# Patient Record
Sex: FEMALE | Race: BLACK OR AFRICAN AMERICAN | NOT HISPANIC OR LATINO | URBAN - METROPOLITAN AREA
[De-identification: names, ages, dates, MRNs, and addresses within clinical notes are randomized per-mention and may not be internally consistent; named-entity substitution may affect disease eponyms.]

---

## 2018-05-16 ENCOUNTER — INPATIENT (INPATIENT)
Facility: HOSPITAL | Age: 83
LOS: 19 days | Discharge: NURSING, REHAB OR LONG-TERM CARE | DRG: 253 | End: 2018-06-05
Attending: SURGERY | Admitting: SURGERY
Payer: MEDICARE

## 2018-05-16 VITALS
TEMPERATURE: 97 F | HEIGHT: 66 IN | SYSTOLIC BLOOD PRESSURE: 150 MMHG | RESPIRATION RATE: 20 BRPM | WEIGHT: 184.09 LBS | OXYGEN SATURATION: 100 % | DIASTOLIC BLOOD PRESSURE: 90 MMHG | HEART RATE: 99 BPM

## 2018-05-16 LAB
ALBUMIN SERPL ELPH-MCNC: 3.6 G/DL — SIGNIFICANT CHANGE UP (ref 3.3–5)
ALP SERPL-CCNC: 86 U/L — SIGNIFICANT CHANGE UP (ref 40–120)
ALT FLD-CCNC: 17 U/L — SIGNIFICANT CHANGE UP (ref 10–45)
ANION GAP SERPL CALC-SCNC: 15 MMOL/L — SIGNIFICANT CHANGE UP (ref 5–17)
APPEARANCE UR: CLEAR — SIGNIFICANT CHANGE UP
APTT BLD: 28.5 SEC — SIGNIFICANT CHANGE UP (ref 27.5–37.4)
AST SERPL-CCNC: 16 U/L — SIGNIFICANT CHANGE UP (ref 10–40)
BASE EXCESS BLDV CALC-SCNC: 0.7 MMOL/L — SIGNIFICANT CHANGE UP
BASOPHILS NFR BLD AUTO: 0.1 % — SIGNIFICANT CHANGE UP (ref 0–2)
BILIRUB SERPL-MCNC: 0.3 MG/DL — SIGNIFICANT CHANGE UP (ref 0.2–1.2)
BILIRUB UR-MCNC: NEGATIVE — SIGNIFICANT CHANGE UP
BLD GP AB SCN SERPL QL: NEGATIVE — SIGNIFICANT CHANGE UP
BLD GP AB SCN SERPL QL: NEGATIVE — SIGNIFICANT CHANGE UP
BUN SERPL-MCNC: 17 MG/DL — SIGNIFICANT CHANGE UP (ref 7–23)
CA-I SERPL-SCNC: 1.23 MMOL/L — SIGNIFICANT CHANGE UP (ref 1.12–1.3)
CALCIUM SERPL-MCNC: 9.6 MG/DL — SIGNIFICANT CHANGE UP (ref 8.4–10.5)
CHLORIDE SERPL-SCNC: 97 MMOL/L — SIGNIFICANT CHANGE UP (ref 96–108)
CK MB CFR SERPL CALC: 1.4 NG/ML — SIGNIFICANT CHANGE UP (ref 0–6.7)
CK SERPL-CCNC: 83 U/L — SIGNIFICANT CHANGE UP (ref 25–170)
CO2 SERPL-SCNC: 22 MMOL/L — SIGNIFICANT CHANGE UP (ref 22–31)
COLOR SPEC: YELLOW — SIGNIFICANT CHANGE UP
CREAT SERPL-MCNC: 0.76 MG/DL — SIGNIFICANT CHANGE UP (ref 0.5–1.3)
DIFF PNL FLD: NEGATIVE — SIGNIFICANT CHANGE UP
EOSINOPHIL NFR BLD AUTO: 2.5 % — SIGNIFICANT CHANGE UP (ref 0–6)
GAS PNL BLDV: 135 MMOL/L — LOW (ref 138–146)
GAS PNL BLDV: SIGNIFICANT CHANGE UP
GAS PNL BLDV: SIGNIFICANT CHANGE UP
GLUCOSE BLDC GLUCOMTR-MCNC: 134 MG/DL — HIGH (ref 70–99)
GLUCOSE BLDC GLUCOMTR-MCNC: 266 MG/DL — HIGH (ref 70–99)
GLUCOSE SERPL-MCNC: 186 MG/DL — HIGH (ref 70–99)
GLUCOSE UR QL: 250
HCO3 BLDV-SCNC: 26 MMOL/L — SIGNIFICANT CHANGE UP (ref 20–27)
HCT VFR BLD CALC: 35.3 % — SIGNIFICANT CHANGE UP (ref 34.5–45)
HGB BLD-MCNC: 11.8 G/DL — SIGNIFICANT CHANGE UP (ref 11.5–15.5)
INR BLD: 1.14 — SIGNIFICANT CHANGE UP (ref 0.88–1.16)
KETONES UR-MCNC: NEGATIVE — SIGNIFICANT CHANGE UP
LACTATE SERPL-SCNC: 1.4 MMOL/L — SIGNIFICANT CHANGE UP (ref 0.5–2)
LACTATE SERPL-SCNC: 2.2 MMOL/L — HIGH (ref 0.5–2)
LEUKOCYTE ESTERASE UR-ACNC: NEGATIVE — SIGNIFICANT CHANGE UP
LYMPHOCYTES # BLD AUTO: 18 % — SIGNIFICANT CHANGE UP (ref 13–44)
MCHC RBC-ENTMCNC: 29.4 PG — SIGNIFICANT CHANGE UP (ref 27–34)
MCHC RBC-ENTMCNC: 33.4 G/DL — SIGNIFICANT CHANGE UP (ref 32–36)
MCV RBC AUTO: 88 FL — SIGNIFICANT CHANGE UP (ref 80–100)
MONOCYTES NFR BLD AUTO: 13.4 % — SIGNIFICANT CHANGE UP (ref 2–14)
NEUTROPHILS NFR BLD AUTO: 66 % — SIGNIFICANT CHANGE UP (ref 43–77)
NITRITE UR-MCNC: NEGATIVE — SIGNIFICANT CHANGE UP
NT-PROBNP SERPL-SCNC: 100 PG/ML — SIGNIFICANT CHANGE UP (ref 0–300)
PCO2 BLDV: 45 MMHG — SIGNIFICANT CHANGE UP (ref 41–51)
PH BLDV: 7.38 — SIGNIFICANT CHANGE UP (ref 7.32–7.43)
PH UR: 6 — SIGNIFICANT CHANGE UP (ref 5–8)
PLATELET # BLD AUTO: 241 K/UL — SIGNIFICANT CHANGE UP (ref 150–400)
PO2 BLDV: 38 MMHG — SIGNIFICANT CHANGE UP
POTASSIUM BLDV-SCNC: 4.1 MMOL/L — SIGNIFICANT CHANGE UP (ref 3.5–4.9)
POTASSIUM SERPL-MCNC: 4.4 MMOL/L — SIGNIFICANT CHANGE UP (ref 3.5–5.3)
POTASSIUM SERPL-SCNC: 4.4 MMOL/L — SIGNIFICANT CHANGE UP (ref 3.5–5.3)
PROT SERPL-MCNC: 7.1 G/DL — SIGNIFICANT CHANGE UP (ref 6–8.3)
PROT UR-MCNC: NEGATIVE MG/DL — SIGNIFICANT CHANGE UP
PROTHROM AB SERPL-ACNC: 12.7 SEC — SIGNIFICANT CHANGE UP (ref 9.8–12.7)
RBC # BLD: 4.01 M/UL — SIGNIFICANT CHANGE UP (ref 3.8–5.2)
RBC # FLD: 13.5 % — SIGNIFICANT CHANGE UP (ref 10.3–16.9)
RH IG SCN BLD-IMP: POSITIVE — SIGNIFICANT CHANGE UP
RH IG SCN BLD-IMP: POSITIVE — SIGNIFICANT CHANGE UP
SAO2 % BLDV: 71 % — SIGNIFICANT CHANGE UP
SODIUM SERPL-SCNC: 134 MMOL/L — LOW (ref 135–145)
SP GR SPEC: 1.02 — SIGNIFICANT CHANGE UP (ref 1–1.03)
TROPONIN T SERPL-MCNC: <0.01 NG/ML — SIGNIFICANT CHANGE UP (ref 0–0.01)
UROBILINOGEN FLD QL: 0.2 E.U./DL — SIGNIFICANT CHANGE UP
WBC # BLD: 7.9 K/UL — SIGNIFICANT CHANGE UP (ref 3.8–10.5)
WBC # FLD AUTO: 7.9 K/UL — SIGNIFICANT CHANGE UP (ref 3.8–10.5)

## 2018-05-16 PROCEDURE — 99285 EMERGENCY DEPT VISIT HI MDM: CPT | Mod: 25

## 2018-05-16 PROCEDURE — 93010 ELECTROCARDIOGRAM REPORT: CPT

## 2018-05-16 PROCEDURE — 99223 1ST HOSP IP/OBS HIGH 75: CPT | Mod: 57,GC

## 2018-05-16 PROCEDURE — 93010 ELECTROCARDIOGRAM REPORT: CPT | Mod: 77

## 2018-05-16 PROCEDURE — 71045 X-RAY EXAM CHEST 1 VIEW: CPT | Mod: 26

## 2018-05-16 PROCEDURE — 73030 X-RAY EXAM OF SHOULDER: CPT | Mod: 26

## 2018-05-16 RX ORDER — SODIUM CHLORIDE 9 MG/ML
1000 INJECTION, SOLUTION INTRAVENOUS
Qty: 0 | Refills: 0 | Status: DISCONTINUED | OUTPATIENT
Start: 2018-05-16 | End: 2018-06-05

## 2018-05-16 RX ORDER — SERTRALINE 25 MG/1
50 TABLET, FILM COATED ORAL DAILY
Qty: 0 | Refills: 0 | Status: DISCONTINUED | OUTPATIENT
Start: 2018-05-16 | End: 2018-06-05

## 2018-05-16 RX ORDER — AMLODIPINE BESYLATE 2.5 MG/1
10 TABLET ORAL AT BEDTIME
Qty: 0 | Refills: 0 | Status: DISCONTINUED | OUTPATIENT
Start: 2018-05-16 | End: 2018-05-18

## 2018-05-16 RX ORDER — CIPROFLOXACIN LACTATE 400MG/40ML
500 VIAL (ML) INTRAVENOUS EVERY 12 HOURS
Qty: 0 | Refills: 0 | Status: DISCONTINUED | OUTPATIENT
Start: 2018-05-16 | End: 2018-05-18

## 2018-05-16 RX ORDER — INSULIN LISPRO 100/ML
VIAL (ML) SUBCUTANEOUS
Qty: 0 | Refills: 0 | Status: DISCONTINUED | OUTPATIENT
Start: 2018-05-16 | End: 2018-05-23

## 2018-05-16 RX ORDER — LOSARTAN POTASSIUM 100 MG/1
25 TABLET, FILM COATED ORAL
Qty: 0 | Refills: 0 | Status: DISCONTINUED | OUTPATIENT
Start: 2018-05-16 | End: 2018-05-16

## 2018-05-16 RX ORDER — DEXTROSE 50 % IN WATER 50 %
15 SYRINGE (ML) INTRAVENOUS ONCE
Qty: 0 | Refills: 0 | Status: DISCONTINUED | OUTPATIENT
Start: 2018-05-16 | End: 2018-06-05

## 2018-05-16 RX ORDER — HEPARIN SODIUM 5000 [USP'U]/ML
5000 INJECTION INTRAVENOUS; SUBCUTANEOUS EVERY 8 HOURS
Qty: 0 | Refills: 0 | Status: DISCONTINUED | OUTPATIENT
Start: 2018-05-16 | End: 2018-05-22

## 2018-05-16 RX ORDER — GABAPENTIN 400 MG/1
300 CAPSULE ORAL THREE TIMES A DAY
Qty: 0 | Refills: 0 | Status: DISCONTINUED | OUTPATIENT
Start: 2018-05-16 | End: 2018-06-05

## 2018-05-16 RX ORDER — DEXTROSE 50 % IN WATER 50 %
25 SYRINGE (ML) INTRAVENOUS ONCE
Qty: 0 | Refills: 0 | Status: DISCONTINUED | OUTPATIENT
Start: 2018-05-16 | End: 2018-06-05

## 2018-05-16 RX ORDER — GLUCAGON INJECTION, SOLUTION 0.5 MG/.1ML
1 INJECTION, SOLUTION SUBCUTANEOUS ONCE
Qty: 0 | Refills: 0 | Status: DISCONTINUED | OUTPATIENT
Start: 2018-05-16 | End: 2018-06-05

## 2018-05-16 RX ORDER — SODIUM CHLORIDE 9 MG/ML
3 INJECTION INTRAMUSCULAR; INTRAVENOUS; SUBCUTANEOUS ONCE
Qty: 0 | Refills: 0 | Status: COMPLETED | OUTPATIENT
Start: 2018-05-16 | End: 2018-05-16

## 2018-05-16 RX ORDER — DEXTROSE 50 % IN WATER 50 %
12.5 SYRINGE (ML) INTRAVENOUS ONCE
Qty: 0 | Refills: 0 | Status: DISCONTINUED | OUTPATIENT
Start: 2018-05-16 | End: 2018-06-05

## 2018-05-16 RX ORDER — ATORVASTATIN CALCIUM 80 MG/1
20 TABLET, FILM COATED ORAL AT BEDTIME
Qty: 0 | Refills: 0 | Status: DISCONTINUED | OUTPATIENT
Start: 2018-05-16 | End: 2018-05-19

## 2018-05-16 RX ADMIN — ATORVASTATIN CALCIUM 20 MILLIGRAM(S): 80 TABLET, FILM COATED ORAL at 22:27

## 2018-05-16 RX ADMIN — HEPARIN SODIUM 5000 UNIT(S): 5000 INJECTION INTRAVENOUS; SUBCUTANEOUS at 22:26

## 2018-05-16 RX ADMIN — SODIUM CHLORIDE 3 MILLILITER(S): 9 INJECTION INTRAMUSCULAR; INTRAVENOUS; SUBCUTANEOUS at 13:18

## 2018-05-16 RX ADMIN — GABAPENTIN 300 MILLIGRAM(S): 400 CAPSULE ORAL at 22:27

## 2018-05-16 RX ADMIN — AMLODIPINE BESYLATE 10 MILLIGRAM(S): 2.5 TABLET ORAL at 22:27

## 2018-05-16 RX ADMIN — Medication 100 MILLIGRAM(S): at 13:45

## 2018-05-16 RX ADMIN — Medication 300 MILLIGRAM(S): at 22:30

## 2018-05-16 RX ADMIN — Medication 500 MILLIGRAM(S): at 19:42

## 2018-05-16 NOTE — PRE-OP CHECKLIST - SELECT TESTS ORDERED
Type and Screen/POCT Blood Glucose/BMP/PT/PTT/CBC/INR/Urinalysis Type and Screen/POCT Blood Glucose/130/BMP/CBC/PT/PTT/INR/Urinalysis

## 2018-05-16 NOTE — ED PROVIDER NOTE - OBJECTIVE STATEMENT
82 yo F with hx of DVT? PE CAD cardiac stent PAD HTN DM CKD with bilateral lower extremity ulcers of great toes x 1 month-- no chest pain or sob  no N/V- recently traveled from [place] to vist her daughter who is an RN in the ED at [facility]  no pleuritic chest pain- no syncope

## 2018-05-16 NOTE — H&P ADULT - HISTORY OF PRESENT ILLNESS
83yoF with sig PMHx of CAD, lateral wall MI s/p 6 stents, dm, htn, ckd, RLE DVT, PE. early dementia presents with b/l great toe ulcers and pain.  Patient is from [place] and is noncompliant with medications and follow up care.  Patient has had ulcers for years.  Had a RLE angio done years ago and they could not intervene and was lost to follow up.  Was seeing Podiatry for ulcers but was told would not debride because she did not have pal pulses and the signals were not good.  Came to [place] to have better care and eval her vascular status.  Patient denies CP/N/V.

## 2018-05-16 NOTE — ED ADULT NURSE NOTE - OBJECTIVE STATEMENT
c/o intermittent dry non-productive cough and bilateral foot pain and BLE swelling x few weeks.  Hx DM, cardiac stents.  Also c/o left shoulder pain s/p trip and fall 2 weeks ago.  Denies head trauma, loc, chest pain, fever, chills.  Patient speaking clearly and coherently in full sentences.  MD at bedside.  EKG done.  Cardiac / vital signs monitoring in place.  DM ulcer to bilateral big toes.  Dressing changed by family this AM.  Labs sent.  Awaiting lab results and further studies.  Continue to monitor.

## 2018-05-16 NOTE — ED ADULT NURSE NOTE - NURSING SKIN WOUND TYPE #1
I have reviewed and confirmed nurses' notes for patient's medications, allergies, medical history, and surgical history. diabetic ulcer

## 2018-05-16 NOTE — H&P ADULT - ASSESSMENT
83yoF with b/l great toe nonhealing ulcer  -consistent carb diet  -ISS  -cont home meds  -f/u Dr. Sebastian  -ELSI cancino mdnt for OR tomorrow  -f/u am labs

## 2018-05-16 NOTE — ED ADULT NURSE NOTE - PMH
Arthropathy    Diabetes mellitus    Hyperlipidemia    Hypertension    Ischemic heart disease    Peripheral vascular disease    Ulcer of lower limb

## 2018-05-16 NOTE — ED PROVIDER NOTE - SKIN COLOR
normal for race/ischemic ulcers bilat great ts  no plap pulses DP or PI- uniphasic signal bilateral DP/PT

## 2018-05-16 NOTE — H&P ADULT - NSHPPHYSICALEXAM_GEN_ALL_CORE
Gen- NAD  Pulm- cta b/l  cardio- s1s2 rrr  abd- soft nt/nd  ext- RLE- distal tip of great toe with dry ulcer with ttp; no dc, odor, erythema  LLE- dry ulcer on nail bed of great toe with ttp; no dc, odor, erythema  Vascular- b/l LE +mono DP/PT triphasic pop 2+ pal fem

## 2018-05-16 NOTE — ED ADULT NURSE NOTE - CHIEF COMPLAINT QUOTE
Patient c/o sob when walking and bilateral foot pain with swelling for 2 weeks . Denies any chest pain  . History of cardiac stent .

## 2018-05-16 NOTE — PROGRESS NOTE ADULT - SUBJECTIVE AND OBJECTIVE BOX
Pre-op Diagnosis:  Procedure: Right Lower Extremity Angiogram  Surgeon: Emanuel    Consent: Obtained in chart                          11.8   7.9   )-----------( 241      ( 16 May 2018 13:25 )             35.3     05-16    134<L>  |  97  |  17  ----------------------------<  186<H>  4.4   |  22  |  0.76    Ca    9.6      16 May 2018 13:25    TPro  7.1  /  Alb  3.6  /  TBili  0.3  /  DBili  x   /  AST  16  /  ALT  17  /  AlkPhos  86  05-16    PT/INR - ( 16 May 2018 13:25 )   PT: 12.7 sec;   INR: 1.14          PTT - ( 16 May 2018 13:25 )  PTT:28.5 sec  Urinalysis Basic - ( 16 May 2018 14:58 )    Color: Yellow / Appearance: Clear / S.020 / pH: x  Gluc: x / Ketone: NEGATIVE  / Bili: Negative / Urobili: 0.2 E.U./dL   Blood: x / Protein: NEGATIVE mg/dL / Nitrite: NEGATIVE   Leuk Esterase: NEGATIVE / RBC: x / WBC x   Sq Epi: x / Non Sq Epi: x / Bacteria: x        Type & Screen: O positive, antibody Neg  CXR:  EKG:        A/P: 83yFemale pre-op for above procedure  1. NPO past midnight, except medications  2. IVFdextrose 5%. 1000 milliLiter(s) IV Continuous <Continuous>    3. [ ] Blood on hold, Units: Pre-op Diagnosis:  Procedure: Right Lower Extremity Angiogram  Surgeon: Emanuel    Consent: Obtained in chart                          11.8   7.9   )-----------( 241      ( 16 May 2018 13:25 )             35.3     05-16    134<L>  |  97  |  17  ----------------------------<  186<H>  4.4   |  22  |  0.76    Ca    9.6      16 May 2018 13:25    TPro  7.1  /  Alb  3.6  /  TBili  0.3  /  DBili  x   /  AST  16  /  ALT  17  /  AlkPhos  86  05-16    PT/INR - ( 16 May 2018 13:25 )   PT: 12.7 sec;   INR: 1.14          PTT - ( 16 May 2018 13:25 )  PTT:28.5 sec  Urinalysis Basic - ( 16 May 2018 14:58 )    Color: Yellow / Appearance: Clear / S.020 / pH: x  Gluc: x / Ketone: NEGATIVE  / Bili: Negative / Urobili: 0.2 E.U./dL   Blood: x / Protein: NEGATIVE mg/dL / Nitrite: NEGATIVE   Leuk Esterase: NEGATIVE / RBC: x / WBC x   Sq Epi: x / Non Sq Epi: x / Bacteria: x        Type & Screen: O positive, antibody Neg  CXR: WNL  EKG: NSR         A/P: 83yFemale pre-op for above procedure  1. NPO past midnight, except medications  2. IVFdextrose 5%. 1000 milliLiter(s) IV Continuous <Continuous>    3. [ ] Blood on hold, Units:

## 2018-05-17 DIAGNOSIS — I73.9 PERIPHERAL VASCULAR DISEASE, UNSPECIFIED: ICD-10-CM

## 2018-05-17 DIAGNOSIS — L98.499 NON-PRESSURE CHRONIC ULCER OF SKIN OF OTHER SITES WITH UNSPECIFIED SEVERITY: ICD-10-CM

## 2018-05-17 DIAGNOSIS — I10 ESSENTIAL (PRIMARY) HYPERTENSION: ICD-10-CM

## 2018-05-17 DIAGNOSIS — E78.5 HYPERLIPIDEMIA, UNSPECIFIED: ICD-10-CM

## 2018-05-17 DIAGNOSIS — Z01.818 ENCOUNTER FOR OTHER PREPROCEDURAL EXAMINATION: ICD-10-CM

## 2018-05-17 DIAGNOSIS — E11.9 TYPE 2 DIABETES MELLITUS WITHOUT COMPLICATIONS: ICD-10-CM

## 2018-05-17 LAB
ANION GAP SERPL CALC-SCNC: 10 MMOL/L — SIGNIFICANT CHANGE UP (ref 5–17)
BUN SERPL-MCNC: 13 MG/DL — SIGNIFICANT CHANGE UP (ref 7–23)
CALCIUM SERPL-MCNC: 9 MG/DL — SIGNIFICANT CHANGE UP (ref 8.4–10.5)
CHLORIDE SERPL-SCNC: 99 MMOL/L — SIGNIFICANT CHANGE UP (ref 96–108)
CO2 SERPL-SCNC: 28 MMOL/L — SIGNIFICANT CHANGE UP (ref 22–31)
CREAT SERPL-MCNC: 0.7 MG/DL — SIGNIFICANT CHANGE UP (ref 0.5–1.3)
CULTURE RESULTS: SIGNIFICANT CHANGE UP
GLUCOSE BLDC GLUCOMTR-MCNC: 123 MG/DL — HIGH (ref 70–99)
GLUCOSE BLDC GLUCOMTR-MCNC: 126 MG/DL — HIGH (ref 70–99)
GLUCOSE BLDC GLUCOMTR-MCNC: 130 MG/DL — HIGH (ref 70–99)
GLUCOSE BLDC GLUCOMTR-MCNC: 236 MG/DL — HIGH (ref 70–99)
GLUCOSE SERPL-MCNC: 140 MG/DL — HIGH (ref 70–99)
GRAM STN FLD: SIGNIFICANT CHANGE UP
HBA1C BLD-MCNC: 8.2 % — HIGH (ref 4–5.6)
HCT VFR BLD CALC: 34 % — LOW (ref 34.5–45)
HGB BLD-MCNC: 11.5 G/DL — SIGNIFICANT CHANGE UP (ref 11.5–15.5)
MAGNESIUM SERPL-MCNC: 2.1 MG/DL — SIGNIFICANT CHANGE UP (ref 1.6–2.6)
MCHC RBC-ENTMCNC: 29.3 PG — SIGNIFICANT CHANGE UP (ref 27–34)
MCHC RBC-ENTMCNC: 33.8 G/DL — SIGNIFICANT CHANGE UP (ref 32–36)
MCV RBC AUTO: 86.5 FL — SIGNIFICANT CHANGE UP (ref 80–100)
PHOSPHATE SERPL-MCNC: 3.3 MG/DL — SIGNIFICANT CHANGE UP (ref 2.5–4.5)
PLATELET # BLD AUTO: 249 K/UL — SIGNIFICANT CHANGE UP (ref 150–400)
POTASSIUM SERPL-MCNC: 3.9 MMOL/L — SIGNIFICANT CHANGE UP (ref 3.5–5.3)
POTASSIUM SERPL-SCNC: 3.9 MMOL/L — SIGNIFICANT CHANGE UP (ref 3.5–5.3)
RBC # BLD: 3.93 M/UL — SIGNIFICANT CHANGE UP (ref 3.8–5.2)
RBC # FLD: 13.5 % — SIGNIFICANT CHANGE UP (ref 10.3–16.9)
SODIUM SERPL-SCNC: 137 MMOL/L — SIGNIFICANT CHANGE UP (ref 135–145)
SPECIMEN SOURCE: SIGNIFICANT CHANGE UP
SPECIMEN SOURCE: SIGNIFICANT CHANGE UP
WBC # BLD: 5.8 K/UL — SIGNIFICANT CHANGE UP (ref 3.8–10.5)
WBC # FLD AUTO: 5.8 K/UL — SIGNIFICANT CHANGE UP (ref 3.8–10.5)

## 2018-05-17 PROCEDURE — 75710 ARTERY X-RAYS ARM/LEG: CPT | Mod: 26,GC

## 2018-05-17 PROCEDURE — 99223 1ST HOSP IP/OBS HIGH 75: CPT | Mod: GC

## 2018-05-17 PROCEDURE — 75625 CONTRAST EXAM ABDOMINL AORTA: CPT | Mod: 26,GC

## 2018-05-17 PROCEDURE — 36247 INS CATH ABD/L-EXT ART 3RD: CPT | Mod: GC

## 2018-05-17 PROCEDURE — 93306 TTE W/DOPPLER COMPLETE: CPT | Mod: 26

## 2018-05-17 PROCEDURE — 36140 INTRO NDL ICATH UPR/LXTR ART: CPT | Mod: 59,GC

## 2018-05-17 PROCEDURE — 36246 INS CATH ABD/L-EXT ART 2ND: CPT | Mod: 59,GC

## 2018-05-17 RX ORDER — SODIUM CHLORIDE 9 MG/ML
1000 INJECTION, SOLUTION INTRAVENOUS
Qty: 0 | Refills: 0 | Status: DISCONTINUED | OUTPATIENT
Start: 2018-05-17 | End: 2018-05-17

## 2018-05-17 RX ORDER — SODIUM CHLORIDE 9 MG/ML
1000 INJECTION INTRAMUSCULAR; INTRAVENOUS; SUBCUTANEOUS
Qty: 0 | Refills: 0 | Status: DISCONTINUED | OUTPATIENT
Start: 2018-05-17 | End: 2018-05-18

## 2018-05-17 RX ORDER — ASPIRIN/CALCIUM CARB/MAGNESIUM 324 MG
81 TABLET ORAL DAILY
Qty: 0 | Refills: 0 | Status: DISCONTINUED | OUTPATIENT
Start: 2018-05-17 | End: 2018-06-05

## 2018-05-17 RX ADMIN — Medication 81 MILLIGRAM(S): at 15:05

## 2018-05-17 RX ADMIN — Medication 300 MILLIGRAM(S): at 22:24

## 2018-05-17 RX ADMIN — ATORVASTATIN CALCIUM 20 MILLIGRAM(S): 80 TABLET, FILM COATED ORAL at 22:24

## 2018-05-17 RX ADMIN — Medication 500 MILLIGRAM(S): at 17:10

## 2018-05-17 RX ADMIN — GABAPENTIN 300 MILLIGRAM(S): 400 CAPSULE ORAL at 05:55

## 2018-05-17 RX ADMIN — Medication 500 MILLIGRAM(S): at 05:55

## 2018-05-17 RX ADMIN — GABAPENTIN 300 MILLIGRAM(S): 400 CAPSULE ORAL at 22:24

## 2018-05-17 RX ADMIN — Medication 300 MILLIGRAM(S): at 10:56

## 2018-05-17 RX ADMIN — Medication 300 MILLIGRAM(S): at 04:55

## 2018-05-17 RX ADMIN — AMLODIPINE BESYLATE 10 MILLIGRAM(S): 2.5 TABLET ORAL at 22:24

## 2018-05-17 RX ADMIN — SERTRALINE 50 MILLIGRAM(S): 25 TABLET, FILM COATED ORAL at 17:10

## 2018-05-17 RX ADMIN — Medication 300 MILLIGRAM(S): at 17:06

## 2018-05-17 RX ADMIN — SODIUM CHLORIDE 50 MILLILITER(S): 9 INJECTION INTRAMUSCULAR; INTRAVENOUS; SUBCUTANEOUS at 15:05

## 2018-05-17 RX ADMIN — HEPARIN SODIUM 5000 UNIT(S): 5000 INJECTION INTRAVENOUS; SUBCUTANEOUS at 22:23

## 2018-05-17 RX ADMIN — Medication 2: at 22:22

## 2018-05-17 NOTE — PROGRESS NOTE ADULT - SUBJECTIVE AND OBJECTIVE BOX
Vascular Post Op Check    Procedure: RLE angiogram  Surgeon: Emanuel    Pt comfortable, without complaints  Denies CP, SOB, N/V, numbness/tingling     Vital Signs Last 24 Hrs  T(C): 36.2 (17 May 2018 16:01), Max: 36.8 (17 May 2018 00:04)  T(F): 97.1 (17 May 2018 16:01), Max: 98.3 (17 May 2018 00:04)  HR: 61 (17 May 2018 16:01) (61 - 82)  BP: 139/73 (17 May 2018 16:01) (131/76 - 160/77)  BP(mean): --  RR: 17 (17 May 2018 16:01) (16 - 17)  SpO2: 98% (17 May 2018 16:01) (96% - 100%)  AVSS, NAD    Dressing C/D/I with no associated swelling or ecchymosis  General: Pt Alert and oriented                           11.5   5.8   )-----------( 249      ( 17 May 2018 11:31 )             34.0   05-17    137  |  99  |  13  ----------------------------<  140<H>  3.9   |  28  |  0.70    Ca    9.0      17 May 2018 11:31  Phos  3.3     05-17  Mg     2.1     05-17    TPro  7.1  /  Alb  3.6  /  TBili  0.3  /  DBili  x   /  AST  16  /  ALT  17  /  AlkPhos  86  05-16      A/P: 83yFemale POD#0 s/p above  - stable  - pain control  - NPO at MN for possible stress test tmrw

## 2018-05-17 NOTE — PROGRESS NOTE ADULT - SUBJECTIVE AND OBJECTIVE BOX
O/N: VALERIE, pre op'd consented, xray pend                        83yoF with b/l great toe nonhealing ulcer  -consistent carb diet  -ISS  -cont home meds  -f/u Dr. Romulo ORTEGA c mdnt for OR today  -f/u am labs O/N: VALERIE, pre op'd consented, xray pend    ciprofloxacin     Tablet 500  clindamycin   Capsule 300  amLODIPine   Tablet 10  ciprofloxacin     Tablet 500  clindamycin   Capsule 300  heparin  Injectable 5000        Vital Signs Last 24 Hrs  T(C): 36.5 (17 May 2018 05:58), Max: 36.9 (16 May 2018 16:22)  T(F): 97.7 (17 May 2018 05:58), Max: 98.5 (16 May 2018 16:22)  HR: 76 (17 May 2018 05:58) (70 - 99)  BP: 134/63 (17 May 2018 05:58) (130/57 - 160/77)  BP(mean): --  RR: 16 (17 May 2018 05:58) (16 - 20)  SpO2: 96% (17 May 2018 05:58) (96% - 100%)  I&O's Summary    16 May 2018 07:01  -  17 May 2018 07:00  --------------------------------------------------------  IN: 540 mL / OUT: 350 mL / NET: 190 mL      Gen- NAD  	Pulm- cta b/l  	cardio- s1s2 rrr  	abd- soft nt/nd  	ext- RLE- distal tip of great toe with dry ulcer with ttp; no dc, odor, erythema  	LLE- dry ulcer on nail bed of great toe with ttp; no dc, odor, erythema  Vascular- b/l LE +mono DP/PT triphasic pop 2+ pal fem      LABS:                        11.8   7.9   )-----------( 241      ( 16 May 2018 13:25 )             35.3     05-16    134<L>  |  97  |  17  ----------------------------<  186<H>  4.4   |  22  |  0.76    Ca    9.6      16 May 2018 13:25    TPro  7.1  /  Alb  3.6  /  TBili  0.3  /  DBili  x   /  AST  16  /  ALT  17  /  AlkPhos  86  05-16    PT/INR - ( 16 May 2018 13:25 )   PT: 12.7 sec;   INR: 1.14          PTT - ( 16 May 2018 13:25 )  PTT:28.5 sec        Assessment and Plan:     83yoF with b/l great toe nonhealing ulcer  -pre op for tmrw  -consistent carb diet  -ISS  -cont home meds  -f/u Dr. Romulo cancino mdnt for OR today  -f/u am labs

## 2018-05-17 NOTE — CONSULT NOTE ADULT - PROBLEM SELECTOR RECOMMENDATION 9
The culture is positive  follow on the identification of the organism. Patient is to continue on antibiotic.  The patient is scheduled for surgery after cardiac clearance and optimizing her medical condition

## 2018-05-17 NOTE — CONSULT NOTE ADULT - PROBLEM SELECTOR RECOMMENDATION 6
The patient's medical condition is optimized for surgery.  There is no contraindication for surgery.  There is no clinical evidence neither of angina, decompensated CHF, arrhthymias, nor valvular disease.   There is no limitation of exercise capacity.  MET is 4 .  ASA class is 3.  Weinberg cardiac risk factor is high .  DVT prophylaxis is indicated.  Pain control.  Early mobilization.  Avoid fluid overload.  I wait cardiac clearance

## 2018-05-17 NOTE — CONSULT NOTE ADULT - SUBJECTIVE AND OBJECTIVE BOX
Patient is a 83y old  Female who presents with a chief complaint of b/l great toe nonhealing ulcer with pain (16 May 2018 16:32)      HPI:  83yoF with sig PMHx of CAD, lateral wall MI s/p 6 stents, dm, htn, ckd, RLE DVT, PE. early dementia presents with b/l great toe ulcers and pain.  Patient is from [place] and is noncompliant with medications and follow up care.  Patient has had ulcers for years.  Had a RLE angio done years ago and they could not intervene and was lost to follow up.  Was seeing Podiatry for ulcers but was told would not debride because she did not have pal pulses and the signals were not good.  Came to [place] to have better care and eval her vascular status.  Patient denies CP/N/V. (16 May 2018 16:32)      PAST MEDICAL & SURGICAL HISTORY:  Peripheral vascular disease  Ulcer of lower limb  Arthropathy  Ischemic heart disease  Hypertension  Hyperlipidemia  Diabetes mellitus      FAMILY HISTORY:      SOCIAL HISTORY:  Smoking Status: [ ] Current, [ ] Former, [ ] Never  Pack Years:    MEDICATIONS:  Pulmonary:    Antimicrobials:  ciprofloxacin     Tablet 500 milliGRAM(s) Oral every 12 hours  clindamycin   Capsule 300 milliGRAM(s) Oral every 6 hours    Anticoagulants:  aspirin enteric coated 81 milliGRAM(s) Oral daily  heparin  Injectable 5000 Unit(s) SubCutaneous every 8 hours    Onc:    GI/:    Endocrine:  atorvastatin 20 milliGRAM(s) Oral at bedtime  dextrose 40% Gel 15 Gram(s) Oral once PRN  dextrose 50% Injectable 12.5 Gram(s) IV Push once  dextrose 50% Injectable 25 Gram(s) IV Push once  dextrose 50% Injectable 25 Gram(s) IV Push once  glucagon  Injectable 1 milliGRAM(s) IntraMuscular once PRN  insulin lispro (HumaLOG) corrective regimen sliding scale   SubCutaneous Before meals and at bedtime    Cardiac:  amLODIPine   Tablet 10 milliGRAM(s) Oral at bedtime    Other Medications:  dextrose 5%. 1000 milliLiter(s) IV Continuous <Continuous>  gabapentin 300 milliGRAM(s) Oral three times a day  sertraline 50 milliGRAM(s) Oral daily  sodium chloride 0.9%. 1000 milliLiter(s) IV Continuous <Continuous>      Allergies    penicillins (Anaphylaxis)    Intolerances        Vital Signs Last 24 Hrs  T(C): 36.2 (17 May 2018 16:01), Max: 36.8 (17 May 2018 00:04)  T(F): 97.1 (17 May 2018 16:01), Max: 98.3 (17 May 2018 00:04)  HR: 61 (17 May 2018 16:01) (61 - 76)  BP: 139/73 (17 May 2018 16:01) (131/76 - 139/73)  BP(mean): --  RR: 17 (17 May 2018 16:01) (16 - 17)  SpO2: 98% (17 May 2018 16:01) (96% - 100%)     @ 07:01  -   @ 07:00  --------------------------------------------------------  IN: 640 mL / OUT: 650 mL / NET: -10 mL     @ 07:01  -   @ 21:23  --------------------------------------------------------  IN: 0 mL / OUT: 350 mL / NET: -350 mL          LABS:      CBC Full  -  ( 17 May 2018 11:31 )  WBC Count : 5.8 K/uL  Hemoglobin : 11.5 g/dL  Hematocrit : 34.0 %  Platelet Count - Automated : 249 K/uL  Mean Cell Volume : 86.5 fL  Mean Cell Hemoglobin : 29.3 pg  Mean Cell Hemoglobin Concentration : 33.8 g/dL  Auto Neutrophil # : x  Auto Lymphocyte # : x  Auto Monocyte # : x  Auto Eosinophil # : x  Auto Basophil # : x  Auto Neutrophil % : x  Auto Lymphocyte % : x  Auto Monocyte % : x  Auto Eosinophil % : x  Auto Basophil % : x        137  |  99  |  13  ----------------------------<  140<H>  3.9   |  28  |  0.70    Ca    9.0      17 May 2018 11:31  Phos  3.3       Mg     2.1         TPro  7.1  /  Alb  3.6  /  TBili  0.3  /  DBili  x   /  AST  16  /  ALT  17  /  AlkPhos  86  05-    PT/INR - ( 16 May 2018 13:25 )   PT: 12.7 sec;   INR: 1.14          PTT - ( 16 May 2018 13:25 )  PTT:28.5 sec      Urinalysis Basic - ( 16 May 2018 14:58 )    Color: Yellow / Appearance: Clear / S.020 / pH: x  Gluc: x / Ketone: NEGATIVE  / Bili: Negative / Urobili: 0.2 E.U./dL   Blood: x / Protein: NEGATIVE mg/dL / Nitrite: NEGATIVE   Leuk Esterase: NEGATIVE / RBC: x / WBC x   Sq Epi: x / Non Sq Epi: x / Bacteria: x    < from: Echocardiogram (18 @ 08:13) >  NTERPRETATION:  Patient Height: 167.0 cm  Patient Weight: 83.0 kg  Systolic Pressure: 129 mmHg  Diastolic Pressure: 77 mmHg  BSA: 1.9 m^2  Interpretation Summary  Normal left ventricular size and wall thickness.Abnormal (paradoxical)   septal   motion consistent with abnormal conductionThere is basal inferior wall   hypokinesis.The left ventricular ejection fraction is 65%.The right   ventricle   is normal in size and function.The left atrium is mildly dilated.The   mitral   inflow pattern is consistent with impaired left ventricular relaxation   with   mildly elevated left atrial pressure (8-14mmHg).  Right atrial size is   normal.Calcified trileaflet aortic valve with prominent thickening on   ventricular side of aortic valve, likely represents a calcification;   howwever   connot rule out a fibroelastoma.  No aortic regurgitation noted.No   hemodynamically significant valvularaortic stenosis.Structurally normal   mitral valve.There is trace mitral regurgitation.Structurally normal   tricuspid   valve.There is trace to mild tricuspid regurgitation.There is mild   pulmonary   hypertension.The pulmonary artery systolic pressure is estimated to be 45   mmHg.Structurally normal pulmonic valve.There is trace pulmonic   regurgitation.There is no pericardial effusion.No prior echo is   available for   comparison.    < end of copied text >            < from: Xray Chest 1 View-PORTABLE IMMEDIATE (18 @ 13:57) >    EXAM:  XR CHEST PORTABLE IMMED 1V                          PROCEDURE DATE:  2018                     INTERPRETATION:  PORTABLE CHEST X-RAY     HISTORY: sob    PRIOR STUDIES: No prior studies are available for comparison.    FINDINGS: The lungs are clear.  There are no pleural effusions.  The   cardiomediastinal silhouette is unremarkable. No pneumothorax. Right   rotator cuff arthropathy.    IMPRESSION:  The lungs are clear.    < end of copied text >      RADIOLOGY & ADDITIONAL STUDIES (The following images were personally reviewed):

## 2018-05-17 NOTE — CONSULT NOTE ADULT - PROBLEM SELECTOR RECOMMENDATION 5
The blood sugar is control but the hemoglobin A1c is elevated. Continue insulin sliding scale to maintain the blood sugar in the range of 140- 180. Monitor for hypoglycemia

## 2018-05-17 NOTE — BRIEF OPERATIVE NOTE - PROCEDURE
<<-----Click on this checkbox to enter Procedure Angiogram  05/17/2018  RLE angiogram  Active  AMAYAER

## 2018-05-18 LAB
GLUCOSE BLDC GLUCOMTR-MCNC: 135 MG/DL — HIGH (ref 70–99)
GLUCOSE BLDC GLUCOMTR-MCNC: 144 MG/DL — HIGH (ref 70–99)
GLUCOSE BLDC GLUCOMTR-MCNC: 177 MG/DL — HIGH (ref 70–99)
GLUCOSE BLDC GLUCOMTR-MCNC: 246 MG/DL — HIGH (ref 70–99)
TROPONIN T SERPL-MCNC: <0.01 NG/ML — SIGNIFICANT CHANGE UP (ref 0–0.01)

## 2018-05-18 PROCEDURE — 93016 CV STRESS TEST SUPVJ ONLY: CPT

## 2018-05-18 PROCEDURE — 99233 SBSQ HOSP IP/OBS HIGH 50: CPT

## 2018-05-18 PROCEDURE — 93018 CV STRESS TEST I&R ONLY: CPT

## 2018-05-18 PROCEDURE — 99223 1ST HOSP IP/OBS HIGH 75: CPT

## 2018-05-18 PROCEDURE — 99233 SBSQ HOSP IP/OBS HIGH 50: CPT | Mod: GC

## 2018-05-18 PROCEDURE — 78452 HT MUSCLE IMAGE SPECT MULT: CPT | Mod: 26

## 2018-05-18 RX ORDER — INSULIN LISPRO 100/ML
4 VIAL (ML) SUBCUTANEOUS
Qty: 0 | Refills: 0 | Status: DISCONTINUED | OUTPATIENT
Start: 2018-05-18 | End: 2018-05-22

## 2018-05-18 RX ORDER — AMLODIPINE BESYLATE 2.5 MG/1
5 TABLET ORAL DAILY
Qty: 0 | Refills: 0 | Status: DISCONTINUED | OUTPATIENT
Start: 2018-05-18 | End: 2018-05-23

## 2018-05-18 RX ORDER — AMLODIPINE BESYLATE 2.5 MG/1
2.5 TABLET ORAL DAILY
Qty: 0 | Refills: 0 | Status: DISCONTINUED | OUTPATIENT
Start: 2018-05-18 | End: 2018-05-18

## 2018-05-18 RX ORDER — VANCOMYCIN HCL 1 G
1250 VIAL (EA) INTRAVENOUS EVERY 24 HOURS
Qty: 0 | Refills: 0 | Status: DISCONTINUED | OUTPATIENT
Start: 2018-05-18 | End: 2018-05-21

## 2018-05-18 RX ADMIN — Medication 166.67 MILLIGRAM(S): at 17:33

## 2018-05-18 RX ADMIN — Medication 2: at 17:41

## 2018-05-18 RX ADMIN — HEPARIN SODIUM 5000 UNIT(S): 5000 INJECTION INTRAVENOUS; SUBCUTANEOUS at 22:04

## 2018-05-18 RX ADMIN — SERTRALINE 50 MILLIGRAM(S): 25 TABLET, FILM COATED ORAL at 11:20

## 2018-05-18 RX ADMIN — Medication 500 MILLIGRAM(S): at 06:08

## 2018-05-18 RX ADMIN — Medication 1: at 22:04

## 2018-05-18 RX ADMIN — GABAPENTIN 300 MILLIGRAM(S): 400 CAPSULE ORAL at 22:04

## 2018-05-18 RX ADMIN — GABAPENTIN 300 MILLIGRAM(S): 400 CAPSULE ORAL at 06:08

## 2018-05-18 RX ADMIN — Medication 300 MILLIGRAM(S): at 03:30

## 2018-05-18 RX ADMIN — ATORVASTATIN CALCIUM 20 MILLIGRAM(S): 80 TABLET, FILM COATED ORAL at 22:03

## 2018-05-18 RX ADMIN — Medication 300 MILLIGRAM(S): at 10:13

## 2018-05-18 RX ADMIN — AMLODIPINE BESYLATE 5 MILLIGRAM(S): 2.5 TABLET ORAL at 11:21

## 2018-05-18 RX ADMIN — Medication 81 MILLIGRAM(S): at 11:21

## 2018-05-18 RX ADMIN — HEPARIN SODIUM 5000 UNIT(S): 5000 INJECTION INTRAVENOUS; SUBCUTANEOUS at 06:08

## 2018-05-18 NOTE — PROGRESS NOTE ADULT - SUBJECTIVE AND OBJECTIVE BOX
The nuclear stress test showed a  moderate area of ischemia. The SS score is five which indicates mild ischemia. The patient is clinically stable. Her cardiac surgical risk is increased by her age and coronary artery disease. Her RCRI score is one. Her cardiac risk is not high, and she is having a low-risk procedure. She is optimized for surgery.

## 2018-05-18 NOTE — PROGRESS NOTE ADULT - SUBJECTIVE AND OBJECTIVE BOX
Interval Events: Reviewed  Patient seen and examined at bedside.    Patient is a 83y old  Female who presents with a chief complaint of b/l great toe nonhealing ulcer with pain (16 May 2018 16:32)    She is doing okay. She had the stress test  PAST MEDICAL & SURGICAL HISTORY:  Peripheral vascular disease  Ulcer of lower limb  Arthropathy  Ischemic heart disease  Hypertension  Hyperlipidemia  Diabetes mellitus      MEDICATIONS:  Pulmonary:    Antimicrobials:  vancomycin  IVPB 1250 milliGRAM(s) IV Intermittent every 24 hours    Anticoagulants:  aspirin enteric coated 81 milliGRAM(s) Oral daily  heparin  Injectable 5000 Unit(s) SubCutaneous every 8 hours    Cardiac:  amLODIPine   Tablet 5 milliGRAM(s) Oral daily      Allergies    penicillins (Anaphylaxis)    Intolerances        Vital Signs Last 24 Hrs  T(C): 36.6 (18 May 2018 20:38), Max: 37.4 (17 May 2018 22:20)  T(F): 97.8 (18 May 2018 20:38), Max: 99.3 (17 May 2018 22:20)  HR: 85 (18 May 2018 20:38) (65 - 85)  BP: 168/65 (18 May 2018 20:38) (134/69 - 168/65)  BP(mean): --  RR: 17 (18 May 2018 20:38) (17 - 18)  SpO2: 98% (18 May 2018 20:38) (95% - 100%)    05-17 @ 07:01  -  05-18 @ 07:00  --------------------------------------------------------  IN: 800 mL / OUT: 650 mL / NET: 150 mL    05-18 @ 07:01  -  05-18 @ 21:28  --------------------------------------------------------  IN: 0 mL / OUT: 400 mL / NET: -400 mL          LABS:      CBC Full  -  ( 17 May 2018 11:31 )  WBC Count : 5.8 K/uL  Hemoglobin : 11.5 g/dL  Hematocrit : 34.0 %  Platelet Count - Automated : 249 K/uL  Mean Cell Volume : 86.5 fL  Mean Cell Hemoglobin : 29.3 pg  Mean Cell Hemoglobin Concentration : 33.8 g/dL  Auto Neutrophil # : x  Auto Lymphocyte # : x  Auto Monocyte # : x  Auto Eosinophil # : x  Auto Basophil # : x  Auto Neutrophil % : x  Auto Lymphocyte % : x  Auto Monocyte % : x  Auto Eosinophil % : x  Auto Basophil % : x    05-17    137  |  99  |  13  ----------------------------<  140<H>  < from: Pharm Thallium Stress (Lexiscan) -LEXMIB (05.18.18 @ 17:01) >  EXAM:  PHARM STRESS THALL RENETTA-MIBI+                          EXAM:  MYOCARD PERF SPECT MULTI                          EXAM:  EKG STRESS TEST (CARDIOL)                          PROCEDURE DATE:  05/18/2018                     INTERPRETATION:  Dear Dr. Castellanos:    Your patient, Radha Marrero, an 83 year-old-female, was evaluated in our   laboratory on May 18, 2018 by stress sestamibi/rest thallium separate   acquisition SPECT myocardial perfusion imaging.     Indication:  Diagnosis of coronary artery disease    Clinical History:  The patient is an 83-year-old woman with known coronary artery disease.   Her cardiac risk factors include DM, PVD and HLD. Her weight is 189 lbs   and her height is 64 in.    Procedure:    A resting injection of 1.4mCi of Tl-201 was made and semi-supine   tomographic images were obtained ten minutes later. Following the   acquisition of the resting images .4 mg of regadenoson was given by IV   push. Thirty seconds after the administration of the regadenoson, 10.0   mCi of Tc-99m sestamibi was injected. Semi-upright and semi-supine gated   tomographic images were obtained sixty minutes after the injection of   Tc-99m sestamibi.        Findings:  The resting heart rate was 61 bpm and the resting blood pressure was   124/70 mm Hg. A peak heart rate of 93 beats per minute and a blood   pressure of 130/66 mm Hg were recorded during stress. The patient did not   develop any symptoms other than fatigue during the procedure.    The resting EKG revealed normal sinus rhythm. Neither ST segment   depression nor cardiac arrhythmias were noted during the stress or   recovery periods.    The overall quality of the imaging is excellent. Visual analysis of the   stress tomographic images demonstrates a moderate size area of decreased   tracer uptake involving mid/distal inferior, inferolateral and   inferoseptal segments. Visual analysis of the rest tomographic images   demonstrates a physiologic distribution of tracer throughout the   myocardium. The SSS scoreis 5. There is no evidence of ischemic dilation   of the left ventricle. The gated images demonstrate normal left   ventricular chamber size, wall motion and myocardial thickening. The   ejection fraction is 62%. Additionally the right ventricle is normal.    EF (%)                      62                                    LLN = 50    EDV (ml)                  31                                    ULN =100    ESV (ml)                   12                                    ULN = 42         Event Rate (%)  SSS score      MI  Cardiac Death  <4    (Normal)      0.5    .3  4-8   (Mildly abnormal)  2.7    .8  9-13 (Mod. Abnormal)  2.9    2.3  >13 (Severely abnormal)  4.2    2.9    Impression:    Myocardial perfusion imaging is abnormal. There is a moderate size area   of reversible ischemia involving mid/distal inferior, inferolateral and   inferoseptal segments. Overall left ventricular systolic function is   normal without regional wall motion abnormalities. The EKG stress test is   normal.    < end of copied text >  3.9   |  28  |  0.70    Ca    9.0      17 May 2018 11:31  Phos  3.3     05-17  Mg     2.1     05-17                      Culture Results:   Numerous Staphylococcus aureus  presumptive Methicillin resistant  Confirmation to follow within 24 hours  Susceptibility to follow.  Result called to and read back byBakari Mccarthy RN  05/18/2018 13:34:03  Rare Gram Negative Rods  Identification and susceptibility to follow. (05-17 @ 08:34)      RADIOLOGY & ADDITIONAL STUDIES (The following images were personally reviewed):  Vivar:                                     No  Urine output:                       adequate  DVT prophylaxis:                 Yes  Flattus:                                  Yes  Bowel movement:              No

## 2018-05-18 NOTE — CONSULT NOTE ADULT - SUBJECTIVE AND OBJECTIVE BOX
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 early dementia presents with b/l great toe ulcers and pain.  Patient is from [place] and is noncompliant with medications and follow up care.  Patient has had ulcers for years.  Had a RLE angio done years ago and they could not intervene and was lost to follow up.  Was seeing Podiatry for ulcers but was told would not debride because she did not have pal pulses and the signals were not good.  Came to [place] to have better care and eval her vascular status.  Patient denies CP/N/V.    The patient walks two blocks with her walker and stop three times because of left leg discomfort. She has no chest pain or dyspnea. She has no dyspnea in bed. She is a vague sense of dizziness. The patient has not had a recent stress test.    Chart reviewed  PAST MEDICAL & SURGICAL HISTORY:  Peripheral vascular disease  Ulcer of lower limb  Arthropathy  Ischemic heart disease  Hypertension  Hyperlipidemia  Diabetes mellitus      [x  ] Diabetes   [ x ] Hypertension  [x  ] Hyperlipidemia  [ x ] CAD  [ x ] PCI  [  ] CABG    PREVIOUS DIAGNOSTIC TESTING:    [x  ] Echocardiogram:  [x  ]  Catheterization:  [ x ] Stress Test:  	    MEDICATIONS:  amLODIPine   Tablet 10 milliGRAM(s) Oral at bedtime    ciprofloxacin     Tablet 500 milliGRAM(s) Oral every 12 hours  clindamycin   Capsule 300 milliGRAM(s) Oral every 6 hours      gabapentin 300 milliGRAM(s) Oral three times a day  sertraline 50 milliGRAM(s) Oral daily      atorvastatin 20 milliGRAM(s) Oral at bedtime  dextrose 40% Gel 15 Gram(s) Oral once PRN  dextrose 50% Injectable 12.5 Gram(s) IV Push once  dextrose 50% Injectable 25 Gram(s) IV Push once  dextrose 50% Injectable 25 Gram(s) IV Push once  glucagon  Injectable 1 milliGRAM(s) IntraMuscular once PRN  insulin lispro (HumaLOG) corrective regimen sliding scale   SubCutaneous Before meals and at bedtime    aspirin enteric coated 81 milliGRAM(s) Oral daily  dextrose 5%. 1000 milliLiter(s) IV Continuous <Continuous>  heparin  Injectable 5000 Unit(s) SubCutaneous every 8 hours  sodium chloride 0.9%. 1000 milliLiter(s) IV Continuous <Continuous>      FAMILY HISTORY:      SOCIAL HISTORY:    [ x ] Never smoker  [  ] Non-smoker  [  ] Smoker  [  ] Social alcohol use  [  ] Former smoker    Allergies    penicillins (Anaphylaxis)    Intolerances    	    REVIEW OF SYSTEMS:  CONSTITUTIONAL: No fever, weight loss, or fatigue  EYES: No eye pain, visual disturbances, or discharge  ENT:  No difficulty hearing, tinnitus, vertigo; No sinus or throat pain  NECK: No pain or stiffness  PULMONARY: No cough, no wheezing, chills or hemoptysis; No Shortness of Breath  CARDIOVASCULAR: No chest pain, no  palpitations, no passing out, dizziness, or leg swelling+ claudication  GASTROINTESTINAL: No abdominal  pain. No nausea, vomiting, or hematemesis; No diarrhea or constipation. No melena or hematochezia.  GENITOURINARY: No dysuria, frequency, hematuria, or incontinence  NEUROLOGICAL: No headaches, memory loss, loss of strength, numbness, or tremors  SKIN: No itching, burning, rashes, or lesions   LYMPH Nodes: No enlarged glands  ENDOCRINE: No heat or cold intolerance; No hair loss  MUSCULOSKELETAL: No joint pain or swelling; No muscle, back, or extremity pain  PSYCHIATRIC: No depression, anxiety, mood swings, or difficulty sleeping  HEME/LYMPH: No easy bruising, or bleeding gums  ALLERGY AND IMMUNOLOGIC: No hives or eczema	    PHYSICAL EXAM:  T(C): 37.3 (05-18-18 @ 05:51), Max: 37.4 (05-17-18 @ 22:20)  HR: 73 (05-18-18 @ 05:51) (61 - 85)  BP: 166/72 (05-18-18 @ 05:51) (131/76 - 166/72)  RR: 17 (05-18-18 @ 05:51) (16 - 17)  SpO2: 100% (05-18-18 @ 05:51) (95% - 100%)  Wt(kg): --  I&O's Summary    17 May 2018 07:01  -  18 May 2018 07:00  --------------------------------------------------------  IN: 800 mL / OUT: 650 mL / NET: 150 mL        Appearance: Normal	  HEENT:   Normal oral mucosa, PERRL, EOMI	  Lymphatic: No lymphadenopathy  Cardiovascular: Normal S1 S2, No JVD, No murmur, No edema, DP/PT 0/0, gangrenous toes bilaterally  Pulmonary: Lungs clear to auscultation	  Psychiatry: A & O x 3, Mood & affect appropriate  Gastrointestinal:  Soft, Non-tender, + BS	  Skin: No rashes, No ecchymoses, No cyanosis	  Neurologic: Non-focal  Extremities: Normal range of motion, No clubbing or cyanosis  	 	  CARDIAC MARKERS:                                11.5   5.8   )-----------( 249      ( 17 May 2018 11:31 )             34.0     05-17    137  |  99  |  13  ----------------------------<  140<H>  3.9   |  28  |  0.70    Ca    9.0      17 May 2018 11:31  Phos  3.3     05-17  Mg     2.1     05-17    TPro  7.1  /  Alb  3.6  /  TBili  0.3  /  DBili  x   /  AST  16  /  ALT  17  /  AlkPhos  86  05-16    proBNP:  Lipid Profile:  HgA1c: Hemoglobin A1C, Whole Blood: 8.2 % (05-17 @ 11:31)   TSH:  PT/INR - ( 16 May 2018 13:25 )   PT: 12.7 sec;   INR: 1.14          PTT - ( 16 May 2018 13:25 )  PTT:28.5 sec  TELEMETRY: 	    ECG:  NSR T wave abn	  RADIOLOGY:	chest clear    ASSESSMENT/PLAN: 	    Coronary artery disease/ H/o MI-the patient's exercise is very limited although she has no chest discomfort. EKG shows T-wave abnormalities. The ejection fraction is normal. Recommend nuclear stress test. Continue aspirin and statin.     Hypertension-blood pressure has been elevated. Consider ACE inhibitor.    Gangrene-plan is surgery.     Diabetes-follow fingersticks.

## 2018-05-18 NOTE — PROGRESS NOTE ADULT - ATTENDING COMMENTS
Patient seen and examined with house-staff during bedside rounds.  Resident note read, including vitals, physical findings, laboratory data, and radiological reports.   Revisions included below.  Direct personal management at bed side and extensive interpretation of the data.  Plan was outlined and discussed in details with the housestaff.  Decision making of high complexity  Action taken for acute disease activity to reflect the level of care provided:  - medication reconciliation  - review laboratory data  -Preoperative evaluation and risk assessment  - This testis is positive and will follow with cardiology.  Management of comorbidities    Discussed with the family

## 2018-05-18 NOTE — PROGRESS NOTE ADULT - SUBJECTIVE AND OBJECTIVE BOX
pt has severe PVD with ulcer  angio shows occlusion of rt leg vessels and recon of peroneal artery    she will have full cardiac workup and we will bypass on tuesday coming

## 2018-05-18 NOTE — PROGRESS NOTE ADULT - SUBJECTIVE AND OBJECTIVE BOX
O/N: VALERIE, VSS                              A/P: 83yFemale s/p RLE Angio POD#1     - pain control  -F/u with Dr Junior  - NPO at [facility] for possible stress test   -Bypass LE next Tues O/N: VALERIE, VSS    ciprofloxacin     Tablet 500  clindamycin   Capsule 300  amLODIPine   Tablet 10  aspirin enteric coated 81  ciprofloxacin     Tablet 500  clindamycin   Capsule 300  heparin  Injectable 5000        Vital Signs Last 24 Hrs  T(C): 37.2 (18 May 2018 09:28), Max: 37.4 (17 May 2018 22:20)  T(F): 99 (18 May 2018 09:28), Max: 99.3 (17 May 2018 22:20)  HR: 82 (18 May 2018 09:28) (61 - 85)  BP: 137/74 (18 May 2018 09:28) (137/74 - 166/72)  BP(mean): --  RR: 17 (18 May 2018 09:28) (17 - 17)  SpO2: 98% (18 May 2018 09:28) (95% - 100%)  I&O's Summary    17 May 2018 07:01  -  18 May 2018 07:00  --------------------------------------------------------  IN: 800 mL / OUT: 650 mL / NET: 150 mL    Gen- NAD  	Pulm- cta b/l  	cardio- s1s2 rrr  	abd- soft nt/nd  	ext- RLE- distal tip of great toe with dry ulcer with ttp; no dc, odor, erythema  	LLE- dry ulcer on nail bed of great toe with ttp; no dc, odor, erythema  Vascular- b/l LE +mono DP/PT triphasic pop 2+ pal fem      LABS:                        11.5   5.8   )-----------( 249      ( 17 May 2018 11:31 )             34.0     05-17    137  |  99  |  13  ----------------------------<  140<H>  3.9   |  28  |  0.70    Ca    9.0      17 May 2018 11:31  Phos  3.3     05-17  Mg     2.1     05-17    TPro  7.1  /  Alb  3.6  /  TBili  0.3  /  DBili  x   /  AST  16  /  ALT  17  /  AlkPhos  86  05-16    PT/INR - ( 16 May 2018 13:25 )   PT: 12.7 sec;   INR: 1.14          PTT - ( 16 May 2018 13:25 )  PTT:28.5 sec    Radiology and Additional Studies:    Assessment and Plan:         A/P: 83yFemale s/p RLE Angio POD#1     - pain control  -F/u with Dr Junior  - nuc stress test today w Dr Junior  -Bypass LE next Tues

## 2018-05-19 LAB
-  AMPICILLIN/SULBACTAM: SIGNIFICANT CHANGE UP
-  AMPICILLIN: SIGNIFICANT CHANGE UP
-  CEFAZOLIN: SIGNIFICANT CHANGE UP
-  CEFAZOLIN: SIGNIFICANT CHANGE UP
-  CEFTRIAXONE: SIGNIFICANT CHANGE UP
-  CIPROFLOXACIN: SIGNIFICANT CHANGE UP
-  CLINDAMYCIN: SIGNIFICANT CHANGE UP
-  DAPTOMYCIN: SIGNIFICANT CHANGE UP
-  ERYTHROMYCIN: SIGNIFICANT CHANGE UP
-  GENTAMICIN: SIGNIFICANT CHANGE UP
-  LINEZOLID: SIGNIFICANT CHANGE UP
-  OXACILLIN: SIGNIFICANT CHANGE UP
-  PENICILLIN: SIGNIFICANT CHANGE UP
-  PIPERACILLIN/TAZOBACTAM: SIGNIFICANT CHANGE UP
-  RIFAMPIN: SIGNIFICANT CHANGE UP
-  TETRACYCLINE: SIGNIFICANT CHANGE UP
-  TOBRAMYCIN: SIGNIFICANT CHANGE UP
-  TRIMETHOPRIM/SULFAMETHOXAZOLE: SIGNIFICANT CHANGE UP
-  TRIMETHOPRIM/SULFAMETHOXAZOLE: SIGNIFICANT CHANGE UP
-  VANCOMYCIN: SIGNIFICANT CHANGE UP
GLUCOSE BLDC GLUCOMTR-MCNC: 119 MG/DL — HIGH (ref 70–99)
GLUCOSE BLDC GLUCOMTR-MCNC: 129 MG/DL — HIGH (ref 70–99)
GLUCOSE BLDC GLUCOMTR-MCNC: 134 MG/DL — HIGH (ref 70–99)
GLUCOSE BLDC GLUCOMTR-MCNC: 214 MG/DL — HIGH (ref 70–99)
GLUCOSE BLDC GLUCOMTR-MCNC: 254 MG/DL — HIGH (ref 70–99)
METHOD TYPE: SIGNIFICANT CHANGE UP

## 2018-05-19 PROCEDURE — 99233 SBSQ HOSP IP/OBS HIGH 50: CPT | Mod: GC

## 2018-05-19 PROCEDURE — 93970 EXTREMITY STUDY: CPT | Mod: 26

## 2018-05-19 RX ORDER — PIPERACILLIN AND TAZOBACTAM 4; .5 G/20ML; G/20ML
3.38 INJECTION, POWDER, LYOPHILIZED, FOR SOLUTION INTRAVENOUS EVERY 8 HOURS
Qty: 0 | Refills: 0 | Status: DISCONTINUED | OUTPATIENT
Start: 2018-05-19 | End: 2018-05-19

## 2018-05-19 RX ORDER — METOPROLOL TARTRATE 50 MG
25 TABLET ORAL DAILY
Qty: 0 | Refills: 0 | Status: DISCONTINUED | OUTPATIENT
Start: 2018-05-19 | End: 2018-05-23

## 2018-05-19 RX ORDER — ATORVASTATIN CALCIUM 80 MG/1
80 TABLET, FILM COATED ORAL AT BEDTIME
Qty: 0 | Refills: 0 | Status: DISCONTINUED | OUTPATIENT
Start: 2018-05-19 | End: 2018-06-05

## 2018-05-19 RX ORDER — CIPROFLOXACIN LACTATE 400MG/40ML
400 VIAL (ML) INTRAVENOUS ONCE
Qty: 0 | Refills: 0 | Status: COMPLETED | OUTPATIENT
Start: 2018-05-19 | End: 2018-05-20

## 2018-05-19 RX ORDER — CIPROFLOXACIN LACTATE 400MG/40ML
400 VIAL (ML) INTRAVENOUS EVERY 12 HOURS
Qty: 0 | Refills: 0 | Status: DISCONTINUED | OUTPATIENT
Start: 2018-05-20 | End: 2018-05-29

## 2018-05-19 RX ORDER — CIPROFLOXACIN LACTATE 400MG/40ML
VIAL (ML) INTRAVENOUS
Qty: 0 | Refills: 0 | Status: DISCONTINUED | OUTPATIENT
Start: 2018-05-20 | End: 2018-05-29

## 2018-05-19 RX ADMIN — Medication 4 UNIT(S): at 12:29

## 2018-05-19 RX ADMIN — Medication 81 MILLIGRAM(S): at 12:29

## 2018-05-19 RX ADMIN — SERTRALINE 50 MILLIGRAM(S): 25 TABLET, FILM COATED ORAL at 12:30

## 2018-05-19 RX ADMIN — Medication 3: at 12:29

## 2018-05-19 RX ADMIN — HEPARIN SODIUM 5000 UNIT(S): 5000 INJECTION INTRAVENOUS; SUBCUTANEOUS at 22:28

## 2018-05-19 RX ADMIN — GABAPENTIN 300 MILLIGRAM(S): 400 CAPSULE ORAL at 05:53

## 2018-05-19 RX ADMIN — Medication 2: at 22:27

## 2018-05-19 RX ADMIN — HEPARIN SODIUM 5000 UNIT(S): 5000 INJECTION INTRAVENOUS; SUBCUTANEOUS at 05:53

## 2018-05-19 RX ADMIN — GABAPENTIN 300 MILLIGRAM(S): 400 CAPSULE ORAL at 22:28

## 2018-05-19 RX ADMIN — HEPARIN SODIUM 5000 UNIT(S): 5000 INJECTION INTRAVENOUS; SUBCUTANEOUS at 13:28

## 2018-05-19 RX ADMIN — GABAPENTIN 300 MILLIGRAM(S): 400 CAPSULE ORAL at 13:28

## 2018-05-19 RX ADMIN — Medication 25 MILLIGRAM(S): at 17:36

## 2018-05-19 RX ADMIN — AMLODIPINE BESYLATE 5 MILLIGRAM(S): 2.5 TABLET ORAL at 05:53

## 2018-05-19 RX ADMIN — Medication 4 UNIT(S): at 17:36

## 2018-05-19 RX ADMIN — Medication 166.67 MILLIGRAM(S): at 17:36

## 2018-05-19 RX ADMIN — Medication 4 UNIT(S): at 07:38

## 2018-05-19 RX ADMIN — ATORVASTATIN CALCIUM 80 MILLIGRAM(S): 80 TABLET, FILM COATED ORAL at 22:27

## 2018-05-19 NOTE — PROGRESS NOTE ADULT - SUBJECTIVE AND OBJECTIVE BOX
O/N: VALERIE, VSS  5/18: presumed MRSA, switched from clinda to Vanco, off cipro        PMH:  Peripheral vascular disease  Ulcer of lower limb  Arthropathy  Ischemic heart disease  Hypertension  Hyperlipidemia  Diabetes mellitus      Medication:   ciprofloxacin   IVPB   vancomycin  IVPB 1250  amLODIPine   Tablet 5  aspirin enteric coated 81  ciprofloxacin   IVPB   heparin  Injectable 5000  metoprolol succinate ER 25  vancomycin  IVPB 1250        Vital Signs Last 24 Hrs  T(C): 36.5 (19 May 2018 17:35), Max: 37 (19 May 2018 05:18)  T(F): 97.7 (19 May 2018 17:35), Max: 98.6 (19 May 2018 05:18)  HR: 69 (19 May 2018 17:35) (69 - 81)  BP: 139/77 (19 May 2018 17:35) (129/77 - 156/74)  BP(mean): --  RR: 17 (19 May 2018 17:35) (16 - 17)  SpO2: 94% (19 May 2018 17:35) (94% - 99%)  I&O's Summary    18 May 2018 07:01  -  19 May 2018 07:00  --------------------------------------------------------  IN: 150 mL / OUT: 550 mL / NET: -400 mL    19 May 2018 07:01  -  19 May 2018 23:27  --------------------------------------------------------  IN: 490 mL / OUT: 300 mL / NET: 190 mL        Physical Exam:    Gen- NAD  	Pulm- cta b/l  	cardio- s1s2 rrr  	abd- soft nt/nd  	ext- RLE- distal tip of great toe with dry ulcer with ttp; no dc, odor, erythema  	LLE- dry ulcer on nail bed of great toe with ttp; no dc, odor, erythema  Vascular- b/l LE +mono DP/PT triphasic pop 2+ pal fem            Radiology and Additional Studies:

## 2018-05-19 NOTE — PROGRESS NOTE ADULT - ASSESSMENT
83 yoF with sig PMHx of CAD, lateral wall MI s/p 6 stents, dm, htn, ckd, RLE DVT, PE. early dementia s/p RLE angiogram (5/17) for b/l great toe ulcers and pain    - preop for RLE bypass next Tuesday    - pain control  - asa/toprol/amlodipine  - diabetic diet  - insulin sliding scale  - Vanco (5/18 - ) for MRSA / Cipro (5/19 - ) for E-coli  - SQH  - betadine for toes  - f/u AM labs

## 2018-05-20 DIAGNOSIS — E78.00 PURE HYPERCHOLESTEROLEMIA, UNSPECIFIED: ICD-10-CM

## 2018-05-20 DIAGNOSIS — I25.10 ATHEROSCLEROTIC HEART DISEASE OF NATIVE CORONARY ARTERY WITHOUT ANGINA PECTORIS: ICD-10-CM

## 2018-05-20 DIAGNOSIS — I10 ESSENTIAL (PRIMARY) HYPERTENSION: ICD-10-CM

## 2018-05-20 LAB
ANION GAP SERPL CALC-SCNC: 11 MMOL/L — SIGNIFICANT CHANGE UP (ref 5–17)
BUN SERPL-MCNC: 11 MG/DL — SIGNIFICANT CHANGE UP (ref 7–23)
CALCIUM SERPL-MCNC: 9.1 MG/DL — SIGNIFICANT CHANGE UP (ref 8.4–10.5)
CHLORIDE SERPL-SCNC: 99 MMOL/L — SIGNIFICANT CHANGE UP (ref 96–108)
CO2 SERPL-SCNC: 27 MMOL/L — SIGNIFICANT CHANGE UP (ref 22–31)
CREAT SERPL-MCNC: 0.66 MG/DL — SIGNIFICANT CHANGE UP (ref 0.5–1.3)
GLUCOSE BLDC GLUCOMTR-MCNC: 130 MG/DL — HIGH (ref 70–99)
GLUCOSE BLDC GLUCOMTR-MCNC: 147 MG/DL — HIGH (ref 70–99)
GLUCOSE BLDC GLUCOMTR-MCNC: 196 MG/DL — HIGH (ref 70–99)
GLUCOSE BLDC GLUCOMTR-MCNC: 246 MG/DL — HIGH (ref 70–99)
GLUCOSE SERPL-MCNC: 121 MG/DL — HIGH (ref 70–99)
HCT VFR BLD CALC: 34 % — LOW (ref 34.5–45)
HGB BLD-MCNC: 11 G/DL — LOW (ref 11.5–15.5)
MAGNESIUM SERPL-MCNC: 2.1 MG/DL — SIGNIFICANT CHANGE UP (ref 1.6–2.6)
MCHC RBC-ENTMCNC: 29 PG — SIGNIFICANT CHANGE UP (ref 27–34)
MCHC RBC-ENTMCNC: 32.4 G/DL — SIGNIFICANT CHANGE UP (ref 32–36)
MCV RBC AUTO: 89.7 FL — SIGNIFICANT CHANGE UP (ref 80–100)
PHOSPHATE SERPL-MCNC: 3.6 MG/DL — SIGNIFICANT CHANGE UP (ref 2.5–4.5)
PLATELET # BLD AUTO: 229 K/UL — SIGNIFICANT CHANGE UP (ref 150–400)
POTASSIUM SERPL-MCNC: 3.9 MMOL/L — SIGNIFICANT CHANGE UP (ref 3.5–5.3)
POTASSIUM SERPL-SCNC: 3.9 MMOL/L — SIGNIFICANT CHANGE UP (ref 3.5–5.3)
RBC # BLD: 3.79 M/UL — LOW (ref 3.8–5.2)
RBC # FLD: 13.8 % — SIGNIFICANT CHANGE UP (ref 10.3–16.9)
SODIUM SERPL-SCNC: 137 MMOL/L — SIGNIFICANT CHANGE UP (ref 135–145)
WBC # BLD: 7.2 K/UL — SIGNIFICANT CHANGE UP (ref 3.8–10.5)
WBC # FLD AUTO: 7.2 K/UL — SIGNIFICANT CHANGE UP (ref 3.8–10.5)

## 2018-05-20 PROCEDURE — 99233 SBSQ HOSP IP/OBS HIGH 50: CPT | Mod: GC

## 2018-05-20 PROCEDURE — 99233 SBSQ HOSP IP/OBS HIGH 50: CPT

## 2018-05-20 RX ORDER — ACETAMINOPHEN 500 MG
650 TABLET ORAL EVERY 6 HOURS
Qty: 0 | Refills: 0 | Status: DISCONTINUED | OUTPATIENT
Start: 2018-05-20 | End: 2018-06-05

## 2018-05-20 RX ORDER — OXYCODONE AND ACETAMINOPHEN 5; 325 MG/1; MG/1
2 TABLET ORAL EVERY 6 HOURS
Qty: 0 | Refills: 0 | Status: DISCONTINUED | OUTPATIENT
Start: 2018-05-20 | End: 2018-05-22

## 2018-05-20 RX ORDER — ISOSORBIDE MONONITRATE 60 MG/1
30 TABLET, EXTENDED RELEASE ORAL DAILY
Qty: 0 | Refills: 0 | Status: DISCONTINUED | OUTPATIENT
Start: 2018-05-20 | End: 2018-05-23

## 2018-05-20 RX ORDER — SODIUM CHLORIDE 9 MG/ML
1000 INJECTION INTRAMUSCULAR; INTRAVENOUS; SUBCUTANEOUS
Qty: 0 | Refills: 0 | Status: DISCONTINUED | OUTPATIENT
Start: 2018-05-20 | End: 2018-05-21

## 2018-05-20 RX ADMIN — GABAPENTIN 300 MILLIGRAM(S): 400 CAPSULE ORAL at 21:37

## 2018-05-20 RX ADMIN — ATORVASTATIN CALCIUM 80 MILLIGRAM(S): 80 TABLET, FILM COATED ORAL at 21:37

## 2018-05-20 RX ADMIN — ISOSORBIDE MONONITRATE 30 MILLIGRAM(S): 60 TABLET, EXTENDED RELEASE ORAL at 12:19

## 2018-05-20 RX ADMIN — AMLODIPINE BESYLATE 5 MILLIGRAM(S): 2.5 TABLET ORAL at 05:52

## 2018-05-20 RX ADMIN — HEPARIN SODIUM 5000 UNIT(S): 5000 INJECTION INTRAVENOUS; SUBCUTANEOUS at 22:00

## 2018-05-20 RX ADMIN — HEPARIN SODIUM 5000 UNIT(S): 5000 INJECTION INTRAVENOUS; SUBCUTANEOUS at 14:36

## 2018-05-20 RX ADMIN — Medication 2: at 12:19

## 2018-05-20 RX ADMIN — Medication 81 MILLIGRAM(S): at 12:19

## 2018-05-20 RX ADMIN — SERTRALINE 50 MILLIGRAM(S): 25 TABLET, FILM COATED ORAL at 12:21

## 2018-05-20 RX ADMIN — Medication 4 UNIT(S): at 12:19

## 2018-05-20 RX ADMIN — Medication 200 MILLIGRAM(S): at 23:54

## 2018-05-20 RX ADMIN — Medication 4 UNIT(S): at 08:29

## 2018-05-20 RX ADMIN — GABAPENTIN 300 MILLIGRAM(S): 400 CAPSULE ORAL at 14:37

## 2018-05-20 RX ADMIN — Medication 25 MILLIGRAM(S): at 05:52

## 2018-05-20 RX ADMIN — Medication 1: at 21:45

## 2018-05-20 RX ADMIN — OXYCODONE AND ACETAMINOPHEN 2 TABLET(S): 5; 325 TABLET ORAL at 00:38

## 2018-05-20 RX ADMIN — Medication 4 UNIT(S): at 17:15

## 2018-05-20 RX ADMIN — Medication 200 MILLIGRAM(S): at 12:22

## 2018-05-20 RX ADMIN — Medication 166.67 MILLIGRAM(S): at 17:15

## 2018-05-20 RX ADMIN — GABAPENTIN 300 MILLIGRAM(S): 400 CAPSULE ORAL at 05:52

## 2018-05-20 RX ADMIN — OXYCODONE AND ACETAMINOPHEN 1 TABLET(S): 5; 325 TABLET ORAL at 21:36

## 2018-05-20 RX ADMIN — Medication 200 MILLIGRAM(S): at 00:35

## 2018-05-20 RX ADMIN — HEPARIN SODIUM 5000 UNIT(S): 5000 INJECTION INTRAVENOUS; SUBCUTANEOUS at 05:52

## 2018-05-20 NOTE — PROGRESS NOTE ADULT - SUBJECTIVE AND OBJECTIVE BOX
INTERVAL HISTORY:  c/o pain in right great toe  	  MEDICATIONS:  amLODIPine   Tablet 5 milliGRAM(s) Oral daily  metoprolol succinate ER 25 milliGRAM(s) Oral daily    ciprofloxacin   IVPB      ciprofloxacin   IVPB 400 milliGRAM(s) IV Intermittent every 12 hours  vancomycin  IVPB 1250 milliGRAM(s) IV Intermittent every 24 hours      gabapentin 300 milliGRAM(s) Oral three times a day  sertraline 50 milliGRAM(s) Oral daily      atorvastatin 80 milliGRAM(s) Oral at bedtime  dextrose 40% Gel 15 Gram(s) Oral once PRN  dextrose 50% Injectable 12.5 Gram(s) IV Push once  dextrose 50% Injectable 25 Gram(s) IV Push once  dextrose 50% Injectable 25 Gram(s) IV Push once  glucagon  Injectable 1 milliGRAM(s) IntraMuscular once PRN  insulin lispro (HumaLOG) corrective regimen sliding scale   SubCutaneous Before meals and at bedtime  insulin lispro Injectable (HumaLOG) 4 Unit(s) SubCutaneous three times a day before meals    aspirin enteric coated 81 milliGRAM(s) Oral daily  dextrose 5%. 1000 milliLiter(s) IV Continuous <Continuous>  heparin  Injectable 5000 Unit(s) SubCutaneous every 8 hours        PHYSICAL EXAM:  T(C): 36.1 (05-20-18 @ 06:19), Max: 36.8 (05-19-18 @ 20:30)  HR: 62 (05-20-18 @ 06:19) (61 - 78)  BP: 159/76 (05-20-18 @ 06:19) (129/77 - 159/76)  RR: 17 (05-20-18 @ 06:19) (16 - 18)  SpO2: 99% (05-20-18 @ 06:19) (94% - 99%)  Wt(kg): --  I&O's Summary    19 May 2018 07:01  -  20 May 2018 07:00  --------------------------------------------------------  IN: 490 mL / OUT: 500 mL / NET: -10 mL          Appearance: Normal	  HEENT:   Normal oral mucosa, PERRL, EOMI	  Lymphatic: No lymphadenopathy  Cardiovascular: Normal S1 S2, No JVD, No murmurs, No edema  Respiratory: Lungs clear to auscultation	  Psychiatry: A & O x 3, Mood & affect appropriate  Gastrointestinal:  Soft, Non-tender, + BS	  Skin: No rashes, No ecchymoses, No cyanosis  Neurologic: Non-focal  Extremities: Normal range of motion, No clubbing, cyanosis or edema  Vascular: Peripheral pulses palpable absent    TELEMETRY: 	    ECG:  	  RADIOLOGY:   DIAGNOSTIC TESTING:  [ ] Echocardiogram:  [ ]  Catheterization:  [ ] Stress Test:    OTHER: 	    LABS:	 	    CARDIAC MARKERS:                    proBNP:   Lipid Profile:   HgA1c:   TSH:     ASSESSMENT/PLAN: INTERVAL HISTORY:  c/o pain in right great toe  + Nuc c/w a new dx of ischemic heart disease  	  MEDICATIONS:  amLODIPine   Tablet 5 milliGRAM(s) Oral daily  metoprolol succinate ER 25 milliGRAM(s) Oral daily    ciprofloxacin   IVPB      ciprofloxacin   IVPB 400 milliGRAM(s) IV Intermittent every 12 hours  vancomycin  IVPB 1250 milliGRAM(s) IV Intermittent every 24 hours      gabapentin 300 milliGRAM(s) Oral three times a day  sertraline 50 milliGRAM(s) Oral daily      atorvastatin 80 milliGRAM(s) Oral at bedtime  dextrose 40% Gel 15 Gram(s) Oral once PRN  dextrose 50% Injectable 12.5 Gram(s) IV Push once  dextrose 50% Injectable 25 Gram(s) IV Push once  dextrose 50% Injectable 25 Gram(s) IV Push once  glucagon  Injectable 1 milliGRAM(s) IntraMuscular once PRN  insulin lispro (HumaLOG) corrective regimen sliding scale   SubCutaneous Before meals and at bedtime  insulin lispro Injectable (HumaLOG) 4 Unit(s) SubCutaneous three times a day before meals    aspirin enteric coated 81 milliGRAM(s) Oral daily  dextrose 5%. 1000 milliLiter(s) IV Continuous <Continuous>  heparin  Injectable 5000 Unit(s) SubCutaneous every 8 hours        PHYSICAL EXAM:  T(C): 36.1 (05-20-18 @ 06:19), Max: 36.8 (05-19-18 @ 20:30)  HR: 62 (05-20-18 @ 06:19) (61 - 78)  BP: 159/76 (05-20-18 @ 06:19) (129/77 - 159/76)  RR: 17 (05-20-18 @ 06:19) (16 - 18)  SpO2: 99% (05-20-18 @ 06:19) (94% - 99%)  Wt(kg): --  I&O's Summary    19 May 2018 07:01  -  20 May 2018 07:00  --------------------------------------------------------  IN: 490 mL / OUT: 500 mL / NET: -10 mL          Appearance: Normal	  HEENT:   Normal oral mucosa, PERRL, EOMI	  Lymphatic: No lymphadenopathy  Cardiovascular: Normal S1 S2, No JVD, No murmurs, No edema  Respiratory: Lungs clear to auscultation	  Psychiatry: A & O x 3, Mood & affect appropriate  Gastrointestinal:  Soft, Non-tender, + BS	  Skin: No rashes, No ecchymoses, No cyanosis  Neurologic: Non-focal  Extremities: Normal range of motion, No clubbing, cyanosis or edema  Vascular: Peripheral pulses palpable absent    TELEMETRY: 	    ECG:  	  RADIOLOGY:   DIAGNOSTIC TESTING:  [ ] Echocardiogram:  [ ]  Catheterization:  [ ] Stress Test:    OTHER: 	    LABS:	 	    CARDIAC MARKERS:                    proBNP:   Lipid Profile:   HgA1c:   TSH:     ASSESSMENT/PLAN:

## 2018-05-20 NOTE — PROGRESS NOTE ADULT - SUBJECTIVE AND OBJECTIVE BOX
o/n: VALERIE, VSS  5/19: E.coli sensitive to ciprofloxacin (anaphylaxis w/ penicillin); vein mapping complete; increased statin to 80 and added metoprolol xL 25; no cath lab for Monday per cards (make NPO Sunday night just in case change of plan)         83 yoF with sig PMHx of CAD, lateral wall MI s/p 6 stents, dm, htn, ckd, RLE DVT, PE. early dementia s/p RLE angiogram (5/17) for b/l great toe ulcers and pain    - preop for RLE bypass next Tuesday    - pain control  - asa/toprol/amlodipine  - diabetic diet  - insulin sliding scale  - Vanco (5/18 - ) for MRSA / Cipro (5/19 - ) for E-coli  - SQH  - betadine for toes  - f/u AM labs o/n: VALERIE, VSS  5/19: E.coli sensitive to ciprofloxacin (anaphylaxis w/ penicillin); vein mapping complete; increased statin to 80 and added metoprolol xL 25; no cath lab for Monday per cards (make NPO Sunday night just in case change of plan)     ciprofloxacin   IVPB   ciprofloxacin   IVPB 400  vancomycin  IVPB 1250  amLODIPine   Tablet 5  aspirin enteric coated 81  ciprofloxacin   IVPB   ciprofloxacin   IVPB 400  heparin  Injectable 5000  isosorbide   mononitrate ER Tablet (IMDUR) 30  metoprolol succinate ER 25  vancomycin  IVPB 1250        Vital Signs Last 24 Hrs  T(C): 36.1 (20 May 2018 08:30), Max: 36.8 (19 May 2018 20:30)  T(F): 97 (20 May 2018 08:30), Max: 98.2 (19 May 2018 20:30)  HR: 56 (20 May 2018 08:30) (56 - 69)  BP: 130/78 (20 May 2018 08:30) (130/78 - 159/76)  BP(mean): --  RR: 16 (20 May 2018 08:30) (16 - 18)  SpO2: 100% (20 May 2018 08:30) (94% - 100%)  I&O's Summary    19 May 2018 07:01  -  20 May 2018 07:00  --------------------------------------------------------  IN: 490 mL / OUT: 500 mL / NET: -10 mL    Physical Exam:    Gen- NAD  	Pulm- cta b/l  	cardio- s1s2 rrr  	abd- soft nt/nd  	ext- RLE- distal tip of great toe with dry ulcer with ttp; no dc, odor, erythema  	LLE- dry ulcer on nail bed of great toe with ttp; no dc, odor, erythema  Vascular- b/l LE +mono DP/PT triphasic pop 2+ pal fem        LABS:        Radiology and Additional Studies:    Assessment and Plan:     83 yoF with sig PMHx of CAD, lateral wall MI s/p 6 stents, dm, htn, ckd, RLE DVT, PE. early dementia s/p RLE angiogram (5/17) for b/l great toe ulcers and pain    - preop for RLE bypass next Tuesday    - pain control  - asa/toprol/amlodipine  - diabetic diet  - insulin sliding scale  - Vanco (5/18 - ) for MRSA / Cipro (5/19 - ) for E-coli  - SQH  - betadine for toes  - f/u AM labs

## 2018-05-21 LAB
-  AZTREONAM: SIGNIFICANT CHANGE UP
-  CEFTAZIDIME: SIGNIFICANT CHANGE UP
-  MEROPENEM: SIGNIFICANT CHANGE UP
ANION GAP SERPL CALC-SCNC: 11 MMOL/L — SIGNIFICANT CHANGE UP (ref 5–17)
APTT BLD: 33.2 SEC — SIGNIFICANT CHANGE UP (ref 27.5–37.4)
BUN SERPL-MCNC: 20 MG/DL — SIGNIFICANT CHANGE UP (ref 7–23)
CALCIUM SERPL-MCNC: 8.9 MG/DL — SIGNIFICANT CHANGE UP (ref 8.4–10.5)
CHLORIDE SERPL-SCNC: 102 MMOL/L — SIGNIFICANT CHANGE UP (ref 96–108)
CO2 SERPL-SCNC: 26 MMOL/L — SIGNIFICANT CHANGE UP (ref 22–31)
CREAT SERPL-MCNC: 0.77 MG/DL — SIGNIFICANT CHANGE UP (ref 0.5–1.3)
CULTURE RESULTS: SIGNIFICANT CHANGE UP
CULTURE RESULTS: SIGNIFICANT CHANGE UP
GLUCOSE BLDC GLUCOMTR-MCNC: 122 MG/DL — HIGH (ref 70–99)
GLUCOSE BLDC GLUCOMTR-MCNC: 122 MG/DL — HIGH (ref 70–99)
GLUCOSE BLDC GLUCOMTR-MCNC: 168 MG/DL — HIGH (ref 70–99)
GLUCOSE BLDC GLUCOMTR-MCNC: 185 MG/DL — HIGH (ref 70–99)
GLUCOSE SERPL-MCNC: 131 MG/DL — HIGH (ref 70–99)
HCT VFR BLD CALC: 30.9 % — LOW (ref 34.5–45)
HGB BLD-MCNC: 10 G/DL — LOW (ref 11.5–15.5)
INR BLD: 1.09 — SIGNIFICANT CHANGE UP (ref 0.88–1.16)
MAGNESIUM SERPL-MCNC: 2.2 MG/DL — SIGNIFICANT CHANGE UP (ref 1.6–2.6)
MCHC RBC-ENTMCNC: 29.3 PG — SIGNIFICANT CHANGE UP (ref 27–34)
MCHC RBC-ENTMCNC: 32.4 G/DL — SIGNIFICANT CHANGE UP (ref 32–36)
MCV RBC AUTO: 90.6 FL — SIGNIFICANT CHANGE UP (ref 80–100)
PHOSPHATE SERPL-MCNC: 4.1 MG/DL — SIGNIFICANT CHANGE UP (ref 2.5–4.5)
PLATELET # BLD AUTO: 217 K/UL — SIGNIFICANT CHANGE UP (ref 150–400)
POTASSIUM SERPL-MCNC: 4.1 MMOL/L — SIGNIFICANT CHANGE UP (ref 3.5–5.3)
POTASSIUM SERPL-SCNC: 4.1 MMOL/L — SIGNIFICANT CHANGE UP (ref 3.5–5.3)
PROTHROM AB SERPL-ACNC: 12.1 SEC — SIGNIFICANT CHANGE UP (ref 9.8–12.7)
RBC # BLD: 3.41 M/UL — LOW (ref 3.8–5.2)
RBC # FLD: 14.1 % — SIGNIFICANT CHANGE UP (ref 10.3–16.9)
SODIUM SERPL-SCNC: 139 MMOL/L — SIGNIFICANT CHANGE UP (ref 135–145)
SPECIMEN SOURCE: SIGNIFICANT CHANGE UP
SPECIMEN SOURCE: SIGNIFICANT CHANGE UP
VANCOMYCIN TROUGH SERPL-MCNC: 7 UG/ML — LOW (ref 10–20)
WBC # BLD: 8.8 K/UL — SIGNIFICANT CHANGE UP (ref 3.8–10.5)
WBC # FLD AUTO: 8.8 K/UL — SIGNIFICANT CHANGE UP (ref 3.8–10.5)

## 2018-05-21 RX ORDER — SODIUM CHLORIDE 9 MG/ML
1000 INJECTION INTRAMUSCULAR; INTRAVENOUS; SUBCUTANEOUS
Qty: 0 | Refills: 0 | Status: DISCONTINUED | OUTPATIENT
Start: 2018-05-21 | End: 2018-05-21

## 2018-05-21 RX ORDER — VANCOMYCIN HCL 1 G
1500 VIAL (EA) INTRAVENOUS EVERY 24 HOURS
Qty: 0 | Refills: 0 | Status: DISCONTINUED | OUTPATIENT
Start: 2018-05-21 | End: 2018-05-23

## 2018-05-21 RX ORDER — SODIUM CHLORIDE 9 MG/ML
1000 INJECTION INTRAMUSCULAR; INTRAVENOUS; SUBCUTANEOUS
Qty: 0 | Refills: 0 | Status: DISCONTINUED | OUTPATIENT
Start: 2018-05-21 | End: 2018-05-24

## 2018-05-21 RX ADMIN — HEPARIN SODIUM 5000 UNIT(S): 5000 INJECTION INTRAVENOUS; SUBCUTANEOUS at 22:49

## 2018-05-21 RX ADMIN — Medication 200 MILLIGRAM(S): at 11:02

## 2018-05-21 RX ADMIN — Medication 81 MILLIGRAM(S): at 10:32

## 2018-05-21 RX ADMIN — Medication 300 MILLIGRAM(S): at 21:24

## 2018-05-21 RX ADMIN — Medication 4 UNIT(S): at 12:18

## 2018-05-21 RX ADMIN — AMLODIPINE BESYLATE 5 MILLIGRAM(S): 2.5 TABLET ORAL at 05:33

## 2018-05-21 RX ADMIN — OXYCODONE AND ACETAMINOPHEN 2 TABLET(S): 5; 325 TABLET ORAL at 22:50

## 2018-05-21 RX ADMIN — HEPARIN SODIUM 5000 UNIT(S): 5000 INJECTION INTRAVENOUS; SUBCUTANEOUS at 05:34

## 2018-05-21 RX ADMIN — Medication 1: at 22:49

## 2018-05-21 RX ADMIN — SODIUM CHLORIDE 100 MILLILITER(S): 9 INJECTION INTRAMUSCULAR; INTRAVENOUS; SUBCUTANEOUS at 00:00

## 2018-05-21 RX ADMIN — HEPARIN SODIUM 5000 UNIT(S): 5000 INJECTION INTRAVENOUS; SUBCUTANEOUS at 15:12

## 2018-05-21 RX ADMIN — GABAPENTIN 300 MILLIGRAM(S): 400 CAPSULE ORAL at 05:33

## 2018-05-21 RX ADMIN — GABAPENTIN 300 MILLIGRAM(S): 400 CAPSULE ORAL at 15:12

## 2018-05-21 RX ADMIN — Medication 25 MILLIGRAM(S): at 05:33

## 2018-05-21 RX ADMIN — ISOSORBIDE MONONITRATE 30 MILLIGRAM(S): 60 TABLET, EXTENDED RELEASE ORAL at 10:32

## 2018-05-21 RX ADMIN — SODIUM CHLORIDE 100 MILLILITER(S): 9 INJECTION INTRAMUSCULAR; INTRAVENOUS; SUBCUTANEOUS at 09:53

## 2018-05-21 RX ADMIN — Medication 4 UNIT(S): at 17:35

## 2018-05-21 RX ADMIN — SERTRALINE 50 MILLIGRAM(S): 25 TABLET, FILM COATED ORAL at 10:32

## 2018-05-21 RX ADMIN — GABAPENTIN 300 MILLIGRAM(S): 400 CAPSULE ORAL at 22:50

## 2018-05-21 RX ADMIN — Medication 1: at 17:35

## 2018-05-21 RX ADMIN — ATORVASTATIN CALCIUM 80 MILLIGRAM(S): 80 TABLET, FILM COATED ORAL at 22:58

## 2018-05-21 NOTE — PROGRESS NOTE ADULT - SUBJECTIVE AND OBJECTIVE BOX
Pre-op Diagnosis: PAD  Procedure: Right lower extremity bypass  Surgeon: Dr. Castellanos    Consent Pending                          10.0   8.8   )-----------( 217      ( 21 May 2018 06:17 )             30.9     05-21    139  |  102  |  20  ----------------------------<  131<H>  4.1   |  26  |  0.77    Ca    8.9      21 May 2018 06:17  Phos  4.1     05-21  Mg     2.2     05-21      PT/INR - ( 21 May 2018 06:17 )   PT: 12.1 sec;   INR: 1.09     PTT - ( 21 May 2018 06:17 )  PTT:33.2 sec      Type & Screen: O+ Ab neg  CXR: The lungs are clear.  EKG: Normal sinus rhythm; Voltage criteria for left ventricular hypertrophy; Nonspecific T wave abnormality      A/P: 83yFemale planned for above procedure  1. NPO past midnight, except medications  2. NS@65ml/hr  3. [x ] Blood on hold, Units: 2units  4. Home medication  5. Vancomycin/Ciprofloxacin  6. F/u AM labs

## 2018-05-21 NOTE — DIETITIAN INITIAL EVALUATION ADULT. - ENERGY NEEDS
Height: 5'6" Weight: 184lbs, IBW 130lbs+/-10%, %%, BMI 29.7  IBW used for calculations as pt >120% of IBW   Nutrient needs based on [facility] standards of care for maintenance in older adults.    Needs adjusted for age and wound healing.

## 2018-05-21 NOTE — DIETITIAN INITIAL EVALUATION ADULT. - OTHER INFO
84yo F w/ h/o CAD, lateral wall MI, DM, HTN, CKD, RLE DVT, PE, early dementia. S/p RLE angiogram (5/17) for b/l great toe ulcers and pain. pt w/ newly dx ischemic heart disease. Pt currently pre-op for RLE bypass tomorrow. NPO today for cardiac cath. Previously on a CSTCHO diet w/ good tolerance and adequate appetite. Pt unable to state purpose of CSTCHO diet. RD provided indepth edu on purpose of diet, CHO counting & meal planning. Reports enjoying ice cream and rice regularly - discussed portion sizes and prioritizing proteins & vegetables at meals. Denies GI distress, no N/V. Last BM yesterday 5/20. No known wt changes. Denies issues chewing or swallowing however, noted pt has missing tooth. Skin: big toe wound, R venous ulcer, josiah score 16; GI: distended per flowsheet.

## 2018-05-21 NOTE — DIETITIAN INITIAL EVALUATION ADULT. - ADHERENCE
per dietary recall pt does not adhere to CSTCHO diet. Unable to state protein foods, or high CHO/starch/sugar foods./poor

## 2018-05-21 NOTE — PROGRESS NOTE ADULT - SUBJECTIVE AND OBJECTIVE BOX
24hr Events:  O/N: VALERIE, VSS, NPO for possible cardiac cath  5/20: NPO for possible cardiac cath tmrw      Assessment/Plan;  83 yoF with sig PMHx of CAD, lateral wall MI s/p 6 stents, dm, htn, ckd, RLE DVT, PE. early dementia s/p RLE angiogram (5/17) for b/l great toe ulcers and pain    - preop for RLE bypass next Tuesday  - pain control  - asa/toprol/amlodipine  - NPO/IVF  - insulin sliding scale  - Vanco (5/18 - ) for MRSA / Cipro (5/19 - ) for E-coli  - SQH  - betadine for toes  - f/u AM labs 24hr Events:  O/N: VALERIE, VSS, NPO for possible cardiac cath  5/20: NPO for possible cardiac cath tmrw    ciprofloxacin   IVPB   ciprofloxacin   IVPB 400  vancomycin  IVPB 1250  amLODIPine   Tablet 5  aspirin enteric coated 81  ciprofloxacin   IVPB   ciprofloxacin   IVPB 400  heparin  Injectable 5000  isosorbide   mononitrate ER Tablet (IMDUR) 30  metoprolol succinate ER 25  vancomycin  IVPB 1250        Vital Signs Last 24 Hrs  T(C): 35.8 (21 May 2018 05:34), Max: 37 (20 May 2018 20:25)  T(F): 96.4 (21 May 2018 05:34), Max: 98.6 (20 May 2018 20:25)  HR: 55 (21 May 2018 05:34) (55 - 72)  BP: 135/71 (21 May 2018 05:34) (118/71 - 148/71)  BP(mean): --  RR: 16 (21 May 2018 05:34) (16 - 17)  SpO2: 97% (21 May 2018 05:34) (96% - 100%)  I&O's Summary    20 May 2018 07:01  -  21 May 2018 07:00  --------------------------------------------------------  IN: 1000 mL / OUT: 0 mL / NET: 1000 mL        Physical Exam:  General: NAD, resting comfortably in bed  Pulmonary: Nonlabored breathing, no respiratory distress  cardio- s1s2 rrr  	abd- soft nt/nd  	ext- RLE- distal tip of great toe with dry ulcer with ttp; no dc, odor, erythema  	LLE- dry ulcer on nail bed of great toe with ttp; no dc, odor, erythema  Vascular- b/l LE +mono DP/PT triphasic pop 2+ pal fem      LABS:                        10.0   8.8   )-----------( 217      ( 21 May 2018 06:17 )             30.9     05-21    139  |  102  |  20  ----------------------------<  131<H>  4.1   |  26  |  0.77    Ca    8.9      21 May 2018 06:17  Phos  4.1     05-21  Mg     2.2     05-21      PT/INR - ( 21 May 2018 06:17 )   PT: 12.1 sec;   INR: 1.09          PTT - ( 21 May 2018 06:17 )  PTT:33.2 sec    Radiology and Additional Studies:    Assessment and Plan:     Assessment/Plan;  83 yoF with sig PMHx of CAD, lateral wall MI s/p 6 stents, dm, htn, ckd, RLE DVT, PE. early dementia s/p RLE angiogram (5/17) for b/l great toe ulcers and pain    - preop for RLE bypass on Tuesday  - pain control  - asa/hsq  - cont home meds  - ISS  - NPO/IVF for poss cardiac cath today  - f/u cards recs  - insulin sliding scale  - Vanco (5/18 - ) for MRSA / Cipro (5/19 - ) for E-coli  - betadine for toes  - f/u AM labs

## 2018-05-22 LAB
-  AMPICILLIN/SULBACTAM: SIGNIFICANT CHANGE UP
-  AMPICILLIN: SIGNIFICANT CHANGE UP
-  CEFAZOLIN: SIGNIFICANT CHANGE UP
-  CEFTRIAXONE: SIGNIFICANT CHANGE UP
-  CIPROFLOXACIN: SIGNIFICANT CHANGE UP
-  GENTAMICIN: SIGNIFICANT CHANGE UP
-  PIPERACILLIN/TAZOBACTAM: SIGNIFICANT CHANGE UP
-  TOBRAMYCIN: SIGNIFICANT CHANGE UP
-  TRIMETHOPRIM/SULFAMETHOXAZOLE: SIGNIFICANT CHANGE UP
ANION GAP SERPL CALC-SCNC: 11 MMOL/L — SIGNIFICANT CHANGE UP (ref 5–17)
ANION GAP SERPL CALC-SCNC: 9 MMOL/L — SIGNIFICANT CHANGE UP (ref 5–17)
APTT BLD: 27.7 SEC — SIGNIFICANT CHANGE UP (ref 27.5–37.4)
APTT BLD: 51.8 SEC — HIGH (ref 27.5–37.4)
BLD GP AB SCN SERPL QL: NEGATIVE — SIGNIFICANT CHANGE UP
BUN SERPL-MCNC: 10 MG/DL — SIGNIFICANT CHANGE UP (ref 7–23)
BUN SERPL-MCNC: 17 MG/DL — SIGNIFICANT CHANGE UP (ref 7–23)
CALCIUM SERPL-MCNC: 8.7 MG/DL — SIGNIFICANT CHANGE UP (ref 8.4–10.5)
CALCIUM SERPL-MCNC: 8.9 MG/DL — SIGNIFICANT CHANGE UP (ref 8.4–10.5)
CHLORIDE SERPL-SCNC: 103 MMOL/L — SIGNIFICANT CHANGE UP (ref 96–108)
CHLORIDE SERPL-SCNC: 105 MMOL/L — SIGNIFICANT CHANGE UP (ref 96–108)
CO2 SERPL-SCNC: 25 MMOL/L — SIGNIFICANT CHANGE UP (ref 22–31)
CO2 SERPL-SCNC: 27 MMOL/L — SIGNIFICANT CHANGE UP (ref 22–31)
CREAT SERPL-MCNC: 0.7 MG/DL — SIGNIFICANT CHANGE UP (ref 0.5–1.3)
CREAT SERPL-MCNC: 0.8 MG/DL — SIGNIFICANT CHANGE UP (ref 0.5–1.3)
CULTURE RESULTS: SIGNIFICANT CHANGE UP
GLUCOSE BLDC GLUCOMTR-MCNC: 134 MG/DL — HIGH (ref 70–99)
GLUCOSE BLDC GLUCOMTR-MCNC: 136 MG/DL — HIGH (ref 70–99)
GLUCOSE BLDC GLUCOMTR-MCNC: 138 MG/DL — HIGH (ref 70–99)
GLUCOSE BLDC GLUCOMTR-MCNC: 138 MG/DL — HIGH (ref 70–99)
GLUCOSE SERPL-MCNC: 137 MG/DL — HIGH (ref 70–99)
GLUCOSE SERPL-MCNC: 144 MG/DL — HIGH (ref 70–99)
HCT VFR BLD CALC: 28.2 % — LOW (ref 34.5–45)
HCT VFR BLD CALC: 29.5 % — LOW (ref 34.5–45)
HGB BLD-MCNC: 8.9 G/DL — LOW (ref 11.5–15.5)
HGB BLD-MCNC: 9.5 G/DL — LOW (ref 11.5–15.5)
INR BLD: 1.07 — SIGNIFICANT CHANGE UP (ref 0.88–1.16)
INR BLD: 1.11 — SIGNIFICANT CHANGE UP (ref 0.88–1.16)
MAGNESIUM SERPL-MCNC: 1.8 MG/DL — SIGNIFICANT CHANGE UP (ref 1.6–2.6)
MAGNESIUM SERPL-MCNC: 2.1 MG/DL — SIGNIFICANT CHANGE UP (ref 1.6–2.6)
MCHC RBC-ENTMCNC: 28.4 PG — SIGNIFICANT CHANGE UP (ref 27–34)
MCHC RBC-ENTMCNC: 29.1 PG — SIGNIFICANT CHANGE UP (ref 27–34)
MCHC RBC-ENTMCNC: 31.6 G/DL — LOW (ref 32–36)
MCHC RBC-ENTMCNC: 32.2 G/DL — SIGNIFICANT CHANGE UP (ref 32–36)
MCV RBC AUTO: 90.1 FL — SIGNIFICANT CHANGE UP (ref 80–100)
MCV RBC AUTO: 90.2 FL — SIGNIFICANT CHANGE UP (ref 80–100)
METHOD TYPE: SIGNIFICANT CHANGE UP
ORGANISM # SPEC MICROSCOPIC CNT: SIGNIFICANT CHANGE UP
PHOSPHATE SERPL-MCNC: 4 MG/DL — SIGNIFICANT CHANGE UP (ref 2.5–4.5)
PHOSPHATE SERPL-MCNC: 4.2 MG/DL — SIGNIFICANT CHANGE UP (ref 2.5–4.5)
PLATELET # BLD AUTO: 208 K/UL — SIGNIFICANT CHANGE UP (ref 150–400)
PLATELET # BLD AUTO: 211 K/UL — SIGNIFICANT CHANGE UP (ref 150–400)
POTASSIUM SERPL-MCNC: 4 MMOL/L — SIGNIFICANT CHANGE UP (ref 3.5–5.3)
POTASSIUM SERPL-MCNC: 4.2 MMOL/L — SIGNIFICANT CHANGE UP (ref 3.5–5.3)
POTASSIUM SERPL-SCNC: 4 MMOL/L — SIGNIFICANT CHANGE UP (ref 3.5–5.3)
POTASSIUM SERPL-SCNC: 4.2 MMOL/L — SIGNIFICANT CHANGE UP (ref 3.5–5.3)
PROTHROM AB SERPL-ACNC: 11.9 SEC — SIGNIFICANT CHANGE UP (ref 9.8–12.7)
PROTHROM AB SERPL-ACNC: 12.4 SEC — SIGNIFICANT CHANGE UP (ref 9.8–12.7)
RBC # BLD: 3.13 M/UL — LOW (ref 3.8–5.2)
RBC # BLD: 3.27 M/UL — LOW (ref 3.8–5.2)
RBC # FLD: 14 % — SIGNIFICANT CHANGE UP (ref 10.3–16.9)
RBC # FLD: 14.2 % — SIGNIFICANT CHANGE UP (ref 10.3–16.9)
RH IG SCN BLD-IMP: POSITIVE — SIGNIFICANT CHANGE UP
SODIUM SERPL-SCNC: 139 MMOL/L — SIGNIFICANT CHANGE UP (ref 135–145)
SODIUM SERPL-SCNC: 141 MMOL/L — SIGNIFICANT CHANGE UP (ref 135–145)
SPECIMEN SOURCE: SIGNIFICANT CHANGE UP
WBC # BLD: 11.1 K/UL — HIGH (ref 3.8–10.5)
WBC # BLD: 7.8 K/UL — SIGNIFICANT CHANGE UP (ref 3.8–10.5)
WBC # FLD AUTO: 11.1 K/UL — HIGH (ref 3.8–10.5)
WBC # FLD AUTO: 7.8 K/UL — SIGNIFICANT CHANGE UP (ref 3.8–10.5)

## 2018-05-22 PROCEDURE — 75710 ARTERY X-RAYS ARM/LEG: CPT | Mod: 26,GC

## 2018-05-22 PROCEDURE — 93010 ELECTROCARDIOGRAM REPORT: CPT

## 2018-05-22 PROCEDURE — 99232 SBSQ HOSP IP/OBS MODERATE 35: CPT

## 2018-05-22 PROCEDURE — 99255 IP/OBS CONSLTJ NEW/EST HI 80: CPT | Mod: GC

## 2018-05-22 PROCEDURE — 35566 ART BYP FEM-ANT-POST TIB/PRL: CPT | Mod: GC

## 2018-05-22 PROCEDURE — 36140 INTRO NDL ICATH UPR/LXTR ART: CPT | Mod: 59,GC

## 2018-05-22 RX ORDER — OXYCODONE AND ACETAMINOPHEN 5; 325 MG/1; MG/1
1 TABLET ORAL EVERY 6 HOURS
Qty: 0 | Refills: 0 | Status: DISCONTINUED | OUTPATIENT
Start: 2018-05-22 | End: 2018-05-23

## 2018-05-22 RX ORDER — DOCUSATE SODIUM 100 MG
100 CAPSULE ORAL THREE TIMES A DAY
Qty: 0 | Refills: 0 | Status: DISCONTINUED | OUTPATIENT
Start: 2018-05-22 | End: 2018-06-05

## 2018-05-22 RX ORDER — MAGNESIUM SULFATE 500 MG/ML
1 VIAL (ML) INJECTION ONCE
Qty: 0 | Refills: 0 | Status: COMPLETED | OUTPATIENT
Start: 2018-05-22 | End: 2018-05-22

## 2018-05-22 RX ADMIN — GABAPENTIN 300 MILLIGRAM(S): 400 CAPSULE ORAL at 22:14

## 2018-05-22 RX ADMIN — Medication 200 MILLIGRAM(S): at 00:57

## 2018-05-22 RX ADMIN — OXYCODONE AND ACETAMINOPHEN 1 TABLET(S): 5; 325 TABLET ORAL at 20:58

## 2018-05-22 RX ADMIN — Medication 300 MILLIGRAM(S): at 21:37

## 2018-05-22 RX ADMIN — Medication 100 GRAM(S): at 21:10

## 2018-05-22 RX ADMIN — Medication 25 MILLIGRAM(S): at 06:36

## 2018-05-22 RX ADMIN — GABAPENTIN 300 MILLIGRAM(S): 400 CAPSULE ORAL at 06:36

## 2018-05-22 RX ADMIN — ATORVASTATIN CALCIUM 80 MILLIGRAM(S): 80 TABLET, FILM COATED ORAL at 22:14

## 2018-05-22 RX ADMIN — HEPARIN SODIUM 5000 UNIT(S): 5000 INJECTION INTRAVENOUS; SUBCUTANEOUS at 06:36

## 2018-05-22 RX ADMIN — AMLODIPINE BESYLATE 5 MILLIGRAM(S): 2.5 TABLET ORAL at 06:36

## 2018-05-22 RX ADMIN — SODIUM CHLORIDE 65 MILLILITER(S): 9 INJECTION INTRAMUSCULAR; INTRAVENOUS; SUBCUTANEOUS at 02:57

## 2018-05-22 RX ADMIN — Medication 200 MILLIGRAM(S): at 23:57

## 2018-05-22 RX ADMIN — OXYCODONE AND ACETAMINOPHEN 1 TABLET(S): 5; 325 TABLET ORAL at 21:20

## 2018-05-22 RX ADMIN — SODIUM CHLORIDE 60 MILLILITER(S): 9 INJECTION INTRAMUSCULAR; INTRAVENOUS; SUBCUTANEOUS at 19:35

## 2018-05-22 NOTE — CONSULT NOTE ADULT - SUBJECTIVE AND OBJECTIVE BOX
HPI:  83yoF with sig PMHx of CAD, lateral wall MI s/p 6 stents, dm, htn, ckd, RLE DVT, PE. early dementia presents with b/l great toe ulcers and pain.  Patient is from [place] and is noncompliant with medications and follow up care.  Patient has had ulcers for years.  Had a RLE angio done years ago and they could not intervene and was lost to follow up.  Was seeing Podiatry for ulcers but was told would not debride because she did not have pal pulses and the signals were not good.  Came to [place] to have better care and eval her vascular status.  Patient denies CP/N/V. (16 May 2018 16:32)      PMH:  PSH:  Meds:  Allerg:  SH:  FH:    PAST MEDICAL & SURGICAL HISTORY:  Peripheral vascular disease  Ulcer of lower limb  Arthropathy  Ischemic heart disease  Hypertension  Hyperlipidemia  Diabetes mellitus      MEDICATIONS  (STANDING):  amLODIPine   Tablet 5 milliGRAM(s) Oral daily  aspirin enteric coated 81 milliGRAM(s) Oral daily  atorvastatin 80 milliGRAM(s) Oral at bedtime  ciprofloxacin   IVPB      ciprofloxacin   IVPB 400 milliGRAM(s) IV Intermittent every 12 hours  dextrose 5%. 1000 milliLiter(s) (50 mL/Hr) IV Continuous <Continuous>  dextrose 50% Injectable 12.5 Gram(s) IV Push once  dextrose 50% Injectable 25 Gram(s) IV Push once  dextrose 50% Injectable 25 Gram(s) IV Push once  gabapentin 300 milliGRAM(s) Oral three times a day  insulin lispro (HumaLOG) corrective regimen sliding scale   SubCutaneous Before meals and at bedtime  insulin lispro Injectable (HumaLOG) 4 Unit(s) SubCutaneous three times a day before meals  isosorbide   mononitrate ER Tablet (IMDUR) 30 milliGRAM(s) Oral daily  metoprolol succinate ER 25 milliGRAM(s) Oral daily  sertraline 50 milliGRAM(s) Oral daily  sodium chloride 0.9%. 1000 milliLiter(s) (60 mL/Hr) IV Continuous <Continuous>  vancomycin  IVPB 1500 milliGRAM(s) IV Intermittent every 24 hours    MEDICATIONS  (PRN):  acetaminophen   Tablet. 650 milliGRAM(s) Oral every 6 hours PRN Moderate Pain (4 - 6)  dextrose 40% Gel 15 Gram(s) Oral once PRN Blood Glucose LESS THAN 70 milliGRAM(s)/deciliter  glucagon  Injectable 1 milliGRAM(s) IntraMuscular once PRN Glucose LESS THAN 70 milligrams/deciliter  oxyCODONE    5 mG/acetaminophen 325 mG 1 Tablet(s) Oral every 6 hours PRN Severe Pain (7 - 10)      Allergies    penicillins (Anaphylaxis)    Intolerances        SOCIAL HISTORY:    FAMILY HISTORY:      REVIEW OF SYSTEMS      General:	    Skin/Breast:  	  Ophthalmologic:  	  ENMT:	    Respiratory and Thorax:  	  Cardiovascular:	    Gastrointestinal:	    Genitourinary:	    Musculoskeletal:	    Neurological:	    Psychiatric:	    Hematology/Lymphatics:	    Endocrine:	    Allergic/Immunologic:	    ICU Vital Signs Last 24 Hrs  T(C): 35.9 (22 May 2018 18:55), Max: 36.7 (22 May 2018 00:00)  T(F): 96.7 (22 May 2018 18:55), Max: 98 (22 May 2018 00:00)  HR: 54 (22 May 2018 19:55) (52 - 64)  BP: 118/54 (22 May 2018 19:55) (114/73 - 169/80)  BP(mean): 82 (22 May 2018 19:55) (75 - 96)  ABP: 136/40 (22 May 2018 19:55) (126/38 - 140/42)  ABP(mean): 70 (22 May 2018 19:55) (64 - 74)  RR: 16 (22 May 2018 19:55) (8 - 17)  SpO2: 100% (22 May 2018 19:55) (98% - 100%)      I&O's Summary    21 May 2018 07:01  -  22 May 2018 07:00  --------------------------------------------------------  IN: 860 mL / OUT: 300 mL / NET: 560 mL    22 May 2018 07:01  -  22 May 2018 20:32  --------------------------------------------------------  IN: 420 mL / OUT: 500 mL / NET: -80 mL        Vivar:	  [ ] None	[ ] Daily Vivar Order Placed	   Indication:	  [ ] Strict I and O's    [ ] Obstruction     [ ] Incontinence + Stage 3 or 4 Decubitus  Central Line:  [ ] None	   [ ]  Medication / TPN Administration       Physical Exam:  General: NAD  HEENT: NC/AT, EOMI, PERRLA, normal hearing, no oral lesions, neck supple w/o LAD  Pulmonary: Nonlabored breathing, no respiratory distress, CTA-B  Cardiovascular: NSR, no murmurs  Abdominal: soft, NT/ND, +BS, no organomegaly  Extremities: WWP, 5/5 strength x 4, no clubbing/cyanosis/edema  Neuro: A/O x3, CNs II-XII grossly intact, normal motor/sensation, no focal deficits  Pulses:   Right:                                                                          Left:  FEM [ ]2+ [ ]1+ [ ]doppler                                             FEM [ ]2+ [ ]1+ [ ]doppler    POP [ ]2+ [ ]1+ [ ]doppler                                             POP [ ]2+ [ ]1+ [ ]doppler    DP [ ]2+ [ ]1+ [ ]doppler                                                DP [ ]2+ [ ]1+ [ ]doppler  PT[ ]2+ [ ]1+ [ ]doppler                                                  PT [ ]2+ [ ]1+ [ ]doppler    Lines/tubes/drains:    Vent settings:      LABS:                        8.9    11.1  )-----------( 208      ( 22 May 2018 19:49 )             28.2     05-22    141  |  105  |  10  ----------------------------<  144<H>  4.2   |  25  |  0.70    Ca    8.7      22 May 2018 19:49  Phos  4.2     05-22  Mg     1.8     05-22      PT/INR - ( 22 May 2018 19:49 )   PT: 12.4 sec;   INR: 1.11          PTT - ( 22 May 2018 19:49 )  PTT:51.8 sec    CAPILLARY BLOOD GLUCOSE  138 (22 May 2018 19:40)      POCT Blood Glucose.: 138 mg/dL (22 May 2018 19:38)  POCT Blood Glucose.: 134 mg/dL (22 May 2018 11:02)  POCT Blood Glucose.: 138 mg/dL (22 May 2018 08:03)  POCT Blood Glucose.: 185 mg/dL (21 May 2018 22:09)        Cultures:      RADIOLOGY & ADDITIONAL STUDIES: HPI:  83yoF with sig PMHx of CAD, lateral wall MI s/p 6 stents, dm, htn, ckd, RLE DVT, PE. early dementia presents with b/l great toe ulcers and pain.  Patient is from [place] and is noncompliant with medications and follow up care.  Patient has had ulcers for years.  Had a RLE angio done years ago and they could not intervene and was lost to follow up.  Was seeing Podiatry for ulcers but was told would not debride because she did not have pal pulses and the signals were not good.  Came to [place] to have better care and eval her vascular status.  Patient denies CP/N/V. (16 May 2018 16:32)    On 5/22, she went to OR for RLE CFA to peroneal bypass with rGSV and completion angio, extubated at end of case. Post-op, she is brought to SICU for close hemodynamic monitoring in light of coronary artery disease; post op EKG unchanged from pre op. She has minimal tenderness in right leg, and currently no pain in feet. No fevers, no CP, no SOA, no nausea, no leg coolness/numbness/weakness.     PAST MEDICAL & SURGICAL HISTORY:  Peripheral vascular disease  Ulcer of lower limb  Arthropathy  Ischemic heart disease  Hypertension  Hyperlipidemia  Diabetes mellitus    MEDICATIONS  (STANDING):  amLODIPine   Tablet 5 milliGRAM(s) Oral daily  aspirin enteric coated 81 milliGRAM(s) Oral daily  atorvastatin 80 milliGRAM(s) Oral at bedtime  ciprofloxacin   IVPB      ciprofloxacin   IVPB 400 milliGRAM(s) IV Intermittent every 12 hours  dextrose 5%. 1000 milliLiter(s) (50 mL/Hr) IV Continuous <Continuous>  dextrose 50% Injectable 12.5 Gram(s) IV Push once  dextrose 50% Injectable 25 Gram(s) IV Push once  dextrose 50% Injectable 25 Gram(s) IV Push once  gabapentin 300 milliGRAM(s) Oral three times a day  insulin lispro (HumaLOG) corrective regimen sliding scale   SubCutaneous Before meals and at bedtime  insulin lispro Injectable (HumaLOG) 4 Unit(s) SubCutaneous three times a day before meals  isosorbide   mononitrate ER Tablet (IMDUR) 30 milliGRAM(s) Oral daily  metoprolol succinate ER 25 milliGRAM(s) Oral daily  sertraline 50 milliGRAM(s) Oral daily  sodium chloride 0.9%. 1000 milliLiter(s) (60 mL/Hr) IV Continuous <Continuous>  vancomycin  IVPB 1500 milliGRAM(s) IV Intermittent every 24 hours    MEDICATIONS  (PRN):  acetaminophen   Tablet. 650 milliGRAM(s) Oral every 6 hours PRN Moderate Pain (4 - 6)  dextrose 40% Gel 15 Gram(s) Oral once PRN Blood Glucose LESS THAN 70 milliGRAM(s)/deciliter  glucagon  Injectable 1 milliGRAM(s) IntraMuscular once PRN Glucose LESS THAN 70 milligrams/deciliter  oxyCODONE    5 mG/acetaminophen 325 mG 1 Tablet(s) Oral every 6 hours PRN Severe Pain (7 - 10)      Allergies  penicillins (Anaphylaxis)      ICU Vital Signs Last 24 Hrs  T(C): 35.9 (22 May 2018 18:55), Max: 36.7 (22 May 2018 00:00)  T(F): 96.7 (22 May 2018 18:55), Max: 98 (22 May 2018 00:00)  HR: 54 (22 May 2018 19:55) (52 - 64)  BP: 118/54 (22 May 2018 19:55) (114/73 - 169/80)  BP(mean): 82 (22 May 2018 19:55) (75 - 96)  ABP: 136/40 (22 May 2018 19:55) (126/38 - 140/42)  ABP(mean): 70 (22 May 2018 19:55) (64 - 74)  RR: 16 (22 May 2018 19:55) (8 - 17)  SpO2: 100% (22 May 2018 19:55) (98% - 100%)      I&O's Summary    21 May 2018 07:01  -  22 May 2018 07:00  --------------------------------------------------------  IN: 860 mL / OUT: 300 mL / NET: 560 mL    22 May 2018 07:01  -  22 May 2018 20:32  --------------------------------------------------------  IN: 420 mL / OUT: 500 mL / NET: -80 mL        Vivar:	  [ ] None	[x ] Daily Vivar Order Placed	   Indication:	  [x ] Strict I and O's    [ ] Obstruction     [ ] Incontinence + Stage 3 or 4 Decubitus  Central Line:  [x ] None	   [ ]  Medication / TPN Administration       Physical Exam:  General: NAD  HEENT: NC/AT, EOMI, PERRLA, normal hearing, no oral lesions, neck supple w/o LAD  Pulmonary: Nonlabored breathing, no respiratory distress, CTA-B  Cardiovascular: NSR, no murmurs  Abdominal: soft, NT/ND, +BS, no organomegaly  Extremities: WWP, 5/5 strength x 4, no clubbing/cyanosis/edema  Neuro: A/O x3, CNs II-XII grossly intact, normal motor/sensation, no focal deficits  Pulses:   Right:                                                                          Left:  FEM [ ]2+ [ ]1+ [ ]doppler                                             FEM [ ]2+ [ ]1+ [ ]doppler    POP [ ]2+ [ ]1+ [ ]doppler                                             POP [ ]2+ [ ]1+ [ ]doppler    DP [ ]2+ [ ]1+ [ ]doppler                                                DP [ ]2+ [ ]1+ [ ]doppler  PT[ ]2+ [ ]1+ [ ]doppler                                                  PT [ ]2+ [ ]1+ [ ]doppler    Lines/tubes/drains:    Vent settings:      LABS:                        8.9    11.1  )-----------( 208      ( 22 May 2018 19:49 )             28.2     05-22    141  |  105  |  10  ----------------------------<  144<H>  4.2   |  25  |  0.70    Ca    8.7      22 May 2018 19:49  Phos  4.2     05-22  Mg     1.8     05-22      PT/INR - ( 22 May 2018 19:49 )   PT: 12.4 sec;   INR: 1.11          PTT - ( 22 May 2018 19:49 )  PTT:51.8 sec    CAPILLARY BLOOD GLUCOSE  138 (22 May 2018 19:40)      POCT Blood Glucose.: 138 mg/dL (22 May 2018 19:38)  POCT Blood Glucose.: 134 mg/dL (22 May 2018 11:02)  POCT Blood Glucose.: 138 mg/dL (22 May 2018 08:03)  POCT Blood Glucose.: 185 mg/dL (21 May 2018 22:09)        Cultures:      RADIOLOGY & ADDITIONAL STUDIES: HPI:  83yoF with sig PMHx of CAD, lateral wall MI s/p 6 stents, dm, htn, ckd, RLE DVT, PE. early dementia presents with b/l great toe ulcers and pain.  Patient is from [place] and is noncompliant with medications and follow up care.  Patient has had ulcers for years.  Had a RLE angio done years ago and they could not intervene and was lost to follow up.  Was seeing Podiatry for ulcers but was told would not debride because she did not have pal pulses and the signals were not good.  Came to [place] to have better care and eval her vascular status.  Patient denies CP/N/V. (16 May 2018 16:32)    On 5/22, she went to OR for RLE CFA to peroneal bypass with rGSV and completion angio, extubated at end of case. Post-op, she is brought to SICU for close hemodynamic monitoring in light of coronary artery disease; post op EKG unchanged from pre op. She has minimal tenderness in right leg, and currently no pain in feet. No fevers, no CP, no SOA, no nausea, no leg coolness/numbness/weakness.     PAST MEDICAL & SURGICAL HISTORY:  Peripheral vascular disease  Ulcer of lower limb  Arthropathy  Ischemic heart disease  Hypertension  Hyperlipidemia  Diabetes mellitus    MEDICATIONS  (STANDING):  amLODIPine   Tablet 5 milliGRAM(s) Oral daily  aspirin enteric coated 81 milliGRAM(s) Oral daily  atorvastatin 80 milliGRAM(s) Oral at bedtime  ciprofloxacin   IVPB      ciprofloxacin   IVPB 400 milliGRAM(s) IV Intermittent every 12 hours  dextrose 5%. 1000 milliLiter(s) (50 mL/Hr) IV Continuous <Continuous>  dextrose 50% Injectable 12.5 Gram(s) IV Push once  dextrose 50% Injectable 25 Gram(s) IV Push once  dextrose 50% Injectable 25 Gram(s) IV Push once  gabapentin 300 milliGRAM(s) Oral three times a day  insulin lispro (HumaLOG) corrective regimen sliding scale   SubCutaneous Before meals and at bedtime  insulin lispro Injectable (HumaLOG) 4 Unit(s) SubCutaneous three times a day before meals  isosorbide   mononitrate ER Tablet (IMDUR) 30 milliGRAM(s) Oral daily  metoprolol succinate ER 25 milliGRAM(s) Oral daily  sertraline 50 milliGRAM(s) Oral daily  sodium chloride 0.9%. 1000 milliLiter(s) (60 mL/Hr) IV Continuous <Continuous>  vancomycin  IVPB 1500 milliGRAM(s) IV Intermittent every 24 hours    MEDICATIONS  (PRN):  acetaminophen   Tablet. 650 milliGRAM(s) Oral every 6 hours PRN Moderate Pain (4 - 6)  dextrose 40% Gel 15 Gram(s) Oral once PRN Blood Glucose LESS THAN 70 milliGRAM(s)/deciliter  glucagon  Injectable 1 milliGRAM(s) IntraMuscular once PRN Glucose LESS THAN 70 milligrams/deciliter  oxyCODONE    5 mG/acetaminophen 325 mG 1 Tablet(s) Oral every 6 hours PRN Severe Pain (7 - 10)      Allergies  penicillins (Anaphylaxis)      ICU Vital Signs Last 24 Hrs  T(C): 35.9 (22 May 2018 18:55), Max: 36.7 (22 May 2018 00:00)  T(F): 96.7 (22 May 2018 18:55), Max: 98 (22 May 2018 00:00)  HR: 54 (22 May 2018 19:55) (52 - 64)  BP: 118/54 (22 May 2018 19:55) (114/73 - 169/80)  BP(mean): 82 (22 May 2018 19:55) (75 - 96)  ABP: 136/40 (22 May 2018 19:55) (126/38 - 140/42)  ABP(mean): 70 (22 May 2018 19:55) (64 - 74)  RR: 16 (22 May 2018 19:55) (8 - 17)  SpO2: 100% (22 May 2018 19:55) (98% - 100%)      I&O's Summary    21 May 2018 07:01  -  22 May 2018 07:00  --------------------------------------------------------  IN: 860 mL / OUT: 300 mL / NET: 560 mL    22 May 2018 07:01  -  22 May 2018 20:32  --------------------------------------------------------  IN: 420 mL / OUT: 500 mL / NET: -80 mL        Vivar:	  [ ] None	[x ] Daily Vivar Order Placed	   Indication:	  [x ] Strict I and O's    [ ] Obstruction     [ ] Incontinence + Stage 3 or 4 Decubitus  Central Line:  [x ] None	   [ ]  Medication / TPN Administration       Physical Exam:  General: NAD  HEENT: NC/AT, EOMI, PERRLA, normal hearing, no oral lesions, neck supple w/o LAD  Pulmonary: Nonlabored breathing, no respiratory distress, CTA-B  Cardiovascular: NSR, no murmurs  Abdominal: soft, NT/ND, +BS, no organomegaly  Extremities: RLE with prevena wound vac medially over incision, no drainage around tape, nontender. R 1st toe with dry gangrene at tip. Left great toe with dry gangrene at tip. Normal strength bilaterally, no edema. Feet warm.  Neuro: A/O x3, CNs II-XII grossly intact, normal motor/sensation, no focal deficits  Pulses: R DP/PT biphasic, L DP monophasic, L PT signal absent    LABS:                        8.9    11.1  )-----------( 208      ( 22 May 2018 19:49 )             28.2     05-22    141  |  105  |  10  ----------------------------<  144<H>  4.2   |  25  |  0.70    Ca    8.7      22 May 2018 19:49  Phos  4.2     05-22  Mg     1.8     05-22      PT/INR - ( 22 May 2018 19:49 )   PT: 12.4 sec;   INR: 1.11          PTT - ( 22 May 2018 19:49 )  PTT:51.8 sec    CAPILLARY BLOOD GLUCOSE  138 (22 May 2018 19:40)      POCT Blood Glucose.: 138 mg/dL (22 May 2018 19:38)  POCT Blood Glucose.: 134 mg/dL (22 May 2018 11:02)  POCT Blood Glucose.: 138 mg/dL (22 May 2018 08:03)  POCT Blood Glucose.: 185 mg/dL (21 May 2018 22:09)

## 2018-05-22 NOTE — BRIEF OPERATIVE NOTE - PROCEDURE
<<-----Click on this checkbox to enter Procedure Vascular surgery procedure  05/22/2018  RLE CFA to Peroneal bypass with rGSV with completion angio  Active  AMY

## 2018-05-22 NOTE — CONSULT NOTE ADULT - ATTENDING COMMENTS
Patient seen and examined with house-staff during bedside rounds.  Resident note read, including vitals, physical findings, laboratory data, and radiological reports.   Revisions included below.  Direct personal management at bed side and extensive interpretation of the data.  Plan was outlined and discussed in details with the housestaff.  Decision making of high complexity  Action taken for acute disease activity to reflect the level of care provided:  - medication reconciliation  - review laboratory data  -Preoperative evaluation and risk assessment    Management of comorbidities    Discussed with the family
CCM ATTENDING    Patient seen and discussed with House Officer/Resident  Chart and history reviewed  Exam, labs, and radiology as noted above   Assessment and Plans as outlined  Currently patient's mentation is appropriate   Protecting airway   Breathing is non-labored without increased work of breathing --- no intercostal accessory muscle use and no paradoxical abdominal motion evident   Ventilating and oxygenating adequately without increased work of breathing  Hemodynamically stable---clinically perfusing adequately  Aim to maintain MAP >= 65 mmHg, U/O >= 0.5 ml/kg/hr, pulse oximetry OxSat >= 92%   Metabolic demands along with any abnormal blood chemistries, and fluid requirements being addressed    Sedation and/or pain meds as needed to reduce metabolic demand and maintain comfort   GI/DVT prophylaxis

## 2018-05-22 NOTE — PROGRESS NOTE ADULT - SUBJECTIVE AND OBJECTIVE BOX
24hr Events:  O/N: NPO/IVF, pre-oped, Vanc trough 7, increased Vanc dose from 1250mg to 1500mg q24hr  5/21: ABIs, vein study done in [facility] office      Assessment/Plan:  83 yoF with sig PMHx of CAD, lateral wall MI s/p 6 stents, dm, htn, ckd, RLE DVT, PE. early dementia s/p RLE angiogram (5/17) for b/l great toe ulcers and pain    - pain control  - asa/hsq  - cont home meds  - ISS  - NPO/IVF for RLE bypass  - f/u cards recs  - insulin sliding scale  - Vanco (5/18 - ) for MRSA / Cipro (5/19 - ) for E-coli  - betadine for toes  - f/u AM labs 24hr Events:  O/N: NPO/IVF, pre-oped, Vanc trough 7, increased Vanc dose from 1250mg to 1500mg q24hr  5[facility]21: [facility]    ciprofloxacin   IVPB   ciprofloxacin   IVPB 400  vancomycin  IVPB 1500  amLODIPine   Tablet 5  aspirin enteric coated 81  ciprofloxacin   IVPB   ciprofloxacin   IVPB 400  heparin  Injectable 5000  isosorbide   mononitrate ER Tablet (IMDUR) 30  metoprolol succinate ER 25  vancomycin  IVPB 1500        Vital Signs Last 24 Hrs  T(C): 36.1 (22 May 2018 05:27), Max: 37.1 (21 May 2018 13:29)  T(F): 97 (22 May 2018 05:27), Max: 98.7 (21 May 2018 13:29)  HR: 64 (22 May 2018 05:27) (55 - 67)  BP: 150/75 (22 May 2018 05:27) (111/72 - 169/80)  BP(mean): --  RR: 17 (22 May 2018 05:27) (15 - 17)  SpO2: 98% (22 May 2018 05:27) (96% - 99%)  I&O's Summary    21 May 2018 07:01  -  22 May 2018 07:00  --------------------------------------------------------  IN: 860 mL / OUT: 300 mL / NET: 560 mL    22 May 2018 07:01  -  22 May 2018 07:54  --------------------------------------------------------  IN: 65 mL / OUT: 0 mL / NET: 65 mL    Physical Exam:  General: NAD, resting comfortably in bed  Pulmonary: Nonlabored breathing, no respiratory distress  cardio- s1s2 rrr  	abd- soft nt/nd  	ext- RLE- distal tip of great toe with dry ulcer with ttp; no dc, odor, erythema  	LLE- dry ulcer on nail bed of great toe with ttp; no dc, odor, erythema  Vascular- b/l LE +mono DP/PT triphasic pop 2+ pal fem          LABS:                        9.5    7.8   )-----------( 211      ( 22 May 2018 05:50 )             29.5     05-22    139  |  103  |  17  ----------------------------<  137<H>  4.0   |  27  |  0.80    Ca    8.9      22 May 2018 05:50  Phos  4.0     05-22  Mg     2.1     05-22      PT/INR - ( 22 May 2018 05:50 )   PT: 11.9 sec;   INR: 1.07          PTT - ( 22 May 2018 05:50 )  PTT:27.7 sec    Radiology and Additional Studies:    Assessment and Plan:     Assessment/Plan:  83 yoF with sig PMHx of CAD, lateral wall MI s/p 6 stents, dm, htn, ckd, RLE DVT, PE. early dementia s/p RLE angiogram (5/17) for b/l great toe ulcers and pain    - pain control  - asa/hsq  - cont home meds  - ISS  - NPO/IVF for RLE bypass  - f/u cards recs  - insulin sliding scale  - Vanco (5/18 - ) for MRSA / Cipro (5/19 - ) for E-coli  - betadine for toes  - f/u AM labs

## 2018-05-23 DIAGNOSIS — Z98.890 OTHER SPECIFIED POSTPROCEDURAL STATES: ICD-10-CM

## 2018-05-23 LAB
ALBUMIN SERPL ELPH-MCNC: 2.5 G/DL — LOW (ref 3.3–5)
ALP SERPL-CCNC: 51 U/L — SIGNIFICANT CHANGE UP (ref 40–120)
ALT FLD-CCNC: 27 U/L — SIGNIFICANT CHANGE UP (ref 10–45)
ANION GAP SERPL CALC-SCNC: 13 MMOL/L — SIGNIFICANT CHANGE UP (ref 5–17)
ANION GAP SERPL CALC-SCNC: 9 MMOL/L — SIGNIFICANT CHANGE UP (ref 5–17)
APPEARANCE UR: CLEAR — SIGNIFICANT CHANGE UP
APPEARANCE UR: CLEAR — SIGNIFICANT CHANGE UP
APTT BLD: 24.5 SEC — LOW (ref 27.5–37.4)
AST SERPL-CCNC: 24 U/L — SIGNIFICANT CHANGE UP (ref 10–40)
BILIRUB SERPL-MCNC: 0.4 MG/DL — SIGNIFICANT CHANGE UP (ref 0.2–1.2)
BILIRUB UR-MCNC: NEGATIVE — SIGNIFICANT CHANGE UP
BILIRUB UR-MCNC: NEGATIVE — SIGNIFICANT CHANGE UP
BUN SERPL-MCNC: 12 MG/DL — SIGNIFICANT CHANGE UP (ref 7–23)
BUN SERPL-MCNC: 14 MG/DL — SIGNIFICANT CHANGE UP (ref 7–23)
CALCIUM SERPL-MCNC: 7.8 MG/DL — LOW (ref 8.4–10.5)
CALCIUM SERPL-MCNC: 8.5 MG/DL — SIGNIFICANT CHANGE UP (ref 8.4–10.5)
CHLORIDE SERPL-SCNC: 100 MMOL/L — SIGNIFICANT CHANGE UP (ref 96–108)
CHLORIDE SERPL-SCNC: 101 MMOL/L — SIGNIFICANT CHANGE UP (ref 96–108)
CK MB CFR SERPL CALC: 4 NG/ML — SIGNIFICANT CHANGE UP (ref 0–6.7)
CK SERPL-CCNC: 345 U/L — HIGH (ref 25–170)
CO2 SERPL-SCNC: 24 MMOL/L — SIGNIFICANT CHANGE UP (ref 22–31)
CO2 SERPL-SCNC: 24 MMOL/L — SIGNIFICANT CHANGE UP (ref 22–31)
COLOR SPEC: YELLOW — SIGNIFICANT CHANGE UP
COLOR SPEC: YELLOW — SIGNIFICANT CHANGE UP
CREAT ?TM UR-MCNC: 203 MG/DL — SIGNIFICANT CHANGE UP
CREAT SERPL-MCNC: 0.72 MG/DL — SIGNIFICANT CHANGE UP (ref 0.5–1.3)
CREAT SERPL-MCNC: 0.99 MG/DL — SIGNIFICANT CHANGE UP (ref 0.5–1.3)
DIFF PNL FLD: (no result)
DIFF PNL FLD: (no result)
GLUCOSE BLDC GLUCOMTR-MCNC: 140 MG/DL — HIGH (ref 70–99)
GLUCOSE BLDC GLUCOMTR-MCNC: 144 MG/DL — HIGH (ref 70–99)
GLUCOSE BLDC GLUCOMTR-MCNC: 156 MG/DL — HIGH (ref 70–99)
GLUCOSE BLDC GLUCOMTR-MCNC: 169 MG/DL — HIGH (ref 70–99)
GLUCOSE BLDC GLUCOMTR-MCNC: 175 MG/DL — HIGH (ref 70–99)
GLUCOSE SERPL-MCNC: 173 MG/DL — HIGH (ref 70–99)
GLUCOSE SERPL-MCNC: 201 MG/DL — HIGH (ref 70–99)
GLUCOSE UR QL: NEGATIVE — SIGNIFICANT CHANGE UP
GLUCOSE UR QL: NEGATIVE — SIGNIFICANT CHANGE UP
HCT VFR BLD CALC: 26.2 % — LOW (ref 34.5–45)
HCT VFR BLD CALC: 29 % — LOW (ref 34.5–45)
HGB BLD-MCNC: 8.5 G/DL — LOW (ref 11.5–15.5)
HGB BLD-MCNC: 9.3 G/DL — LOW (ref 11.5–15.5)
INR BLD: 1.12 — SIGNIFICANT CHANGE UP (ref 0.88–1.16)
KETONES UR-MCNC: NEGATIVE — SIGNIFICANT CHANGE UP
KETONES UR-MCNC: NEGATIVE — SIGNIFICANT CHANGE UP
LACTATE SERPL-SCNC: 1.1 MMOL/L — SIGNIFICANT CHANGE UP (ref 0.5–2)
LEUKOCYTE ESTERASE UR-ACNC: NEGATIVE — SIGNIFICANT CHANGE UP
LEUKOCYTE ESTERASE UR-ACNC: NEGATIVE — SIGNIFICANT CHANGE UP
MAGNESIUM SERPL-MCNC: 2 MG/DL — SIGNIFICANT CHANGE UP (ref 1.6–2.6)
MAGNESIUM SERPL-MCNC: 2 MG/DL — SIGNIFICANT CHANGE UP (ref 1.6–2.6)
MCHC RBC-ENTMCNC: 29.2 PG — SIGNIFICANT CHANGE UP (ref 27–34)
MCHC RBC-ENTMCNC: 29.8 PG — SIGNIFICANT CHANGE UP (ref 27–34)
MCHC RBC-ENTMCNC: 32.1 G/DL — SIGNIFICANT CHANGE UP (ref 32–36)
MCHC RBC-ENTMCNC: 32.4 G/DL — SIGNIFICANT CHANGE UP (ref 32–36)
MCV RBC AUTO: 90.9 FL — SIGNIFICANT CHANGE UP (ref 80–100)
MCV RBC AUTO: 91.9 FL — SIGNIFICANT CHANGE UP (ref 80–100)
NITRITE UR-MCNC: NEGATIVE — SIGNIFICANT CHANGE UP
NITRITE UR-MCNC: NEGATIVE — SIGNIFICANT CHANGE UP
OSMOLALITY UR: 560 MOSMOL/KG — SIGNIFICANT CHANGE UP (ref 100–650)
PH UR: 5.5 — SIGNIFICANT CHANGE UP (ref 5–8)
PH UR: 5.5 — SIGNIFICANT CHANGE UP (ref 5–8)
PHOSPHATE SERPL-MCNC: 3.4 MG/DL — SIGNIFICANT CHANGE UP (ref 2.5–4.5)
PHOSPHATE SERPL-MCNC: 3.4 MG/DL — SIGNIFICANT CHANGE UP (ref 2.5–4.5)
PLATELET # BLD AUTO: 189 K/UL — SIGNIFICANT CHANGE UP (ref 150–400)
PLATELET # BLD AUTO: 212 K/UL — SIGNIFICANT CHANGE UP (ref 150–400)
POTASSIUM SERPL-MCNC: 4.2 MMOL/L — SIGNIFICANT CHANGE UP (ref 3.5–5.3)
POTASSIUM SERPL-MCNC: 4.2 MMOL/L — SIGNIFICANT CHANGE UP (ref 3.5–5.3)
POTASSIUM SERPL-SCNC: 4.2 MMOL/L — SIGNIFICANT CHANGE UP (ref 3.5–5.3)
POTASSIUM SERPL-SCNC: 4.2 MMOL/L — SIGNIFICANT CHANGE UP (ref 3.5–5.3)
PROT SERPL-MCNC: 5.6 G/DL — LOW (ref 6–8.3)
PROT UR-MCNC: (no result) MG/DL
PROT UR-MCNC: (no result) MG/DL
PROTHROM AB SERPL-ACNC: 12.5 SEC — SIGNIFICANT CHANGE UP (ref 9.8–12.7)
RBC # BLD: 2.85 M/UL — LOW (ref 3.8–5.2)
RBC # BLD: 3.19 M/UL — LOW (ref 3.8–5.2)
RBC # FLD: 14.4 % — SIGNIFICANT CHANGE UP (ref 10.3–16.9)
RBC # FLD: 14.5 % — SIGNIFICANT CHANGE UP (ref 10.3–16.9)
SODIUM SERPL-SCNC: 133 MMOL/L — LOW (ref 135–145)
SODIUM SERPL-SCNC: 138 MMOL/L — SIGNIFICANT CHANGE UP (ref 135–145)
SODIUM UR-SCNC: <20 MMOL/L — SIGNIFICANT CHANGE UP
SP GR SPEC: >=1.03 — SIGNIFICANT CHANGE UP (ref 1–1.03)
SP GR SPEC: >=1.03 — SIGNIFICANT CHANGE UP (ref 1–1.03)
TROPONIN T SERPL-MCNC: <0.01 NG/ML — SIGNIFICANT CHANGE UP (ref 0–0.01)
UROBILINOGEN FLD QL: 0.2 E.U./DL — SIGNIFICANT CHANGE UP
UROBILINOGEN FLD QL: 0.2 E.U./DL — SIGNIFICANT CHANGE UP
WBC # BLD: 11.3 K/UL — HIGH (ref 3.8–10.5)
WBC # BLD: 11.3 K/UL — HIGH (ref 3.8–10.5)
WBC # FLD AUTO: 11.3 K/UL — HIGH (ref 3.8–10.5)
WBC # FLD AUTO: 11.3 K/UL — HIGH (ref 3.8–10.5)

## 2018-05-23 PROCEDURE — 99232 SBSQ HOSP IP/OBS MODERATE 35: CPT | Mod: GC

## 2018-05-23 PROCEDURE — 71045 X-RAY EXAM CHEST 1 VIEW: CPT | Mod: 26

## 2018-05-23 PROCEDURE — 99233 SBSQ HOSP IP/OBS HIGH 50: CPT | Mod: GC

## 2018-05-23 PROCEDURE — 99233 SBSQ HOSP IP/OBS HIGH 50: CPT

## 2018-05-23 RX ORDER — OXYCODONE AND ACETAMINOPHEN 5; 325 MG/1; MG/1
2 TABLET ORAL EVERY 6 HOURS
Qty: 0 | Refills: 0 | Status: DISCONTINUED | OUTPATIENT
Start: 2018-05-23 | End: 2018-05-30

## 2018-05-23 RX ORDER — VANCOMYCIN HCL 1 G
1250 VIAL (EA) INTRAVENOUS EVERY 12 HOURS
Qty: 0 | Refills: 0 | Status: DISCONTINUED | OUTPATIENT
Start: 2018-05-23 | End: 2018-05-23

## 2018-05-23 RX ORDER — OXYCODONE AND ACETAMINOPHEN 5; 325 MG/1; MG/1
2 TABLET ORAL EVERY 4 HOURS
Qty: 0 | Refills: 0 | Status: DISCONTINUED | OUTPATIENT
Start: 2018-05-23 | End: 2018-05-23

## 2018-05-23 RX ORDER — INSULIN LISPRO 100/ML
VIAL (ML) SUBCUTANEOUS
Qty: 0 | Refills: 0 | Status: DISCONTINUED | OUTPATIENT
Start: 2018-05-23 | End: 2018-06-05

## 2018-05-23 RX ORDER — HYDROMORPHONE HYDROCHLORIDE 2 MG/ML
0.5 INJECTION INTRAMUSCULAR; INTRAVENOUS; SUBCUTANEOUS
Qty: 0 | Refills: 0 | Status: DISCONTINUED | OUTPATIENT
Start: 2018-05-23 | End: 2018-05-23

## 2018-05-23 RX ORDER — OXYCODONE AND ACETAMINOPHEN 5; 325 MG/1; MG/1
1 TABLET ORAL EVERY 4 HOURS
Qty: 0 | Refills: 0 | Status: DISCONTINUED | OUTPATIENT
Start: 2018-05-23 | End: 2018-05-23

## 2018-05-23 RX ORDER — SODIUM CHLORIDE 9 MG/ML
500 INJECTION INTRAMUSCULAR; INTRAVENOUS; SUBCUTANEOUS ONCE
Qty: 0 | Refills: 0 | Status: COMPLETED | OUTPATIENT
Start: 2018-05-23 | End: 2018-05-23

## 2018-05-23 RX ORDER — LOSARTAN POTASSIUM 100 MG/1
100 TABLET, FILM COATED ORAL DAILY
Qty: 0 | Refills: 0 | Status: DISCONTINUED | OUTPATIENT
Start: 2018-05-23 | End: 2018-05-23

## 2018-05-23 RX ORDER — SODIUM CHLORIDE 9 MG/ML
1000 INJECTION INTRAMUSCULAR; INTRAVENOUS; SUBCUTANEOUS
Qty: 0 | Refills: 0 | Status: DISCONTINUED | OUTPATIENT
Start: 2018-05-23 | End: 2018-05-23

## 2018-05-23 RX ORDER — HEPARIN SODIUM 5000 [USP'U]/ML
5000 INJECTION INTRAVENOUS; SUBCUTANEOUS EVERY 8 HOURS
Qty: 0 | Refills: 0 | Status: DISCONTINUED | OUTPATIENT
Start: 2018-05-23 | End: 2018-05-26

## 2018-05-23 RX ORDER — HEPARIN SODIUM 5000 [USP'U]/ML
5000 INJECTION INTRAVENOUS; SUBCUTANEOUS EVERY 12 HOURS
Qty: 0 | Refills: 0 | Status: DISCONTINUED | OUTPATIENT
Start: 2018-05-23 | End: 2018-05-23

## 2018-05-23 RX ORDER — VANCOMYCIN HCL 1 G
1250 VIAL (EA) INTRAVENOUS ONCE
Qty: 0 | Refills: 0 | Status: COMPLETED | OUTPATIENT
Start: 2018-05-23 | End: 2018-05-23

## 2018-05-23 RX ORDER — VANCOMYCIN HCL 1 G
VIAL (EA) INTRAVENOUS
Qty: 0 | Refills: 0 | Status: DISCONTINUED | OUTPATIENT
Start: 2018-05-23 | End: 2018-05-23

## 2018-05-23 RX ORDER — NOREPINEPHRINE BITARTRATE/D5W 8 MG/250ML
0.05 PLASTIC BAG, INJECTION (ML) INTRAVENOUS
Qty: 8 | Refills: 0 | Status: DISCONTINUED | OUTPATIENT
Start: 2018-05-23 | End: 2018-05-23

## 2018-05-23 RX ORDER — OXYCODONE AND ACETAMINOPHEN 5; 325 MG/1; MG/1
1 TABLET ORAL EVERY 6 HOURS
Qty: 0 | Refills: 0 | Status: DISCONTINUED | OUTPATIENT
Start: 2018-05-23 | End: 2018-05-30

## 2018-05-23 RX ORDER — ACETAMINOPHEN 500 MG
1000 TABLET ORAL ONCE
Qty: 0 | Refills: 0 | Status: COMPLETED | OUTPATIENT
Start: 2018-05-23 | End: 2018-05-23

## 2018-05-23 RX ORDER — HYDROMORPHONE HYDROCHLORIDE 2 MG/ML
0.5 INJECTION INTRAMUSCULAR; INTRAVENOUS; SUBCUTANEOUS ONCE
Qty: 0 | Refills: 0 | Status: DISCONTINUED | OUTPATIENT
Start: 2018-05-23 | End: 2018-05-23

## 2018-05-23 RX ADMIN — LOSARTAN POTASSIUM 100 MILLIGRAM(S): 100 TABLET, FILM COATED ORAL at 05:05

## 2018-05-23 RX ADMIN — AMLODIPINE BESYLATE 5 MILLIGRAM(S): 2.5 TABLET ORAL at 05:06

## 2018-05-23 RX ADMIN — Medication 2: at 06:06

## 2018-05-23 RX ADMIN — Medication 2: at 11:56

## 2018-05-23 RX ADMIN — HYDROMORPHONE HYDROCHLORIDE 0.5 MILLIGRAM(S): 2 INJECTION INTRAMUSCULAR; INTRAVENOUS; SUBCUTANEOUS at 09:15

## 2018-05-23 RX ADMIN — Medication 7.83 MICROGRAM(S)/KG/MIN: at 10:07

## 2018-05-23 RX ADMIN — SODIUM CHLORIDE 1000 MILLILITER(S): 9 INJECTION INTRAMUSCULAR; INTRAVENOUS; SUBCUTANEOUS at 15:51

## 2018-05-23 RX ADMIN — SODIUM CHLORIDE 60 MILLILITER(S): 9 INJECTION INTRAMUSCULAR; INTRAVENOUS; SUBCUTANEOUS at 12:27

## 2018-05-23 RX ADMIN — Medication 166.67 MILLIGRAM(S): at 11:46

## 2018-05-23 RX ADMIN — GABAPENTIN 300 MILLIGRAM(S): 400 CAPSULE ORAL at 13:53

## 2018-05-23 RX ADMIN — Medication 100 MILLIGRAM(S): at 22:21

## 2018-05-23 RX ADMIN — GABAPENTIN 300 MILLIGRAM(S): 400 CAPSULE ORAL at 22:20

## 2018-05-23 RX ADMIN — Medication 100 MILLIGRAM(S): at 05:06

## 2018-05-23 RX ADMIN — OXYCODONE AND ACETAMINOPHEN 1 TABLET(S): 5; 325 TABLET ORAL at 07:35

## 2018-05-23 RX ADMIN — HEPARIN SODIUM 5000 UNIT(S): 5000 INJECTION INTRAVENOUS; SUBCUTANEOUS at 13:52

## 2018-05-23 RX ADMIN — Medication 25 MILLIGRAM(S): at 05:05

## 2018-05-23 RX ADMIN — Medication 400 MILLIGRAM(S): at 21:01

## 2018-05-23 RX ADMIN — HYDROMORPHONE HYDROCHLORIDE 0.5 MILLIGRAM(S): 2 INJECTION INTRAMUSCULAR; INTRAVENOUS; SUBCUTANEOUS at 09:00

## 2018-05-23 RX ADMIN — SODIUM CHLORIDE 1000 MILLILITER(S): 9 INJECTION INTRAMUSCULAR; INTRAVENOUS; SUBCUTANEOUS at 09:29

## 2018-05-23 RX ADMIN — ATORVASTATIN CALCIUM 80 MILLIGRAM(S): 80 TABLET, FILM COATED ORAL at 22:21

## 2018-05-23 RX ADMIN — OXYCODONE AND ACETAMINOPHEN 1 TABLET(S): 5; 325 TABLET ORAL at 02:12

## 2018-05-23 RX ADMIN — Medication 200 MILLIGRAM(S): at 13:44

## 2018-05-23 RX ADMIN — Medication 100 MILLIGRAM(S): at 13:52

## 2018-05-23 RX ADMIN — SERTRALINE 50 MILLIGRAM(S): 25 TABLET, FILM COATED ORAL at 12:10

## 2018-05-23 RX ADMIN — OXYCODONE AND ACETAMINOPHEN 1 TABLET(S): 5; 325 TABLET ORAL at 08:05

## 2018-05-23 RX ADMIN — OXYCODONE AND ACETAMINOPHEN 1 TABLET(S): 5; 325 TABLET ORAL at 03:00

## 2018-05-23 RX ADMIN — GABAPENTIN 300 MILLIGRAM(S): 400 CAPSULE ORAL at 05:06

## 2018-05-23 RX ADMIN — HEPARIN SODIUM 5000 UNIT(S): 5000 INJECTION INTRAVENOUS; SUBCUTANEOUS at 22:21

## 2018-05-23 RX ADMIN — Medication 81 MILLIGRAM(S): at 12:05

## 2018-05-23 NOTE — PROGRESS NOTE ADULT - ASSESSMENT
83yoF with PMH CAD, lateral wall MI s/p 6 stents, DM, HTN, CKD, RLE DVT, PE, early dementia, PVD s/p RLE angiogram (5/17) for b/l great toe ulcers and rest pain now s/p  RLE CFA to Peroneal bypass with rGSV with completion angio on 5/22    Patient appears to be recovering well. Neurovascular exam intact.  Appreciate care per SICU. Agree with continuing to wean pressors as tolerated, pain control, continue abx, neurovascular monitoring

## 2018-05-23 NOTE — PROGRESS NOTE ADULT - SUBJECTIVE AND OBJECTIVE BOX
INTERVAL HISTORY:  The patient is having right leg discomfort. She denies chest pain or dyspnea.	  Chart, PMH and allergies reviewed  MEDICATIONS:  amLODIPine   Tablet 5 milliGRAM(s) Oral daily  isosorbide   mononitrate ER Tablet (IMDUR) 30 milliGRAM(s) Oral daily  losartan 100 milliGRAM(s) Oral daily  metoprolol succinate ER 25 milliGRAM(s) Oral daily    ciprofloxacin   IVPB      ciprofloxacin   IVPB 400 milliGRAM(s) IV Intermittent every 12 hours  vancomycin  IVPB 1500 milliGRAM(s) IV Intermittent every 24 hours      acetaminophen   Tablet. 650 milliGRAM(s) Oral every 6 hours PRN  gabapentin 300 milliGRAM(s) Oral three times a day  oxyCODONE    5 mG/acetaminophen 325 mG 1 Tablet(s) Oral every 6 hours PRN  sertraline 50 milliGRAM(s) Oral daily    docusate sodium 100 milliGRAM(s) Oral three times a day    atorvastatin 80 milliGRAM(s) Oral at bedtime  dextrose 40% Gel 15 Gram(s) Oral once PRN  dextrose 50% Injectable 12.5 Gram(s) IV Push once  dextrose 50% Injectable 25 Gram(s) IV Push once  dextrose 50% Injectable 25 Gram(s) IV Push once  glucagon  Injectable 1 milliGRAM(s) IntraMuscular once PRN  insulin lispro (HumaLOG) corrective regimen sliding scale   SubCutaneous Before meals and at bedtime    aspirin enteric coated 81 milliGRAM(s) Oral daily  dextrose 5%. 1000 milliLiter(s) IV Continuous <Continuous>  sodium chloride 0.9%. 1000 milliLiter(s) IV Continuous <Continuous>      REVIEW OF SYSTEMS:  CONSTITUTIONAL: No fever, no weight loss, or fatigue  PULMONARY: No cough, no wheezing, chills or hemoptysis; No Shortness of Breath  CARDIOVASCULAR: No chest pain, no palpitations, no syncope, dizziness, no leg swelling  GASTROINTESTINAL: No abdominal pain. No nausea, no  vomiting, or hematemesis; No diarrhea or constipation. No melena or hematochezia.  GENITOURINARY: No dysuria, frequency, hematuria, or incontinence  SKIN: No itching, burning, rashes, or lesions     PHYSICAL EXAM:  T(C): 37.7 (05-23-18 @ 06:38), Max: 37.7 (05-23-18 @ 06:38)  HR: 58 (05-23-18 @ 07:00) (50 - 62)  BP: 150/51 (05-23-18 @ 00:44) (114/73 - 150/51)  RR: 13 (05-23-18 @ 07:00) (8 - 21)  SpO2: 99% (05-23-18 @ 07:00) (95% - 100%)  Wt(kg): --  I&O's Summary    22 May 2018 07:01  -  23 May 2018 07:00  --------------------------------------------------------  IN: 1280 mL / OUT: 1365 mL / NET: -85 mL    23 May 2018 07:01  -  23 May 2018 07:52  --------------------------------------------------------  IN: 60 mL / OUT: 0 mL / NET: 60 mL        Appearance:  No deformities, normal appearance	  HEENT:   Normal oral mucosa, PERRL, EOMI	  Neck: No jvd , no masses  Lymphatic: No  lymphadenopathy  Pulmonary: Lungs clear to auscultation and percussion  Cardiovascular: Normal S1 S2, No JVD, No murmur, No edema, R foot warm, + doppler, R leg bandage, bilateral gangrenous toes	  Abdomen:  Soft, Non-tender, + BS  Skin: No rashes, No ecchymoses	  Extremities:  No clubbing or cyanosis  MSK: Normal range of motion, no joint swelling    LABS:		  CARDIAC MARKERS:                         9.3    11.3  )-----------( 212      ( 23 May 2018 04:31 )             29.0     05-23    138  |  101  |  12  ----------------------------<  201<H>  4.2   |  24  |  0.72    Ca    8.5      23 May 2018 04:31  Phos  3.4     05-23  Mg     2.0     05-23      proBNP:  Lipid Profile:  HgA1c:  TSH:  PT/INR - ( 22 May 2018 19:49 )   PT: 12.4 sec;   INR: 1.11          PTT - ( 22 May 2018 19:49 )  PTT:51.8 sec  TELEMETRY: NSR	           ECG:  	            RADIOLOGY:   DIAGNOSTIC TESTING: [ ] Echocardiogram: [ ]  Catheterization: [ ] Stress Test:      ASSESSMENT/PLAN: 	      Coronary artery disease/ H/o MI-No chest discomfort. EKG post op reported no change but not on chart. Please repeat.   Continue to monitor in ICU.    Hypertension-blood pressure is labile but acceptable.    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 R foot is warm with positive doppler flow.      Diabetes-follow fingersticks.    Anemia- check guaiac. Follow closely. Stable.

## 2018-05-23 NOTE — PROGRESS NOTE ADULT - ATTENDING COMMENTS
Patient seen and examined with house-staff during bedside rounds.  Resident note read, including vitals, physical findings, laboratory data, and radiological reports.   Revisions included below.  Direct personal management at bed side and extensive interpretation of the data.  Plan was outlined and discussed in details with the housestaff.  Decision making of high complexity  Action taken for acute disease activity to reflect the level of care provided:  - medication reconciliation  - review laboratory data  -Post operative evaluation and risk assessment  - Patient is followed by cardiology and there's no evidence of acute coronarysyndrome  Management of comorbidities    Discussed with the family

## 2018-05-23 NOTE — PROGRESS NOTE ADULT - SUBJECTIVE AND OBJECTIVE BOX
SUBJECTIVE: Patient seen and examined bedside. Underwent RLE CFA to peroneal rSVG bypass with completion angiogram showing adequate distal perfusion. End of case with PT/DP signals. Transferred to SICU care for post-operative hemodynamic monitoring.     This AM, no complaints. Would like something to help her sleep. Pain medication controlling level of pain for now. Is requiring minimal pressor support with MAP 60s.    Denies CP, SOB.    amLODIPine   Tablet 5 milliGRAM(s) Oral daily  aspirin enteric coated 81 milliGRAM(s) Oral daily  ciprofloxacin   IVPB      ciprofloxacin   IVPB 400 milliGRAM(s) IV Intermittent every 12 hours  heparin  Injectable 5000 Unit(s) SubCutaneous every 8 hours  norepinephrine Infusion 0.05 MICROgram(s)/kG/Min IV Continuous <Continuous>  vancomycin  IVPB 1250 milliGRAM(s) IV Intermittent every 12 hours  vancomycin  IVPB          Vital Signs Last 24 Hrs  T(C): 36.9 (23 May 2018 10:01), Max: 37.7 (23 May 2018 06:38)  T(F): 98.5 (23 May 2018 10:01), Max: 99.9 (23 May 2018 06:38)  HR: 52 (23 May 2018 11:15) (50 - 62)  BP: 105/50 (23 May 2018 11:00) (71/32 - 150/51)  BP(mean): 73 (23 May 2018 11:00) (39 - 982)  RR: 11 (23 May 2018 11:15) (8 - 21)  SpO2: 99% (23 May 2018 11:15) (95% - 100%)  I&O's Detail    22 May 2018 07:01  -  23 May 2018 07:00  --------------------------------------------------------  IN:    IV PiggyBack: 200 mL    sodium chloride 0.9%.: 1080 mL  Total IN: 1280 mL    OUT:    Indwelling Catheter - Urethral: 1065 mL    Voided: 300 mL  Total OUT: 1365 mL    Total NET: -85 mL      23 May 2018 07:01  -  23 May 2018 12:01  --------------------------------------------------------  IN:    norepinephrine Infusion: 17.2 mL    Sodium Chloride 0.9% IV Bolus: 500 mL    sodium chloride 0.9%.: 180 mL  Total IN: 697.2 mL    OUT:    Indwelling Catheter - Urethral: 95 mL  Total OUT: 95 mL    Total NET: 602.2 mL          General: NAD, resting comfortably in bed. Appears stated age. Overweight. Pleasant. AAOx3  C/V: RRR, no MRG  Pulm: Nonlabored breathing, no respiratory distress on RA  Extrem: WWP. RLE with mild pedal edema. DP/PT bi-triphasic signals. Prevena vac in place over length of medial incision, no drainage  Vivar in place draining clear yellow urine        LABS:                        9.3    11.3  )-----------( 212      ( 23 May 2018 04:31 )             29.0     05-23    138  |  101  |  12  ----------------------------<  201<H>  4.2   |  24  |  0.72    Ca    8.5      23 May 2018 04:31  Phos  3.4     05-23  Mg     2.0     05-23      PT/INR - ( 22 May 2018 19:49 )   PT: 12.4 sec;   INR: 1.11          PTT - ( 22 May 2018 19:49 )  PTT:51.8 sec      RADIOLOGY & ADDITIONAL STUDIES:

## 2018-05-23 NOTE — PROGRESS NOTE ADULT - ASSESSMENT
83 yoF with sig PMHx of CAD, lateral wall MI s/p 6 stents, dm, htn, ckd, RLE DVT, PE. early dementia p/w b/l great toe ulcers and rest pain now s/p  RLE CFA to Peroneal bypass with rGSV with completion angio on 5/22    Neuro: prn percocet/tylenol, gabapentin 300 tid, sertraline 50 qd  CV: HD stable with labile BP. Cards following.  Norvasc 5, imdur 30, toprol xl 25, losartan, ASA, lipitor   Pulm: NC, wean to room air  FENGI: ADAT, NS @ 60, colace  : Vivar  Endo: ISS, holding humalog 4 qac  ID: +MRSA and E coli in wound - vanc (5/21--), cipro (5/20--)  Ppx: SCDs, SQH  Lines: PIVs  Wounds: RLE incision with prevena vac, R and L 1st great toes with dry gangrene, daily betadine  PT: not ordered    Addendum: after completion of this note the team was notified by nursing staff of SBP 70s after receiving dilaudid 0.5mg as well as amlodipine, losartan, and metoprolol. Pt was given 500cc bolus without response so levophed was started to achieve MAP>65 for adequate perfusion through RLE bypass. Pt sleepy but easily arousable throughout hypotensive episode. She denies CP/SOB. RLE remains WWP with biphasic DP/PT signals. Will discontinue imdur, metoprolol, and losartan until BP allows. Vascular surgery aware.

## 2018-05-23 NOTE — PROGRESS NOTE ADULT - SUBJECTIVE AND OBJECTIVE BOX
INTERVAL HPI/OVERNIGHT EVENTS:    PRESSORS: [ ] YES [ ] NO      MEDICATIONS  (STANDING):  amLODIPine   Tablet 5 milliGRAM(s) Oral daily  aspirin enteric coated 81 milliGRAM(s) Oral daily  atorvastatin 80 milliGRAM(s) Oral at bedtime  ciprofloxacin   IVPB      ciprofloxacin   IVPB 400 milliGRAM(s) IV Intermittent every 12 hours  dextrose 5%. 1000 milliLiter(s) (50 mL/Hr) IV Continuous <Continuous>  dextrose 50% Injectable 12.5 Gram(s) IV Push once  dextrose 50% Injectable 25 Gram(s) IV Push once  dextrose 50% Injectable 25 Gram(s) IV Push once  docusate sodium 100 milliGRAM(s) Oral three times a day  gabapentin 300 milliGRAM(s) Oral three times a day  heparin  Injectable 5000 Unit(s) SubCutaneous every 8 hours  insulin lispro (HumaLOG) corrective regimen sliding scale   SubCutaneous Before meals and at bedtime  isosorbide   mononitrate ER Tablet (IMDUR) 30 milliGRAM(s) Oral daily  losartan 100 milliGRAM(s) Oral daily  metoprolol succinate ER 25 milliGRAM(s) Oral daily  sertraline 50 milliGRAM(s) Oral daily  sodium chloride 0.9%. 1000 milliLiter(s) (60 mL/Hr) IV Continuous <Continuous>  vancomycin  IVPB 1500 milliGRAM(s) IV Intermittent every 24 hours    MEDICATIONS  (PRN):  acetaminophen   Tablet. 650 milliGRAM(s) Oral every 6 hours PRN Mild Pain (1 - 3)  dextrose 40% Gel 15 Gram(s) Oral once PRN Blood Glucose LESS THAN 70 milliGRAM(s)/deciliter  glucagon  Injectable 1 milliGRAM(s) IntraMuscular once PRN Glucose LESS THAN 70 milligrams/deciliter  HYDROmorphone  Injectable 0.5 milliGRAM(s) IV Push every 3 hours PRN breakthrough pain  oxyCODONE    5 mG/acetaminophen 325 mG 1 Tablet(s) Oral every 4 hours PRN Moderate Pain (4 - 6)  oxyCODONE    5 mG/acetaminophen 325 mG 2 Tablet(s) Oral every 4 hours PRN Severe Pain (7 - 10)      Drug Dosing Weight  Height (cm): 167.64 (21 May 2018 02:20)  Weight (kg): 83.5 (21 May 2018 02:20)  BMI (kg/m2): 29.7 (21 May 2018 02:20)  BSA (m2): 1.93 (21 May 2018 02:20)      PAST MEDICAL & SURGICAL HISTORY:  Peripheral vascular disease  Ulcer of lower limb  Arthropathy  Ischemic heart disease  Hypertension  Hyperlipidemia  Diabetes mellitus      ICU Vital Signs Last 24 Hrs  T(C): 37.7 (23 May 2018 06:38), Max: 37.7 (23 May 2018 06:38)  T(F): 99.9 (23 May 2018 06:38), Max: 99.9 (23 May 2018 06:38)  HR: 56 (23 May 2018 09:00) (50 - 62)  BP: 150/51 (23 May 2018 00:44) (114/73 - 150/51)  BP(mean): 982 (23 May 2018 00:44) (75 - 982)  ABP: 98/40 (23 May 2018 09:00) (98/40 - 182/52)  ABP(mean): 58 (23 May 2018 09:00) (58 - 88)  RR: 19 (23 May 2018 09:00) (8 - 21)  SpO2: 97% (23 May 2018 09:00) (95% - 100%)            22 May 2018 07:01  -  23 May 2018 07:00  --------------------------------------------------------  IN:    IV PiggyBack: 200 mL    sodium chloride 0.9%.: 1080 mL  Total IN: 1280 mL    OUT:    Indwelling Catheter - Urethral: 1065 mL    Voided: 300 mL  Total OUT: 1365 mL    Total NET: -85 mL      23 May 2018 07:01  -  23 May 2018 09:18  --------------------------------------------------------  IN:    sodium chloride 0.9%.: 60 mL  Total IN: 60 mL    OUT:    Indwelling Catheter - Urethral: 50 mL  Total OUT: 50 mL    Total NET: 10 mL              PHYSICAL EXAM:  General: appears uncomfortable  Neuro: A&Ox3, CNs II-XII grossly intact  HEENT: NC/AT, MMM,   Pulmonary: Nonlabored breathing, no respiratory distress, CTA-B  Cardiovascular: NSR, no murmurs  Abdominal: soft, NT/ND, +BS, no organomegaly  Extremities: RLE with prevena wound vac medially over incision, no drainage around tape, nontender. R 1st toe with dry gangrene at tip. Left great toe with dry gangrene at tip. Normal strength bilaterally, no edema. Feet warm.  Pulses: R DP/PT biphasic, L DP monophasic, L PT signal absent      LABS:  CBC Full  -  (23 May 2018 04:31)  WBC Count : 11.3 K/uL  Hemoglobin : 9.3 g/dL  Hematocrit : 29.0 %  Platelet Count - Automated : 212 K/uL  Mean Cell Volume : 90.9 fL  Mean Cell Hemoglobin : 29.2 pg  Mean Cell Hemoglobin Concentration : 32.1 g/dL      05-23    138  |  101  |  12  ----------------------------<  201<H>  4.2   |  24  |  0.72    Ca    8.5      23 May 2018 04:31  Phos  3.4     05-23  Mg     2.0     05-23      PT/INR - ( 22 May 2018 19:49 )   PT: 12.4 sec;   INR: 1.11          PTT - ( 22 May 2018 19:49 )  PTT:51.8 sec INTERVAL HPI/OVERNIGHT EVENTS: o/n: Post op EKG unchanged from pre op. AM Hb 9.5, post op 8.9, HD stable. Mag repleted. Finger sticks WNL. Transferred to [facility]. Restarted losartan.  5/22: transferred to SICU s/p RLE CFA to Peroneal bypass with rGSV with completion angio. R DP/PT biphasic signals.       Pt seen and examined at bedside. She c/o right groin pain that has not improved after taking percocet. She denies CP/SOB/N/V/HA      MEDICATIONS  (STANDING):  amLODIPine   Tablet 5 milliGRAM(s) Oral daily  aspirin enteric coated 81 milliGRAM(s) Oral daily  atorvastatin 80 milliGRAM(s) Oral at bedtime  ciprofloxacin   IVPB      ciprofloxacin   IVPB 400 milliGRAM(s) IV Intermittent every 12 hours  dextrose 5%. 1000 milliLiter(s) (50 mL/Hr) IV Continuous <Continuous>  dextrose 50% Injectable 12.5 Gram(s) IV Push once  dextrose 50% Injectable 25 Gram(s) IV Push once  dextrose 50% Injectable 25 Gram(s) IV Push once  docusate sodium 100 milliGRAM(s) Oral three times a day  gabapentin 300 milliGRAM(s) Oral three times a day  heparin  Injectable 5000 Unit(s) SubCutaneous every 8 hours  insulin lispro (HumaLOG) corrective regimen sliding scale   SubCutaneous Before meals and at bedtime  isosorbide   mononitrate ER Tablet (IMDUR) 30 milliGRAM(s) Oral daily  losartan 100 milliGRAM(s) Oral daily  metoprolol succinate ER 25 milliGRAM(s) Oral daily  sertraline 50 milliGRAM(s) Oral daily  sodium chloride 0.9%. 1000 milliLiter(s) (60 mL/Hr) IV Continuous <Continuous>  vancomycin  IVPB 1500 milliGRAM(s) IV Intermittent every 24 hours    MEDICATIONS  (PRN):  acetaminophen   Tablet. 650 milliGRAM(s) Oral every 6 hours PRN Mild Pain (1 - 3)  dextrose 40% Gel 15 Gram(s) Oral once PRN Blood Glucose LESS THAN 70 milliGRAM(s)/deciliter  glucagon  Injectable 1 milliGRAM(s) IntraMuscular once PRN Glucose LESS THAN 70 milligrams/deciliter  HYDROmorphone  Injectable 0.5 milliGRAM(s) IV Push every 3 hours PRN breakthrough pain  oxyCODONE    5 mG/acetaminophen 325 mG 1 Tablet(s) Oral every 4 hours PRN Moderate Pain (4 - 6)  oxyCODONE    5 mG/acetaminophen 325 mG 2 Tablet(s) Oral every 4 hours PRN Severe Pain (7 - 10)      Drug Dosing Weight  Height (cm): 167.64 (21 May 2018 02:20)  Weight (kg): 83.5 (21 May 2018 02:20)  BMI (kg/m2): 29.7 (21 May 2018 02:20)  BSA (m2): 1.93 (21 May 2018 02:20)      PAST MEDICAL & SURGICAL HISTORY:  Peripheral vascular disease  Ulcer of lower limb  Arthropathy  Ischemic heart disease  Hypertension  Hyperlipidemia  Diabetes mellitus      ICU Vital Signs Last 24 Hrs  T(C): 37.7 (23 May 2018 06:38), Max: 37.7 (23 May 2018 06:38)  T(F): 99.9 (23 May 2018 06:38), Max: 99.9 (23 May 2018 06:38)  HR: 56 (23 May 2018 09:00) (50 - 62)  BP: 150/51 (23 May 2018 00:44) (114/73 - 150/51)  BP(mean): 982 (23 May 2018 00:44) (75 - 982)  ABP: 98/40 (23 May 2018 09:00) (98/40 - 182/52)  ABP(mean): 58 (23 May 2018 09:00) (58 - 88)  RR: 19 (23 May 2018 09:00) (8 - 21)  SpO2: 97% (23 May 2018 09:00) (95% - 100%)            22 May 2018 07:01  -  23 May 2018 07:00  --------------------------------------------------------  IN:    IV PiggyBack: 200 mL    sodium chloride 0.9%.: 1080 mL  Total IN: 1280 mL    OUT:    Indwelling Catheter - Urethral: 1065 mL    Voided: 300 mL  Total OUT: 1365 mL    Total NET: -85 mL      23 May 2018 07:01  -  23 May 2018 09:18  --------------------------------------------------------  IN:    sodium chloride 0.9%.: 60 mL  Total IN: 60 mL    OUT:    Indwelling Catheter - Urethral: 50 mL  Total OUT: 50 mL    Total NET: 10 mL              PHYSICAL EXAM:  General: appears uncomfortable  Neuro: A&Ox3, no focal deficits  HEENT: NC/AT, MMM, left pupil with cataract  Pulmonary: CTA x 2, no respiratory distress  CV: RRR, no murmur  Abdominal: soft, NT/ND, +BS, no organomegaly  : serrato draining clear urine  Extremities: WWP. RLE with prevena wound vac over incision, functioning properly. R 1st toe with dry gangrene at tip. Left great toe with dry gangrene at tip. no edema. Feet warm.  Pulses: R DP/PT biphasic, L DP monophasic, L PT signal absent      LABS:  CBC Full  -  (23 May 2018 04:31)  WBC Count : 11.3 K/uL  Hemoglobin : 9.3 g/dL  Hematocrit : 29.0 %  Platelet Count - Automated : 212 K/uL  Mean Cell Volume : 90.9 fL  Mean Cell Hemoglobin : 29.2 pg  Mean Cell Hemoglobin Concentration : 32.1 g/dL      05-23    138  |  101  |  12  ----------------------------<  201<H>  4.2   |  24  |  0.72    Ca    8.5      23 May 2018 04:31  Phos  3.4     05-23  Mg     2.0     05-23      PT/INR - ( 22 May 2018 19:49 )   PT: 12.4 sec;   INR: 1.11          PTT - ( 22 May 2018 19:49 )  PTT:51.8 sec

## 2018-05-23 NOTE — PROGRESS NOTE ADULT - SUBJECTIVE AND OBJECTIVE BOX
Interval Events: Reviewed  Patient seen and examined at bedside.    Patient is a 84y old  Female who presents with a chief complaint of b/l great toe nonhealing ulcer with pain (16 May 2018 16:32)  She's doing well the paint is only at the incision site    PAST MEDICAL & SURGICAL HISTORY:  Peripheral vascular disease  Ulcer of lower limb  Arthropathy  Ischemic heart disease  Hypertension  Hyperlipidemia  Diabetes mellitus      MEDICATIONS:  Pulmonary:    Antimicrobials:  ciprofloxacin   IVPB      ciprofloxacin   IVPB 400 milliGRAM(s) IV Intermittent every 12 hours    Anticoagulants:  aspirin enteric coated 81 milliGRAM(s) Oral daily  heparin  Injectable 5000 Unit(s) SubCutaneous every 8 hours    Cardiac:      Allergies    penicillins (Anaphylaxis)    Intolerances        Vital Signs Last 24 Hrs  T(C): 38.4 (23 May 2018 19:35), Max: 38.4 (23 May 2018 19:35)  T(F): 101.2 (23 May 2018 19:35), Max: 101.2 (23 May 2018 19:35)  HR: 72 (23 May 2018 21:00) (50 - 72)  BP: 150/60 (23 May 2018 21:00) (71/32 - 155/72)  BP(mean): 87 (23 May 2018 21:00) (39 - 982)  RR: 20 (23 May 2018 21:00) (11 - 21)  SpO2: 98% (23 May 2018 21:00) (95% - 100%)    05-22 @ 07:01  -  05-23 @ 07:00  --------------------------------------------------------  IN: 1280 mL / OUT: 1365 mL / NET: -85 mL    05-23 @ 07:01  -  05-23 @ 21:23  --------------------------------------------------------  IN: 2599.9 mL / OUT: 310 mL / NET: 2289.9 mL          LABS:      CBC Full  -  ( 23 May 2018 16:45 )  WBC Count : 11.3 K/uL  Hemoglobin : 8.5 g/dL  Hematocrit : 26.2 %  Platelet Count - Automated : 189 K/uL  Mean Cell Volume : 91.9 fL  Mean Cell Hemoglobin : 29.8 pg  Mean Cell Hemoglobin Concentration : 32.4 g/dL  Auto Neutrophil # : x  Auto Lymphocyte # : x  Auto Monocyte # : x  Auto Eosinophil # : x  Auto Basophil # : x  Auto Neutrophil % : x  Auto Lymphocyte % : x  Auto Monocyte % : x  Auto Eosinophil % : x  Auto Basophil % : x    05-23    133<L>  |  100  |  14  ----------------------------<  173<H>  4.2   |  24  |  0.99    Ca    7.8<L>      23 May 2018 16:45  Phos  3.4     05-23  Mg     2.0     05-23    TPro  5.6<L>  /  Alb  2.5<L>  /  TBili  0.4  /  DBili  x   /  AST  24  /  ALT  27  /  AlkPhos  51  05-23    PT/INR - ( 23 May 2018 11:34 )   PT: 12.5 sec;   INR: 1.12          PTT - ( 23 May 2018 11:34 )  PTT:24.5 sec      Urinalysis Basic - ( 23 May 2018 17:41 )    Color: Yellow / Appearance: Clear / SG: >=1.030 / pH: x  Gluc: x / Ketone: NEGATIVE  / Bili: Negative / Urobili: 0.2 E.U./dL   Blood: x / Protein: Trace mg/dL / Nitrite: NEGATIVE   Leuk Esterase: NEGATIVE / RBC: < 5 /HPF / WBC < 5 /HPF   Sq Epi: x / Non Sq Epi: 0-5 /HPF / Bacteria: Present /HPF                  RADIOLOGY & ADDITIONAL STUDIES (The following images were personally reviewed):  Vivar:                                     No  Urine output:                       adequate  DVT prophylaxis:                 Yes  Flattus:                                  Yes  Bowel movement:              No

## 2018-05-24 LAB
ANION GAP SERPL CALC-SCNC: 9 MMOL/L — SIGNIFICANT CHANGE UP (ref 5–17)
BUN SERPL-MCNC: 13 MG/DL — SIGNIFICANT CHANGE UP (ref 7–23)
CALCIUM SERPL-MCNC: 8.3 MG/DL — LOW (ref 8.4–10.5)
CHLORIDE SERPL-SCNC: 100 MMOL/L — SIGNIFICANT CHANGE UP (ref 96–108)
CO2 SERPL-SCNC: 24 MMOL/L — SIGNIFICANT CHANGE UP (ref 22–31)
CREAT SERPL-MCNC: 0.88 MG/DL — SIGNIFICANT CHANGE UP (ref 0.5–1.3)
GLUCOSE BLDC GLUCOMTR-MCNC: 105 MG/DL — HIGH (ref 70–99)
GLUCOSE BLDC GLUCOMTR-MCNC: 116 MG/DL — HIGH (ref 70–99)
GLUCOSE BLDC GLUCOMTR-MCNC: 155 MG/DL — HIGH (ref 70–99)
GLUCOSE BLDC GLUCOMTR-MCNC: 172 MG/DL — HIGH (ref 70–99)
GLUCOSE SERPL-MCNC: 140 MG/DL — HIGH (ref 70–99)
HCT VFR BLD CALC: 29.5 % — LOW (ref 34.5–45)
HGB BLD-MCNC: 9.7 G/DL — LOW (ref 11.5–15.5)
MAGNESIUM SERPL-MCNC: 2.1 MG/DL — SIGNIFICANT CHANGE UP (ref 1.6–2.6)
MCHC RBC-ENTMCNC: 29.6 PG — SIGNIFICANT CHANGE UP (ref 27–34)
MCHC RBC-ENTMCNC: 32.9 G/DL — SIGNIFICANT CHANGE UP (ref 32–36)
MCV RBC AUTO: 89.9 FL — SIGNIFICANT CHANGE UP (ref 80–100)
PHOSPHATE SERPL-MCNC: 3.4 MG/DL — SIGNIFICANT CHANGE UP (ref 2.5–4.5)
PLATELET # BLD AUTO: 170 K/UL — SIGNIFICANT CHANGE UP (ref 150–400)
POTASSIUM SERPL-MCNC: 4 MMOL/L — SIGNIFICANT CHANGE UP (ref 3.5–5.3)
POTASSIUM SERPL-SCNC: 4 MMOL/L — SIGNIFICANT CHANGE UP (ref 3.5–5.3)
RBC # BLD: 3.28 M/UL — LOW (ref 3.8–5.2)
RBC # FLD: 14.7 % — SIGNIFICANT CHANGE UP (ref 10.3–16.9)
SODIUM SERPL-SCNC: 133 MMOL/L — LOW (ref 135–145)
VANCOMYCIN FLD-MCNC: 14.6 UG/ML — SIGNIFICANT CHANGE UP
VANCOMYCIN TROUGH SERPL-MCNC: 12 UG/ML — SIGNIFICANT CHANGE UP (ref 10–20)
WBC # BLD: 12 K/UL — HIGH (ref 3.8–10.5)
WBC # FLD AUTO: 12 K/UL — HIGH (ref 3.8–10.5)

## 2018-05-24 PROCEDURE — 99233 SBSQ HOSP IP/OBS HIGH 50: CPT

## 2018-05-24 PROCEDURE — 99232 SBSQ HOSP IP/OBS MODERATE 35: CPT | Mod: GC

## 2018-05-24 PROCEDURE — 99233 SBSQ HOSP IP/OBS HIGH 50: CPT | Mod: GC

## 2018-05-24 RX ORDER — METOPROLOL TARTRATE 50 MG
25 TABLET ORAL DAILY
Qty: 0 | Refills: 0 | Status: DISCONTINUED | OUTPATIENT
Start: 2018-05-24 | End: 2018-06-05

## 2018-05-24 RX ORDER — VANCOMYCIN HCL 1 G
1250 VIAL (EA) INTRAVENOUS ONCE
Qty: 0 | Refills: 0 | Status: COMPLETED | OUTPATIENT
Start: 2018-05-24 | End: 2018-05-24

## 2018-05-24 RX ORDER — VANCOMYCIN HCL 1 G
VIAL (EA) INTRAVENOUS
Qty: 0 | Refills: 0 | Status: DISCONTINUED | OUTPATIENT
Start: 2018-05-24 | End: 2018-05-30

## 2018-05-24 RX ORDER — VANCOMYCIN HCL 1 G
1250 VIAL (EA) INTRAVENOUS EVERY 24 HOURS
Qty: 0 | Refills: 0 | Status: DISCONTINUED | OUTPATIENT
Start: 2018-05-25 | End: 2018-05-30

## 2018-05-24 RX ORDER — LOSARTAN POTASSIUM 100 MG/1
50 TABLET, FILM COATED ORAL DAILY
Qty: 0 | Refills: 0 | Status: DISCONTINUED | OUTPATIENT
Start: 2018-05-24 | End: 2018-05-25

## 2018-05-24 RX ADMIN — Medication 100 MILLIGRAM(S): at 06:19

## 2018-05-24 RX ADMIN — SERTRALINE 50 MILLIGRAM(S): 25 TABLET, FILM COATED ORAL at 11:23

## 2018-05-24 RX ADMIN — Medication 100 MILLIGRAM(S): at 22:33

## 2018-05-24 RX ADMIN — Medication 166.67 MILLIGRAM(S): at 10:58

## 2018-05-24 RX ADMIN — Medication 100 MILLIGRAM(S): at 13:50

## 2018-05-24 RX ADMIN — GABAPENTIN 300 MILLIGRAM(S): 400 CAPSULE ORAL at 06:19

## 2018-05-24 RX ADMIN — Medication 650 MILLIGRAM(S): at 08:39

## 2018-05-24 RX ADMIN — OXYCODONE AND ACETAMINOPHEN 1 TABLET(S): 5; 325 TABLET ORAL at 16:45

## 2018-05-24 RX ADMIN — HEPARIN SODIUM 5000 UNIT(S): 5000 INJECTION INTRAVENOUS; SUBCUTANEOUS at 06:19

## 2018-05-24 RX ADMIN — OXYCODONE AND ACETAMINOPHEN 1 TABLET(S): 5; 325 TABLET ORAL at 17:30

## 2018-05-24 RX ADMIN — HEPARIN SODIUM 5000 UNIT(S): 5000 INJECTION INTRAVENOUS; SUBCUTANEOUS at 13:50

## 2018-05-24 RX ADMIN — Medication 200 MILLIGRAM(S): at 00:07

## 2018-05-24 RX ADMIN — ATORVASTATIN CALCIUM 80 MILLIGRAM(S): 80 TABLET, FILM COATED ORAL at 22:33

## 2018-05-24 RX ADMIN — Medication 81 MILLIGRAM(S): at 11:23

## 2018-05-24 RX ADMIN — HEPARIN SODIUM 5000 UNIT(S): 5000 INJECTION INTRAVENOUS; SUBCUTANEOUS at 22:33

## 2018-05-24 RX ADMIN — GABAPENTIN 300 MILLIGRAM(S): 400 CAPSULE ORAL at 22:33

## 2018-05-24 RX ADMIN — Medication 200 MILLIGRAM(S): at 12:49

## 2018-05-24 RX ADMIN — Medication 2: at 22:33

## 2018-05-24 RX ADMIN — GABAPENTIN 300 MILLIGRAM(S): 400 CAPSULE ORAL at 13:50

## 2018-05-24 RX ADMIN — Medication 2: at 11:23

## 2018-05-24 RX ADMIN — Medication 650 MILLIGRAM(S): at 09:00

## 2018-05-24 RX ADMIN — Medication 25 MILLIGRAM(S): at 10:50

## 2018-05-24 NOTE — PROGRESS NOTE ADULT - SUBJECTIVE AND OBJECTIVE BOX
INTERVAL HISTORY:  The patient has had right leg discomfort and a cough. She denies chest pain or dyspnea.	  Chart, PMH and allergies reviewed  MEDICATIONS:    ciprofloxacin   IVPB      ciprofloxacin   IVPB 400 milliGRAM(s) IV Intermittent every 12 hours      acetaminophen   Tablet. 650 milliGRAM(s) Oral every 6 hours PRN  gabapentin 300 milliGRAM(s) Oral three times a day  oxyCODONE    5 mG/acetaminophen 325 mG 1 Tablet(s) Oral every 6 hours PRN  oxyCODONE    5 mG/acetaminophen 325 mG 2 Tablet(s) Oral every 6 hours PRN  sertraline 50 milliGRAM(s) Oral daily    docusate sodium 100 milliGRAM(s) Oral three times a day    atorvastatin 80 milliGRAM(s) Oral at bedtime  dextrose 40% Gel 15 Gram(s) Oral once PRN  dextrose 50% Injectable 12.5 Gram(s) IV Push once  dextrose 50% Injectable 25 Gram(s) IV Push once  dextrose 50% Injectable 25 Gram(s) IV Push once  glucagon  Injectable 1 milliGRAM(s) IntraMuscular once PRN  insulin lispro (HumaLOG) corrective regimen sliding scale   SubCutaneous Before meals and at bedtime    aspirin enteric coated 81 milliGRAM(s) Oral daily  dextrose 5%. 1000 milliLiter(s) IV Continuous <Continuous>  heparin  Injectable 5000 Unit(s) SubCutaneous every 8 hours  sodium chloride 0.9%. 1000 milliLiter(s) IV Continuous <Continuous>      REVIEW OF SYSTEMS:  CONSTITUTIONAL: +fever, no weight loss, or fatigue  PULMONARY: +cough, no wheezing, chills or hemoptysis; No Shortness of Breath  CARDIOVASCULAR: No chest pain, no palpitations, no syncope, dizziness, no leg swelling  GASTROINTESTINAL: No abdominal pain. No nausea, no  vomiting, or hematemesis; No diarrhea or constipation. No melena or hematochezia.  GENITOURINARY: No dysuria, frequency, hematuria, or incontinence  SKIN: No itching, burning, rashes, or lesions     PHYSICAL EXAM:  T(C): 36.6 (05-24-18 @ 06:15), Max: 38.7 (05-23-18 @ 22:00)  HR: 62 (05-24-18 @ 07:00) (50 - 74)  BP: 106/52 (05-24-18 @ 01:00) (71/32 - 155/72)  RR: 17 (05-24-18 @ 07:00) (11 - 37)  SpO2: 99% (05-24-18 @ 07:00) (93% - 100%)  Wt(kg): --  I&O's Summary    23 May 2018 07:01  -  24 May 2018 07:00  --------------------------------------------------------  IN: 3749.9 mL / OUT: 985 mL / NET: 2764.9 mL        Appearance:  No deformities, normal appearance	  HEENT:   Normal oral mucosa, PERRL, EOMI	  Neck: No jvd , no masses  Lymphatic: No  lymphadenopathy  Pulmonary: Lungs clear to auscultation and percussion  Cardiovascular: Normal S1 S2, No JVD, No murmur, No edema, dressing R leg, gangrenous toes bilaterally, R foot warm.	  Abdomen:  Soft, Non-tender, + BS  Skin: No rashes, No ecchymoses	  Extremities:  No clubbing or cyanosis  MSK: Normal range of motion, no joint swelling    LABS:		  CARDIAC MARKERS:                         9.7    12.0  )-----------( 170      ( 24 May 2018 04:26 )             29.5     05-24    133<L>  |  100  |  13  ----------------------------<  140<H>  4.0   |  24  |  0.88    Ca    8.3<L>      24 May 2018 04:26  Phos  3.4     05-24  Mg     2.1     05-24    TPro  5.6<L>  /  Alb  2.5<L>  /  TBili  0.4  /  DBili  x   /  AST  24  /  ALT  27  /  AlkPhos  51  05-23    proBNP:  Lipid Profile:  HgA1c:  TSH:  PT/INR - ( 23 May 2018 11:34 )   PT: 12.5 sec;   INR: 1.12          PTT - ( 23 May 2018 11:34 )  PTT:24.5 sec  TELEMETRY: NSR	           ECG:  	Sinus hilda, minimal ST abn            RADIOLOGY:   DIAGNOSTIC TESTING: [ ] Echocardiogram: [ ]  Catheterization: [ ] Stress Test:      ASSESSMENT/PLAN: 	    Hypotension-blood pressure was in the 70s by cuff. Related to medication  and  postoperative state. The patient was later febrile. She responded to saline boluses. The patient had no chest pain indicative of ischemia. The plan is to discontinue amlodipine and isosorbide. The losartan will be reduced to 50 mg daily. Metoprolol will be continued to prevent ischemia. The patient is getting IV fluid. The patient was transfused and hemoglobin increased appropriately.    Coronary artery disease/ H/o MI-No chest discomfort. EKG post op no change.  Continue to monitor in ICU.    Hypertension- see above.    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 R foot is warm with positive doppler flow.  No drainage.     Diabetes-follow fingersticks.    Anemia-  Follow closely. Transfused.

## 2018-05-24 NOTE — PROGRESS NOTE ADULT - SUBJECTIVE AND OBJECTIVE BOX
SUBJECTIVE: Patient seen and examined bedside. Pt hypotensive yesterday, likely due to receiving multiple anti-HTNsives at once. Also with oliguria for which she received light IVF boluses. Overnight, febrile to 101.2F. CXR significant for atelectasis. UA negative. BCX drawn. Received 1U PRBC for Hb 8.5 with appropriate response. Vanc dose held as trough 14.    This AM, reports continued pain in the RLE.     aspirin enteric coated 81 milliGRAM(s) Oral daily  ciprofloxacin   IVPB      ciprofloxacin   IVPB 400 milliGRAM(s) IV Intermittent every 12 hours  heparin  Injectable 5000 Unit(s) SubCutaneous every 8 hours      Vital Signs Last 24 Hrs  T(C): 36.6 (24 May 2018 06:15), Max: 38.7 (23 May 2018 22:00)  T(F): 97.8 (24 May 2018 06:15), Max: 101.6 (23 May 2018 22:00)  HR: 74 (24 May 2018 06:00) (50 - 74)  BP: 106/52 (24 May 2018 01:00) (71/32 - 155/72)  BP(mean): 68 (24 May 2018 01:00) (39 - 117)  RR: 20 (24 May 2018 06:00) (11 - 37)  SpO2: 100% (24 May 2018 06:00) (93% - 100%)  I&O's Detail    22 May 2018 07:01  -  23 May 2018 07:00  --------------------------------------------------------  IN:    IV PiggyBack: 200 mL    sodium chloride 0.9%.: 1080 mL  Total IN: 1280 mL    OUT:    Indwelling Catheter - Urethral: 1065 mL    Voided: 300 mL  Total OUT: 1365 mL    Total NET: -85 mL      23 May 2018 07:01  -  24 May 2018 06:58  --------------------------------------------------------  IN:    IV PiggyBack: 100 mL    norepinephrine Infusion: 32.9 mL    Oral Fluid: 297 mL    Packed Red Blood Cells: 350 mL    Sodium Chloride 0.9% IV Bolus: 1000 mL    sodium chloride 0.9%.: 1320 mL    Solution: 250 mL    Solution: 400 mL  Total IN: 3749.9 mL    OUT:    Indwelling Catheter - Urethral: 985 mL  Total OUT: 985 mL    Total NET: 2764.9 mL    General: NAD, resting comfortably in bed. Appears stated age. Overweight. Pleasant. AAOx3  C/V: RRR, no MRG  Pulm: Nonlabored breathing, no respiratory distress on RA  Extrem: WWP. RLE with mild pedal edema. DP/PT mono-biphasic signals. Prevena vac in place over length of medial incision, no drainage. Bilateral first toe ulcers with betadine.  Vivar in place draining clear yellow urine    LABS:                        9.7    12.0  )-----------( 170      ( 24 May 2018 04:26 )             29.5     05-24    133<L>  |  100  |  13  ----------------------------<  140<H>  4.0   |  24  |  0.88    Ca    8.3<L>      24 May 2018 04:26  Phos  3.4     05-24  Mg     2.1     05-24    TPro  5.6<L>  /  Alb  2.5<L>  /  TBili  0.4  /  DBili  x   /  AST  24  /  ALT  27  /  AlkPhos  51  05-23    PT/INR - ( 23 May 2018 11:34 )   PT: 12.5 sec;   INR: 1.12          PTT - ( 23 May 2018 11:34 )  PTT:24.5 sec  Urinalysis Basic - ( 23 May 2018 22:24 )    Color: Yellow / Appearance: Clear / SG: >=1.030 / pH: x  Gluc: x / Ketone: NEGATIVE  / Bili: Negative / Urobili: 0.2 E.U./dL   Blood: x / Protein: Trace mg/dL / Nitrite: NEGATIVE   Leuk Esterase: NEGATIVE / RBC: 5-10 /HPF / WBC 5-10 /HPF   Sq Epi: x / Non Sq Epi: 5-10 /HPF / Bacteria: Present /HPF        RADIOLOGY & ADDITIONAL STUDIES:

## 2018-05-24 NOTE — PHYSICAL THERAPY INITIAL EVALUATION ADULT - ADDITIONAL COMMENTS
Patient reports that prior to admission, she was living in [place] independent. Reports that she ambulated with a rolling walker, lived alone in a 1 level house, and was driving. Reports that just prior to admission, her daughter travelled to [place] and brought her directly here to the hospital. Patient states that her daughter's house has 1 flight of stairs to enter and her daughter/daughter's family are home during the day to assist as needed.

## 2018-05-24 NOTE — PROGRESS NOTE ADULT - ATTENDING COMMENTS
SHAJI Wei has acted as my scribe. Pt with temp this AM and denies SOB, abd pain, dysuria, or chest pain. VSS and CXR without obvious infiltrate. OOB with PT and Vivar d/regian. continue pain control. Anti Htn resumed except Norvasc d/regina. continue wound care per Vascular and pulse monitoring. LE warm with dopplerable pulses bilat.  No cyanosis. wound without erythema around dressing or drainage. F/U cultures and continue Vanco and Cipro. Dose Vanco based on trough.

## 2018-05-24 NOTE — PHYSICAL THERAPY INITIAL EVALUATION ADULT - GENERAL OBSERVATIONS, REHAB EVAL
Patient received seated in bedside chair with + heplock IV, wound vac to RLE, tele. Patient in no apparent distress.

## 2018-05-24 NOTE — PROGRESS NOTE ADULT - ATTENDING COMMENTS
Patient seen and examined with house-staff during bedside rounds.  Resident note read, including vitals, physical findings, laboratory data, and radiological reports.   Revisions included below.  Direct personal management at bed side and extensive interpretation of the data.  Plan was outlined and discussed in details with the housestaff.  Decision making of high complexity  Action taken for acute disease activity to reflect the level of care provided:  - medication reconciliation  - review laboratory data  -Post operative evaluation and risk assessment  - Patient is followed by cardiology and there's no evidence of acute coronary syndrome  Management of comorbidities    Discussed with the family

## 2018-05-24 NOTE — PHYSICAL THERAPY INITIAL EVALUATION ADULT - PERTINENT HX OF CURRENT PROBLEM, REHAB EVAL
83 yoF with sig PMHx of CAD, lateral wall MI s/p 6 stents, dm, htn, ckd, RLE DVT, PE. early dementia s/p RLE angiogram (5/17) for b/l great toe ulcers and rest pain now s/p  RLE CFA to Peroneal bypass with rGSV with completion angio on 5/22

## 2018-05-24 NOTE — PHYSICAL THERAPY INITIAL EVALUATION ADULT - ORIENTATION, REHAB EVAL
+Person, +location (+hospital, able to state name of hospital with increased time), +year, +situation.

## 2018-05-24 NOTE — PROGRESS NOTE ADULT - ASSESSMENT
83 yoF with sig PMHx of CAD, lateral wall MI s/p 6 stents, dm, htn, ckd, RLE DVT, PE. early dementia s/p RLE angiogram (5/17) for b/l great toe ulcers and rest pain now s/p  RLE CFA to Peroneal bypass with rGSV with completion angio on 5/22    Neuro: prn percocet/tylenol, gabapentin 300 tid, sertraline 50 qd  CV: HD stable. metoprolol, losartan 50 QD, ASA, lipitor. cards following.   Pulm: NC, wean to room air  FENGI: DM diet, colace  : TOV@4pm  Endo: ISS  ID: +MRSA and carbapenem resistant E coli in wound - vanc (5/21--), cipro (5/20--)  Ppx: SQH  Lines: PIVs  Wounds: RLE incision with prevena vac, R and L 1st great toes with dry gangrene, daily betadine  PT: PT consult placed 5/24

## 2018-05-24 NOTE — PROGRESS NOTE ADULT - SUBJECTIVE AND OBJECTIVE BOX
INTERVAL HPI/OVERNIGHT EVENTS: o/n: Febrile to 101.2 rectally at 1900. Coughing, CXR showed atelectasis, UA neg, new bctx sent, given Ofirmev 1 g. 1 U PRBC at 2200. Post txn: 9.7. Vanc trough: 14, vanc not given.  5/23: SQH started, become hypotensive to SBP 70s after receiving all antihypertensives at once and dilaudid 0.5. 500cc bolus given without response so levo started. dc-ed isosorbite, losartan and metoprolol. off levo at 3pm. given 500cc bolus for low UO      Pt seen and examined at bedside. She offers no complaints. Denies CP/SOB/N/V    MEDICATIONS  (STANDING):  aspirin enteric coated 81 milliGRAM(s) Oral daily  atorvastatin 80 milliGRAM(s) Oral at bedtime  ciprofloxacin   IVPB      ciprofloxacin   IVPB 400 milliGRAM(s) IV Intermittent every 12 hours  dextrose 5%. 1000 milliLiter(s) (50 mL/Hr) IV Continuous <Continuous>  dextrose 50% Injectable 12.5 Gram(s) IV Push once  dextrose 50% Injectable 25 Gram(s) IV Push once  dextrose 50% Injectable 25 Gram(s) IV Push once  docusate sodium 100 milliGRAM(s) Oral three times a day  gabapentin 300 milliGRAM(s) Oral three times a day  heparin  Injectable 5000 Unit(s) SubCutaneous every 8 hours  insulin lispro (HumaLOG) corrective regimen sliding scale   SubCutaneous Before meals and at bedtime  losartan 50 milliGRAM(s) Oral daily  metoprolol succinate ER 25 milliGRAM(s) Oral daily  sertraline 50 milliGRAM(s) Oral daily  vancomycin  IVPB        MEDICATIONS  (PRN):  acetaminophen   Tablet. 650 milliGRAM(s) Oral every 6 hours PRN Mild Pain (1 - 3)  dextrose 40% Gel 15 Gram(s) Oral once PRN Blood Glucose LESS THAN 70 milliGRAM(s)/deciliter  glucagon  Injectable 1 milliGRAM(s) IntraMuscular once PRN Glucose LESS THAN 70 milligrams/deciliter  oxyCODONE    5 mG/acetaminophen 325 mG 1 Tablet(s) Oral every 6 hours PRN Moderate Pain (4 - 6)  oxyCODONE    5 mG/acetaminophen 325 mG 2 Tablet(s) Oral every 6 hours PRN Severe Pain (7 - 10)      Drug Dosing Weight  Height (cm): 167.64 (21 May 2018 02:20)  Weight (kg): 83.5 (21 May 2018 02:20)  BMI (kg/m2): 29.7 (21 May 2018 02:20)  BSA (m2): 1.93 (21 May 2018 02:20)      PAST MEDICAL & SURGICAL HISTORY:  Peripheral vascular disease  Ulcer of lower limb  Arthropathy  Ischemic heart disease  Hypertension  Hyperlipidemia  Diabetes mellitus      ICU Vital Signs Last 24 Hrs  T(C): 36.6 (24 May 2018 06:15), Max: 38.7 (23 May 2018 22:00)  T(F): 97.8 (24 May 2018 06:15), Max: 101.6 (23 May 2018 22:00)  HR: 80 (24 May 2018 11:32) (50 - 80)  BP: 126/78 (24 May 2018 11:32) (106/52 - 155/72)  BP(mean): 106 (24 May 2018 11:32) (66 - 117)  ABP: 112/82 (24 May 2018 11:32) (96/52 - 166/52)  ABP(mean): 96 (24 May 2018 11:32) (62 - 104)  RR: 21 (24 May 2018 11:32) (11 - 37)  SpO2: 100% (24 May 2018 11:32) (93% - 100%)            23 May 2018 07:01  -  24 May 2018 07:00  --------------------------------------------------------  IN:    IV PiggyBack: 100 mL    norepinephrine Infusion: 32.9 mL    Oral Fluid: 297 mL    Packed Red Blood Cells: 350 mL    sodium chloride 0.9%: 1380 mL    Sodium Chloride 0.9% IV Bolus: 1000 mL    Solution: 400 mL    Solution: 250 mL  Total IN: 3809.9 mL    OUT:    Indwelling Catheter - Urethral: 1055 mL  Total OUT: 1055 mL    Total NET: 2754.9 mL      24 May 2018 07:01  -  24 May 2018 12:27  --------------------------------------------------------  IN:    Oral Fluid: 180 mL    sodium chloride 0.9%: 180 mL    Solution: 250 mL  Total IN: 610 mL    OUT:    Indwelling Catheter - Urethral: 75 mL  Total OUT: 75 mL    Total NET: 535 mL              PHYSICAL EXAM:      LABS:  CBC Full  -  ( 24 May 2018 04:26 )  WBC Count : 12.0 K/uL  Hemoglobin : 9.7 g/dL  Hematocrit : 29.5 %  Platelet Count - Automated : 170 K/uL  Mean Cell Volume : 89.9 fL  Mean Cell Hemoglobin : 29.6 pg  Mean Cell Hemoglobin Concentration : 32.9 g/dL  Auto Neutrophil # : x  Auto Lymphocyte # : x  Auto Monocyte # : x  Auto Eosinophil # : x  Auto Basophil # : x  Auto Neutrophil % : x  Auto Lymphocyte % : x  Auto Monocyte % : x  Auto Eosinophil % : x  Auto Basophil % : x    05-24    133<L>  |  100  |  13  ----------------------------<  140<H>  4.0   |  24  |  0.88    Ca    8.3<L>      24 May 2018 04:26  Phos  3.4     05-24  Mg     2.1     05-24    TPro  5.6<L>  /  Alb  2.5<L>  /  TBili  0.4  /  DBili  x   /  AST  24  /  ALT  27  /  AlkPhos  51  05-23    PT/INR - ( 23 May 2018 11:34 )   PT: 12.5 sec;   INR: 1.12          PTT - ( 23 May 2018 11:34 )  PTT:24.5 sec  Urinalysis Basic - ( 23 May 2018 22:24 )    Color: Yellow / Appearance: Clear / SG: >=1.030 / pH: x  Gluc: x / Ketone: NEGATIVE  / Bili: Negative / Urobili: 0.2 E.U./dL   Blood: x / Protein: Trace mg/dL / Nitrite: NEGATIVE   Leuk Esterase: NEGATIVE / RBC: 5-10 /HPF / WBC 5-10 /HPF   Sq Epi: x / Non Sq Epi: 5-10 /HPF / Bacteria: Present /HPF INTERVAL HPI/OVERNIGHT EVENTS: o/n: Febrile to 101.2 rectally at 1900. Coughing, CXR showed atelectasis, UA neg, new bctx sent, given Ofirmev 1 g. 1 U PRBC at 2200. Post txn: 9.7. Vanc trough: 14, vanc not given.  5/23: SQH started, become hypotensive to SBP 70s after receiving all antihypertensives at once and dilaudid 0.5. 500cc bolus given without response so levo started. dc-ed isosorbite, losartan and metoprolol. off levo at 3pm. given 500cc bolus for low UO      Pt seen and examined at bedside. She offers no complaints. Denies CP/SOB/N/V    MEDICATIONS  (STANDING):  aspirin enteric coated 81 milliGRAM(s) Oral daily  atorvastatin 80 milliGRAM(s) Oral at bedtime  ciprofloxacin   IVPB      ciprofloxacin   IVPB 400 milliGRAM(s) IV Intermittent every 12 hours  dextrose 5%. 1000 milliLiter(s) (50 mL/Hr) IV Continuous <Continuous>  dextrose 50% Injectable 12.5 Gram(s) IV Push once  dextrose 50% Injectable 25 Gram(s) IV Push once  dextrose 50% Injectable 25 Gram(s) IV Push once  docusate sodium 100 milliGRAM(s) Oral three times a day  gabapentin 300 milliGRAM(s) Oral three times a day  heparin  Injectable 5000 Unit(s) SubCutaneous every 8 hours  insulin lispro (HumaLOG) corrective regimen sliding scale   SubCutaneous Before meals and at bedtime  losartan 50 milliGRAM(s) Oral daily  metoprolol succinate ER 25 milliGRAM(s) Oral daily  sertraline 50 milliGRAM(s) Oral daily  vancomycin  IVPB        MEDICATIONS  (PRN):  acetaminophen   Tablet. 650 milliGRAM(s) Oral every 6 hours PRN Mild Pain (1 - 3)  dextrose 40% Gel 15 Gram(s) Oral once PRN Blood Glucose LESS THAN 70 milliGRAM(s)/deciliter  glucagon  Injectable 1 milliGRAM(s) IntraMuscular once PRN Glucose LESS THAN 70 milligrams/deciliter  oxyCODONE    5 mG/acetaminophen 325 mG 1 Tablet(s) Oral every 6 hours PRN Moderate Pain (4 - 6)  oxyCODONE    5 mG/acetaminophen 325 mG 2 Tablet(s) Oral every 6 hours PRN Severe Pain (7 - 10)      Drug Dosing Weight  Height (cm): 167.64 (21 May 2018 02:20)  Weight (kg): 83.5 (21 May 2018 02:20)  BMI (kg/m2): 29.7 (21 May 2018 02:20)  BSA (m2): 1.93 (21 May 2018 02:20)      PAST MEDICAL & SURGICAL HISTORY:  Peripheral vascular disease  Ulcer of lower limb  Arthropathy  Ischemic heart disease  Hypertension  Hyperlipidemia  Diabetes mellitus      ICU Vital Signs Last 24 Hrs  T(C): 36.6 (24 May 2018 06:15), Max: 38.7 (23 May 2018 22:00)  T(F): 97.8 (24 May 2018 06:15), Max: 101.6 (23 May 2018 22:00)  HR: 80 (24 May 2018 11:32) (50 - 80)  BP: 126/78 (24 May 2018 11:32) (106/52 - 155/72)  BP(mean): 106 (24 May 2018 11:32) (66 - 117)  ABP: 112/82 (24 May 2018 11:32) (96/52 - 166/52)  ABP(mean): 96 (24 May 2018 11:32) (62 - 104)  RR: 21 (24 May 2018 11:32) (11 - 37)  SpO2: 100% (24 May 2018 11:32) (93% - 100%)            23 May 2018 07:01  -  24 May 2018 07:00  --------------------------------------------------------  IN:    IV PiggyBack: 100 mL    norepinephrine Infusion: 32.9 mL    Oral Fluid: 297 mL    Packed Red Blood Cells: 350 mL    sodium chloride 0.9%: 1380 mL    Sodium Chloride 0.9% IV Bolus: 1000 mL    Solution: 400 mL    Solution: 250 mL  Total IN: 3809.9 mL    OUT:    Indwelling Catheter - Urethral: 1055 mL  Total OUT: 1055 mL    Total NET: 2754.9 mL      24 May 2018 07:01  -  24 May 2018 12:27  --------------------------------------------------------  IN:    Oral Fluid: 180 mL    sodium chloride 0.9%: 180 mL    Solution: 250 mL  Total IN: 610 mL    OUT:    Indwelling Catheter - Urethral: 75 mL  Total OUT: 75 mL    Total NET: 535 mL      PHYSICAL EXAM:  General: NAD  Neuro: A&Ox3, no focal deficits  HEENT: NC/AT, MMM, left pupil with cataract  Pulmonary: CTA x 2, no respiratory distress  CV: RRR, no murmur  Abdominal: soft, NT/ND, +BS, no organomegaly  : serrato draining clear urine  Extremities:  RLE with prevena wound vac over incision, functioning properly. R 1st toe with dry gangrene at tip. Left great toe with dry gangrene at tip. Right foot WWP, Left foot cool. no edema.  Pulses: R DP/PT biphasic, L DP monophasic, L PT signal absent        LABS:  CBC Full  -  ( 24 May 2018 04:26 )  WBC Count : 12.0 K/uL  Hemoglobin : 9.7 g/dL  Hematocrit : 29.5 %  Platelet Count - Automated : 170 K/uL  Mean Cell Volume : 89.9 fL  Mean Cell Hemoglobin : 29.6 pg  Mean Cell Hemoglobin Concentration : 32.9 g/dL  Auto Neutrophil # : x  Auto Lymphocyte # : x  Auto Monocyte # : x  Auto Eosinophil # : x  Auto Basophil # : x  Auto Neutrophil % : x  Auto Lymphocyte % : x  Auto Monocyte % : x  Auto Eosinophil % : x  Auto Basophil % : x    05-24    133<L>  |  100  |  13  ----------------------------<  140<H>  4.0   |  24  |  0.88    Ca    8.3<L>      24 May 2018 04:26  Phos  3.4     05-24  Mg     2.1     05-24    TPro  5.6<L>  /  Alb  2.5<L>  /  TBili  0.4  /  DBili  x   /  AST  24  /  ALT  27  /  AlkPhos  51  05-23    PT/INR - ( 23 May 2018 11:34 )   PT: 12.5 sec;   INR: 1.12          PTT - ( 23 May 2018 11:34 )  PTT:24.5 sec  Urinalysis Basic - ( 23 May 2018 22:24 )    Color: Yellow / Appearance: Clear / SG: >=1.030 / pH: x  Gluc: x / Ketone: NEGATIVE  / Bili: Negative / Urobili: 0.2 E.U./dL   Blood: x / Protein: Trace mg/dL / Nitrite: NEGATIVE   Leuk Esterase: NEGATIVE / RBC: 5-10 /HPF / WBC 5-10 /HPF   Sq Epi: x / Non Sq Epi: 5-10 /HPF / Bacteria: Present /HPF

## 2018-05-24 NOTE — PHYSICAL THERAPY INITIAL EVALUATION ADULT - CRITERIA FOR SKILLED THERAPEUTIC INTERVENTIONS
impairments found/therapy frequency/anticipated equipment needs at discharge/rehab potential/anticipated discharge recommendation

## 2018-05-25 ENCOUNTER — RESULT REVIEW (OUTPATIENT)
Age: 83
End: 2018-05-25

## 2018-05-25 LAB
ANION GAP SERPL CALC-SCNC: 12 MMOL/L — SIGNIFICANT CHANGE UP (ref 5–17)
BUN SERPL-MCNC: 14 MG/DL — SIGNIFICANT CHANGE UP (ref 7–23)
CALCIUM SERPL-MCNC: 8.5 MG/DL — SIGNIFICANT CHANGE UP (ref 8.4–10.5)
CHLORIDE SERPL-SCNC: 101 MMOL/L — SIGNIFICANT CHANGE UP (ref 96–108)
CO2 SERPL-SCNC: 23 MMOL/L — SIGNIFICANT CHANGE UP (ref 22–31)
CREAT SERPL-MCNC: 0.84 MG/DL — SIGNIFICANT CHANGE UP (ref 0.5–1.3)
GLUCOSE BLDC GLUCOMTR-MCNC: 112 MG/DL — HIGH (ref 70–99)
GLUCOSE BLDC GLUCOMTR-MCNC: 132 MG/DL — HIGH (ref 70–99)
GLUCOSE BLDC GLUCOMTR-MCNC: 137 MG/DL — HIGH (ref 70–99)
GLUCOSE BLDC GLUCOMTR-MCNC: 159 MG/DL — HIGH (ref 70–99)
GLUCOSE SERPL-MCNC: 123 MG/DL — HIGH (ref 70–99)
HCT VFR BLD CALC: 28.2 % — LOW (ref 34.5–45)
HGB BLD-MCNC: 9.3 G/DL — LOW (ref 11.5–15.5)
MAGNESIUM SERPL-MCNC: 2 MG/DL — SIGNIFICANT CHANGE UP (ref 1.6–2.6)
MCHC RBC-ENTMCNC: 28.6 PG — SIGNIFICANT CHANGE UP (ref 27–34)
MCHC RBC-ENTMCNC: 33 G/DL — SIGNIFICANT CHANGE UP (ref 32–36)
MCV RBC AUTO: 86.8 FL — SIGNIFICANT CHANGE UP (ref 80–100)
PHOSPHATE SERPL-MCNC: 2.7 MG/DL — SIGNIFICANT CHANGE UP (ref 2.5–4.5)
PLATELET # BLD AUTO: 191 K/UL — SIGNIFICANT CHANGE UP (ref 150–400)
POTASSIUM SERPL-MCNC: 3.8 MMOL/L — SIGNIFICANT CHANGE UP (ref 3.5–5.3)
POTASSIUM SERPL-SCNC: 3.8 MMOL/L — SIGNIFICANT CHANGE UP (ref 3.5–5.3)
RBC # BLD: 3.25 M/UL — LOW (ref 3.8–5.2)
RBC # FLD: 14.9 % — SIGNIFICANT CHANGE UP (ref 10.3–16.9)
SODIUM SERPL-SCNC: 136 MMOL/L — SIGNIFICANT CHANGE UP (ref 135–145)
WBC # BLD: 11.4 K/UL — HIGH (ref 3.8–10.5)
WBC # FLD AUTO: 11.4 K/UL — HIGH (ref 3.8–10.5)

## 2018-05-25 PROCEDURE — 99232 SBSQ HOSP IP/OBS MODERATE 35: CPT

## 2018-05-25 PROCEDURE — 28820 AMPUTATION OF TOE: CPT | Mod: 78,GC

## 2018-05-25 RX ORDER — MORPHINE SULFATE 50 MG/1
2 CAPSULE, EXTENDED RELEASE ORAL
Qty: 0 | Refills: 0 | Status: DISCONTINUED | OUTPATIENT
Start: 2018-05-25 | End: 2018-05-25

## 2018-05-25 RX ORDER — SODIUM CHLORIDE 9 MG/ML
1000 INJECTION INTRAMUSCULAR; INTRAVENOUS; SUBCUTANEOUS
Qty: 0 | Refills: 0 | Status: DISCONTINUED | OUTPATIENT
Start: 2018-05-25 | End: 2018-05-25

## 2018-05-25 RX ADMIN — SERTRALINE 50 MILLIGRAM(S): 25 TABLET, FILM COATED ORAL at 13:32

## 2018-05-25 RX ADMIN — Medication 166.67 MILLIGRAM(S): at 11:59

## 2018-05-25 RX ADMIN — GABAPENTIN 300 MILLIGRAM(S): 400 CAPSULE ORAL at 06:08

## 2018-05-25 RX ADMIN — HEPARIN SODIUM 5000 UNIT(S): 5000 INJECTION INTRAVENOUS; SUBCUTANEOUS at 21:05

## 2018-05-25 RX ADMIN — GABAPENTIN 300 MILLIGRAM(S): 400 CAPSULE ORAL at 21:05

## 2018-05-25 RX ADMIN — HEPARIN SODIUM 5000 UNIT(S): 5000 INJECTION INTRAVENOUS; SUBCUTANEOUS at 06:08

## 2018-05-25 RX ADMIN — OXYCODONE AND ACETAMINOPHEN 1 TABLET(S): 5; 325 TABLET ORAL at 20:41

## 2018-05-25 RX ADMIN — Medication 650 MILLIGRAM(S): at 00:08

## 2018-05-25 RX ADMIN — SODIUM CHLORIDE 50 MILLILITER(S): 9 INJECTION INTRAMUSCULAR; INTRAVENOUS; SUBCUTANEOUS at 06:25

## 2018-05-25 RX ADMIN — ATORVASTATIN CALCIUM 80 MILLIGRAM(S): 80 TABLET, FILM COATED ORAL at 21:05

## 2018-05-25 RX ADMIN — Medication 650 MILLIGRAM(S): at 01:08

## 2018-05-25 RX ADMIN — Medication 2: at 22:01

## 2018-05-25 RX ADMIN — Medication 100 MILLIGRAM(S): at 06:08

## 2018-05-25 RX ADMIN — Medication 100 MILLIGRAM(S): at 13:35

## 2018-05-25 RX ADMIN — Medication 200 MILLIGRAM(S): at 13:35

## 2018-05-25 RX ADMIN — Medication 25 MILLIGRAM(S): at 06:08

## 2018-05-25 RX ADMIN — Medication 200 MILLIGRAM(S): at 00:08

## 2018-05-25 RX ADMIN — HEPARIN SODIUM 5000 UNIT(S): 5000 INJECTION INTRAVENOUS; SUBCUTANEOUS at 13:32

## 2018-05-25 RX ADMIN — GABAPENTIN 300 MILLIGRAM(S): 400 CAPSULE ORAL at 13:32

## 2018-05-25 RX ADMIN — Medication 650 MILLIGRAM(S): at 06:08

## 2018-05-25 RX ADMIN — Medication 81 MILLIGRAM(S): at 13:32

## 2018-05-25 RX ADMIN — Medication 650 MILLIGRAM(S): at 07:08

## 2018-05-25 RX ADMIN — OXYCODONE AND ACETAMINOPHEN 1 TABLET(S): 5; 325 TABLET ORAL at 21:41

## 2018-05-25 NOTE — PROGRESS NOTE ADULT - SUBJECTIVE AND OBJECTIVE BOX
O/N: VALERIE. VSS                    83yoF with PMH CAD, lateral wall MI s/p 6 stents, DM, HTN, CKD, RLE DVT, PE, early dementia, PVD s/p RLE angiogram (5/17) for b/l great toe ulcers and rest pain now s/p  RLE CFA to Peroneal bypass with rGSV with completion angio on 5/22, stepped down 5/24 for SICU      PT  Vanc trough 5/26 9am  awaiting [facility] placement O/N: VALERIE. VSS    ciprofloxacin   IVPB   ciprofloxacin   IVPB 400  vancomycin  IVPB 1250  vancomycin  IVPB   aspirin enteric coated 81  ciprofloxacin   IVPB   ciprofloxacin   IVPB 400  heparin  Injectable 5000  metoprolol succinate ER 25  vancomycin  IVPB 1250  vancomycin  IVPB         Vital Signs Last 24 Hrs  T(C): 36.3 (25 May 2018 10:29), Max: 37.3 (25 May 2018 01:00)  T(F): 97.4 (25 May 2018 10:29), Max: 99.1 (25 May 2018 01:00)  HR: 74 (25 May 2018 10:45) (64 - 80)  BP: 134/65 (25 May 2018 10:45) (107/51 - 167/74)  BP(mean): 95 (25 May 2018 10:15) (61 - 106)  RR: 15 (25 May 2018 10:45) (12 - 28)  SpO2: 100% (25 May 2018 10:45) (86% - 100%)  I&O's Detail    24 May 2018 07:01  -  25 May 2018 07:00  --------------------------------------------------------  IN:    Oral Fluid: 680 mL    sodium chloride 0.9%: 180 mL    Solution: 250 mL    Solution: 400 mL  Total IN: 1510 mL    OUT:    Indwelling Catheter - Urethral: 75 mL    Voided: 430 mL  Total OUT: 505 mL    Total NET: 1005 mL      25 May 2018 07:01  -  25 May 2018 11:23  --------------------------------------------------------  IN:    sodium chloride 0.9%.: 50 mL  Total IN: 50 mL    OUT:  Total OUT: 0 mL    Total NET: 50 mL          Physical Exam:  General: NAD, resting comfortably in bed  Pulmonary: Nonlabored breathing, no respiratory distress  Cardiovascular: NSR  Abdominal: soft, NT/ND  Extremities: L toe dry gangrene. R toe gangrene, now s/p amputation  Pulses: R biphasic DP/ Triphasic PT. L monophasic DP/PT.       LABS:                        9.3    11.4  )-----------( 191      ( 25 May 2018 07:56 )             28.2     05-25    136  |  101  |  14  ----------------------------<  123<H>  3.8   |  23  |  0.84    Ca    8.5      25 May 2018 07:56  Phos  2.7     05-25  Mg     2.0     05-25    TPro  5.6<L>  /  Alb  2.5<L>  /  TBili  0.4  /  DBili  x   /  AST  24  /  ALT  27  /  AlkPhos  51  05-23    PT/INR - ( 23 May 2018 11:34 )   PT: 12.5 sec;   INR: 1.12          PTT - ( 23 May 2018 11:34 )  PTT:24.5 sec    Radiology and Additional Studies:

## 2018-05-25 NOTE — PROGRESS NOTE ADULT - SUBJECTIVE AND OBJECTIVE BOX
Vascular Surgery Post-Op Note    Procedure: right 1st toe amputation    Diagnosis/Indication: right 1st toe infection     Surgeon: Dr. Castellanos    S: Pt has no complaints. Denies CP, SOB, foot pain, coolness or numbness    O:  T(C): 36.3 (05-25-18 @ 10:29), Max: 36.6 (05-25-18 @ 09:29)  T(F): 97.4 (05-25-18 @ 10:29), Max: 97.8 (05-25-18 @ 09:29)  HR: 66 (05-25-18 @ 12:04) (64 - 74)  BP: 158/69 (05-25-18 @ 12:04) (111/55 - 158/69)  RR: 18 (05-25-18 @ 12:04) (12 - 19)  SpO2: 100% (05-25-18 @ 12:04) (100% - 100%)  Wt(kg): --                        9.3    11.4  )-----------( 191      ( 25 May 2018 07:56 )             28.2     05-25    136  |  101  |  14  ----------------------------<  123<H>  3.8   |  23  |  0.84    Ca    8.5      25 May 2018 07:56  Phos  2.7     05-25  Mg     2.0     05-25    TPro  5.6<L>  /  Alb  2.5<L>  /  TBili  0.4  /  DBili  x   /  AST  24  /  ALT  27  /  AlkPhos  51  05-23      Gen: NAD, resting comfortably in bed  C/V: NSR  Pulm: Nonlabored breathing, no respiratory distress  Abd: soft, NT/ND  Extrem: right foot dressing with some serosanguinous exudate, no jordan bleeding      A/P: 84yFemale s/p above procedure  Diet: advance as tolerated  IVF: HL  Pain/nausea control  DVT ppx  PT eval in AM

## 2018-05-25 NOTE — PROGRESS NOTE ADULT - ASSESSMENT
83yoF with PMH CAD, lateral wall MI s/p 6 stents, DM, HTN, CKD, RLE DVT, PE, early dementia, PVD s/p RLE angiogram (5/17) for b/l great toe ulcers and rest pain now s/p  RLE CFA to Peroneal bypass with rGSV with completion angio on 5/22, stepped down 5/24 for SICU      PT  Vanc trough 5/26 9am  awaiting JAMI placement

## 2018-05-25 NOTE — BRIEF OPERATIVE NOTE - PROCEDURE
<<-----Click on this checkbox to enter Procedure Toe amputation, right, single toe  05/25/2018  great toe  Active  TAD

## 2018-05-25 NOTE — BRIEF OPERATIVE NOTE - OPERATION/FINDINGS
Non healing ulcer of R great toe, partially amputated. Minimal bleeding at wound edge. No jordan pus.
Patent aorto-iliac segment. Patent CFA and PFA. Occluded SFA and pop. REconstutution of the perneal in mid-calf. AT and PT occluded. Peroneal supplying the foot.
Patent bypass. No anastomotic defects

## 2018-05-25 NOTE — PROGRESS NOTE ADULT - SUBJECTIVE AND OBJECTIVE BOX
INTERVAL HISTORY:  The patient is anxious about today's surgery. She is having right leg discomfort. She denies chest pain or dyspnea.	  Chart, PMH and allergies reviewed  MEDICATIONS:  metoprolol succinate ER 25 milliGRAM(s) Oral daily    ciprofloxacin   IVPB      ciprofloxacin   IVPB 400 milliGRAM(s) IV Intermittent every 12 hours  vancomycin  IVPB 1250 milliGRAM(s) IV Intermittent every 24 hours  vancomycin  IVPB          acetaminophen   Tablet. 650 milliGRAM(s) Oral every 6 hours PRN  gabapentin 300 milliGRAM(s) Oral three times a day  oxyCODONE    5 mG/acetaminophen 325 mG 1 Tablet(s) Oral every 6 hours PRN  oxyCODONE    5 mG/acetaminophen 325 mG 2 Tablet(s) Oral every 6 hours PRN  sertraline 50 milliGRAM(s) Oral daily    docusate sodium 100 milliGRAM(s) Oral three times a day    atorvastatin 80 milliGRAM(s) Oral at bedtime  dextrose 40% Gel 15 Gram(s) Oral once PRN  dextrose 50% Injectable 12.5 Gram(s) IV Push once  dextrose 50% Injectable 25 Gram(s) IV Push once  dextrose 50% Injectable 25 Gram(s) IV Push once  glucagon  Injectable 1 milliGRAM(s) IntraMuscular once PRN  insulin lispro (HumaLOG) corrective regimen sliding scale   SubCutaneous Before meals and at bedtime    aspirin enteric coated 81 milliGRAM(s) Oral daily  dextrose 5%. 1000 milliLiter(s) IV Continuous <Continuous>  heparin  Injectable 5000 Unit(s) SubCutaneous every 8 hours  sodium chloride 0.9%. 1000 milliLiter(s) IV Continuous <Continuous>      REVIEW OF SYSTEMS:  CONSTITUTIONAL: No fever, no weight loss, or fatigue  PULMONARY: No cough, no wheezing, chills or hemoptysis; No Shortness of Breath  CARDIOVASCULAR: No chest pain, no palpitations, no syncope, dizziness, no leg swelling  GASTROINTESTINAL: No abdominal pain. No nausea, no  vomiting, or hematemesis; No diarrhea or constipation. No melena or hematochezia.  GENITOURINARY: No dysuria, frequency, hematuria, or incontinence  SKIN: No itching, burning, rashes, or lesions     PHYSICAL EXAM:  T(C): 37.1 (05-25-18 @ 06:08), Max: 37.3 (05-25-18 @ 01:00)  HR: 80 (05-25-18 @ 06:15) (60 - 80)  BP: 167/74 (05-25-18 @ 06:15) (107/51 - 167/74)  RR: 18 (05-25-18 @ 06:15) (15 - 28)  SpO2: 100% (05-25-18 @ 06:15) (86% - 100%)  Wt(kg): --  I&O's Summary    24 May 2018 07:01  -  25 May 2018 07:00  --------------------------------------------------------  IN: 1510 mL / OUT: 505 mL / NET: 1005 mL      Height (cm): 167.64 (05-25 @ 06:15)  Weight (kg): 83.5 (05-25 @ 06:15)  BMI (kg/m2): 29.7 (05-25 @ 06:15)  BSA (m2): 1.93 (05-25 @ 06:15)  Appearance:  No deformities, normal appearance	  HEENT:   Normal oral mucosa, PERRL, EOMI	  Neck: No jvd , no masses  Lymphatic: No  lymphadenopathy  Pulmonary: Lungs clear to auscultation and percussion  Cardiovascular: Normal S1 S2, No JVD, No murmur, No edema	  Abdomen:  Soft, Non-tender, + BS  Skin: No rashes, No ecchymoses	  Extremities:  No clubbing or cyanosis, R foot is warm, gangrenous toes bilaterally  MSK: Normal range of motion, no joint swelling    LABS:		  CARDIAC MARKERS:                         9.7    12.0  )-----------( 170      ( 24 May 2018 04:26 )             29.5     05-24    133<L>  |  100  |  13  ----------------------------<  140<H>  4.0   |  24  |  0.88    Ca    8.3<L>      24 May 2018 04:26  Phos  3.4     05-24  Mg     2.1     05-24    TPro  5.6<L>  /  Alb  2.5<L>  /  TBili  0.4  /  DBili  x   /  AST  24  /  ALT  27  /  AlkPhos  51  05-23    proBNP:  Lipid Profile:  HgA1c:  TSH:  PT/INR - ( 23 May 2018 11:34 )   PT: 12.5 sec;   INR: 1.12          PTT - ( 23 May 2018 11:34 )  PTT:24.5 sec  TELEMETRY: 	           ECG:  	            RADIOLOGY:   DIAGNOSTIC TESTING: [ ] Echocardiogram: [ ]  Catheterization: [ ] Stress Test:      ASSESSMENT/PLAN: 	    Hypotension-blood pressure is lablile but acceptable.  The losartan is 50 mg daily. Metoprolol is well tolerated. The patient is getting IV fluid.     Coronary artery disease/ H/o MI-No chest discomfort. EKG post op no change.  Continue to monitor in ICU.    Hypertension- see above.    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 R foot is warm with positive doppler flow.  No drainage.     Diabetes-follow fingersticks.    Anemia-  Follow closely.

## 2018-05-26 LAB
ANION GAP SERPL CALC-SCNC: 9 MMOL/L — SIGNIFICANT CHANGE UP (ref 5–17)
APTT BLD: 28.6 SEC — SIGNIFICANT CHANGE UP (ref 27.5–37.4)
BUN SERPL-MCNC: 16 MG/DL — SIGNIFICANT CHANGE UP (ref 7–23)
CALCIUM SERPL-MCNC: 8.7 MG/DL — SIGNIFICANT CHANGE UP (ref 8.4–10.5)
CHLORIDE SERPL-SCNC: 100 MMOL/L — SIGNIFICANT CHANGE UP (ref 96–108)
CO2 SERPL-SCNC: 26 MMOL/L — SIGNIFICANT CHANGE UP (ref 22–31)
CREAT SERPL-MCNC: 0.81 MG/DL — SIGNIFICANT CHANGE UP (ref 0.5–1.3)
GLUCOSE BLDC GLUCOMTR-MCNC: 114 MG/DL — HIGH (ref 70–99)
GLUCOSE BLDC GLUCOMTR-MCNC: 139 MG/DL — HIGH (ref 70–99)
GLUCOSE BLDC GLUCOMTR-MCNC: 177 MG/DL — HIGH (ref 70–99)
GLUCOSE BLDC GLUCOMTR-MCNC: 206 MG/DL — HIGH (ref 70–99)
GLUCOSE SERPL-MCNC: 168 MG/DL — HIGH (ref 70–99)
HCT VFR BLD CALC: 29 % — LOW (ref 34.5–45)
HGB BLD-MCNC: 9.3 G/DL — LOW (ref 11.5–15.5)
MAGNESIUM SERPL-MCNC: 2 MG/DL — SIGNIFICANT CHANGE UP (ref 1.6–2.6)
MCHC RBC-ENTMCNC: 28.9 PG — SIGNIFICANT CHANGE UP (ref 27–34)
MCHC RBC-ENTMCNC: 32.1 G/DL — SIGNIFICANT CHANGE UP (ref 32–36)
MCV RBC AUTO: 90.1 FL — SIGNIFICANT CHANGE UP (ref 80–100)
PHOSPHATE SERPL-MCNC: 3.2 MG/DL — SIGNIFICANT CHANGE UP (ref 2.5–4.5)
PLATELET # BLD AUTO: 203 K/UL — SIGNIFICANT CHANGE UP (ref 150–400)
POTASSIUM SERPL-MCNC: 3.9 MMOL/L — SIGNIFICANT CHANGE UP (ref 3.5–5.3)
POTASSIUM SERPL-SCNC: 3.9 MMOL/L — SIGNIFICANT CHANGE UP (ref 3.5–5.3)
RBC # BLD: 3.22 M/UL — LOW (ref 3.8–5.2)
RBC # FLD: 14.6 % — SIGNIFICANT CHANGE UP (ref 10.3–16.9)
SODIUM SERPL-SCNC: 135 MMOL/L — SIGNIFICANT CHANGE UP (ref 135–145)
VANCOMYCIN TROUGH SERPL-MCNC: 10 UG/ML — SIGNIFICANT CHANGE UP (ref 10–20)
WBC # BLD: 8.8 K/UL — SIGNIFICANT CHANGE UP (ref 3.8–10.5)
WBC # FLD AUTO: 8.8 K/UL — SIGNIFICANT CHANGE UP (ref 3.8–10.5)

## 2018-05-26 PROCEDURE — 99232 SBSQ HOSP IP/OBS MODERATE 35: CPT

## 2018-05-26 PROCEDURE — 99232 SBSQ HOSP IP/OBS MODERATE 35: CPT | Mod: GC

## 2018-05-26 RX ORDER — HEPARIN SODIUM 5000 [USP'U]/ML
500 INJECTION INTRAVENOUS; SUBCUTANEOUS
Qty: 25000 | Refills: 0 | Status: DISCONTINUED | OUTPATIENT
Start: 2018-05-26 | End: 2018-05-27

## 2018-05-26 RX ADMIN — Medication 4: at 22:04

## 2018-05-26 RX ADMIN — Medication 81 MILLIGRAM(S): at 12:03

## 2018-05-26 RX ADMIN — OXYCODONE AND ACETAMINOPHEN 1 TABLET(S): 5; 325 TABLET ORAL at 18:00

## 2018-05-26 RX ADMIN — OXYCODONE AND ACETAMINOPHEN 1 TABLET(S): 5; 325 TABLET ORAL at 14:54

## 2018-05-26 RX ADMIN — OXYCODONE AND ACETAMINOPHEN 1 TABLET(S): 5; 325 TABLET ORAL at 06:05

## 2018-05-26 RX ADMIN — HEPARIN SODIUM 5000 UNIT(S): 5000 INJECTION INTRAVENOUS; SUBCUTANEOUS at 14:00

## 2018-05-26 RX ADMIN — Medication 166.67 MILLIGRAM(S): at 13:18

## 2018-05-26 RX ADMIN — SERTRALINE 50 MILLIGRAM(S): 25 TABLET, FILM COATED ORAL at 12:03

## 2018-05-26 RX ADMIN — HEPARIN SODIUM 5 UNIT(S)/HR: 5000 INJECTION INTRAVENOUS; SUBCUTANEOUS at 19:54

## 2018-05-26 RX ADMIN — Medication 25 MILLIGRAM(S): at 06:05

## 2018-05-26 RX ADMIN — GABAPENTIN 300 MILLIGRAM(S): 400 CAPSULE ORAL at 22:04

## 2018-05-26 RX ADMIN — GABAPENTIN 300 MILLIGRAM(S): 400 CAPSULE ORAL at 06:05

## 2018-05-26 RX ADMIN — OXYCODONE AND ACETAMINOPHEN 1 TABLET(S): 5; 325 TABLET ORAL at 13:18

## 2018-05-26 RX ADMIN — ATORVASTATIN CALCIUM 80 MILLIGRAM(S): 80 TABLET, FILM COATED ORAL at 22:04

## 2018-05-26 RX ADMIN — OXYCODONE AND ACETAMINOPHEN 1 TABLET(S): 5; 325 TABLET ORAL at 18:38

## 2018-05-26 RX ADMIN — Medication 200 MILLIGRAM(S): at 12:04

## 2018-05-26 RX ADMIN — HEPARIN SODIUM 5000 UNIT(S): 5000 INJECTION INTRAVENOUS; SUBCUTANEOUS at 06:04

## 2018-05-26 RX ADMIN — OXYCODONE AND ACETAMINOPHEN 1 TABLET(S): 5; 325 TABLET ORAL at 07:05

## 2018-05-26 RX ADMIN — GABAPENTIN 300 MILLIGRAM(S): 400 CAPSULE ORAL at 13:17

## 2018-05-26 RX ADMIN — Medication 2: at 12:03

## 2018-05-26 RX ADMIN — Medication 200 MILLIGRAM(S): at 00:30

## 2018-05-26 NOTE — PROGRESS NOTE ADULT - SUBJECTIVE AND OBJECTIVE BOX
INTERVAL HISTORY:  s/p peripheral bypass and toe amputation on right  	  MEDICATIONS:  metoprolol succinate ER 25 milliGRAM(s) Oral daily    ciprofloxacin   IVPB      ciprofloxacin   IVPB 400 milliGRAM(s) IV Intermittent every 12 hours  vancomycin  IVPB 1250 milliGRAM(s) IV Intermittent every 24 hours  vancomycin  IVPB          acetaminophen   Tablet. 650 milliGRAM(s) Oral every 6 hours PRN  gabapentin 300 milliGRAM(s) Oral three times a day  oxyCODONE    5 mG/acetaminophen 325 mG 1 Tablet(s) Oral every 6 hours PRN  oxyCODONE    5 mG/acetaminophen 325 mG 2 Tablet(s) Oral every 6 hours PRN  sertraline 50 milliGRAM(s) Oral daily    docusate sodium 100 milliGRAM(s) Oral three times a day    atorvastatin 80 milliGRAM(s) Oral at bedtime  dextrose 40% Gel 15 Gram(s) Oral once PRN  dextrose 50% Injectable 12.5 Gram(s) IV Push once  dextrose 50% Injectable 25 Gram(s) IV Push once  dextrose 50% Injectable 25 Gram(s) IV Push once  glucagon  Injectable 1 milliGRAM(s) IntraMuscular once PRN  insulin lispro (HumaLOG) corrective regimen sliding scale   SubCutaneous Before meals and at bedtime    aspirin enteric coated 81 milliGRAM(s) Oral daily  dextrose 5%. 1000 milliLiter(s) IV Continuous <Continuous>  heparin  Injectable 5000 Unit(s) SubCutaneous every 8 hours        PHYSICAL EXAM:  T(C): 36.1 (05-26-18 @ 05:06), Max: 37.5 (05-25-18 @ 22:08)  HR: 73 (05-26-18 @ 09:34) (62 - 82)  BP: 134/63 (05-26-18 @ 09:34) (113/51 - 179/74)  RR: 18 (05-26-18 @ 09:34) (12 - 19)  SpO2: 100% (05-26-18 @ 09:34) (100% - 100%)  Wt(kg): --  I&O's Summary    25 May 2018 07:01  -  26 May 2018 07:00  --------------------------------------------------------  IN: 1270 mL / OUT: 850 mL / NET: 420 mL          Appearance: Normal	  HEENT:   Normal oral mucosa, PERRL, EOMI	  Lymphatic: No lymphadenopathy  Cardiovascular: Normal S1 S2, No JVD, No murmurs, No edema  Respiratory: Lungs clear to auscultation	  Psychiatry: A & O x 3, Mood & affect appropriate  Gastrointestinal:  Soft, Non-tender, + BS	  Skin: No rashes, No ecchymoses, No cyanosis  Neurologic: Non-focal  Extremities: Normal range of motion, No clubbing, cyanosis or edema  Vascular: Peripheral pulses reduced    TELEMETRY: 	    ECG:  	  RADIOLOGY:   DIAGNOSTIC TESTING:  [ ] Echocardiogram:  [ ]  Catheterization:  [ ] Stress Test:    OTHER: 	    LABS:	 	    CARDIAC MARKERS:                                  9.3    11.4  )-----------( 191      ( 25 May 2018 07:56 )             28.2     05-25    136  |  101  |  14  ----------------------------<  123<H>  3.8   |  23  |  0.84    Ca    8.5      25 May 2018 07:56  Phos  2.7     05-25  Mg     2.0     05-25      proBNP:   Lipid Profile:   HgA1c:   TSH:     ASSESSMENT/PLAN:

## 2018-05-26 NOTE — PROGRESS NOTE ADULT - SUBJECTIVE AND OBJECTIVE BOX
O/N: VALERIE, VSS, minimal bld tinged soilage on dressing at surgical site                          A/P: 84yFemale s/p R first Toe Amp pod#1    Diet: advance as tolerated  Pain/nausea control  DVT ppx  PT eval

## 2018-05-26 NOTE — PROGRESS NOTE ADULT - SUBJECTIVE AND OBJECTIVE BOX
Interval Events: Reviewed  Patient seen and examined at bedside.    Patient is a 84y old  Female who presents with a chief complaint of b/l great toe nonhealing ulcer with pain (16 May 2018 16:32)    she is doing well but complainin gof pain at the surgery site  PAST MEDICAL & SURGICAL HISTORY:  Peripheral vascular disease  Ulcer of lower limb  Arthropathy  Ischemic heart disease  Hypertension  Hyperlipidemia  Diabetes mellitus      MEDICATIONS:  Pulmonary:    Antimicrobials:  ciprofloxacin   IVPB      ciprofloxacin   IVPB 400 milliGRAM(s) IV Intermittent every 12 hours  vancomycin  IVPB 1250 milliGRAM(s) IV Intermittent every 24 hours  vancomycin  IVPB        Anticoagulants:  aspirin enteric coated 81 milliGRAM(s) Oral daily  heparin  Injectable 5000 Unit(s) SubCutaneous every 8 hours    Cardiac:  metoprolol succinate ER 25 milliGRAM(s) Oral daily      Allergies    penicillins (Anaphylaxis)    Intolerances        Vital Signs Last 24 Hrs  T(C): 36.4 (26 May 2018 17:51), Max: 37.5 (25 May 2018 22:08)  T(F): 97.5 (26 May 2018 17:51), Max: 99.5 (25 May 2018 22:08)  HR: 70 (26 May 2018 17:46) (62 - 82)  BP: 125/60 (26 May 2018 17:46) (73/146 - 179/74)  BP(mean): 60 (26 May 2018 13:24) (60 - 60)  RR: 18 (26 May 2018 17:46) (16 - 18)  SpO2: 100% (26 May 2018 17:46) (100% - 100%)    05-25 @ 07:01 - 05-26 @ 07:00  --------------------------------------------------------  IN: 1270 mL / OUT: 850 mL / NET: 420 mL    05-26 @ 07:01 - 05-26 @ 18:34  --------------------------------------------------------  IN: 720 mL / OUT: 0 mL / NET: 720 mL          LABS:      CBC Full  -  ( 26 May 2018 12:00 )  WBC Count : 8.8 K/uL  Hemoglobin : 9.3 g/dL  Hematocrit : 29.0 %  Platelet Count - Automated : 203 K/uL  Mean Cell Volume : 90.1 fL  Mean Cell Hemoglobin : 28.9 pg  Mean Cell Hemoglobin Concentration : 32.1 g/dL  Auto Neutrophil # : x  Auto Lymphocyte # : x  Auto Monocyte # : x  Auto Eosinophil # : x  Auto Basophil # : x  Auto Neutrophil % : x  Auto Lymphocyte % : x  Auto Monocyte % : x  Auto Eosinophil % : x  Auto Basophil % : x    05-26    135  |  100  |  16  ----------------------------<  168<H>  3.9   |  26  |  0.81    Ca    8.7      26 May 2018 12:00  Phos  3.2     05-26  Mg     2.0     05-26      PTT - ( 26 May 2018 17:43 )  PTT:28.6 sec                    RADIOLOGY & ADDITIONAL STUDIES (The following images were personally reviewed):  Vivar:                                     No  Urine output:                       adequate  DVT prophylaxis:                 Yes  Flattus:                                  Yes  Bowel movement:              No

## 2018-05-27 LAB
APTT BLD: 33.5 SEC — SIGNIFICANT CHANGE UP (ref 27.5–37.4)
APTT BLD: 39.6 SEC — HIGH (ref 27.5–37.4)
APTT BLD: 47.8 SEC — HIGH (ref 27.5–37.4)
GLUCOSE BLDC GLUCOMTR-MCNC: 111 MG/DL — HIGH (ref 70–99)
GLUCOSE BLDC GLUCOMTR-MCNC: 142 MG/DL — HIGH (ref 70–99)
GLUCOSE BLDC GLUCOMTR-MCNC: 155 MG/DL — HIGH (ref 70–99)
GLUCOSE BLDC GLUCOMTR-MCNC: 161 MG/DL — HIGH (ref 70–99)

## 2018-05-27 RX ORDER — HEPARIN SODIUM 5000 [USP'U]/ML
800 INJECTION INTRAVENOUS; SUBCUTANEOUS
Qty: 25000 | Refills: 0 | Status: DISCONTINUED | OUTPATIENT
Start: 2018-05-27 | End: 2018-05-28

## 2018-05-27 RX ORDER — HEPARIN SODIUM 5000 [USP'U]/ML
700 INJECTION INTRAVENOUS; SUBCUTANEOUS
Qty: 25000 | Refills: 0 | Status: DISCONTINUED | OUTPATIENT
Start: 2018-05-27 | End: 2018-05-27

## 2018-05-27 RX ADMIN — Medication 200 MILLIGRAM(S): at 11:52

## 2018-05-27 RX ADMIN — Medication 100 MILLIGRAM(S): at 13:42

## 2018-05-27 RX ADMIN — HEPARIN SODIUM 7 UNIT(S)/HR: 5000 INJECTION INTRAVENOUS; SUBCUTANEOUS at 13:43

## 2018-05-27 RX ADMIN — HEPARIN SODIUM 8 UNIT(S)/HR: 5000 INJECTION INTRAVENOUS; SUBCUTANEOUS at 22:18

## 2018-05-27 RX ADMIN — Medication 81 MILLIGRAM(S): at 11:51

## 2018-05-27 RX ADMIN — Medication 100 MILLIGRAM(S): at 22:19

## 2018-05-27 RX ADMIN — Medication 200 MILLIGRAM(S): at 00:53

## 2018-05-27 RX ADMIN — OXYCODONE AND ACETAMINOPHEN 2 TABLET(S): 5; 325 TABLET ORAL at 02:26

## 2018-05-27 RX ADMIN — Medication 2: at 22:19

## 2018-05-27 RX ADMIN — GABAPENTIN 300 MILLIGRAM(S): 400 CAPSULE ORAL at 22:18

## 2018-05-27 RX ADMIN — HEPARIN SODIUM 6 UNIT(S)/HR: 5000 INJECTION INTRAVENOUS; SUBCUTANEOUS at 07:23

## 2018-05-27 RX ADMIN — OXYCODONE AND ACETAMINOPHEN 1 TABLET(S): 5; 325 TABLET ORAL at 14:40

## 2018-05-27 RX ADMIN — ATORVASTATIN CALCIUM 80 MILLIGRAM(S): 80 TABLET, FILM COATED ORAL at 22:18

## 2018-05-27 RX ADMIN — OXYCODONE AND ACETAMINOPHEN 2 TABLET(S): 5; 325 TABLET ORAL at 07:25

## 2018-05-27 RX ADMIN — Medication 200 MILLIGRAM(S): at 23:39

## 2018-05-27 RX ADMIN — GABAPENTIN 300 MILLIGRAM(S): 400 CAPSULE ORAL at 07:26

## 2018-05-27 RX ADMIN — Medication 166.67 MILLIGRAM(S): at 11:47

## 2018-05-27 RX ADMIN — OXYCODONE AND ACETAMINOPHEN 2 TABLET(S): 5; 325 TABLET ORAL at 01:01

## 2018-05-27 RX ADMIN — OXYCODONE AND ACETAMINOPHEN 2 TABLET(S): 5; 325 TABLET ORAL at 18:15

## 2018-05-27 RX ADMIN — Medication 25 MILLIGRAM(S): at 07:26

## 2018-05-27 RX ADMIN — OXYCODONE AND ACETAMINOPHEN 2 TABLET(S): 5; 325 TABLET ORAL at 17:14

## 2018-05-27 RX ADMIN — Medication 2: at 11:50

## 2018-05-27 RX ADMIN — GABAPENTIN 300 MILLIGRAM(S): 400 CAPSULE ORAL at 13:42

## 2018-05-27 RX ADMIN — OXYCODONE AND ACETAMINOPHEN 2 TABLET(S): 5; 325 TABLET ORAL at 23:39

## 2018-05-27 RX ADMIN — OXYCODONE AND ACETAMINOPHEN 2 TABLET(S): 5; 325 TABLET ORAL at 08:25

## 2018-05-27 RX ADMIN — SERTRALINE 50 MILLIGRAM(S): 25 TABLET, FILM COATED ORAL at 11:51

## 2018-05-27 RX ADMIN — Medication 100 MILLIGRAM(S): at 07:25

## 2018-05-27 RX ADMIN — OXYCODONE AND ACETAMINOPHEN 1 TABLET(S): 5; 325 TABLET ORAL at 13:42

## 2018-05-27 NOTE — PROVIDER CONTACT NOTE (OTHER) - RECOMMENDATIONS
Call and discuss with HCP/daughter.
return packed red blood cells till further notice Md to order tylenol for fever
Continue with blood administration & continue to monitor temp.
as per MD Albert maintain same rate
continue to monitor
xander salcedo

## 2018-05-27 NOTE — PROGRESS NOTE ADULT - ASSESSMENT
A/P: 84yFemale s/p R first Toe Amp pod#1    Diet: advance as tolerated  Pain/nausea control  DVT ppx  PT eval A/P: 84yFemale s/p R first Toe Amp pod#2    Diet: advance as tolerated  Pain/nausea control  DVT ppx  PT eval   Hep Gtt f/u ptt 6p

## 2018-05-27 NOTE — PROVIDER CONTACT NOTE (OTHER) - ACTION/TREATMENT ORDERED:
As per SHAJI Barrera, states she will call daughter and discuss need for OR today for amputation of 1st digit on R foot.
packed red blood cells returned
Administer blood per order, cooling blanket post transfusion until fever resolves, continue to monitor temp.
losarten held
no change in therapy, continue heparin at 5cc/hr
patient continued to be monitored

## 2018-05-27 NOTE — PROVIDER CONTACT NOTE (OTHER) - REASON
Temp 101.6
bp 107 /51 map 61
heparin specific protocol
short burst of tachycardia
Consent for OR for amputation of first digit R foot
patients temperature 101.2rectally

## 2018-05-27 NOTE — PROVIDER CONTACT NOTE (OTHER) - DATE AND TIME:
23-May-2018 22:10
24-May-2018 12:55
24-May-2018 13:50
27-May-2018 04:05
23-May-2018 19:35
25-May-2018 06:15

## 2018-05-27 NOTE — PROGRESS NOTE ADULT - SUBJECTIVE AND OBJECTIVE BOX
5/26 & ON: Vanc trough in AM was 10, gave same dose. started heparin gtt. Pt offers no complaints at this time,    O/N: Vanc trough in AM 10, dose unchanged. started heparin gtt.     ciprofloxacin   IVPB   ciprofloxacin   IVPB 400  vancomycin  IVPB 1250  vancomycin  IVPB   aspirin enteric coated 81  ciprofloxacin   IVPB   ciprofloxacin   IVPB 400  heparin  Infusion 700  metoprolol succinate ER 25  vancomycin  IVPB 1250  vancomycin  IVPB       Allergies    penicillins (Anaphylaxis)    Intolerances        Vital Signs Last 24 Hrs  T(C): 35.8 (27 May 2018 13:56), Max: 37 (26 May 2018 21:45)  T(F): 96.5 (27 May 2018 13:56), Max: 98.6 (26 May 2018 21:45)  HR: 56 (27 May 2018 09:35) (56 - 70)  BP: 105/52 (27 May 2018 09:35) (105/52 - 185/74)  BP(mean): --  RR: 16 (27 May 2018 09:35) (16 - 18)  SpO2: 99% (27 May 2018 09:35) (99% - 100%)  I&O's Summary    26 May 2018 07:01  -  27 May 2018 07:00  --------------------------------------------------------  IN: 720 mL / OUT: 0 mL / NET: 720 mL    27 May 2018 07:01  -  27 May 2018 16:25  --------------------------------------------------------  IN: 144 mL / OUT: 0 mL / NET: 144 mL        Physical Exam:  General: Pt AXOX2	  Pulmonary: unlabored breathing  Cardiovascular: S1S2  Extremities: Great toe stump beef red tissue, wet/dry dressing placed    LABS:                        9.3    8.8   )-----------( 203      ( 26 May 2018 12:00 )             29.0     05-26    135  |  100  |  16  ----------------------------<  168<H>  3.9   |  26  |  0.81    Ca    8.7      26 May 2018 12:00  Phos  3.2     05-26  Mg     2.0     05-26      PTT - ( 27 May 2018 10:54 )  PTT:47.8 sec    Radiology and Additional Studies:

## 2018-05-27 NOTE — PROVIDER CONTACT NOTE (OTHER) - ASSESSMENT
Pt c/o pain in R foot. VSS. /74 HR 80 RR 18 O2 100% room air in NSR.
patient denies chills
no bleeding
patient asymptomatic
patient asymptomatic

## 2018-05-27 NOTE — PROVIDER CONTACT NOTE (OTHER) - SITUATION
PTT 33.5
Pt w/ temp of 101.6 rectally 30 min post admin of 1g IV tylenol.
patient had episode of tacycardia
patient oob to chair bp 107/51 map 61 asymptomatic
Pt is first case for OR today for amputation of 1st digit on R foot. Pt was unaware and needs consent. Pt would like vascular team to discuss with daughter/HCP Anne Guzman. HCP form in chart.
patient to get one unit of packed red blood cells but temperature is 101.2 rectally

## 2018-05-27 NOTE — PROVIDER CONTACT NOTE (OTHER) - BACKGROUND
Pt had 1 unit of blood ordered being held d/t fever this evening.
heparin specific protocol
s/p fem fem bypass
s/p fem fem bypass 5/22
VEnous ulcer b/l of both feet on 1st digits. Pt had RLE angiogram and bypass of RLE on 5/22.
post op right femeral ,femeral bypass on 5/22/18  hb 8.5

## 2018-05-28 LAB
ALBUMIN SERPL ELPH-MCNC: 2.6 G/DL — LOW (ref 3.3–5)
ALP SERPL-CCNC: 68 U/L — SIGNIFICANT CHANGE UP (ref 40–120)
ALT FLD-CCNC: 79 U/L — HIGH (ref 10–45)
ANION GAP SERPL CALC-SCNC: 11 MMOL/L — SIGNIFICANT CHANGE UP (ref 5–17)
APTT BLD: 41 SEC — HIGH (ref 27.5–37.4)
APTT BLD: 79.1 SEC — HIGH (ref 27.5–37.4)
AST SERPL-CCNC: 52 U/L — HIGH (ref 10–40)
BILIRUB SERPL-MCNC: 0.4 MG/DL — SIGNIFICANT CHANGE UP (ref 0.2–1.2)
BUN SERPL-MCNC: 20 MG/DL — SIGNIFICANT CHANGE UP (ref 7–23)
CALCIUM SERPL-MCNC: 8.9 MG/DL — SIGNIFICANT CHANGE UP (ref 8.4–10.5)
CHLORIDE SERPL-SCNC: 98 MMOL/L — SIGNIFICANT CHANGE UP (ref 96–108)
CO2 SERPL-SCNC: 27 MMOL/L — SIGNIFICANT CHANGE UP (ref 22–31)
CREAT SERPL-MCNC: 0.84 MG/DL — SIGNIFICANT CHANGE UP (ref 0.5–1.3)
GLUCOSE BLDC GLUCOMTR-MCNC: 113 MG/DL — HIGH (ref 70–99)
GLUCOSE BLDC GLUCOMTR-MCNC: 120 MG/DL — HIGH (ref 70–99)
GLUCOSE BLDC GLUCOMTR-MCNC: 208 MG/DL — HIGH (ref 70–99)
GLUCOSE BLDC GLUCOMTR-MCNC: 95 MG/DL — SIGNIFICANT CHANGE UP (ref 70–99)
GLUCOSE SERPL-MCNC: 118 MG/DL — HIGH (ref 70–99)
HCT VFR BLD CALC: 31.6 % — LOW (ref 34.5–45)
HGB BLD-MCNC: 10.2 G/DL — LOW (ref 11.5–15.5)
MAGNESIUM SERPL-MCNC: 2 MG/DL — SIGNIFICANT CHANGE UP (ref 1.6–2.6)
MCHC RBC-ENTMCNC: 28.7 PG — SIGNIFICANT CHANGE UP (ref 27–34)
MCHC RBC-ENTMCNC: 32.3 G/DL — SIGNIFICANT CHANGE UP (ref 32–36)
MCV RBC AUTO: 89 FL — SIGNIFICANT CHANGE UP (ref 80–100)
PHOSPHATE SERPL-MCNC: 4.3 MG/DL — SIGNIFICANT CHANGE UP (ref 2.5–4.5)
PLATELET # BLD AUTO: 180 K/UL — SIGNIFICANT CHANGE UP (ref 150–400)
POTASSIUM SERPL-MCNC: 4.4 MMOL/L — SIGNIFICANT CHANGE UP (ref 3.5–5.3)
POTASSIUM SERPL-SCNC: 4.4 MMOL/L — SIGNIFICANT CHANGE UP (ref 3.5–5.3)
PROT SERPL-MCNC: 5.9 G/DL — LOW (ref 6–8.3)
RBC # BLD: 3.55 M/UL — LOW (ref 3.8–5.2)
RBC # FLD: 14.5 % — SIGNIFICANT CHANGE UP (ref 10.3–16.9)
SODIUM SERPL-SCNC: 136 MMOL/L — SIGNIFICANT CHANGE UP (ref 135–145)
WBC # BLD: 7.5 K/UL — SIGNIFICANT CHANGE UP (ref 3.8–10.5)
WBC # FLD AUTO: 7.5 K/UL — SIGNIFICANT CHANGE UP (ref 3.8–10.5)

## 2018-05-28 PROCEDURE — 99232 SBSQ HOSP IP/OBS MODERATE 35: CPT

## 2018-05-28 RX ORDER — HYDRALAZINE HCL 50 MG
5 TABLET ORAL ONCE
Qty: 0 | Refills: 0 | Status: COMPLETED | OUTPATIENT
Start: 2018-05-28 | End: 2018-05-28

## 2018-05-28 RX ORDER — HEPARIN SODIUM 5000 [USP'U]/ML
900 INJECTION INTRAVENOUS; SUBCUTANEOUS
Qty: 25000 | Refills: 0 | Status: DISCONTINUED | OUTPATIENT
Start: 2018-05-28 | End: 2018-05-28

## 2018-05-28 RX ORDER — APIXABAN 2.5 MG/1
5 TABLET, FILM COATED ORAL EVERY 12 HOURS
Qty: 0 | Refills: 0 | Status: DISCONTINUED | OUTPATIENT
Start: 2018-05-28 | End: 2018-06-05

## 2018-05-28 RX ADMIN — SERTRALINE 50 MILLIGRAM(S): 25 TABLET, FILM COATED ORAL at 11:01

## 2018-05-28 RX ADMIN — Medication 81 MILLIGRAM(S): at 11:02

## 2018-05-28 RX ADMIN — OXYCODONE AND ACETAMINOPHEN 2 TABLET(S): 5; 325 TABLET ORAL at 09:06

## 2018-05-28 RX ADMIN — OXYCODONE AND ACETAMINOPHEN 2 TABLET(S): 5; 325 TABLET ORAL at 00:50

## 2018-05-28 RX ADMIN — Medication 25 MILLIGRAM(S): at 05:49

## 2018-05-28 RX ADMIN — GABAPENTIN 300 MILLIGRAM(S): 400 CAPSULE ORAL at 15:21

## 2018-05-28 RX ADMIN — Medication 4: at 12:05

## 2018-05-28 RX ADMIN — OXYCODONE AND ACETAMINOPHEN 1 TABLET(S): 5; 325 TABLET ORAL at 07:02

## 2018-05-28 RX ADMIN — OXYCODONE AND ACETAMINOPHEN 2 TABLET(S): 5; 325 TABLET ORAL at 23:00

## 2018-05-28 RX ADMIN — OXYCODONE AND ACETAMINOPHEN 2 TABLET(S): 5; 325 TABLET ORAL at 10:56

## 2018-05-28 RX ADMIN — Medication 100 MILLIGRAM(S): at 21:39

## 2018-05-28 RX ADMIN — HEPARIN SODIUM 9 UNIT(S)/HR: 5000 INJECTION INTRAVENOUS; SUBCUTANEOUS at 05:47

## 2018-05-28 RX ADMIN — Medication 5 MILLIGRAM(S): at 22:02

## 2018-05-28 RX ADMIN — Medication 200 MILLIGRAM(S): at 23:34

## 2018-05-28 RX ADMIN — GABAPENTIN 300 MILLIGRAM(S): 400 CAPSULE ORAL at 21:39

## 2018-05-28 RX ADMIN — Medication 100 MILLIGRAM(S): at 15:21

## 2018-05-28 RX ADMIN — GABAPENTIN 300 MILLIGRAM(S): 400 CAPSULE ORAL at 05:49

## 2018-05-28 RX ADMIN — APIXABAN 5 MILLIGRAM(S): 2.5 TABLET, FILM COATED ORAL at 23:34

## 2018-05-28 RX ADMIN — OXYCODONE AND ACETAMINOPHEN 2 TABLET(S): 5; 325 TABLET ORAL at 22:03

## 2018-05-28 RX ADMIN — Medication 100 MILLIGRAM(S): at 05:48

## 2018-05-28 RX ADMIN — OXYCODONE AND ACETAMINOPHEN 1 TABLET(S): 5; 325 TABLET ORAL at 05:48

## 2018-05-28 RX ADMIN — ATORVASTATIN CALCIUM 80 MILLIGRAM(S): 80 TABLET, FILM COATED ORAL at 21:39

## 2018-05-28 RX ADMIN — APIXABAN 5 MILLIGRAM(S): 2.5 TABLET, FILM COATED ORAL at 11:08

## 2018-05-28 RX ADMIN — Medication 200 MILLIGRAM(S): at 11:00

## 2018-05-28 RX ADMIN — Medication 166.67 MILLIGRAM(S): at 11:00

## 2018-05-28 NOTE — PROGRESS NOTE ADULT - ASSESSMENT
Assessment/Plan:  A/P: 84yFemale s/p R first Toe Amp pod#3    Diet: advance as tolerated  Pain/nausea control  Provena off, Xeroform to incision.  DVT ppx  PT eval   Eliquis  Home medication

## 2018-05-28 NOTE — PROGRESS NOTE ADULT - SUBJECTIVE AND OBJECTIVE BOX
INTERVAL HISTORY:  c/o post op pain  	  MEDICATIONS:  metoprolol succinate ER 25 milliGRAM(s) Oral daily    ciprofloxacin   IVPB      ciprofloxacin   IVPB 400 milliGRAM(s) IV Intermittent every 12 hours  vancomycin  IVPB 1250 milliGRAM(s) IV Intermittent every 24 hours  vancomycin  IVPB          acetaminophen   Tablet. 650 milliGRAM(s) Oral every 6 hours PRN  gabapentin 300 milliGRAM(s) Oral three times a day  oxyCODONE    5 mG/acetaminophen 325 mG 1 Tablet(s) Oral every 6 hours PRN  oxyCODONE    5 mG/acetaminophen 325 mG 2 Tablet(s) Oral every 6 hours PRN  sertraline 50 milliGRAM(s) Oral daily    docusate sodium 100 milliGRAM(s) Oral three times a day    atorvastatin 80 milliGRAM(s) Oral at bedtime  dextrose 40% Gel 15 Gram(s) Oral once PRN  dextrose 50% Injectable 12.5 Gram(s) IV Push once  dextrose 50% Injectable 25 Gram(s) IV Push once  dextrose 50% Injectable 25 Gram(s) IV Push once  glucagon  Injectable 1 milliGRAM(s) IntraMuscular once PRN  insulin lispro (HumaLOG) corrective regimen sliding scale   SubCutaneous Before meals and at bedtime    apixaban 5 milliGRAM(s) Oral every 12 hours  aspirin enteric coated 81 milliGRAM(s) Oral daily  dextrose 5%. 1000 milliLiter(s) IV Continuous <Continuous>        PHYSICAL EXAM:  T(C): 36.1 (05-28-18 @ 09:09), Max: 37 (05-27-18 @ 21:56)  HR: 64 (05-28-18 @ 05:30) (58 - 64)  BP: 158/59 (05-28-18 @ 05:30) (109/55 - 158/59)  RR: 16 (05-28-18 @ 05:30) (16 - 16)  SpO2: 98% (05-28-18 @ 05:30) (96% - 98%)  Wt(kg): --  I&O's Summary    27 May 2018 07:01  -  28 May 2018 07:00  --------------------------------------------------------  IN: 845 mL / OUT: 0 mL / NET: 845 mL    28 May 2018 07:01  -  28 May 2018 09:44  --------------------------------------------------------  IN: 180 mL / OUT: 0 mL / NET: 180 mL          Appearance: Normal	  HEENT:   Normal oral mucosa, PERRL, EOMI	  Lymphatic: No lymphadenopathy  Cardiovascular: Normal S1 S2, No JVD, No murmurs, No edema  Respiratory: Lungs clear to auscultation	  Psychiatry: A & O x 3, Mood & affect appropriate  Gastrointestinal:  Soft, Non-tender, + BS	  Skin: No rashes, No ecchymoses, No cyanosis  Neurologic: Non-focal  Extremities: Normal range of motion, No clubbing, cyanosis or edema  Vascular: Peripheral pulses reduced, right foot bandaged  TELEMETRY: 	    ECG:  	  RADIOLOGY:   DIAGNOSTIC TESTING:  [ ] Echocardiogram:  [ ]  Catheterization:  [ ] Stress Test:    OTHER: 	    LABS:	 	    CARDIAC MARKERS:                                  10.2   7.5   )-----------( 180      ( 28 May 2018 03:17 )             31.6     05-28    136  |  98  |  20  ----------------------------<  118<H>  4.4   |  27  |  0.84    Ca    8.9      28 May 2018 03:17  Phos  4.3     05-28  Mg     2.0     05-28    TPro  5.9<L>  /  Alb  2.6<L>  /  TBili  0.4  /  DBili  x   /  AST  52<H>  /  ALT  79<H>  /  AlkPhos  68  05-28    proBNP:   Lipid Profile:   HgA1c:   TSH:     ASSESSMENT/PLAN:

## 2018-05-28 NOTE — PROGRESS NOTE ADULT - SUBJECTIVE AND OBJECTIVE BOX
24hr Events:  O/N: 8pm PTT 39.6, heparin rate incrased from 7ml/hr to 8ml/hr, 2am PTT 41.0,heparin rate increased from 8ml/hr to 9ml/hr  5/27: VALERIE, ptt 47, incr to 7 from 6      Assessment/Plan:  A/P: 84yFemale s/p R first Toe Amp pod#2    Diet: advance as tolerated  Pain/nausea control  DVT ppx  PT eval   Hep Gtt f/u ptt 10AM  Home medication 24hr Events:  O/N: 8pm PTT 39.6, heparin rate incrased from 7ml/hr to 8ml/hr, 2am PTT 41.0,heparin rate increased from 8ml/hr to 9ml/hr  5/27: VALERIE, ptt 47, incr to 7 from 6      ciprofloxacin   IVPB   ciprofloxacin   IVPB 400  vancomycin  IVPB 1250  vancomycin  IVPB   apixaban 5  aspirin enteric coated 81  ciprofloxacin   IVPB   ciprofloxacin   IVPB 400  metoprolol succinate ER 25  vancomycin  IVPB 1250  vancomycin  IVPB         Vital Signs Last 24 Hrs  T(C): 36.1 (28 May 2018 09:09), Max: 37 (27 May 2018 21:56)  T(F): 96.9 (28 May 2018 09:09), Max: 98.6 (27 May 2018 21:56)  HR: 64 (28 May 2018 05:30) (58 - 64)  BP: 158/59 (28 May 2018 05:30) (109/55 - 158/59)  BP(mean): --  RR: 16 (28 May 2018 05:30) (16 - 16)  SpO2: 98% (28 May 2018 05:30) (96% - 98%)  I&O's Detail    27 May 2018 07:01  -  28 May 2018 07:00  --------------------------------------------------------  IN:    heparin Infusion: 24 mL    heparin Infusion: 21 mL    Oral Fluid: 350 mL    Solution: 200 mL    Solution: 250 mL  Total IN: 845 mL    OUT:  Total OUT: 0 mL    Total NET: 845 mL      28 May 2018 07:01  -  28 May 2018 10:27  --------------------------------------------------------  IN:    Oral Fluid: 180 mL  Total IN: 180 mL    OUT:  Total OUT: 0 mL    Total NET: 180 mL          Physical Exam:  General: NAD, resting comfortably in bed  Pulmonary: Nonlabored breathing, no respiratory distress  Cardiovascular: NSR  Abdominal: soft, NT/ND  Extremities: RLE w/ great toe amp.   Pulses: Biphasic DP/ Triphasic PT on Right. monophasic DP/PT on left.   Provena vac removed today.       LABS:                        10.2   7.5   )-----------( 180      ( 28 May 2018 03:17 )             31.6     05-28    136  |  98  |  20  ----------------------------<  118<H>  4.4   |  27  |  0.84    Ca    8.9      28 May 2018 03:17  Phos  4.3     05-28  Mg     2.0     05-28    TPro  5.9<L>  /  Alb  2.6<L>  /  TBili  0.4  /  DBili  x   /  AST  52<H>  /  ALT  79<H>  /  AlkPhos  68  05-28    PTT - ( 28 May 2018 03:17 )  PTT:41.0 sec    Radiology and Additional Studies:

## 2018-05-29 ENCOUNTER — TRANSCRIPTION ENCOUNTER (OUTPATIENT)
Age: 83
End: 2018-05-29

## 2018-05-29 LAB
ANION GAP SERPL CALC-SCNC: 11 MMOL/L — SIGNIFICANT CHANGE UP (ref 5–17)
BUN SERPL-MCNC: 16 MG/DL — SIGNIFICANT CHANGE UP (ref 7–23)
CALCIUM SERPL-MCNC: 9.4 MG/DL — SIGNIFICANT CHANGE UP (ref 8.4–10.5)
CHLORIDE SERPL-SCNC: 100 MMOL/L — SIGNIFICANT CHANGE UP (ref 96–108)
CO2 SERPL-SCNC: 28 MMOL/L — SIGNIFICANT CHANGE UP (ref 22–31)
CREAT SERPL-MCNC: 0.78 MG/DL — SIGNIFICANT CHANGE UP (ref 0.5–1.3)
CULTURE RESULTS: SIGNIFICANT CHANGE UP
GLUCOSE BLDC GLUCOMTR-MCNC: 124 MG/DL — HIGH (ref 70–99)
GLUCOSE BLDC GLUCOMTR-MCNC: 148 MG/DL — HIGH (ref 70–99)
GLUCOSE BLDC GLUCOMTR-MCNC: 177 MG/DL — HIGH (ref 70–99)
GLUCOSE BLDC GLUCOMTR-MCNC: 99 MG/DL — SIGNIFICANT CHANGE UP (ref 70–99)
GLUCOSE SERPL-MCNC: 108 MG/DL — HIGH (ref 70–99)
HCT VFR BLD CALC: 32.8 % — LOW (ref 34.5–45)
HGB BLD-MCNC: 10.5 G/DL — LOW (ref 11.5–15.5)
MAGNESIUM SERPL-MCNC: 2 MG/DL — SIGNIFICANT CHANGE UP (ref 1.6–2.6)
MCHC RBC-ENTMCNC: 28.5 PG — SIGNIFICANT CHANGE UP (ref 27–34)
MCHC RBC-ENTMCNC: 32 G/DL — SIGNIFICANT CHANGE UP (ref 32–36)
MCV RBC AUTO: 88.9 FL — SIGNIFICANT CHANGE UP (ref 80–100)
PHOSPHATE SERPL-MCNC: 3.7 MG/DL — SIGNIFICANT CHANGE UP (ref 2.5–4.5)
PLATELET # BLD AUTO: 250 K/UL — SIGNIFICANT CHANGE UP (ref 150–400)
POTASSIUM SERPL-MCNC: 4.5 MMOL/L — SIGNIFICANT CHANGE UP (ref 3.5–5.3)
POTASSIUM SERPL-SCNC: 4.5 MMOL/L — SIGNIFICANT CHANGE UP (ref 3.5–5.3)
RBC # BLD: 3.69 M/UL — LOW (ref 3.8–5.2)
RBC # FLD: 14.8 % — SIGNIFICANT CHANGE UP (ref 10.3–16.9)
SODIUM SERPL-SCNC: 139 MMOL/L — SIGNIFICANT CHANGE UP (ref 135–145)
SPECIMEN SOURCE: SIGNIFICANT CHANGE UP
WBC # BLD: 8 K/UL — SIGNIFICANT CHANGE UP (ref 3.8–10.5)
WBC # FLD AUTO: 8 K/UL — SIGNIFICANT CHANGE UP (ref 3.8–10.5)

## 2018-05-29 PROCEDURE — 99232 SBSQ HOSP IP/OBS MODERATE 35: CPT

## 2018-05-29 RX ORDER — AMLODIPINE BESYLATE 2.5 MG/1
10 TABLET ORAL DAILY
Qty: 0 | Refills: 0 | Status: DISCONTINUED | OUTPATIENT
Start: 2018-05-29 | End: 2018-06-05

## 2018-05-29 RX ORDER — AMLODIPINE BESYLATE 2.5 MG/1
10 TABLET ORAL AT BEDTIME
Qty: 0 | Refills: 0 | Status: DISCONTINUED | OUTPATIENT
Start: 2018-05-29 | End: 2018-05-29

## 2018-05-29 RX ORDER — CIPROFLOXACIN LACTATE 400MG/40ML
500 VIAL (ML) INTRAVENOUS EVERY 12 HOURS
Qty: 0 | Refills: 0 | Status: DISCONTINUED | OUTPATIENT
Start: 2018-05-29 | End: 2018-05-30

## 2018-05-29 RX ADMIN — Medication 81 MILLIGRAM(S): at 11:25

## 2018-05-29 RX ADMIN — Medication 100 MILLIGRAM(S): at 06:35

## 2018-05-29 RX ADMIN — Medication 100 MILLIGRAM(S): at 14:16

## 2018-05-29 RX ADMIN — Medication 25 MILLIGRAM(S): at 06:36

## 2018-05-29 RX ADMIN — SERTRALINE 50 MILLIGRAM(S): 25 TABLET, FILM COATED ORAL at 11:25

## 2018-05-29 RX ADMIN — AMLODIPINE BESYLATE 10 MILLIGRAM(S): 2.5 TABLET ORAL at 22:22

## 2018-05-29 RX ADMIN — Medication 166.67 MILLIGRAM(S): at 11:25

## 2018-05-29 RX ADMIN — OXYCODONE AND ACETAMINOPHEN 2 TABLET(S): 5; 325 TABLET ORAL at 06:36

## 2018-05-29 RX ADMIN — ATORVASTATIN CALCIUM 80 MILLIGRAM(S): 80 TABLET, FILM COATED ORAL at 22:22

## 2018-05-29 RX ADMIN — OXYCODONE AND ACETAMINOPHEN 2 TABLET(S): 5; 325 TABLET ORAL at 07:30

## 2018-05-29 RX ADMIN — GABAPENTIN 300 MILLIGRAM(S): 400 CAPSULE ORAL at 14:16

## 2018-05-29 RX ADMIN — GABAPENTIN 300 MILLIGRAM(S): 400 CAPSULE ORAL at 06:36

## 2018-05-29 RX ADMIN — APIXABAN 5 MILLIGRAM(S): 2.5 TABLET, FILM COATED ORAL at 18:27

## 2018-05-29 RX ADMIN — Medication 100 MILLIGRAM(S): at 22:22

## 2018-05-29 RX ADMIN — Medication 500 MILLIGRAM(S): at 18:28

## 2018-05-29 RX ADMIN — APIXABAN 5 MILLIGRAM(S): 2.5 TABLET, FILM COATED ORAL at 06:36

## 2018-05-29 RX ADMIN — GABAPENTIN 300 MILLIGRAM(S): 400 CAPSULE ORAL at 22:22

## 2018-05-29 RX ADMIN — Medication 2: at 11:25

## 2018-05-29 NOTE — PROGRESS NOTE ADULT - SUBJECTIVE AND OBJECTIVE BOX
INTERVAL HISTORY:  The patient is having right first toe pain. She has not had chest pain or dyspnea.	  Chart, PMH and allergies reviewed  MEDICATIONS:  metoprolol succinate ER 25 milliGRAM(s) Oral daily    ciprofloxacin   IVPB      ciprofloxacin   IVPB 400 milliGRAM(s) IV Intermittent every 12 hours  vancomycin  IVPB 1250 milliGRAM(s) IV Intermittent every 24 hours  vancomycin  IVPB          acetaminophen   Tablet. 650 milliGRAM(s) Oral every 6 hours PRN  gabapentin 300 milliGRAM(s) Oral three times a day  oxyCODONE    5 mG/acetaminophen 325 mG 1 Tablet(s) Oral every 6 hours PRN  oxyCODONE    5 mG/acetaminophen 325 mG 2 Tablet(s) Oral every 6 hours PRN  sertraline 50 milliGRAM(s) Oral daily    docusate sodium 100 milliGRAM(s) Oral three times a day    atorvastatin 80 milliGRAM(s) Oral at bedtime  dextrose 40% Gel 15 Gram(s) Oral once PRN  dextrose 50% Injectable 12.5 Gram(s) IV Push once  dextrose 50% Injectable 25 Gram(s) IV Push once  dextrose 50% Injectable 25 Gram(s) IV Push once  glucagon  Injectable 1 milliGRAM(s) IntraMuscular once PRN  insulin lispro (HumaLOG) corrective regimen sliding scale   SubCutaneous Before meals and at bedtime    apixaban 5 milliGRAM(s) Oral every 12 hours  aspirin enteric coated 81 milliGRAM(s) Oral daily  dextrose 5%. 1000 milliLiter(s) IV Continuous <Continuous>      REVIEW OF SYSTEMS:  CONSTITUTIONAL: No fever, no weight loss, or fatigue  PULMONARY: No cough, no wheezing, chills or hemoptysis; No Shortness of Breath  CARDIOVASCULAR: No chest pain, no palpitations, no syncope, dizziness, no leg swelling  GASTROINTESTINAL: No abdominal pain. No nausea, no  vomiting, or hematemesis; No diarrhea or constipation. No melena or hematochezia.  GENITOURINARY: No dysuria, frequency, hematuria, or incontinence  SKIN: No itching, burning, rashes, or lesions     PHYSICAL EXAM:  T(C): 36 (05-29-18 @ 06:22), Max: 37.3 (05-28-18 @ 21:07)  HR: 68 (05-29-18 @ 05:30) (66 - 78)  BP: 140/65 (05-29-18 @ 05:30) (124/54 - 180/75)  RR: 20 (05-29-18 @ 05:30) (16 - 20)  SpO2: 96% (05-29-18 @ 05:30) (96% - 100%)  Wt(kg): --  I&O's Summary    28 May 2018 07:01  -  29 May 2018 07:00  --------------------------------------------------------  IN: 300 mL / OUT: 0 mL / NET: 300 mL        Appearance:  No deformities, normal appearance	  HEENT:   Normal oral mucosa, PERRL, EOMI	  Neck: No jvd , no masses  Lymphatic: No  lymphadenopathy  Pulmonary: Lungs clear to auscultation and percussion  Cardiovascular: Normal S1 S2, No JVD, No murmur, No edema	  Abdomen:  Soft, Non-tender, + BS  Skin: No rashes, No ecchymoses	  Extremities:  No clubbing or cyanosis, R foot bandaged  MSK: Normal range of motion, no joint swelling    LABS:		  CARDIAC MARKERS:                         10.5   8.0   )-----------( 250      ( 29 May 2018 06:28 )             32.8     05-29    139  |  100  |  16  ----------------------------<  108<H>  4.5   |  28  |  0.78    Ca    9.4      29 May 2018 06:28  Phos  3.7     05-29  Mg     2.0     05-29    TPro  5.9<L>  /  Alb  2.6<L>  /  TBili  0.4  /  DBili  x   /  AST  52<H>  /  ALT  79<H>  /  AlkPhos  68  05-28    proBNP:  Lipid Profile:  HgA1c:  TSH:  PTT - ( 28 May 2018 10:44 )  PTT:79.1 sec  TELEMETRY: 	           ECG:  	            RADIOLOGY:   DIAGNOSTIC TESTING: [ ] Echocardiogram: [ ]  Catheterization: [ ] Stress Test:      ASSESSMENT/PLAN: 	    Hypotension-blood pressure is lablile but generally acceptable. Received IV hydralazine with good response. Metoprolol is well tolerated. The patient is getting IV fluid.     Coronary artery disease/ H/o MI-No chest discomfort. EKG post op no change.  Continue to monitor in ICU.    Hypertension- see above.    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 R foot is bandaged.      Diabetes-follow fingersticks.

## 2018-05-29 NOTE — DISCHARGE NOTE ADULT - CARE PROVIDER_API CALL
Vicky Castellanos), Surgery; Vascular Surgery  [address]  [address]  [place], [place] [address]  Phone: [phone]  Fax: [phone]

## 2018-05-29 NOTE — DISCHARGE NOTE ADULT - MEDICATION SUMMARY - MEDICATIONS TO TAKE
I will START or STAY ON the medications listed below when I get home from the hospital:    oxyCODONE-acetaminophen 5 mg-325 mg oral tablet  -- 1 tab(s) by mouth every 6 hours, As Needed - 10)  -- Indication: For Pain medication    aspirin 81 mg oral delayed release tablet  -- 1 tab(s) by mouth once a day  -- Indication: For ASHD (arteriosclerotic heart disease)    acetaminophen 325 mg oral tablet  -- 2 tab(s) by mouth every 6 hours, As needed, Mild Pain (1 - 3)  -- Indication: For Pain medication    losartan 100 mg oral tablet  -- 1 tab(s) by mouth once a day  -- Indication: For Essential hypertension    Eliquis 5 mg oral tablet  -- 1 tab(s) by mouth 2 times a day  -- Indication: For DVT    gabapentin 300 mg oral capsule  -- 1 cap(s) by mouth 3 times a day  -- Indication: For Diabetes mellitus    sertraline 50 mg oral tablet  -- 1 tab(s) by mouth once a day  -- Indication: For Home medication    metFORMIN 750 mg oral tablet, extended release  -- 1 tab(s) by mouth once a day  -- Indication: For Diabetes mellitus    promethazine 12.5 mg oral tablet  -- 1 tab(s) by mouth every 6 hours  -- Indication: For Home medication    meclizine 12.5 mg oral tablet  -- 1 tab(s) by mouth 3 times a day  -- Indication: For Home medication    Crestor 20 mg oral tablet  -- 1 tab(s) by mouth once a day (at bedtime)  -- Indication: For Pure hypercholesterolemia    Xanax 0.25 mg oral tablet  -- 1 tab(s) by mouth 3 times a day  -- Indication: For Home medication    carvedilol 6.25 mg oral tablet  -- 1 tab(s) by mouth 2 times a day  -- Indication: For Essential hypertension    metoprolol succinate 25 mg oral tablet, extended release  -- 1 tab(s) by mouth once a day  -- Indication: For Essential hypertension    amLODIPine 10 mg oral tablet  -- 1 tab(s) by mouth once a day  -- Indication: For Essential hypertension    Colace 10 mg/mL oral liquid  -- 10 milliliter(s) by mouth 2 times a day  -- Indication: For Constipation    hydrALAZINE 25 mg oral tablet  -- 1 tab(s) by mouth 4 times a day  -- Indication: For Essential hypertension I will START or STAY ON the medications listed below when I get home from the hospital:    oxyCODONE-acetaminophen 5 mg-325 mg oral tablet  -- 1 tab(s) by mouth every 6 hours, As Needed - 10)  -- Indication: For Pain medication    aspirin 81 mg oral delayed release tablet  -- 1 tab(s) by mouth once a day  -- Indication: For ASHD (arteriosclerotic heart disease)    acetaminophen 325 mg oral tablet  -- 2 tab(s) by mouth every 6 hours, As needed, Mild Pain (1 - 3)  -- Indication: For Pain medication    losartan 100 mg oral tablet  -- 1 tab(s) by mouth once a day  -- Indication: For Essential hypertension    Eliquis 5 mg oral tablet  -- 1 tab(s) by mouth 2 times a day  -- Indication: For DVT    gabapentin 300 mg oral capsule  -- 1 cap(s) by mouth 3 times a day  -- Indication: For Diabetes mellitus    sertraline 50 mg oral tablet  -- 1 tab(s) by mouth once a day  -- Indication: For Home medication    metFORMIN 750 mg oral tablet, extended release  -- 1 tab(s) by mouth once a day  -- Indication: For Diabetes mellitus    meclizine 12.5 mg oral tablet  -- 1 tab(s) by mouth 3 times a day, As Needed  -- Indication: For Home medication    promethazine 12.5 mg oral tablet  -- 1 tab(s) by mouth every 6 hours, As Needed  -- Indication: For Home medication    Crestor 20 mg oral tablet  -- 1 tab(s) by mouth once a day (at bedtime)  -- Indication: For Pure hypercholesterolemia    Xanax 0.25 mg oral tablet  -- 1 tab(s) by mouth 3 times a day  -- Indication: For Home medication    metoprolol succinate 25 mg oral tablet, extended release  -- 1 tab(s) by mouth once a day  -- Indication: For Essential hypertension    amLODIPine 10 mg oral tablet  -- 1 tab(s) by mouth once a day  -- Indication: For Essential hypertension    Colace 10 mg/mL oral liquid  -- 10 milliliter(s) by mouth 2 times a day  -- Indication: For Constipation    hydrALAZINE 25 mg oral tablet  -- 1 tab(s) by mouth 4 times a day  -- Indication: For Essential hypertension

## 2018-05-29 NOTE — DISCHARGE NOTE ADULT - CARE PLAN
Principal Discharge DX:	Ulcer of lower limb  Goal:	wound healing  Assessment and plan of treatment:	ambulation as tolerated with physical therapy at [facility]  Follow-up with Dr. Castellanos in 1-2 weeks in office at [phone]. Remove dressings to shower with soap and water. Dry well. Dampen gauze with saline solution. Place on or into wound. Cover with dry gauze and Kerlix wrap daily. Please call your doctor if you have a fever of over 101.9 or swelling/bleeding at wound. Principal Discharge DX:	Ulcer of lower limb  Goal:	wound healing  Assessment and plan of treatment:	Activity: ambulation as tolerated using walker with physical therapy per routine at [facility]. Wound Care; Wash Right leg wounds with soap and water daily. Pat dry. Place ABD pad in right groin for moisture absorption. Change throughout the day to keep groin dry. Right 1st toe amputation site; saline wet to dry dressing changes and wrap with kerlix daily. Pt may shower. Remove all dressings before shower.  Follow-up with Dr. Castellanos in 1-2 weeks in office at [phone].    Please call your doctor if you have a fever of over 101.9 or swelling/bleeding at wound.

## 2018-05-29 NOTE — DISCHARGE NOTE ADULT - MEDICATION SUMMARY - MEDICATIONS TO STOP TAKING
I will STOP taking the medications listed below when I get home from the hospital:  None I will STOP taking the medications listed below when I get home from the hospital:    carvedilol 6.25 mg oral tablet  -- 1 tab(s) by mouth 2 times a day

## 2018-05-29 NOTE — PROGRESS NOTE ADULT - ATTENDING COMMENTS
Patient seen and examined with house-staff during bedside rounds.  Resident note read, including vitals, physical findings, laboratory data, and radiological reports.   Revisions included below.  Direct personal management at bed side and extensive interpretation of the data.  Plan was outlined and discussed in details with the housestaff.  Decision making of high complexity  Action taken for acute disease activity to reflect the level of care provided:  - medication reconciliation  - review laboratory data  -Post operative evaluation and risk assessment  - Patient is followed by cardiology and there's no evidence of acute coronary syndrome  - Management of comorbidities    Discussed with the family

## 2018-05-29 NOTE — DISCHARGE NOTE ADULT - HOSPITAL COURSE
83yoF with sig PMHx of CAD, lateral wall MI s/p 6 stents, dm, htn, ckd, RLE DVT, PE. early dementia presents with b/l great toe ulcers and pain.  Patient is from [place] and is noncompliant with medications and follow up care.  Patient has had ulcers for years.  Had a RLE angio done years ago and they could not intervene and was lost to follow up.  Was seeing Podiatry for ulcers but was told would not debride because she did not have pal pulses and the signals were not good.  Came to [place] to have better care and eval her vascular status.  Patient denies CP/N/V. s/p RLE angiogram 5/17 s/p RLE CFA to Peroneal bypass with rGSV with completion angio 5/22 s/p Non healing ulcer of R great toe, partially amputated 5/25 Her postoperative course was unremarkable with advancement of diet, passing trial of void, and pain control. On day of discharge patient was stable to be d/c'd to [facility]. 83yoF with sig PMHx of CAD, lateral wall MI s/p 6 stents, dm, htn, ckd, RLE DVT, PE. early dementia presents with b/l great toe ulcers and pain.  Patient is from [place] and is noncompliant with medications and follow up care.  Patient has had ulcers for years.  Had a RLE angio done years ago and they could not intervene and was lost to follow up.  Was seeing Podiatry for ulcers but was told would not debride because she did not have pal pulses and the signals were not good.  Came to [place] to have better care and eval her vascular status.  Patient denies CP/N/V. s/p RLE angiogram 5/17 s/p RLE CFA to Peroneal bypass with rGSV with completion angio 5/22 s/p Non healing ulcer of R great toe, partially amputated 5/25 Her postoperative course was unremarkable with advancement of diet, passing trial of void, and pain control. Pt completed full course of antibiotics. On day of discharge patient was stable to be d/c'd to [facility].

## 2018-05-29 NOTE — PROGRESS NOTE ADULT - SUBJECTIVE AND OBJECTIVE BOX
24hr Events:  O/N: Given hydralazine 5mg IV for SBP of 180 with improvement  5/28: Heparin gtt -> Eliquis. Prevena off today.       Assessment/Plan:  A/P: 84yFemale s/p R first Toe Amp and RLE CFA to Peroneal bypass    Diet: Consistent Carb diet  Pain/nausea control  Provena off, Xeroform to incision.  DVT ppx  PT eval   Eliquis  Home medication 24hr Events:  O/N: Given hydralazine 5mg IV for SBP of 180 with improvement  5/28: Heparin gtt -> Eliquis. Prevena off today.       ciprofloxacin   IVPB   ciprofloxacin   IVPB 400  vancomycin  IVPB 1250  vancomycin  IVPB   apixaban 5  aspirin enteric coated 81  ciprofloxacin   IVPB   ciprofloxacin   IVPB 400  metoprolol succinate ER 25  vancomycin  IVPB 1250  vancomycin  IVPB         Vital Signs Last 24 Hrs  T(C): 36 (29 May 2018 06:22), Max: 37.3 (28 May 2018 21:07)  T(F): 96.8 (29 May 2018 06:22), Max: 99.1 (28 May 2018 21:07)  HR: 68 (29 May 2018 05:30) (66 - 78)  BP: 140/65 (29 May 2018 05:30) (124/54 - 180/75)  BP(mean): --  RR: 20 (29 May 2018 05:30) (16 - 20)  SpO2: 96% (29 May 2018 05:30) (96% - 100%)  I&O's Detail    28 May 2018 07:01  -  29 May 2018 07:00  --------------------------------------------------------  IN:    Oral Fluid: 300 mL  Total IN: 300 mL    OUT:  Total OUT: 0 mL    Total NET: 300 mL          Physical Exam:  General: NAD, resting comfortably in bed  Pulmonary: Nonlabored breathing, no respiratory distress  Cardiovascular: NSR  Abdominal: soft, NT/ND  Extremities: RLE with incision intact, great toe amputation.   Pulses: R biphasic DP/ Triphasic PT. L monophasic DP/PT      LABS:                        10.5   8.0   )-----------( 250      ( 29 May 2018 06:28 )             32.8     05-29    139  |  100  |  16  ----------------------------<  108<H>  4.5   |  28  |  0.78    Ca    9.4      29 May 2018 06:28  Phos  3.7     05-29  Mg     2.0     05-29    TPro  5.9<L>  /  Alb  2.6<L>  /  TBili  0.4  /  DBili  x   /  AST  52<H>  /  ALT  79<H>  /  AlkPhos  68  05-28    PTT - ( 28 May 2018 10:44 )  PTT:79.1 sec    Radiology and Additional Studies:

## 2018-05-29 NOTE — PROGRESS NOTE ADULT - SUBJECTIVE AND OBJECTIVE BOX
Interval Events: Reviewed  Patient seen and examined at bedside.    Patient is a 84y old  Female who presents with a chief complaint of b/l great toe ulcers and pain (29 May 2018 08:06)  she is still complaining of pain at the right foot     PAST MEDICAL & SURGICAL HISTORY:  Peripheral vascular disease  Ulcer of lower limb  Arthropathy  Ischemic heart disease  Hypertension  Hyperlipidemia  Diabetes mellitus      MEDICATIONS:  Pulmonary:    Antimicrobials:  ciprofloxacin     Tablet 500 milliGRAM(s) Oral every 12 hours  vancomycin  IVPB 1250 milliGRAM(s) IV Intermittent every 24 hours  vancomycin  IVPB        Anticoagulants:  apixaban 5 milliGRAM(s) Oral every 12 hours  aspirin enteric coated 81 milliGRAM(s) Oral daily    Cardiac:  amLODIPine   Tablet 10 milliGRAM(s) Oral daily  metoprolol succinate ER 25 milliGRAM(s) Oral daily      Allergies    penicillins (Anaphylaxis)    Intolerances        Vital Signs Last 24 Hrs  T(C): 36.6 (29 May 2018 17:42), Max: 37.3 (29 May 2018 01:00)  T(F): 97.9 (29 May 2018 17:42), Max: 99.1 (29 May 2018 01:00)  HR: 66 (29 May 2018 22:07) (58 - 78)  BP: 195/84 (29 May 2018 22:07) (124/54 - 195/84)  BP(mean): 89 (29 May 2018 18:06) (89 - 89)  RR: 18 (29 May 2018 20:23) (18 - 20)  SpO2: 99% (29 May 2018 20:23) (96% - 99%)    05-28 @ 07:01 - 05-29 @ 07:00  --------------------------------------------------------  IN: 300 mL / OUT: 0 mL / NET: 300 mL    05-29 @ 07:01 - 05-29 @ 22:29  --------------------------------------------------------  IN: 730 mL / OUT: 0 mL / NET: 730 mL          LABS:      CBC Full  -  ( 29 May 2018 06:28 )  WBC Count : 8.0 K/uL  Hemoglobin : 10.5 g/dL  Hematocrit : 32.8 %  Platelet Count - Automated : 250 K/uL  Mean Cell Volume : 88.9 fL  Mean Cell Hemoglobin : 28.5 pg  Mean Cell Hemoglobin Concentration : 32.0 g/dL  Auto Neutrophil # : x  Auto Lymphocyte # : x  Auto Monocyte # : x  Auto Eosinophil # : x  Auto Basophil # : x  Auto Neutrophil % : x  Auto Lymphocyte % : x  Auto Monocyte % : x  Auto Eosinophil % : x  Auto Basophil % : x    05-29    139  |  100  |  16  ----------------------------<  108<H>  4.5   |  28  |  0.78    Ca    9.4      29 May 2018 06:28  Phos  3.7     05-29  Mg     2.0     05-29    TPro  5.9<L>  /  Alb  2.6<L>  /  TBili  0.4  /  DBili  x   /  AST  52<H>  /  ALT  79<H>  /  AlkPhos  68  05-28    PTT - ( 28 May 2018 10:44 )  PTT:79.1 sec                    RADIOLOGY & ADDITIONAL STUDIES (The following images were personally reviewed):  Vivar:                                     No  Urine output:                       adequate  DVT prophylaxis:                 Yes  Flattus:                                  Yes  Bowel movement:              No

## 2018-05-29 NOTE — DISCHARGE NOTE ADULT - PLAN OF CARE
wound healing ambulation as tolerated with physical therapy at [facility]  Follow-up with Dr. Castellanos in 1-2 weeks in office at [phone]. Remove dressings to shower with soap and water. Dry well. Dampen gauze with saline solution. Place on or into wound. Cover with dry gauze and Kerlix wrap daily. Please call your doctor if you have a fever of over 101.9 or swelling/bleeding at wound. Activity: ambulation as tolerated using walker with physical therapy per routine at [facility]. Wound Care; Wash Right leg wounds with soap and water daily. Pat dry. Place ABD pad in right groin for moisture absorption. Change throughout the day to keep groin dry. Right 1st toe amputation site; saline wet to dry dressing changes and wrap with kerlix daily. Pt may shower. Remove all dressings before shower.  Follow-up with Dr. Castellanos in 1-2 weeks in office at [phone].    Please call your doctor if you have a fever of over 101.9 or swelling/bleeding at wound.

## 2018-05-29 NOTE — PROGRESS NOTE ADULT - ASSESSMENT
Assessment/Plan:  A/P: 84yFemale s/p R first Toe Amp and RLE CFA to Peroneal bypass    Diet: Consistent Carb diet  Pain/nausea control  Provena off, Xeroform to incision.  DVT ppx  PT eval   Eliquis  Home medication

## 2018-05-29 NOTE — DISCHARGE NOTE ADULT - NS AS ACTIVITY OBS
Showering allowed/Do not drive or operate machinery/No Heavy lifting/straining Showering allowed/Walking-Indoors allowed/Walking-Outdoors allowed/Stairs allowed/No Heavy lifting/straining/Do not drive or operate machinery

## 2018-05-29 NOTE — DISCHARGE NOTE ADULT - PATIENT PORTAL LINK FT
You can access the [employer] [facility] Patient Portal, offered by [facility], by registering with the following website: [email or web address]

## 2018-05-30 LAB
GLUCOSE BLDC GLUCOMTR-MCNC: 106 MG/DL — HIGH (ref 70–99)
GLUCOSE BLDC GLUCOMTR-MCNC: 109 MG/DL — HIGH (ref 70–99)
GLUCOSE BLDC GLUCOMTR-MCNC: 153 MG/DL — HIGH (ref 70–99)
GLUCOSE BLDC GLUCOMTR-MCNC: 159 MG/DL — HIGH (ref 70–99)
VANCOMYCIN TROUGH SERPL-MCNC: 13 UG/ML — SIGNIFICANT CHANGE UP (ref 10–20)

## 2018-05-30 PROCEDURE — 99232 SBSQ HOSP IP/OBS MODERATE 35: CPT | Mod: GC

## 2018-05-30 RX ORDER — OXYCODONE AND ACETAMINOPHEN 5; 325 MG/1; MG/1
2 TABLET ORAL EVERY 6 HOURS
Qty: 0 | Refills: 0 | Status: DISCONTINUED | OUTPATIENT
Start: 2018-05-30 | End: 2018-06-05

## 2018-05-30 RX ORDER — OXYCODONE AND ACETAMINOPHEN 5; 325 MG/1; MG/1
1 TABLET ORAL EVERY 6 HOURS
Qty: 0 | Refills: 0 | Status: DISCONTINUED | OUTPATIENT
Start: 2018-05-30 | End: 2018-06-05

## 2018-05-30 RX ADMIN — AMLODIPINE BESYLATE 10 MILLIGRAM(S): 2.5 TABLET ORAL at 07:40

## 2018-05-30 RX ADMIN — OXYCODONE AND ACETAMINOPHEN 1 TABLET(S): 5; 325 TABLET ORAL at 11:20

## 2018-05-30 RX ADMIN — GABAPENTIN 300 MILLIGRAM(S): 400 CAPSULE ORAL at 07:39

## 2018-05-30 RX ADMIN — Medication 2: at 11:36

## 2018-05-30 RX ADMIN — Medication 500 MILLIGRAM(S): at 17:04

## 2018-05-30 RX ADMIN — Medication 166.67 MILLIGRAM(S): at 13:31

## 2018-05-30 RX ADMIN — Medication 500 MILLIGRAM(S): at 07:39

## 2018-05-30 RX ADMIN — APIXABAN 5 MILLIGRAM(S): 2.5 TABLET, FILM COATED ORAL at 17:04

## 2018-05-30 RX ADMIN — GABAPENTIN 300 MILLIGRAM(S): 400 CAPSULE ORAL at 13:06

## 2018-05-30 RX ADMIN — Medication 100 MILLIGRAM(S): at 13:06

## 2018-05-30 RX ADMIN — Medication 25 MILLIGRAM(S): at 07:40

## 2018-05-30 RX ADMIN — ATORVASTATIN CALCIUM 80 MILLIGRAM(S): 80 TABLET, FILM COATED ORAL at 21:50

## 2018-05-30 RX ADMIN — Medication 100 MILLIGRAM(S): at 21:50

## 2018-05-30 RX ADMIN — OXYCODONE AND ACETAMINOPHEN 1 TABLET(S): 5; 325 TABLET ORAL at 10:16

## 2018-05-30 RX ADMIN — Medication 2: at 17:04

## 2018-05-30 RX ADMIN — SERTRALINE 50 MILLIGRAM(S): 25 TABLET, FILM COATED ORAL at 10:15

## 2018-05-30 RX ADMIN — GABAPENTIN 300 MILLIGRAM(S): 400 CAPSULE ORAL at 21:50

## 2018-05-30 RX ADMIN — Medication 81 MILLIGRAM(S): at 10:15

## 2018-05-30 RX ADMIN — APIXABAN 5 MILLIGRAM(S): 2.5 TABLET, FILM COATED ORAL at 07:40

## 2018-05-30 RX ADMIN — Medication 100 MILLIGRAM(S): at 07:40

## 2018-05-30 NOTE — PROGRESS NOTE ADULT - SUBJECTIVE AND OBJECTIVE BOX
24hr Events:  O/N: Ordered home dose amlodipine, VSS  5/29: awaiting JAMI      Assessment/Plan;  A/P: 84yFemale s/p R first Toe Amp and RLE CFA to Peroneal bypass    Diet: Consistent Carb diet  Pain/nausea control  Provena off, Xeroform to incision.  DVT ppx  PT eval   Eliquis  Home medication 24hr Events:  O/N: Ordered home dose amlodipine, VSS  5/29: awaiting JAMI    ciprofloxacin     Tablet 500  vancomycin  IVPB 1250  vancomycin  IVPB   amLODIPine   Tablet 10  apixaban 5  aspirin enteric coated 81  ciprofloxacin     Tablet 500  metoprolol succinate ER 25  vancomycin  IVPB 1250  vancomycin  IVPB         Vital Signs Last 24 Hrs  T(C): 36.8 (30 May 2018 05:18), Max: 37.2 (29 May 2018 22:09)  T(F): 98.3 (30 May 2018 05:18), Max: 98.9 (29 May 2018 22:09)  HR: 78 (30 May 2018 00:59) (58 - 78)  BP: 142/85 (30 May 2018 00:59) (129/75 - 195/84)  BP(mean): 89 (29 May 2018 18:06) (89 - 89)  RR: 18 (30 May 2018 00:59) (18 - 20)  SpO2: 98% (30 May 2018 00:59) (96% - 99%)  I&O's Summary    29 May 2018 07:01  -  30 May 2018 07:00  --------------------------------------------------------  IN: 850 mL / OUT: 0 mL / NET: 850 mL        Physical Exam:  General: NAD, resting comfortably in bed  Pulmonary: Nonlabored breathing, no respiratory distress  Cardiovascular: NSR  Abdominal: soft, NT/ND  Extremities: RLE with incision intact, great toe amputation c/d/i  Pulses: R biphasic DP/ Triphasic PT. L monophasic DP/PT      LABS:                        10.5   8.0   )-----------( 250      ( 29 May 2018 06:28 )             32.8     05-29    139  |  100  |  16  ----------------------------<  108<H>  4.5   |  28  |  0.78    Ca    9.4      29 May 2018 06:28  Phos  3.7     05-29  Mg     2.0     05-29      PTT - ( 28 May 2018 10:44 )  PTT:79.1 sec    Radiology and Additional Studies:    Assessment and Plan:     Assessment/Plan;  A/P: 84yFemale s/p R first Toe Amp and RLE CFA to Peroneal bypass    Diet: Consistent Carb diet  Pain/nausea control  Provena off, Xeroform to incision.  DVT ppx  PT eval   Eliquis  Home medication  dispo: JAMI

## 2018-05-31 LAB
GLUCOSE BLDC GLUCOMTR-MCNC: 118 MG/DL — HIGH (ref 70–99)
GLUCOSE BLDC GLUCOMTR-MCNC: 136 MG/DL — HIGH (ref 70–99)
GLUCOSE BLDC GLUCOMTR-MCNC: 152 MG/DL — HIGH (ref 70–99)
GLUCOSE BLDC GLUCOMTR-MCNC: 192 MG/DL — HIGH (ref 70–99)
SURGICAL PATHOLOGY STUDY: SIGNIFICANT CHANGE UP

## 2018-05-31 PROCEDURE — 99232 SBSQ HOSP IP/OBS MODERATE 35: CPT | Mod: GC

## 2018-05-31 RX ADMIN — GABAPENTIN 300 MILLIGRAM(S): 400 CAPSULE ORAL at 06:04

## 2018-05-31 RX ADMIN — OXYCODONE AND ACETAMINOPHEN 2 TABLET(S): 5; 325 TABLET ORAL at 15:15

## 2018-05-31 RX ADMIN — OXYCODONE AND ACETAMINOPHEN 2 TABLET(S): 5; 325 TABLET ORAL at 14:21

## 2018-05-31 RX ADMIN — GABAPENTIN 300 MILLIGRAM(S): 400 CAPSULE ORAL at 21:19

## 2018-05-31 RX ADMIN — Medication 2: at 11:53

## 2018-05-31 RX ADMIN — Medication 650 MILLIGRAM(S): at 01:45

## 2018-05-31 RX ADMIN — ATORVASTATIN CALCIUM 80 MILLIGRAM(S): 80 TABLET, FILM COATED ORAL at 21:19

## 2018-05-31 RX ADMIN — SERTRALINE 50 MILLIGRAM(S): 25 TABLET, FILM COATED ORAL at 11:53

## 2018-05-31 RX ADMIN — GABAPENTIN 300 MILLIGRAM(S): 400 CAPSULE ORAL at 16:45

## 2018-05-31 RX ADMIN — Medication 100 MILLIGRAM(S): at 06:04

## 2018-05-31 RX ADMIN — APIXABAN 5 MILLIGRAM(S): 2.5 TABLET, FILM COATED ORAL at 06:04

## 2018-05-31 RX ADMIN — APIXABAN 5 MILLIGRAM(S): 2.5 TABLET, FILM COATED ORAL at 17:00

## 2018-05-31 RX ADMIN — Medication 100 MILLIGRAM(S): at 16:44

## 2018-05-31 RX ADMIN — Medication 100 MILLIGRAM(S): at 21:19

## 2018-05-31 RX ADMIN — OXYCODONE AND ACETAMINOPHEN 1 TABLET(S): 5; 325 TABLET ORAL at 07:09

## 2018-05-31 RX ADMIN — AMLODIPINE BESYLATE 10 MILLIGRAM(S): 2.5 TABLET ORAL at 06:04

## 2018-05-31 RX ADMIN — Medication 650 MILLIGRAM(S): at 00:47

## 2018-05-31 RX ADMIN — Medication 81 MILLIGRAM(S): at 11:53

## 2018-05-31 RX ADMIN — Medication 2: at 22:05

## 2018-05-31 RX ADMIN — OXYCODONE AND ACETAMINOPHEN 1 TABLET(S): 5; 325 TABLET ORAL at 06:04

## 2018-05-31 RX ADMIN — Medication 650 MILLIGRAM(S): at 17:45

## 2018-05-31 RX ADMIN — Medication 650 MILLIGRAM(S): at 16:53

## 2018-05-31 NOTE — PROGRESS NOTE ADULT - ATTENDING COMMENTS
Patient seen and examined with house-staff during bedside rounds.  Resident note read, including vitals, physical findings, laboratory data, and radiological reports.   Revisions included below.  Direct personal management at bed side and extensive interpretation of the data.  Plan was outlined and discussed in details with the housestaff.  Decision making of high complexity  Action taken for acute disease activity to reflect the level of care provided:  - medication reconciliation  - review laboratory data    - Patient is followed by cardiology and there's no evidence of acute coronary syndrome  - Management of comorbidities    Discussed with the family

## 2018-05-31 NOTE — PROGRESS NOTE ADULT - ASSESSMENT
Assessment/Plan;  A/P: 84yFemale s/p R first Toe Amp and RLE CFA to Peroneal bypass    Diet: Consistent Carb diet  Pain/nausea control  Provena off, Xeroform to incision.  DVT ppx  PT eval   Eliquis  Home medication  dispo: JAMI

## 2018-05-31 NOTE — PROGRESS NOTE ADULT - SUBJECTIVE AND OBJECTIVE BOX
O/N: VALERIE, VSS                      Assessment/Plan;  A/P: 84yFemale s/p R first Toe Amp and RLE CFA to Peroneal bypass    Diet: Consistent Carb diet  Pain/nausea control  Provena off, Xeroform to incision.  DVT ppx  PT eval   Eliquis  Home medication  dispo: JAMI O/N: VALERIE, VSS    Pending dispo    amLODIPine   Tablet 10  apixaban 5  aspirin enteric coated 81  metoprolol succinate ER 25        Vital Signs Last 24 Hrs  T(C): 36.8 (31 May 2018 05:21), Max: 36.9 (30 May 2018 22:09)  T(F): 98.2 (31 May 2018 05:21), Max: 98.5 (30 May 2018 22:09)  HR: 58 (31 May 2018 07:17) (58 - 72)  BP: 108/55 (31 May 2018 07:17) (102/48 - 162/90)  BP(mean): --  RR: 18 (31 May 2018 06:01) (18 - 18)  SpO2: 100% (31 May 2018 06:01) (97% - 100%)  I&O's Detail    30 May 2018 07:01  -  31 May 2018 07:00  --------------------------------------------------------  IN:    Oral Fluid: 960 mL  Total IN: 960 mL    OUT:  Total OUT: 0 mL    Total NET: 960 mL          Physical Exam:  General: NAD, resting comfortably in bed  Pulmonary: Nonlabored breathing, no respiratory distress  Cardiovascular: NSR  Abdominal: soft, NT/ND  Extremities: RLE WWP. incision CDI, great toe amputation site well healing.       LABS:              Radiology and Additional Studies:

## 2018-05-31 NOTE — CHART NOTE - NSCHARTNOTEFT_GEN_A_CORE
Admitting Diagnosis:   84yo F w/ h/o CAD, lateral wall MI, DM, HTN, CKD, RLE DVT, PE, early dementia. S/p RLE angiogram (5/17) for b/l great toe ulcers and pain. pt w/ newly dx ischemic heart disease. Pt is s/p RLE bypass. Pending [facility].       PAST MEDICAL & SURGICAL HISTORY:  Peripheral vascular disease  Ulcer of lower limb  Arthropathy  Ischemic heart disease  Hypertension  Hyperlipidemia  Diabetes mellitus      Current Nutrition Order: Consistent Carb diet w/ evening snack     PO Intake: Good (%) [ X  ]  Fair (50-75%) [   ] Poor (<25%) [   ]    GI Issues:  No n/v/d/c noted     Pain: Denies pain     Skin Integrity: surgical incision     Labs:         CAPILLARY BLOOD GLUCOSE      POCT Blood Glucose.: 136 mg/dL (31 May 2018 16:16)  POCT Blood Glucose.: 152 mg/dL (31 May 2018 11:13)  POCT Blood Glucose.: 118 mg/dL (31 May 2018 06:51)  POCT Blood Glucose.: 106 mg/dL (30 May 2018 21:47)      Medications:  MEDICATIONS  (STANDING):  amLODIPine   Tablet 10 milliGRAM(s) Oral daily  apixaban 5 milliGRAM(s) Oral every 12 hours  aspirin enteric coated 81 milliGRAM(s) Oral daily  atorvastatin 80 milliGRAM(s) Oral at bedtime  dextrose 5%. 1000 milliLiter(s) (50 mL/Hr) IV Continuous <Continuous>  dextrose 50% Injectable 12.5 Gram(s) IV Push once  dextrose 50% Injectable 25 Gram(s) IV Push once  dextrose 50% Injectable 25 Gram(s) IV Push once  docusate sodium 100 milliGRAM(s) Oral three times a day  gabapentin 300 milliGRAM(s) Oral three times a day  insulin lispro (HumaLOG) corrective regimen sliding scale   SubCutaneous Before meals and at bedtime  metoprolol succinate ER 25 milliGRAM(s) Oral daily  sertraline 50 milliGRAM(s) Oral daily    MEDICATIONS  (PRN):  acetaminophen   Tablet. 650 milliGRAM(s) Oral every 6 hours PRN Mild Pain (1 - 3)  dextrose 40% Gel 15 Gram(s) Oral once PRN Blood Glucose LESS THAN 70 milliGRAM(s)/deciliter  glucagon  Injectable 1 milliGRAM(s) IntraMuscular once PRN Glucose LESS THAN 70 milligrams/deciliter  oxyCODONE    5 mG/acetaminophen 325 mG 1 Tablet(s) Oral every 6 hours PRN Moderate Pain (4 - 6)  oxyCODONE    5 mG/acetaminophen 325 mG 2 Tablet(s) Oral every 6 hours PRN Severe Pain (7 - 10)      Weight: 94.3 (5/28)  89.4 kg (5/30 kg)    Weight Change: 5 kg wt loss noted 2/2 possible fluid shift     Estimated energy needs:     Estimated Energy Needs (20-25 calories/kg):  · Weight  (lbs)	130 lb  · Weight (kg)	58.9 kg  · From (20 brenda/kg)	1178  · To (25 brenda/kg)	1472     Other Calculation:  · Other Calculation	Height: 5'6" Weight: 184lbs, IBW 130lbs+/-10%, %%, BMI 29.7  IBW used for calculations as pt >120% of IBW   Nutrient needs based on [facility] standards of care for maintenance in older adults.    Needs adjusted for age and wound healing.     Estimated Protein Needs (1.2-1.4 gm/kg):  · Weight  (lbs)	130  · Weight (kg)	58.9 kg  · From (1.2 g/kg)	70.68  · To (1.4 g/kg)	82.46     Estimated Fluid Needs (30-35 ml/kg):  · Weight  (lbs)	130  · Weight (kg)	58.9  · From (30 ml/kg)	1767  · To (35 ml/kg)	2061      Subjective: Pt tolerating diet with good PO intake. Reviewed DM diet with patient. Denies any other nutrition related concerns.     Previous Nutrition Diagnosis:  Food - and nutrition - related knowledge deficit RT lack of prior exposure to formal education on CSTCHO and DASH/TLC AEB pt unable to state CHO counts or identify protein foods      Active [ X  ]  Resolved [   ]    If resolved, new PES:     Goal: 1.) Pt to teach back two aspects of diet     Recommendations: Rec. Consistent carb no snack, DASH/TLC diet     Education: Reviewed DM diet, and healthy eating. Pt verbalized understanding. Compliance is expected.     Risk Level: High [   ] Moderate [ X  ] Low [   ].

## 2018-06-01 LAB
ANION GAP SERPL CALC-SCNC: 14 MMOL/L — SIGNIFICANT CHANGE UP (ref 5–17)
BUN SERPL-MCNC: 23 MG/DL — SIGNIFICANT CHANGE UP (ref 7–23)
CALCIUM SERPL-MCNC: 9.3 MG/DL — SIGNIFICANT CHANGE UP (ref 8.4–10.5)
CHLORIDE SERPL-SCNC: 99 MMOL/L — SIGNIFICANT CHANGE UP (ref 96–108)
CO2 SERPL-SCNC: 26 MMOL/L — SIGNIFICANT CHANGE UP (ref 22–31)
CREAT SERPL-MCNC: 0.97 MG/DL — SIGNIFICANT CHANGE UP (ref 0.5–1.3)
GLUCOSE BLDC GLUCOMTR-MCNC: 123 MG/DL — HIGH (ref 70–99)
GLUCOSE BLDC GLUCOMTR-MCNC: 123 MG/DL — HIGH (ref 70–99)
GLUCOSE BLDC GLUCOMTR-MCNC: 134 MG/DL — HIGH (ref 70–99)
GLUCOSE BLDC GLUCOMTR-MCNC: 212 MG/DL — HIGH (ref 70–99)
GLUCOSE SERPL-MCNC: 116 MG/DL — HIGH (ref 70–99)
HCT VFR BLD CALC: 32.6 % — LOW (ref 34.5–45)
HGB BLD-MCNC: 10.4 G/DL — LOW (ref 11.5–15.5)
MAGNESIUM SERPL-MCNC: 2.1 MG/DL — SIGNIFICANT CHANGE UP (ref 1.6–2.6)
MCHC RBC-ENTMCNC: 28.3 PG — SIGNIFICANT CHANGE UP (ref 27–34)
MCHC RBC-ENTMCNC: 31.9 G/DL — LOW (ref 32–36)
MCV RBC AUTO: 88.8 FL — SIGNIFICANT CHANGE UP (ref 80–100)
PHOSPHATE SERPL-MCNC: 3.7 MG/DL — SIGNIFICANT CHANGE UP (ref 2.5–4.5)
PLATELET # BLD AUTO: 349 K/UL — SIGNIFICANT CHANGE UP (ref 150–400)
POTASSIUM SERPL-MCNC: 4.2 MMOL/L — SIGNIFICANT CHANGE UP (ref 3.5–5.3)
POTASSIUM SERPL-SCNC: 4.2 MMOL/L — SIGNIFICANT CHANGE UP (ref 3.5–5.3)
RBC # BLD: 3.67 M/UL — LOW (ref 3.8–5.2)
RBC # FLD: 14.3 % — SIGNIFICANT CHANGE UP (ref 10.3–16.9)
SODIUM SERPL-SCNC: 139 MMOL/L — SIGNIFICANT CHANGE UP (ref 135–145)
WBC # BLD: 8 K/UL — SIGNIFICANT CHANGE UP (ref 3.8–10.5)
WBC # FLD AUTO: 8 K/UL — SIGNIFICANT CHANGE UP (ref 3.8–10.5)

## 2018-06-01 PROCEDURE — 99232 SBSQ HOSP IP/OBS MODERATE 35: CPT | Mod: GC

## 2018-06-01 PROCEDURE — 99232 SBSQ HOSP IP/OBS MODERATE 35: CPT

## 2018-06-01 RX ORDER — OXYCODONE AND ACETAMINOPHEN 5; 325 MG/1; MG/1
1 TABLET ORAL ONCE
Qty: 0 | Refills: 0 | Status: DISCONTINUED | OUTPATIENT
Start: 2018-06-01 | End: 2018-06-01

## 2018-06-01 RX ADMIN — Medication 81 MILLIGRAM(S): at 10:54

## 2018-06-01 RX ADMIN — OXYCODONE AND ACETAMINOPHEN 1 TABLET(S): 5; 325 TABLET ORAL at 11:46

## 2018-06-01 RX ADMIN — GABAPENTIN 300 MILLIGRAM(S): 400 CAPSULE ORAL at 17:14

## 2018-06-01 RX ADMIN — OXYCODONE AND ACETAMINOPHEN 1 TABLET(S): 5; 325 TABLET ORAL at 21:37

## 2018-06-01 RX ADMIN — SERTRALINE 50 MILLIGRAM(S): 25 TABLET, FILM COATED ORAL at 10:54

## 2018-06-01 RX ADMIN — Medication 100 MILLIGRAM(S): at 21:37

## 2018-06-01 RX ADMIN — ATORVASTATIN CALCIUM 80 MILLIGRAM(S): 80 TABLET, FILM COATED ORAL at 21:36

## 2018-06-01 RX ADMIN — Medication 650 MILLIGRAM(S): at 05:10

## 2018-06-01 RX ADMIN — Medication 100 MILLIGRAM(S): at 06:24

## 2018-06-01 RX ADMIN — OXYCODONE AND ACETAMINOPHEN 1 TABLET(S): 5; 325 TABLET ORAL at 23:00

## 2018-06-01 RX ADMIN — Medication 100 MILLIGRAM(S): at 17:14

## 2018-06-01 RX ADMIN — APIXABAN 5 MILLIGRAM(S): 2.5 TABLET, FILM COATED ORAL at 17:14

## 2018-06-01 RX ADMIN — Medication 25 MILLIGRAM(S): at 06:25

## 2018-06-01 RX ADMIN — GABAPENTIN 300 MILLIGRAM(S): 400 CAPSULE ORAL at 21:36

## 2018-06-01 RX ADMIN — GABAPENTIN 300 MILLIGRAM(S): 400 CAPSULE ORAL at 06:24

## 2018-06-01 RX ADMIN — OXYCODONE AND ACETAMINOPHEN 1 TABLET(S): 5; 325 TABLET ORAL at 06:29

## 2018-06-01 RX ADMIN — Medication 650 MILLIGRAM(S): at 04:10

## 2018-06-01 RX ADMIN — APIXABAN 5 MILLIGRAM(S): 2.5 TABLET, FILM COATED ORAL at 06:24

## 2018-06-01 RX ADMIN — OXYCODONE AND ACETAMINOPHEN 1 TABLET(S): 5; 325 TABLET ORAL at 10:53

## 2018-06-01 RX ADMIN — AMLODIPINE BESYLATE 10 MILLIGRAM(S): 2.5 TABLET ORAL at 08:20

## 2018-06-01 NOTE — PROGRESS NOTE ADULT - SUBJECTIVE AND OBJECTIVE BOX
INTERVAL HISTORY:  The patient is having right foot discomfort. She has not had any chest pain or dyspnea.	  Chart, PMH and allergies reviewed  MEDICATIONS:  amLODIPine   Tablet 10 milliGRAM(s) Oral daily  metoprolol succinate ER 25 milliGRAM(s) Oral daily        acetaminophen   Tablet. 650 milliGRAM(s) Oral every 6 hours PRN  gabapentin 300 milliGRAM(s) Oral three times a day  oxyCODONE    5 mG/acetaminophen 325 mG 1 Tablet(s) Oral every 6 hours PRN  oxyCODONE    5 mG/acetaminophen 325 mG 2 Tablet(s) Oral every 6 hours PRN  sertraline 50 milliGRAM(s) Oral daily    docusate sodium 100 milliGRAM(s) Oral three times a day    atorvastatin 80 milliGRAM(s) Oral at bedtime  dextrose 40% Gel 15 Gram(s) Oral once PRN  dextrose 50% Injectable 12.5 Gram(s) IV Push once  dextrose 50% Injectable 25 Gram(s) IV Push once  dextrose 50% Injectable 25 Gram(s) IV Push once  glucagon  Injectable 1 milliGRAM(s) IntraMuscular once PRN  insulin lispro (HumaLOG) corrective regimen sliding scale   SubCutaneous Before meals and at bedtime    apixaban 5 milliGRAM(s) Oral every 12 hours  aspirin enteric coated 81 milliGRAM(s) Oral daily  dextrose 5%. 1000 milliLiter(s) IV Continuous <Continuous>      REVIEW OF SYSTEMS:  CONSTITUTIONAL: No fever, no weight loss, or fatigue  PULMONARY: No cough, no wheezing, chills or hemoptysis; No Shortness of Breath  CARDIOVASCULAR: No chest pain, no palpitations, no syncope, dizziness, no leg swelling  GASTROINTESTINAL: No abdominal pain. No nausea, no  vomiting, or hematemesis; No diarrhea or constipation. No melena or hematochezia.  GENITOURINARY: No dysuria, frequency, hematuria, or incontinence  SKIN: No itching, burning, rashes, or lesions     PHYSICAL EXAM:  T(C): 36.7 (06-01-18 @ 06:00), Max: 36.8 (05-31-18 @ 18:08)  HR: 61 (06-01-18 @ 06:22) (61 - 75)  BP: 141/72 (06-01-18 @ 06:22) (112/53 - 150/72)  RR: 18 (06-01-18 @ 06:22) (18 - 18)  SpO2: 99% (06-01-18 @ 06:22) (97% - 100%)  Wt(kg): --  I&O's Summary    31 May 2018 07:01  -  01 Jun 2018 07:00  --------------------------------------------------------  IN: 780 mL / OUT: 0 mL / NET: 780 mL        Appearance:  No deformities, normal appearance	  HEENT:   Normal oral mucosa, PERRL, EOMI	  Neck: No jvd , no masses  Lymphatic: No  lymphadenopathy  Pulmonary: Lungs clear to auscultation and percussion  Cardiovascular: Normal S1 S2, No JVD, No murmur, 1+edema on the R with bandaged foot  Abdomen:  Soft, Non-tender, + BS  Skin: No rashes, No ecchymoses	  Extremities:  No clubbing or cyanosis  MSK: Normal range of motion, no joint swelling    LABS:		  CARDIAC MARKERS:                         10.4   8.0   )-----------( 349      ( 01 Jun 2018 05:51 )             32.6     06-01    139  |  99  |  23  ----------------------------<  116<H>  4.2   |  26  |  0.97    Ca    9.3      01 Jun 2018 05:50  Phos  3.7     06-01  Mg     2.1     06-01      proBNP:  Lipid Profile:  HgA1c:  TSH:    TELEMETRY: 	           ECG:  	            RADIOLOGY:   DIAGNOSTIC TESTING: [ ] Echocardiogram: [ ]  Catheterization: [ ] Stress Test:      ASSESSMENT/PLAN: 	    Coronary artery disease/ H/o MI-No chest discomfort. EKG post op no change.  Continue to monitor in ICU.    Hypertension- fairly good control.    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 R foot is bandaged.      Diabetes-follow fingersticks.

## 2018-06-02 LAB
GLUCOSE BLDC GLUCOMTR-MCNC: 114 MG/DL — HIGH (ref 70–99)
GLUCOSE BLDC GLUCOMTR-MCNC: 151 MG/DL — HIGH (ref 70–99)
GLUCOSE BLDC GLUCOMTR-MCNC: 158 MG/DL — HIGH (ref 70–99)
GLUCOSE BLDC GLUCOMTR-MCNC: 159 MG/DL — HIGH (ref 70–99)

## 2018-06-02 RX ADMIN — Medication 2: at 17:00

## 2018-06-02 RX ADMIN — APIXABAN 5 MILLIGRAM(S): 2.5 TABLET, FILM COATED ORAL at 06:45

## 2018-06-02 RX ADMIN — GABAPENTIN 300 MILLIGRAM(S): 400 CAPSULE ORAL at 22:45

## 2018-06-02 RX ADMIN — Medication 100 MILLIGRAM(S): at 06:45

## 2018-06-02 RX ADMIN — GABAPENTIN 300 MILLIGRAM(S): 400 CAPSULE ORAL at 06:45

## 2018-06-02 RX ADMIN — APIXABAN 5 MILLIGRAM(S): 2.5 TABLET, FILM COATED ORAL at 17:00

## 2018-06-02 RX ADMIN — Medication 25 MILLIGRAM(S): at 06:46

## 2018-06-02 RX ADMIN — Medication 100 MILLIGRAM(S): at 13:39

## 2018-06-02 RX ADMIN — OXYCODONE AND ACETAMINOPHEN 2 TABLET(S): 5; 325 TABLET ORAL at 23:03

## 2018-06-02 RX ADMIN — Medication 100 MILLIGRAM(S): at 22:45

## 2018-06-02 RX ADMIN — Medication 2: at 22:44

## 2018-06-02 RX ADMIN — Medication 2: at 11:50

## 2018-06-02 RX ADMIN — ATORVASTATIN CALCIUM 80 MILLIGRAM(S): 80 TABLET, FILM COATED ORAL at 22:45

## 2018-06-02 RX ADMIN — AMLODIPINE BESYLATE 10 MILLIGRAM(S): 2.5 TABLET ORAL at 06:46

## 2018-06-02 RX ADMIN — GABAPENTIN 300 MILLIGRAM(S): 400 CAPSULE ORAL at 13:39

## 2018-06-02 RX ADMIN — Medication 81 MILLIGRAM(S): at 11:50

## 2018-06-02 RX ADMIN — OXYCODONE AND ACETAMINOPHEN 2 TABLET(S): 5; 325 TABLET ORAL at 23:59

## 2018-06-02 RX ADMIN — SERTRALINE 50 MILLIGRAM(S): 25 TABLET, FILM COATED ORAL at 11:50

## 2018-06-02 RX ADMIN — OXYCODONE AND ACETAMINOPHEN 2 TABLET(S): 5; 325 TABLET ORAL at 06:45

## 2018-06-02 NOTE — PROGRESS NOTE ADULT - SUBJECTIVE AND OBJECTIVE BOX
O/N: VALERIE, VSS    Patient seen by bedside. No events overnight. No complaints. Awaiting J[facility]I insurance authorization.    PMH:  Peripheral vascular disease  Ulcer of lower limb  Arthropathy  Ischemic heart disease  Hypertension  Hyperlipidemia  Diabetes mellitus      Medication:   amLODIPine   Tablet 10  apixaban 5  aspirin enteric coated 81  metoprolol succinate ER 25        Vital Signs Last 24 Hrs  T(C): 37.1 (02 Jun 2018 22:00), Max: 37.1 (02 Jun 2018 22:00)  T(F): 98.7 (02 Jun 2018 22:00), Max: 98.7 (02 Jun 2018 22:00)  HR: 65 (02 Jun 2018 22:00) (55 - 78)  BP: 154/85 (02 Jun 2018 22:00) (108/55 - 195/85)  BP(mean): --  RR: 14 (02 Jun 2018 22:00) (14 - 16)  SpO2: 98% (02 Jun 2018 22:00) (95% - 100%)  I&O's Summary    01 Jun 2018 07:01  -  02 Jun 2018 07:00  --------------------------------------------------------  IN: 480 mL / OUT: 400 mL / NET: 80 mL    02 Jun 2018 07:01  -  02 Jun 2018 23:55  --------------------------------------------------------  IN: 360 mL / OUT: 250 mL / NET: 110 mL        Physical Exam:  General: NAD, resting comfortably in bed  Pulmonary: Nonlabored breathing, no respiratory distress  Cardiovascular:  Abdominal:  Extremities:  Pulses:   - RLE: biphasic DP, triphasic PT      LABS:                        10.4   8.0   )-----------( 349      ( 01 Jun 2018 05:51 )             32.6     06-01    139  |  99  |  23  ----------------------------<  116<H>  4.2   |  26  |  0.97    Ca    9.3      01 Jun 2018 05:50  Phos  3.7     06-01  Mg     2.1     06-01          Radiology and Additional Studies:

## 2018-06-02 NOTE — PROGRESS NOTE ADULT - ATTENDING COMMENTS
Patient seen and examined with house-staff during bedside rounds.  Resident note read, including vitals, physical findings, laboratory data, and radiological reports.   Revisions included below.  Direct personal management at bed side and extensive interpretation of the data.  Plan was outlined and discussed in details with the housestaff.  Decision making of high complexity  Action taken for acute disease activity to reflect the level of care provided:  - medication reconciliation  - review laboratory data  - Patient is followed by cardiology and there's no evidence of acute coronary syndrome  - Management of comorbidities    Discussed with the family Patient seen and examined with house-staff during bedside rounds.  Resident note read, including vitals, physical findings, laboratory data, and radiological reports.   Revisions included below.  Direct personal management at bed side and extensive interpretation of the data.  Plan was outlined and discussed in details with the housestaff.  Decision making of high complexity  Action taken for acute disease activity to reflect the level of care provided:  - medication reconciliation  - review laboratory data  - Patient is followed by cardiology and there's no evidence of acute coronary syndrome  - Management of comorbidities  Note is for visit on 6/1/18  Discussed with the family

## 2018-06-02 NOTE — PROGRESS NOTE ADULT - SUBJECTIVE AND OBJECTIVE BOX
Interval Events: Reviewed  Patient seen and examined at bedside.    Patient is a 84y old  Female who presents with a chief complaint of b/l great toe ulcers and pain (29 May 2018 08:06)    the pain is better in the leg and she is otherwise doing well  PAST MEDICAL & SURGICAL HISTORY:  Peripheral vascular disease  Ulcer of lower limb  Arthropathy  Ischemic heart disease  Hypertension  Hyperlipidemia  Diabetes mellitus      MEDICATIONS:  Pulmonary:    Antimicrobials:    Anticoagulants:  apixaban 5 milliGRAM(s) Oral every 12 hours  aspirin enteric coated 81 milliGRAM(s) Oral daily    Cardiac:  amLODIPine   Tablet 10 milliGRAM(s) Oral daily  metoprolol succinate ER 25 milliGRAM(s) Oral daily      Allergies    penicillins (Anaphylaxis)    Intolerances        Vital Signs Last 24 Hrs  T(C): 36.6 (02 Jun 2018 02:16), Max: 36.6 (01 Jun 2018 18:08)  T(F): 97.9 (02 Jun 2018 02:16), Max: 97.9 (01 Jun 2018 18:08)  HR: 78 (02 Jun 2018 00:36) (61 - 82)  BP: 125/91 (02 Jun 2018 00:36) (120/57 - 186/97)  BP(mean): --  RR: 16 (02 Jun 2018 00:36) (16 - 18)  SpO2: 95% (02 Jun 2018 00:36) (95% - 99%)    05-31 @ 07:01  -  06-01 @ 07:00  --------------------------------------------------------  IN: 780 mL / OUT: 0 mL / NET: 780 mL    06-01 @ 07:01  -  06-02 @ 06:02  --------------------------------------------------------  IN: 480 mL / OUT: 200 mL / NET: 280 mL          LABS:      CBC Full  -  ( 01 Jun 2018 05:51 )  WBC Count : 8.0 K/uL  Hemoglobin : 10.4 g/dL  Hematocrit : 32.6 %  Platelet Count - Automated : 349 K/uL  Mean Cell Volume : 88.8 fL  Mean Cell Hemoglobin : 28.3 pg  Mean Cell Hemoglobin Concentration : 31.9 g/dL  Auto Neutrophil # : x  Auto Lymphocyte # : x  Auto Monocyte # : x  Auto Eosinophil # : x  Auto Basophil # : x  Auto Neutrophil % : x  Auto Lymphocyte % : x  Auto Monocyte % : x  Auto Eosinophil % : x  Auto Basophil % : x    06-01    139  |  99  |  23  ----------------------------<  116<H>  4.2   |  26  |  0.97    Ca    9.3      01 Jun 2018 05:50  Phos  3.7     06-01  Mg     2.1     06-01                          RADIOLOGY & ADDITIONAL STUDIES (The following images were personally reviewed):  Vivar:                                     No  Urine output:                       adequate  DVT prophylaxis:                 Yes  Flattus:                                  Yes  Bowel movement:              yes

## 2018-06-03 LAB
ANION GAP SERPL CALC-SCNC: 10 MMOL/L — SIGNIFICANT CHANGE UP (ref 5–17)
BUN SERPL-MCNC: 25 MG/DL — HIGH (ref 7–23)
CALCIUM SERPL-MCNC: 9.3 MG/DL — SIGNIFICANT CHANGE UP (ref 8.4–10.5)
CHLORIDE SERPL-SCNC: 101 MMOL/L — SIGNIFICANT CHANGE UP (ref 96–108)
CO2 SERPL-SCNC: 28 MMOL/L — SIGNIFICANT CHANGE UP (ref 22–31)
CREAT SERPL-MCNC: 0.88 MG/DL — SIGNIFICANT CHANGE UP (ref 0.5–1.3)
GLUCOSE BLDC GLUCOMTR-MCNC: 116 MG/DL — HIGH (ref 70–99)
GLUCOSE BLDC GLUCOMTR-MCNC: 127 MG/DL — HIGH (ref 70–99)
GLUCOSE BLDC GLUCOMTR-MCNC: 146 MG/DL — HIGH (ref 70–99)
GLUCOSE BLDC GLUCOMTR-MCNC: 155 MG/DL — HIGH (ref 70–99)
GLUCOSE SERPL-MCNC: 160 MG/DL — HIGH (ref 70–99)
HCT VFR BLD CALC: 32.7 % — LOW (ref 34.5–45)
HGB BLD-MCNC: 10.7 G/DL — LOW (ref 11.5–15.5)
MAGNESIUM SERPL-MCNC: 2.2 MG/DL — SIGNIFICANT CHANGE UP (ref 1.6–2.6)
MCHC RBC-ENTMCNC: 29.2 PG — SIGNIFICANT CHANGE UP (ref 27–34)
MCHC RBC-ENTMCNC: 32.7 G/DL — SIGNIFICANT CHANGE UP (ref 32–36)
MCV RBC AUTO: 89.3 FL — SIGNIFICANT CHANGE UP (ref 80–100)
PHOSPHATE SERPL-MCNC: 4.1 MG/DL — SIGNIFICANT CHANGE UP (ref 2.5–4.5)
PLATELET # BLD AUTO: 360 K/UL — SIGNIFICANT CHANGE UP (ref 150–400)
POTASSIUM SERPL-MCNC: 4 MMOL/L — SIGNIFICANT CHANGE UP (ref 3.5–5.3)
POTASSIUM SERPL-SCNC: 4 MMOL/L — SIGNIFICANT CHANGE UP (ref 3.5–5.3)
RBC # BLD: 3.66 M/UL — LOW (ref 3.8–5.2)
RBC # FLD: 14.5 % — SIGNIFICANT CHANGE UP (ref 10.3–16.9)
SODIUM SERPL-SCNC: 139 MMOL/L — SIGNIFICANT CHANGE UP (ref 135–145)
WBC # BLD: 7.7 K/UL — SIGNIFICANT CHANGE UP (ref 3.8–10.5)
WBC # FLD AUTO: 7.7 K/UL — SIGNIFICANT CHANGE UP (ref 3.8–10.5)

## 2018-06-03 RX ADMIN — APIXABAN 5 MILLIGRAM(S): 2.5 TABLET, FILM COATED ORAL at 17:39

## 2018-06-03 RX ADMIN — Medication 100 MILLIGRAM(S): at 07:06

## 2018-06-03 RX ADMIN — Medication 2: at 16:45

## 2018-06-03 RX ADMIN — GABAPENTIN 300 MILLIGRAM(S): 400 CAPSULE ORAL at 07:06

## 2018-06-03 RX ADMIN — Medication 100 MILLIGRAM(S): at 13:43

## 2018-06-03 RX ADMIN — ATORVASTATIN CALCIUM 80 MILLIGRAM(S): 80 TABLET, FILM COATED ORAL at 22:45

## 2018-06-03 RX ADMIN — OXYCODONE AND ACETAMINOPHEN 2 TABLET(S): 5; 325 TABLET ORAL at 08:00

## 2018-06-03 RX ADMIN — Medication 100 MILLIGRAM(S): at 22:35

## 2018-06-03 RX ADMIN — AMLODIPINE BESYLATE 10 MILLIGRAM(S): 2.5 TABLET ORAL at 07:06

## 2018-06-03 RX ADMIN — GABAPENTIN 300 MILLIGRAM(S): 400 CAPSULE ORAL at 13:43

## 2018-06-03 RX ADMIN — OXYCODONE AND ACETAMINOPHEN 2 TABLET(S): 5; 325 TABLET ORAL at 07:06

## 2018-06-03 RX ADMIN — OXYCODONE AND ACETAMINOPHEN 2 TABLET(S): 5; 325 TABLET ORAL at 23:00

## 2018-06-03 RX ADMIN — GABAPENTIN 300 MILLIGRAM(S): 400 CAPSULE ORAL at 22:35

## 2018-06-03 RX ADMIN — APIXABAN 5 MILLIGRAM(S): 2.5 TABLET, FILM COATED ORAL at 07:06

## 2018-06-03 RX ADMIN — SERTRALINE 50 MILLIGRAM(S): 25 TABLET, FILM COATED ORAL at 12:09

## 2018-06-03 RX ADMIN — Medication 81 MILLIGRAM(S): at 12:09

## 2018-06-03 RX ADMIN — OXYCODONE AND ACETAMINOPHEN 2 TABLET(S): 5; 325 TABLET ORAL at 22:35

## 2018-06-03 NOTE — PROGRESS NOTE ADULT - SUBJECTIVE AND OBJECTIVE BOX
o/n: VALERIE, VSS  6/2: accepted to [facility] authorization pending        Assessment/Plan;  A/P: 84yFemale s/p R first Toe Amp and RLE CFA to Peroneal bypass    Diet: Consistent Carb diet  Pain/nausea control  Provena off, Xeroform to incision.  DVT ppx  PT eval   Eliquis  Home medication  dispo: [facility]I o/n: VALERIE, VSS  [facility]: [facility]    amLODIPine   Tablet 10  apixaban 5  aspirin enteric coated 81  metoprolol succinate ER 25      Allergies    penicillins (Anaphylaxis)    Intolerances        Vital Signs Last 24 Hrs  T(C): 36.2 (03 Jun 2018 10:34), Max: 37.1 (02 Jun 2018 22:00)  T(F): 97.2 (03 Jun 2018 10:34), Max: 98.7 (02 Jun 2018 22:00)  HR: 59 (03 Jun 2018 08:01) (55 - 70)  BP: 145/65 (03 Jun 2018 08:01) (108/55 - 164/70)  BP(mean): --  RR: 16 (03 Jun 2018 08:01) (14 - 16)  SpO2: 98% (03 Jun 2018 08:01) (95% - 100%)  I&O's Summary    02 Jun 2018 07:01  -  03 Jun 2018 07:00  --------------------------------------------------------  IN: 360 mL / OUT: 250 mL / NET: 110 mL    03 Jun 2018 07:01  -  03 Jun 2018 11:10  --------------------------------------------------------  IN: 240 mL / OUT: 0 mL / NET: 240 mL        Physical Exam:  General: AAOx3, NAD  Pulmonary:  Cardiovascular:  Abdominal:  Extremities: left 1st toe amputation site clean, pink wound bed, no drainage, no surrounding erythema/edema        Assessment/Plan;  A/P: 84yFemale s/p R first Toe Amp and RLE CFA to Peroneal bypass    Diet: Consistent Carb diet  Pain/nausea control  Provena off, Xeroform to incision.  DVT ppx  PT eval   Eliquis  Home medication  dispo: JAMI

## 2018-06-04 LAB
GLUCOSE BLDC GLUCOMTR-MCNC: 115 MG/DL — HIGH (ref 70–99)
GLUCOSE BLDC GLUCOMTR-MCNC: 143 MG/DL — HIGH (ref 70–99)
GLUCOSE BLDC GLUCOMTR-MCNC: 161 MG/DL — HIGH (ref 70–99)
GLUCOSE BLDC GLUCOMTR-MCNC: 188 MG/DL — HIGH (ref 70–99)

## 2018-06-04 PROCEDURE — 99232 SBSQ HOSP IP/OBS MODERATE 35: CPT

## 2018-06-04 PROCEDURE — 99232 SBSQ HOSP IP/OBS MODERATE 35: CPT | Mod: GC

## 2018-06-04 RX ADMIN — Medication 25 MILLIGRAM(S): at 11:54

## 2018-06-04 RX ADMIN — Medication 100 MILLIGRAM(S): at 06:46

## 2018-06-04 RX ADMIN — OXYCODONE AND ACETAMINOPHEN 1 TABLET(S): 5; 325 TABLET ORAL at 06:46

## 2018-06-04 RX ADMIN — APIXABAN 5 MILLIGRAM(S): 2.5 TABLET, FILM COATED ORAL at 17:08

## 2018-06-04 RX ADMIN — APIXABAN 5 MILLIGRAM(S): 2.5 TABLET, FILM COATED ORAL at 06:46

## 2018-06-04 RX ADMIN — Medication 100 MILLIGRAM(S): at 15:36

## 2018-06-04 RX ADMIN — ATORVASTATIN CALCIUM 80 MILLIGRAM(S): 80 TABLET, FILM COATED ORAL at 21:27

## 2018-06-04 RX ADMIN — AMLODIPINE BESYLATE 10 MILLIGRAM(S): 2.5 TABLET ORAL at 06:46

## 2018-06-04 RX ADMIN — GABAPENTIN 300 MILLIGRAM(S): 400 CAPSULE ORAL at 15:36

## 2018-06-04 RX ADMIN — SERTRALINE 50 MILLIGRAM(S): 25 TABLET, FILM COATED ORAL at 11:54

## 2018-06-04 RX ADMIN — Medication 2: at 21:56

## 2018-06-04 RX ADMIN — Medication 100 MILLIGRAM(S): at 21:27

## 2018-06-04 RX ADMIN — GABAPENTIN 300 MILLIGRAM(S): 400 CAPSULE ORAL at 21:27

## 2018-06-04 RX ADMIN — Medication 2: at 16:41

## 2018-06-04 RX ADMIN — GABAPENTIN 300 MILLIGRAM(S): 400 CAPSULE ORAL at 06:46

## 2018-06-04 RX ADMIN — OXYCODONE AND ACETAMINOPHEN 1 TABLET(S): 5; 325 TABLET ORAL at 07:00

## 2018-06-04 RX ADMIN — Medication 81 MILLIGRAM(S): at 11:54

## 2018-06-04 NOTE — PROGRESS NOTE ADULT - PROBLEM SELECTOR PLAN 6
The patient is hemodynamically stable.  The pain is controlled.  Patient is on DVT prophylaxis.  Patient is using incentive spirometry.  Oxygen saturation is acceptable.  Advance diet as tolerated.  Advance activity as tolerated.  Monitor for ileus.  Patient is on Laxatives.  Surgical wound is stable.  No indication for monitor bed.

## 2018-06-04 NOTE — PROGRESS NOTE ADULT - PROBLEM SELECTOR PLAN 3
Blood pressure stable on the current regimen
Lipitor increased to 80mg
Blood pressure stable on the current regimen
Blood pressure stable on the current regimen.  Tolerating BB
Blood pressure stable on the current regimen
Blood pressure stable on the current regimen.  Tolerating BB

## 2018-06-04 NOTE — PROGRESS NOTE ADULT - NS NEC GEN PE MLT EXAM PC
No bruits; no thyromegaly or nodules

## 2018-06-04 NOTE — PROGRESS NOTE ADULT - SKIN
No lesions; no rash

## 2018-06-04 NOTE — PROGRESS NOTE ADULT - NSHPATTENDINGPLANDISCUSS_GEN_ALL_CORE
As above
As above
As aboveAnd family
As above
As above
As above and family

## 2018-06-04 NOTE — PROGRESS NOTE ADULT - PROBLEM SELECTOR PROBLEM 2
Peripheral vascular disease
Essential hypertension
Peripheral vascular disease

## 2018-06-04 NOTE — PROGRESS NOTE ADULT - SUBJECTIVE AND OBJECTIVE BOX
24hr Events:  o/n: VALERIE, VSS  6/3: labs ok, awaiting AUTH for JAMI      Assessment/Plan;  84yFemale s/p R first Toe Amp and RLE CFA to Peroneal bypass    Diet: Consistent Carb diet  Pain/nausea control  Provena off, Xeroform to incision.  DVT ppx  PT eval   Eliquis  Home medication  dispo: [facility] 24hr Events:  o/n: VALERIE, VSS  6/3: labs ok, awaiting AUTH for JAMI    amLODIPine   Tablet 10  apixaban 5  aspirin enteric coated 81  metoprolol succinate ER 25        Vital Signs Last 24 Hrs  T(C): 36.8 (03 Jun 2018 22:05), Max: 36.8 (03 Jun 2018 22:00)  T(F): 98.2 (03 Jun 2018 22:05), Max: 98.2 (03 Jun 2018 22:00)  HR: 52 (04 Jun 2018 06:00) (52 - 66)  BP: 127/60 (03 Jun 2018 22:00) (127/60 - 145/65)  BP(mean): --  RR: 14 (04 Jun 2018 06:00) (14 - 16)  SpO2: 97% (04 Jun 2018 06:00) (97% - 98%)  I&O's Summary    03 Jun 2018 07:01  -  04 Jun 2018 07:00  --------------------------------------------------------  IN: 420 mL / OUT: 400 mL / NET: 20 mL        Physical Exam:  General: NAD, resting comfortably in bed  Pulmonary: Nonlabored breathing, no respiratory distress  Extremities: LT incision c/d/i left 1st toe amputation site clean, pink wound bed, no drainage, no surrounding erythema/edema      LABS:                        10.7   7.7   )-----------( 360      ( 03 Jun 2018 12:20 )             32.7     06-03    139  |  101  |  25<H>  ----------------------------<  160<H>  4.0   |  28  |  0.88    Ca    9.3      03 Jun 2018 12:20  Phos  4.1     06-03  Mg     2.2     06-03          Radiology and Additional Studies:    Assessment and Plan:     Assessment/Plan;  84yFemale s/p R first Toe Amp and RLE CFA to Peroneal bypass    Diet: Consistent Carb diet  Pain/nausea control  Provena off, Xeroform to incision.  DVT ppx  PT eval   Eliquis  Home medication  dispo: [facility]I

## 2018-06-04 NOTE — PROGRESS NOTE ADULT - PROBLEM SELECTOR PROBLEM 5
Diabetes mellitus

## 2018-06-04 NOTE — PROGRESS NOTE ADULT - PROBLEM SELECTOR PLAN 4
On Statin
On Statin
+ Nuc with Inf and IL ischemia.  We have increased Lipitor to 80mg, added Toprol XL 25mg and now added Imdur 30mg daily.  S/P surgery, cardiac status has been stable
+ Nuc with Inf and IL ischemia.  We have increased Lipitor to 80mg, added Toprol XL 25mg and now added Imdur 30mg daily.  S/P surgery, cardiac status has been stable, unchanged
+ Nuc with Inf and IL ischemia.  We have increased Lipitor to 80mg, added Toprol XL 25mg and now added Imdur 30mg daily.  Since surgery is urgent I would permit her to go to surgery on meds and watch closely for ischemia/infarct.  Will discus with Dr Junior.
On Statin

## 2018-06-04 NOTE — PROGRESS NOTE ADULT - PROBLEM SELECTOR PROBLEM 4
Hyperlipidemia
Hyperlipidemia
Preoperative clearance
Hyperlipidemia

## 2018-06-04 NOTE — PROGRESS NOTE ADULT - PROBLEM SELECTOR PLAN 2
Patient underwent vascular bypass surgery and she had amputation of the right big toe
BB added
Patient underwent vascular bypass surgery
Patient underwent vascular bypass surgery and she had amputation of the right big toe
Patient underwent vascular bypass surgery which tolerated very welly
The patient is scheduled for surgery once clear by cardiology
Patient underwent vascular bypass surgery and she had ambutaion of the righ big toe
Patient underwent vascular bypass surgery and she had amputation of the right big toe
Patient underwent vascular bypass surgery and she had amputation of the right big toe

## 2018-06-04 NOTE — PROGRESS NOTE ADULT - CONSTITUTIONAL
Well-developed, well nourished
detailed exam
Well-developed, well nourished

## 2018-06-04 NOTE — PROGRESS NOTE ADULT - PROBLEM SELECTOR PROBLEM 3
Hypertension
Pure hypercholesterolemia
Hypertension

## 2018-06-04 NOTE — PROGRESS NOTE ADULT - PROBLEM SELECTOR PROBLEM 1
ASHD (arteriosclerotic heart disease)
Ischemic ulcer

## 2018-06-04 NOTE — PROGRESS NOTE ADULT - NS ABD PE RECTAL EXAM
not examined

## 2018-06-04 NOTE — PROGRESS NOTE ADULT - EXTREMITIES
No cyanosis, clubbing or edema
detailed exam
No cyanosis, clubbing or edema

## 2018-06-04 NOTE — PROGRESS NOTE ADULT - CARDIOVASCULAR SYMPTOMS
chest pain/palpitations
palpitations/chest pain
palpitations/chest pain
chest pain/palpitations
palpitations/chest pain

## 2018-06-04 NOTE — PROGRESS NOTE ADULT - VASCULAR
Equal and normal pulses (carotid, femoral, dorsalis pedis)
detailed exam
Equal and normal pulses (carotid, femoral, dorsalis pedis)

## 2018-06-04 NOTE — PROGRESS NOTE ADULT - BACK
No deformity or limitation of movement

## 2018-06-04 NOTE — PROGRESS NOTE ADULT - PROBLEM SELECTOR PLAN 5
BS is controlled on the current insulin dose
Blood sugars elevated and will adjust insulin regimen
Blood sugars elevated and will adjust insulin regimen
BS is controlled on the current insulin dose
Blood sugars elevated and will adjust insulin regimen

## 2018-06-04 NOTE — PROGRESS NOTE ADULT - PROBLEM SELECTOR PROBLEM 6
Postoperative state

## 2018-06-04 NOTE — PROGRESS NOTE ADULT - RESPIRATORY
Breath Sounds equal & clear to percussion & auscultation, no accessory muscle use

## 2018-06-04 NOTE — PROGRESS NOTE ADULT - SUBJECTIVE AND OBJECTIVE BOX
INTERVAL HISTORY:  less R foot pain	  Chart, PMH and allergies reviewed  MEDICATIONS:  amLODIPine   Tablet 10 milliGRAM(s) Oral daily  metoprolol succinate ER 25 milliGRAM(s) Oral daily        acetaminophen   Tablet. 650 milliGRAM(s) Oral every 6 hours PRN  gabapentin 300 milliGRAM(s) Oral three times a day  oxyCODONE    5 mG/acetaminophen 325 mG 1 Tablet(s) Oral every 6 hours PRN  oxyCODONE    5 mG/acetaminophen 325 mG 2 Tablet(s) Oral every 6 hours PRN  sertraline 50 milliGRAM(s) Oral daily    docusate sodium 100 milliGRAM(s) Oral three times a day    atorvastatin 80 milliGRAM(s) Oral at bedtime  dextrose 40% Gel 15 Gram(s) Oral once PRN  dextrose 50% Injectable 12.5 Gram(s) IV Push once  dextrose 50% Injectable 25 Gram(s) IV Push once  dextrose 50% Injectable 25 Gram(s) IV Push once  glucagon  Injectable 1 milliGRAM(s) IntraMuscular once PRN  insulin lispro (HumaLOG) corrective regimen sliding scale   SubCutaneous Before meals and at bedtime    apixaban 5 milliGRAM(s) Oral every 12 hours  aspirin enteric coated 81 milliGRAM(s) Oral daily  dextrose 5%. 1000 milliLiter(s) IV Continuous <Continuous>      REVIEW OF SYSTEMS:  CONSTITUTIONAL: No fever, no weight loss, or fatigue  PULMONARY: No cough, no wheezing, chills or hemoptysis; No Shortness of Breath  CARDIOVASCULAR: No chest pain, no palpitations, no syncope, dizziness, no leg swelling  GASTROINTESTINAL: No abdominal pain. No nausea, no  vomiting, or hematemesis; No diarrhea or constipation. No melena or hematochezia.  GENITOURINARY: No dysuria, frequency, hematuria, or incontinence  SKIN: No itching, burning, rashes, or lesions     PHYSICAL EXAM:  T(C): 34.2 (06-04-18 @ 09:34), Max: 36.8 (06-03-18 @ 22:00)  HR: 62 (06-04-18 @ 08:39) (52 - 66)  BP: 125/58 (06-04-18 @ 08:39) (125/58 - 174/70)  RR: 16 (06-04-18 @ 08:39) (14 - 16)  SpO2: 98% (06-04-18 @ 08:39) (97% - 98%)  Wt(kg): --  I&O's Summary    03 Jun 2018 07:01  -  04 Jun 2018 07:00  --------------------------------------------------------  IN: 420 mL / OUT: 400 mL / NET: 20 mL    04 Jun 2018 07:01  -  04 Jun 2018 10:00  --------------------------------------------------------  IN: 180 mL / OUT: 0 mL / NET: 180 mL        Appearance:  No deformities, normal appearance	  HEENT:   Normal oral mucosa, PERRL, EOMI	  Neck: No jvd , no masses  Lymphatic: No  lymphadenopathy  Pulmonary: Lungs clear to auscultation and percussion  Cardiovascular: Normal S1 S2, No JVD, No murmur, No edema	  Abdomen:  Soft, Non-tender, + BS  Skin: No rashes, No ecchymoses	  Extremities:  No clubbing or cyanosis, R foot bandaged, L 1st toe gangrene  MSK: Normal range of motion, no joint swelling    LABS:		  CARDIAC MARKERS:                         10.7   7.7   )-----------( 360      ( 03 Jun 2018 12:20 )             32.7     06-03    139  |  101  |  25<H>  ----------------------------<  160<H>  4.0   |  28  |  0.88    Ca    9.3      03 Jun 2018 12:20  Phos  4.1     06-03  Mg     2.2     06-03      proBNP:  Lipid Profile:  HgA1c:  TSH:    TELEMETRY: 	           ECG:  	            RADIOLOGY:   DIAGNOSTIC TESTING: [ ] Echocardiogram: [ ]  Catheterization: [ ] Stress Test:      ASSESSMENT/PLAN: 	    Coronary artery disease/ H/o MI-No chest discomfort.   Continue to monitor. Continue asa.     Hypertension- fairly good control. One high reading.    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 R foot is bandaged.      Diabetes-follow fingersticks.    H/o pulmonary embolus- Eliquis.

## 2018-06-05 VITALS
SYSTOLIC BLOOD PRESSURE: 120 MMHG | DIASTOLIC BLOOD PRESSURE: 57 MMHG | RESPIRATION RATE: 16 BRPM | OXYGEN SATURATION: 100 % | HEART RATE: 55 BPM

## 2018-06-05 LAB
GLUCOSE BLDC GLUCOMTR-MCNC: 182 MG/DL — HIGH (ref 70–99)
GLUCOSE BLDC GLUCOMTR-MCNC: 224 MG/DL — HIGH (ref 70–99)
GLUCOSE BLDC GLUCOMTR-MCNC: 96 MG/DL — SIGNIFICANT CHANGE UP (ref 70–99)

## 2018-06-05 PROCEDURE — 82550 ASSAY OF CK (CPK): CPT

## 2018-06-05 PROCEDURE — 80202 ASSAY OF VANCOMYCIN: CPT

## 2018-06-05 PROCEDURE — 86901 BLOOD TYPING SEROLOGIC RH(D): CPT

## 2018-06-05 PROCEDURE — 93005 ELECTROCARDIOGRAM TRACING: CPT | Mod: 77

## 2018-06-05 PROCEDURE — 73030 X-RAY EXAM OF SHOULDER: CPT

## 2018-06-05 PROCEDURE — 84295 ASSAY OF SERUM SODIUM: CPT

## 2018-06-05 PROCEDURE — A9500: CPT

## 2018-06-05 PROCEDURE — 81001 URINALYSIS AUTO W/SCOPE: CPT

## 2018-06-05 PROCEDURE — 86850 RBC ANTIBODY SCREEN: CPT

## 2018-06-05 PROCEDURE — 83880 ASSAY OF NATRIURETIC PEPTIDE: CPT

## 2018-06-05 PROCEDURE — 83935 ASSAY OF URINE OSMOLALITY: CPT

## 2018-06-05 PROCEDURE — 82553 CREATINE MB FRACTION: CPT

## 2018-06-05 PROCEDURE — 82570 ASSAY OF URINE CREATININE: CPT

## 2018-06-05 PROCEDURE — 83036 HEMOGLOBIN GLYCOSYLATED A1C: CPT

## 2018-06-05 PROCEDURE — 80053 COMPREHEN METABOLIC PANEL: CPT

## 2018-06-05 PROCEDURE — 78452 HT MUSCLE IMAGE SPECT MULT: CPT

## 2018-06-05 PROCEDURE — 88305 TISSUE EXAM BY PATHOLOGIST: CPT

## 2018-06-05 PROCEDURE — 85610 PROTHROMBIN TIME: CPT

## 2018-06-05 PROCEDURE — 87040 BLOOD CULTURE FOR BACTERIA: CPT

## 2018-06-05 PROCEDURE — 93306 TTE W/DOPPLER COMPLETE: CPT

## 2018-06-05 PROCEDURE — 81003 URINALYSIS AUTO W/O SCOPE: CPT

## 2018-06-05 PROCEDURE — 83735 ASSAY OF MAGNESIUM: CPT

## 2018-06-05 PROCEDURE — 86900 BLOOD TYPING SEROLOGIC ABO: CPT

## 2018-06-05 PROCEDURE — C1894: CPT

## 2018-06-05 PROCEDURE — 82962 GLUCOSE BLOOD TEST: CPT

## 2018-06-05 PROCEDURE — 85027 COMPLETE CBC AUTOMATED: CPT

## 2018-06-05 PROCEDURE — 97530 THERAPEUTIC ACTIVITIES: CPT

## 2018-06-05 PROCEDURE — 97116 GAIT TRAINING THERAPY: CPT

## 2018-06-05 PROCEDURE — 87086 URINE CULTURE/COLONY COUNT: CPT

## 2018-06-05 PROCEDURE — 84300 ASSAY OF URINE SODIUM: CPT

## 2018-06-05 PROCEDURE — 82803 BLOOD GASES ANY COMBINATION: CPT

## 2018-06-05 PROCEDURE — 88311 DECALCIFY TISSUE: CPT

## 2018-06-05 PROCEDURE — A9505: CPT

## 2018-06-05 PROCEDURE — C1769: CPT

## 2018-06-05 PROCEDURE — 84100 ASSAY OF PHOSPHORUS: CPT

## 2018-06-05 PROCEDURE — 71045 X-RAY EXAM CHEST 1 VIEW: CPT

## 2018-06-05 PROCEDURE — 99285 EMERGENCY DEPT VISIT HI MDM: CPT | Mod: 25

## 2018-06-05 PROCEDURE — 97164 PT RE-EVAL EST PLAN CARE: CPT

## 2018-06-05 PROCEDURE — 87070 CULTURE OTHR SPECIMN AEROBIC: CPT

## 2018-06-05 PROCEDURE — 84484 ASSAY OF TROPONIN QUANT: CPT

## 2018-06-05 PROCEDURE — 36415 COLL VENOUS BLD VENIPUNCTURE: CPT

## 2018-06-05 PROCEDURE — 86923 COMPATIBILITY TEST ELECTRIC: CPT

## 2018-06-05 PROCEDURE — 96374 THER/PROPH/DIAG INJ IV PUSH: CPT

## 2018-06-05 PROCEDURE — 76000 FLUOROSCOPY <1 HR PHYS/QHP: CPT

## 2018-06-05 PROCEDURE — P9016: CPT

## 2018-06-05 PROCEDURE — 85730 THROMBOPLASTIN TIME PARTIAL: CPT

## 2018-06-05 PROCEDURE — 87186 SC STD MICRODIL/AGAR DIL: CPT

## 2018-06-05 PROCEDURE — 83605 ASSAY OF LACTIC ACID: CPT

## 2018-06-05 PROCEDURE — 85025 COMPLETE CBC W/AUTO DIFF WBC: CPT

## 2018-06-05 PROCEDURE — 93970 EXTREMITY STUDY: CPT

## 2018-06-05 PROCEDURE — 97162 PT EVAL MOD COMPLEX 30 MIN: CPT

## 2018-06-05 PROCEDURE — 84132 ASSAY OF SERUM POTASSIUM: CPT

## 2018-06-05 PROCEDURE — C1760: CPT

## 2018-06-05 PROCEDURE — 93017 CV STRESS TEST TRACING ONLY: CPT

## 2018-06-05 PROCEDURE — 36430 TRANSFUSION BLD/BLD COMPNT: CPT

## 2018-06-05 PROCEDURE — 80048 BASIC METABOLIC PNL TOTAL CA: CPT

## 2018-06-05 PROCEDURE — 82330 ASSAY OF CALCIUM: CPT

## 2018-06-05 PROCEDURE — 97110 THERAPEUTIC EXERCISES: CPT

## 2018-06-05 PROCEDURE — C1887: CPT

## 2018-06-05 RX ORDER — CARVEDILOL PHOSPHATE 80 MG/1
1 CAPSULE, EXTENDED RELEASE ORAL
Qty: 0 | Refills: 0 | COMMUNITY

## 2018-06-05 RX ORDER — ASPIRIN/CALCIUM CARB/MAGNESIUM 324 MG
1 TABLET ORAL
Qty: 0 | Refills: 0 | COMMUNITY
Start: 2018-06-05

## 2018-06-05 RX ORDER — METOPROLOL TARTRATE 50 MG
1 TABLET ORAL
Qty: 0 | Refills: 0 | COMMUNITY
Start: 2018-06-05

## 2018-06-05 RX ORDER — ACETAMINOPHEN 500 MG
2 TABLET ORAL
Qty: 0 | Refills: 0 | COMMUNITY
Start: 2018-06-05

## 2018-06-05 RX ORDER — MECLIZINE HCL 12.5 MG
1 TABLET ORAL
Qty: 0 | Refills: 0 | COMMUNITY

## 2018-06-05 RX ADMIN — Medication 4: at 16:42

## 2018-06-05 RX ADMIN — Medication 81 MILLIGRAM(S): at 11:27

## 2018-06-05 RX ADMIN — GABAPENTIN 300 MILLIGRAM(S): 400 CAPSULE ORAL at 06:23

## 2018-06-05 RX ADMIN — Medication 2: at 11:28

## 2018-06-05 RX ADMIN — Medication 100 MILLIGRAM(S): at 13:15

## 2018-06-05 RX ADMIN — APIXABAN 5 MILLIGRAM(S): 2.5 TABLET, FILM COATED ORAL at 17:00

## 2018-06-05 RX ADMIN — GABAPENTIN 300 MILLIGRAM(S): 400 CAPSULE ORAL at 13:15

## 2018-06-05 RX ADMIN — SERTRALINE 50 MILLIGRAM(S): 25 TABLET, FILM COATED ORAL at 11:28

## 2018-06-05 RX ADMIN — Medication 25 MILLIGRAM(S): at 11:27

## 2018-06-05 RX ADMIN — Medication 100 MILLIGRAM(S): at 06:23

## 2018-06-05 RX ADMIN — AMLODIPINE BESYLATE 10 MILLIGRAM(S): 2.5 TABLET ORAL at 06:23

## 2018-06-05 RX ADMIN — APIXABAN 5 MILLIGRAM(S): 2.5 TABLET, FILM COATED ORAL at 06:23

## 2018-06-05 NOTE — PROGRESS NOTE ADULT - SUBJECTIVE AND OBJECTIVE BOX
24hr Events:  o/n: VALERIE, VSS  6/4: awaiting JAMI      Assessment/Plan:  84yFemale s/p R first Toe Amp and RLE CFA to Peroneal bypass    Diet: Consistent Carb diet  Pain/nausea control  Provena off, Xeroform to incision.  DVT ppx  PT eval   Eliquis  Home medication  dispo: JAMI 24hr Events:  o/n: VALERIE, VSS  6/4: awaiting JAMI      amLODIPine   Tablet 10  apixaban 5  aspirin enteric coated 81  metoprolol succinate ER 25        Vital Signs Last 24 Hrs  T(C): 36.2 (05 Jun 2018 06:20), Max: 36.9 (04 Jun 2018 22:10)  T(F): 97.1 (05 Jun 2018 06:20), Max: 98.5 (04 Jun 2018 22:10)  HR: 58 (05 Jun 2018 06:08) (58 - 70)  BP: 137/65 (05 Jun 2018 06:08) (92/42 - 143/64)  BP(mean): --  RR: 17 (05 Jun 2018 06:08) (16 - 17)  SpO2: 97% (05 Jun 2018 06:08) (95% - 100%)  I&O's Detail    04 Jun 2018 07:01  -  05 Jun 2018 07:00  --------------------------------------------------------  IN:    Oral Fluid: 660 mL  Total IN: 660 mL    OUT:    Voided: 1 mL  Total OUT: 1 mL    Total NET: 659 mL          Physical Exam:  General: NAD, resting comfortably in bed  Pulmonary: Nonlabored breathing, no respiratory distress  Cardiovascular: NSR  Abdominal: soft, NT/ND  Extremities: R great toe amputation site, well healing  Pulses: R DP biphasic / PT triphasic.       LABS:                        10.7   7.7   )-----------( 360      ( 03 Jun 2018 12:20 )             32.7     06-03    139  |  101  |  25<H>  ----------------------------<  160<H>  4.0   |  28  |  0.88    Ca    9.3      03 Jun 2018 12:20  Phos  4.1     06-03  Mg     2.2     06-03          Radiology and Additional Studies:

## 2018-06-05 NOTE — PROGRESS NOTE ADULT - PROVIDER SPECIALTY LIST ADULT
Cardiology
Internal Medicine
SICU
SICU
Vascular Surgery
Internal Medicine

## 2018-06-05 NOTE — PROGRESS NOTE ADULT - ASSESSMENT
Assessment/Plan:  84yFemale s/p R first Toe Amp and RLE CFA to Peroneal bypass    Diet: Consistent Carb diet  Pain/nausea control  Provena off, Xeroform to incision.  DVT ppx  PT eval   Eliquis  Home medication  dispo: JAMI

## 2018-06-07 PROBLEM — L97.909 NON-PRESSURE CHRONIC ULCER OF UNSPECIFIED PART OF UNSPECIFIED LOWER LEG WITH UNSPECIFIED SEVERITY: Chronic | Status: ACTIVE | Noted: 2018-05-16

## 2018-06-07 PROBLEM — I73.9 PERIPHERAL VASCULAR DISEASE, UNSPECIFIED: Chronic | Status: ACTIVE | Noted: 2018-05-16

## 2018-06-07 PROBLEM — I25.9 CHRONIC ISCHEMIC HEART DISEASE, UNSPECIFIED: Chronic | Status: ACTIVE | Noted: 2018-05-16

## 2018-06-07 PROBLEM — I10 ESSENTIAL (PRIMARY) HYPERTENSION: Chronic | Status: ACTIVE | Noted: 2018-05-16

## 2018-06-07 PROBLEM — M12.9 ARTHROPATHY, UNSPECIFIED: Chronic | Status: ACTIVE | Noted: 2018-05-16

## 2018-06-07 PROBLEM — E78.5 HYPERLIPIDEMIA, UNSPECIFIED: Chronic | Status: ACTIVE | Noted: 2018-05-16

## 2018-06-08 PROBLEM — Z00.00 ENCOUNTER FOR PREVENTIVE HEALTH EXAMINATION: Status: ACTIVE | Noted: 2018-06-08

## 2018-06-12 DIAGNOSIS — Z86.711 PERSONAL HISTORY OF PULMONARY EMBOLISM: ICD-10-CM

## 2018-06-12 DIAGNOSIS — I25.10 ATHEROSCLEROTIC HEART DISEASE OF NATIVE CORONARY ARTERY WITHOUT ANGINA PECTORIS: ICD-10-CM

## 2018-06-12 DIAGNOSIS — E11.22 TYPE 2 DIABETES MELLITUS WITH DIABETIC CHRONIC KIDNEY DISEASE: ICD-10-CM

## 2018-06-12 DIAGNOSIS — Z95.5 PRESENCE OF CORONARY ANGIOPLASTY IMPLANT AND GRAFT: ICD-10-CM

## 2018-06-12 DIAGNOSIS — E78.5 HYPERLIPIDEMIA, UNSPECIFIED: ICD-10-CM

## 2018-06-12 DIAGNOSIS — I70.263 ATHEROSCLEROSIS OF NATIVE ARTERIES OF EXTREMITIES WITH GANGRENE, BILATERAL LEGS: ICD-10-CM

## 2018-06-12 DIAGNOSIS — F03.90 UNSPECIFIED DEMENTIA WITHOUT BEHAVIORAL DISTURBANCE: ICD-10-CM

## 2018-06-12 DIAGNOSIS — E66.9 OBESITY, UNSPECIFIED: ICD-10-CM

## 2018-06-12 DIAGNOSIS — I82.401 ACUTE EMBOLISM AND THROMBOSIS OF UNSPECIFIED DEEP VEINS OF RIGHT LOWER EXTREMITY: ICD-10-CM

## 2018-06-12 DIAGNOSIS — I12.9 HYPERTENSIVE CHRONIC KIDNEY DISEASE WITH STAGE 1 THROUGH STAGE 4 CHRONIC KIDNEY DISEASE, OR UNSPECIFIED CHRONIC KIDNEY DISEASE: ICD-10-CM

## 2018-06-12 DIAGNOSIS — L97.519 NON-PRESSURE CHRONIC ULCER OF OTHER PART OF RIGHT FOOT WITH UNSPECIFIED SEVERITY: ICD-10-CM

## 2018-06-12 DIAGNOSIS — E11.52 TYPE 2 DIABETES MELLITUS WITH DIABETIC PERIPHERAL ANGIOPATHY WITH GANGRENE: ICD-10-CM

## 2018-06-12 DIAGNOSIS — Z79.84 LONG TERM (CURRENT) USE OF ORAL HYPOGLYCEMIC DRUGS: ICD-10-CM

## 2018-06-12 DIAGNOSIS — Z88.0 ALLERGY STATUS TO PENICILLIN: ICD-10-CM

## 2018-06-12 DIAGNOSIS — N18.9 CHRONIC KIDNEY DISEASE, UNSPECIFIED: ICD-10-CM

## 2018-06-12 DIAGNOSIS — Z79.01 LONG TERM (CURRENT) USE OF ANTICOAGULANTS: ICD-10-CM

## 2018-06-12 DIAGNOSIS — Z91.14 PATIENT'S OTHER NONCOMPLIANCE WITH MEDICATION REGIMEN: ICD-10-CM

## 2018-06-12 DIAGNOSIS — E11.621 TYPE 2 DIABETES MELLITUS WITH FOOT ULCER: ICD-10-CM

## 2018-06-12 DIAGNOSIS — I70.92 CHRONIC TOTAL OCCLUSION OF ARTERY OF THE EXTREMITIES: ICD-10-CM

## 2018-06-12 DIAGNOSIS — L97.529 NON-PRESSURE CHRONIC ULCER OF OTHER PART OF LEFT FOOT WITH UNSPECIFIED SEVERITY: ICD-10-CM

## 2018-06-12 DIAGNOSIS — I25.2 OLD MYOCARDIAL INFARCTION: ICD-10-CM

## 2018-06-25 ENCOUNTER — APPOINTMENT (OUTPATIENT)
Dept: VASCULAR SURGERY | Facility: CLINIC | Age: 83
End: 2018-06-25
Payer: MEDICARE

## 2018-06-25 VITALS — DIASTOLIC BLOOD PRESSURE: 57 MMHG | SYSTOLIC BLOOD PRESSURE: 130 MMHG | TEMPERATURE: 98.7 F | OXYGEN SATURATION: 98 %

## 2018-06-25 PROCEDURE — 93926 LOWER EXTREMITY STUDY: CPT | Mod: 79

## 2018-06-25 PROCEDURE — 99024 POSTOP FOLLOW-UP VISIT: CPT

## 2018-07-30 ENCOUNTER — APPOINTMENT (OUTPATIENT)
Dept: VASCULAR SURGERY | Facility: CLINIC | Age: 83
End: 2018-07-30
Payer: MEDICARE

## 2018-07-30 PROCEDURE — 99214 OFFICE O/P EST MOD 30 MIN: CPT | Mod: 24

## 2018-08-20 ENCOUNTER — APPOINTMENT (OUTPATIENT)
Dept: VASCULAR SURGERY | Facility: CLINIC | Age: 83
End: 2018-08-20
Payer: MEDICARE

## 2018-08-20 PROCEDURE — 99213 OFFICE O/P EST LOW 20 MIN: CPT | Mod: 24

## 2018-09-17 ENCOUNTER — APPOINTMENT (OUTPATIENT)
Dept: VASCULAR SURGERY | Facility: CLINIC | Age: 83
End: 2018-09-17
Payer: MEDICARE

## 2018-09-17 PROCEDURE — 99213 OFFICE O/P EST LOW 20 MIN: CPT

## 2018-10-29 ENCOUNTER — APPOINTMENT (OUTPATIENT)
Dept: VASCULAR SURGERY | Facility: CLINIC | Age: 83
End: 2018-10-29
Payer: MEDICARE

## 2018-10-29 ENCOUNTER — OUTPATIENT (OUTPATIENT)
Dept: OUTPATIENT SERVICES | Facility: HOSPITAL | Age: 83
LOS: 1 days | End: 2018-10-29
Payer: MEDICARE

## 2018-10-29 DIAGNOSIS — I73.9 PERIPHERAL VASCULAR DISEASE, UNSPECIFIED: ICD-10-CM

## 2018-10-29 LAB
ALBUMIN SERPL ELPH-MCNC: 4 G/DL — SIGNIFICANT CHANGE UP (ref 3.3–5)
ALP SERPL-CCNC: 90 U/L — SIGNIFICANT CHANGE UP (ref 40–120)
ALT FLD-CCNC: 19 U/L — SIGNIFICANT CHANGE UP (ref 10–45)
ANION GAP SERPL CALC-SCNC: 17 MMOL/L — SIGNIFICANT CHANGE UP (ref 5–17)
APTT BLD: 23.9 SEC — LOW (ref 27.5–37.4)
AST SERPL-CCNC: 19 U/L — SIGNIFICANT CHANGE UP (ref 10–40)
BILIRUB SERPL-MCNC: 0.3 MG/DL — SIGNIFICANT CHANGE UP (ref 0.2–1.2)
BUN SERPL-MCNC: 17 MG/DL — SIGNIFICANT CHANGE UP (ref 7–23)
CALCIUM SERPL-MCNC: 9.6 MG/DL — SIGNIFICANT CHANGE UP (ref 8.4–10.5)
CHLORIDE SERPL-SCNC: 100 MMOL/L — SIGNIFICANT CHANGE UP (ref 96–108)
CO2 SERPL-SCNC: 24 MMOL/L — SIGNIFICANT CHANGE UP (ref 22–31)
CREAT SERPL-MCNC: 0.82 MG/DL — SIGNIFICANT CHANGE UP (ref 0.5–1.3)
EOSINOPHIL NFR BLD AUTO: 4.3 % — SIGNIFICANT CHANGE UP (ref 0–6)
GLUCOSE SERPL-MCNC: 90 MG/DL — SIGNIFICANT CHANGE UP (ref 70–99)
HCT VFR BLD CALC: 39.7 % — SIGNIFICANT CHANGE UP (ref 34.5–45)
HGB BLD-MCNC: 12.7 G/DL — SIGNIFICANT CHANGE UP (ref 11.5–15.5)
INR BLD: 1.08 — SIGNIFICANT CHANGE UP (ref 0.88–1.16)
LYMPHOCYTES # BLD AUTO: 40.8 % — SIGNIFICANT CHANGE UP (ref 13–44)
MCHC RBC-ENTMCNC: 29.1 PG — SIGNIFICANT CHANGE UP (ref 27–34)
MCHC RBC-ENTMCNC: 32 G/DL — SIGNIFICANT CHANGE UP (ref 32–36)
MCV RBC AUTO: 90.8 FL — SIGNIFICANT CHANGE UP (ref 80–100)
MONOCYTES NFR BLD AUTO: 10.8 % — SIGNIFICANT CHANGE UP (ref 2–14)
NEUTROPHILS NFR BLD AUTO: 44.1 % — SIGNIFICANT CHANGE UP (ref 43–77)
PLATELET # BLD AUTO: 192 K/UL — SIGNIFICANT CHANGE UP (ref 150–400)
POTASSIUM SERPL-MCNC: 4.4 MMOL/L — SIGNIFICANT CHANGE UP (ref 3.5–5.3)
POTASSIUM SERPL-SCNC: 4.4 MMOL/L — SIGNIFICANT CHANGE UP (ref 3.5–5.3)
PROT SERPL-MCNC: 6.6 G/DL — SIGNIFICANT CHANGE UP (ref 6–8.3)
PROTHROM AB SERPL-ACNC: 12 SEC — SIGNIFICANT CHANGE UP (ref 9.8–12.7)
RBC # BLD: 4.37 M/UL — SIGNIFICANT CHANGE UP (ref 3.8–5.2)
RBC # FLD: 14.6 % — SIGNIFICANT CHANGE UP (ref 10.3–16.9)
SODIUM SERPL-SCNC: 141 MMOL/L — SIGNIFICANT CHANGE UP (ref 135–145)
WBC # BLD: 5.6 K/UL — SIGNIFICANT CHANGE UP (ref 3.8–10.5)
WBC # FLD AUTO: 5.6 K/UL — SIGNIFICANT CHANGE UP (ref 3.8–10.5)

## 2018-10-29 PROCEDURE — 80053 COMPREHEN METABOLIC PANEL: CPT

## 2018-10-29 PROCEDURE — 93010 ELECTROCARDIOGRAM REPORT: CPT

## 2018-10-29 PROCEDURE — 85025 COMPLETE CBC W/AUTO DIFF WBC: CPT

## 2018-10-29 PROCEDURE — 85610 PROTHROMBIN TIME: CPT

## 2018-10-29 PROCEDURE — 99215 OFFICE O/P EST HI 40 MIN: CPT

## 2018-10-29 PROCEDURE — 93005 ELECTROCARDIOGRAM TRACING: CPT

## 2018-10-29 PROCEDURE — 85730 THROMBOPLASTIN TIME PARTIAL: CPT

## 2018-11-12 VITALS
HEIGHT: 64 IN | OXYGEN SATURATION: 100 % | RESPIRATION RATE: 18 BRPM | SYSTOLIC BLOOD PRESSURE: 133 MMHG | WEIGHT: 189.6 LBS | DIASTOLIC BLOOD PRESSURE: 58 MMHG | TEMPERATURE: 97 F | HEART RATE: 54 BPM

## 2018-11-12 NOTE — PATIENT PROFILE ADULT - STATED REASON FOR ADMISSION
Left lower extremity angio/pta possbile 1st toe amputation Left lower extremity angio/pta possbile   1st toe amputation

## 2018-11-13 ENCOUNTER — APPOINTMENT (OUTPATIENT)
Dept: VASCULAR SURGERY | Facility: HOSPITAL | Age: 83
End: 2018-11-13

## 2018-11-13 ENCOUNTER — RESULT REVIEW (OUTPATIENT)
Age: 83
End: 2018-11-13

## 2018-11-13 ENCOUNTER — INPATIENT (INPATIENT)
Facility: HOSPITAL | Age: 83
LOS: 5 days | Discharge: NURSING, REHAB OR LONG-TERM CARE | DRG: 247 | End: 2018-11-19
Attending: SURGERY | Admitting: SURGERY
Payer: MEDICARE

## 2018-11-13 DIAGNOSIS — Z89.9 ACQUIRED ABSENCE OF LIMB, UNSPECIFIED: Chronic | ICD-10-CM

## 2018-11-13 DIAGNOSIS — Z98.890 OTHER SPECIFIED POSTPROCEDURAL STATES: Chronic | ICD-10-CM

## 2018-11-13 PROBLEM — E11.9 TYPE 2 DIABETES MELLITUS WITHOUT COMPLICATIONS: Chronic | Status: ACTIVE | Noted: 2018-05-16

## 2018-11-13 LAB
GLUCOSE BLDC GLUCOMTR-MCNC: 100 MG/DL — HIGH (ref 70–99)
GLUCOSE BLDC GLUCOMTR-MCNC: 101 MG/DL — HIGH (ref 70–99)
GLUCOSE BLDC GLUCOMTR-MCNC: 250 MG/DL — HIGH (ref 70–99)

## 2018-11-13 PROCEDURE — 75710 ARTERY X-RAYS ARM/LEG: CPT | Mod: 26,GC

## 2018-11-13 PROCEDURE — 36246 INS CATH ABD/L-EXT ART 2ND: CPT | Mod: 59,GC

## 2018-11-13 PROCEDURE — 75625 CONTRAST EXAM ABDOMINL AORTA: CPT | Mod: 26,GC

## 2018-11-13 PROCEDURE — 28820 AMPUTATION OF TOE: CPT | Mod: GC

## 2018-11-13 PROCEDURE — 36247 INS CATH ABD/L-EXT ART 3RD: CPT | Mod: GC

## 2018-11-13 PROCEDURE — 36140 INTRO NDL ICATH UPR/LXTR ART: CPT | Mod: 59,GC

## 2018-11-13 RX ORDER — BIMATOPROST 0.3 MG/ML
1 SOLUTION/ DROPS OPHTHALMIC
Qty: 0 | Refills: 0 | COMMUNITY

## 2018-11-13 RX ORDER — ROSUVASTATIN CALCIUM 5 MG/1
1 TABLET ORAL
Qty: 0 | Refills: 0 | COMMUNITY

## 2018-11-13 RX ORDER — ASPIRIN/CALCIUM CARB/MAGNESIUM 324 MG
81 TABLET ORAL DAILY
Qty: 0 | Refills: 0 | Status: DISCONTINUED | OUTPATIENT
Start: 2018-11-13 | End: 2018-11-19

## 2018-11-13 RX ORDER — AMLODIPINE BESYLATE 2.5 MG/1
1 TABLET ORAL
Qty: 0 | Refills: 0 | COMMUNITY

## 2018-11-13 RX ORDER — MECLIZINE HCL 12.5 MG
1 TABLET ORAL
Qty: 0 | Refills: 0 | COMMUNITY

## 2018-11-13 RX ORDER — BRIMONIDINE TARTRATE 2 MG/MG
0 SOLUTION/ DROPS OPHTHALMIC
Qty: 0 | Refills: 0 | COMMUNITY

## 2018-11-13 RX ORDER — DEXTROSE 50 % IN WATER 50 %
15 SYRINGE (ML) INTRAVENOUS ONCE
Qty: 0 | Refills: 0 | Status: DISCONTINUED | OUTPATIENT
Start: 2018-11-13 | End: 2018-11-19

## 2018-11-13 RX ORDER — DOCUSATE SODIUM 100 MG
10 CAPSULE ORAL
Qty: 0 | Refills: 0 | COMMUNITY

## 2018-11-13 RX ORDER — HYDRALAZINE HCL 50 MG
1 TABLET ORAL
Qty: 0 | Refills: 0 | COMMUNITY

## 2018-11-13 RX ORDER — SODIUM CHLORIDE 9 MG/ML
1000 INJECTION INTRAMUSCULAR; INTRAVENOUS; SUBCUTANEOUS
Qty: 0 | Refills: 0 | Status: DISCONTINUED | OUTPATIENT
Start: 2018-11-13 | End: 2018-11-14

## 2018-11-13 RX ORDER — GABAPENTIN 400 MG/1
300 CAPSULE ORAL THREE TIMES A DAY
Qty: 0 | Refills: 0 | Status: DISCONTINUED | OUTPATIENT
Start: 2018-11-13 | End: 2018-11-19

## 2018-11-13 RX ORDER — MORPHINE SULFATE 50 MG/1
2 CAPSULE, EXTENDED RELEASE ORAL
Qty: 0 | Refills: 0 | Status: DISCONTINUED | OUTPATIENT
Start: 2018-11-13 | End: 2018-11-19

## 2018-11-13 RX ORDER — METOPROLOL TARTRATE 50 MG
25 TABLET ORAL DAILY
Qty: 0 | Refills: 0 | Status: DISCONTINUED | OUTPATIENT
Start: 2018-11-13 | End: 2018-11-19

## 2018-11-13 RX ORDER — TIMOLOL 0.5 %
1 DROPS OPHTHALMIC (EYE)
Qty: 0 | Refills: 0 | Status: DISCONTINUED | OUTPATIENT
Start: 2018-11-13 | End: 2018-11-19

## 2018-11-13 RX ORDER — DEXTROSE 50 % IN WATER 50 %
25 SYRINGE (ML) INTRAVENOUS ONCE
Qty: 0 | Refills: 0 | Status: DISCONTINUED | OUTPATIENT
Start: 2018-11-13 | End: 2018-11-19

## 2018-11-13 RX ORDER — OXYCODONE AND ACETAMINOPHEN 5; 325 MG/1; MG/1
1 TABLET ORAL EVERY 4 HOURS
Qty: 0 | Refills: 0 | Status: DISCONTINUED | OUTPATIENT
Start: 2018-11-13 | End: 2018-11-19

## 2018-11-13 RX ORDER — TIMOLOL 0.5 %
1 DROPS OPHTHALMIC (EYE)
Qty: 0 | Refills: 0 | COMMUNITY

## 2018-11-13 RX ORDER — BRIMONIDINE TARTRATE 2 MG/MG
1 SOLUTION/ DROPS OPHTHALMIC THREE TIMES A DAY
Qty: 0 | Refills: 0 | Status: DISCONTINUED | OUTPATIENT
Start: 2018-11-13 | End: 2018-11-19

## 2018-11-13 RX ORDER — SERTRALINE 25 MG/1
50 TABLET, FILM COATED ORAL DAILY
Qty: 0 | Refills: 0 | Status: DISCONTINUED | OUTPATIENT
Start: 2018-11-13 | End: 2018-11-19

## 2018-11-13 RX ORDER — INSULIN LISPRO 100/ML
VIAL (ML) SUBCUTANEOUS
Qty: 0 | Refills: 0 | Status: DISCONTINUED | OUTPATIENT
Start: 2018-11-13 | End: 2018-11-19

## 2018-11-13 RX ORDER — SODIUM CHLORIDE 9 MG/ML
1000 INJECTION, SOLUTION INTRAVENOUS
Qty: 0 | Refills: 0 | Status: DISCONTINUED | OUTPATIENT
Start: 2018-11-13 | End: 2018-11-19

## 2018-11-13 RX ORDER — DEXTROSE 50 % IN WATER 50 %
12.5 SYRINGE (ML) INTRAVENOUS ONCE
Qty: 0 | Refills: 0 | Status: DISCONTINUED | OUTPATIENT
Start: 2018-11-13 | End: 2018-11-19

## 2018-11-13 RX ORDER — METFORMIN HYDROCHLORIDE 850 MG/1
1 TABLET ORAL
Qty: 0 | Refills: 0 | COMMUNITY

## 2018-11-13 RX ORDER — AMLODIPINE BESYLATE 2.5 MG/1
10 TABLET ORAL DAILY
Qty: 0 | Refills: 0 | Status: DISCONTINUED | OUTPATIENT
Start: 2018-11-13 | End: 2018-11-19

## 2018-11-13 RX ORDER — GLUCAGON INJECTION, SOLUTION 0.5 MG/.1ML
1 INJECTION, SOLUTION SUBCUTANEOUS ONCE
Qty: 0 | Refills: 0 | Status: DISCONTINUED | OUTPATIENT
Start: 2018-11-13 | End: 2018-11-19

## 2018-11-13 RX ORDER — LOSARTAN POTASSIUM 100 MG/1
1 TABLET, FILM COATED ORAL
Qty: 0 | Refills: 0 | COMMUNITY

## 2018-11-13 RX ORDER — ATORVASTATIN CALCIUM 80 MG/1
80 TABLET, FILM COATED ORAL AT BEDTIME
Qty: 0 | Refills: 0 | Status: DISCONTINUED | OUTPATIENT
Start: 2018-11-13 | End: 2018-11-19

## 2018-11-13 RX ORDER — LATANOPROST 0.05 MG/ML
1 SOLUTION/ DROPS OPHTHALMIC; TOPICAL AT BEDTIME
Qty: 0 | Refills: 0 | Status: DISCONTINUED | OUTPATIENT
Start: 2018-11-13 | End: 2018-11-19

## 2018-11-13 RX ORDER — GABAPENTIN 400 MG/1
1 CAPSULE ORAL
Qty: 0 | Refills: 0 | COMMUNITY

## 2018-11-13 RX ORDER — MORPHINE SULFATE 50 MG/1
2 CAPSULE, EXTENDED RELEASE ORAL
Qty: 0 | Refills: 0 | Status: DISCONTINUED | OUTPATIENT
Start: 2018-11-13 | End: 2018-11-13

## 2018-11-13 RX ADMIN — LATANOPROST 1 DROP(S): 0.05 SOLUTION/ DROPS OPHTHALMIC; TOPICAL at 23:33

## 2018-11-13 RX ADMIN — GABAPENTIN 300 MILLIGRAM(S): 400 CAPSULE ORAL at 22:35

## 2018-11-13 RX ADMIN — SODIUM CHLORIDE 60 MILLILITER(S): 9 INJECTION INTRAMUSCULAR; INTRAVENOUS; SUBCUTANEOUS at 18:23

## 2018-11-13 RX ADMIN — Medication 2: at 22:36

## 2018-11-13 RX ADMIN — ATORVASTATIN CALCIUM 80 MILLIGRAM(S): 80 TABLET, FILM COATED ORAL at 22:35

## 2018-11-13 RX ADMIN — BRIMONIDINE TARTRATE 1 DROP(S): 2 SOLUTION/ DROPS OPHTHALMIC at 23:34

## 2018-11-13 NOTE — H&P ADULT - ASSESSMENT
85 yo female s/p R leg bypass and right great toe amputation presenting with non-healing ulcer of the L great toe. Pain of the left foot with rest and left leg MATY of 0.4 reported.    - Angiogram of the left leg followed by amputation of the L great toe scheduled.

## 2018-11-13 NOTE — BRIEF OPERATIVE NOTE - OPERATION/FINDINGS
Angiogram shows patent aorta and bilateral iliac vessels. Patent L CFA and profunda. L SFA occluded the origin. Extensive collateralization throughout leg with no named vessels until DP reconstitutes distally.     Left 1st toe with purulent/fibrinous material, amputated to healthy appearing tissue. Minimal bleeding.

## 2018-11-13 NOTE — PROGRESS NOTE ADULT - SUBJECTIVE AND OBJECTIVE BOX
Vascular Surgery Post-Op Note, PCN:     Pre-Op Dx: L97.521 173.9  Toe ulcer    Procedure: Angiogram  Toe amputation    Surgeon: Emanuel    Subjective:  Reports being hungry. Otherwise no acute complaints. Denies CP/SOB. Denies nausea/vomiting. Pain well controlled.    Vital Signs Last 24 Hrs  T(C): 36.2 (13 Nov 2018 15:59), Max: 36.2 (13 Nov 2018 15:59)  T(F): 97.2 (13 Nov 2018 15:59), Max: 97.2 (13 Nov 2018 15:59)  HR: 56 (13 Nov 2018 18:30) (46 - 56)  BP: 140/62 (13 Nov 2018 18:00) (127/56 - 153/71)  BP(mean): 93 (13 Nov 2018 18:00) (70 - 111)  RR: 17 (13 Nov 2018 18:30) (9 - 20)  SpO2: 95% (13 Nov 2018 18:30) (95% - 100%)    Physical Exam:  General: NAD, resting comfortably in bed  Pulmonary: Nonlabored breathing, no respiratory distress  Abdominal: soft, NT/ND  Extremities: WWP, normal strength; LLE foot dressing in place; R groin soft, no hematoma  Neuro: A/O x 3    CAPILLARY BLOOD GLUCOSE  POCT Blood Glucose.: 101 mg/dL (13 Nov 2018 16:08)  POCT Blood Glucose.: 100 mg/dL (13 Nov 2018 12:44)    Radiology and Additional Studies:    Assessment:84y Female s/p above procedure    Plan:  Pain/nausea control PRN  Home meds  Incentive spirometer/OOB/Ambulate  Vitals per protocol  Consistent carb diet

## 2018-11-13 NOTE — BRIEF OPERATIVE NOTE - PROCEDURE
<<-----Click on this checkbox to enter Procedure Toe amputation  11/13/2018  partial L 1st toe amputation  Active  SMARZBAN  Angiogram  11/13/2018  LLE, diagnostic  Active  SMARZBAN

## 2018-11-13 NOTE — H&P ADULT - NSHPPHYSICALEXAM_GEN_ALL_CORE
General Appearance: NAD.  HEENT: NCAT  Respiratory: Normal respiratory effort  Cardiovascular: Mild ankle edema over the left ankle. Negative for varicosities and venous stasis.  Skin: Right great toe ulcer healed. No erythema or drainage. L great toe partial nail avulsion with ulceration of the nailbed. Negative for signs of infection, discharge or bleeding.  Neuro: A&O x3  Psychiatric: Calm. Appropriate mood and affect.

## 2018-11-13 NOTE — H&P ADULT - PSH
History of surgery  x7 coronary artery stents  History of surgery  right leg bypass 5/2018 with amputation of right great toe  S/P amputation  may 2018; right great toe

## 2018-11-13 NOTE — H&P ADULT - PMH
Arthropathy    Depression    Diabetes mellitus  II  DVT (deep venous thrombosis)  Right lower extremity  Falls    Glaucoma  b/l  Hyperlipidemia    Hypertension    Ischemic heart disease    MI (myocardial infarction)  at age 65 y.o  MRSA infection  right great toe ( amputated)  Peripheral vascular disease    Pulmonary embolism    Ulcer of lower limb

## 2018-11-13 NOTE — H&P ADULT - NSHPREVIEWOFSYSTEMS_GEN_ALL_CORE
Musculoskeletal: L foot pain with rest.  Integumentary: as noted in HPI and skin wound.  Constitutional, Eyes, ENT, Cardiovascular, Respiratory, Gastrointestinal, Genitourinary, Neurological, Psychiatric, Endocrine and Heme/Lymph are otherwise negative.

## 2018-11-14 LAB
ANION GAP SERPL CALC-SCNC: 12 MMOL/L — SIGNIFICANT CHANGE UP (ref 5–17)
ANION GAP SERPL CALC-SCNC: 12 MMOL/L — SIGNIFICANT CHANGE UP (ref 5–17)
APTT BLD: 30.6 SEC — SIGNIFICANT CHANGE UP (ref 27.5–36.3)
BASOPHILS NFR BLD AUTO: 0.2 % — SIGNIFICANT CHANGE UP (ref 0–2)
BUN SERPL-MCNC: 25 MG/DL — HIGH (ref 7–23)
BUN SERPL-MCNC: 26 MG/DL — HIGH (ref 7–23)
CALCIUM SERPL-MCNC: 8.9 MG/DL — SIGNIFICANT CHANGE UP (ref 8.4–10.5)
CALCIUM SERPL-MCNC: 8.9 MG/DL — SIGNIFICANT CHANGE UP (ref 8.4–10.5)
CHLORIDE SERPL-SCNC: 102 MMOL/L — SIGNIFICANT CHANGE UP (ref 96–108)
CHLORIDE SERPL-SCNC: 98 MMOL/L — SIGNIFICANT CHANGE UP (ref 96–108)
CO2 SERPL-SCNC: 26 MMOL/L — SIGNIFICANT CHANGE UP (ref 22–31)
CO2 SERPL-SCNC: 26 MMOL/L — SIGNIFICANT CHANGE UP (ref 22–31)
CREAT SERPL-MCNC: 0.86 MG/DL — SIGNIFICANT CHANGE UP (ref 0.5–1.3)
CREAT SERPL-MCNC: 1.16 MG/DL — SIGNIFICANT CHANGE UP (ref 0.5–1.3)
EOSINOPHIL NFR BLD AUTO: 3.4 % — SIGNIFICANT CHANGE UP (ref 0–6)
GLUCOSE BLDC GLUCOMTR-MCNC: 105 MG/DL — HIGH (ref 70–99)
GLUCOSE BLDC GLUCOMTR-MCNC: 122 MG/DL — HIGH (ref 70–99)
GLUCOSE BLDC GLUCOMTR-MCNC: 126 MG/DL — HIGH (ref 70–99)
GLUCOSE BLDC GLUCOMTR-MCNC: 171 MG/DL — HIGH (ref 70–99)
GLUCOSE SERPL-MCNC: 115 MG/DL — HIGH (ref 70–99)
GLUCOSE SERPL-MCNC: 132 MG/DL — HIGH (ref 70–99)
HBA1C BLD-MCNC: 6.8 % — HIGH (ref 4–5.6)
HCT VFR BLD CALC: 34.8 % — SIGNIFICANT CHANGE UP (ref 34.5–45)
HCT VFR BLD CALC: 36.7 % — SIGNIFICANT CHANGE UP (ref 34.5–45)
HGB BLD-MCNC: 11 G/DL — LOW (ref 11.5–15.5)
HGB BLD-MCNC: 11.7 G/DL — SIGNIFICANT CHANGE UP (ref 11.5–15.5)
INR BLD: 1.16 — SIGNIFICANT CHANGE UP (ref 0.88–1.16)
LYMPHOCYTES # BLD AUTO: 36 % — SIGNIFICANT CHANGE UP (ref 13–44)
MAGNESIUM SERPL-MCNC: 2.2 MG/DL — SIGNIFICANT CHANGE UP (ref 1.6–2.6)
MCHC RBC-ENTMCNC: 29.2 PG — SIGNIFICANT CHANGE UP (ref 27–34)
MCHC RBC-ENTMCNC: 29.4 PG — SIGNIFICANT CHANGE UP (ref 27–34)
MCHC RBC-ENTMCNC: 31.6 G/DL — LOW (ref 32–36)
MCHC RBC-ENTMCNC: 31.9 G/DL — LOW (ref 32–36)
MCV RBC AUTO: 92.2 FL — SIGNIFICANT CHANGE UP (ref 80–100)
MCV RBC AUTO: 92.3 FL — SIGNIFICANT CHANGE UP (ref 80–100)
MONOCYTES NFR BLD AUTO: 10.1 % — SIGNIFICANT CHANGE UP (ref 2–14)
NEUTROPHILS NFR BLD AUTO: 50.3 % — SIGNIFICANT CHANGE UP (ref 43–77)
PLATELET # BLD AUTO: 148 K/UL — LOW (ref 150–400)
PLATELET # BLD AUTO: 149 K/UL — LOW (ref 150–400)
POTASSIUM SERPL-MCNC: 4.3 MMOL/L — SIGNIFICANT CHANGE UP (ref 3.5–5.3)
POTASSIUM SERPL-MCNC: 4.6 MMOL/L — SIGNIFICANT CHANGE UP (ref 3.5–5.3)
POTASSIUM SERPL-SCNC: 4.3 MMOL/L — SIGNIFICANT CHANGE UP (ref 3.5–5.3)
POTASSIUM SERPL-SCNC: 4.6 MMOL/L — SIGNIFICANT CHANGE UP (ref 3.5–5.3)
PROTHROM AB SERPL-ACNC: 13.2 SEC — HIGH (ref 10–12.9)
RBC # BLD: 3.77 M/UL — LOW (ref 3.8–5.2)
RBC # BLD: 3.98 M/UL — SIGNIFICANT CHANGE UP (ref 3.8–5.2)
RBC # FLD: 14.6 % — SIGNIFICANT CHANGE UP (ref 10.3–16.9)
RBC # FLD: 14.7 % — SIGNIFICANT CHANGE UP (ref 10.3–16.9)
SODIUM SERPL-SCNC: 136 MMOL/L — SIGNIFICANT CHANGE UP (ref 135–145)
SODIUM SERPL-SCNC: 140 MMOL/L — SIGNIFICANT CHANGE UP (ref 135–145)
WBC # BLD: 4.5 K/UL — SIGNIFICANT CHANGE UP (ref 3.8–10.5)
WBC # BLD: 4.8 K/UL — SIGNIFICANT CHANGE UP (ref 3.8–10.5)
WBC # FLD AUTO: 4.5 K/UL — SIGNIFICANT CHANGE UP (ref 3.8–10.5)
WBC # FLD AUTO: 4.8 K/UL — SIGNIFICANT CHANGE UP (ref 3.8–10.5)

## 2018-11-14 PROCEDURE — 99222 1ST HOSP IP/OBS MODERATE 55: CPT

## 2018-11-14 PROCEDURE — 93306 TTE W/DOPPLER COMPLETE: CPT | Mod: 26

## 2018-11-14 RX ORDER — SODIUM CHLORIDE 9 MG/ML
1000 INJECTION INTRAMUSCULAR; INTRAVENOUS; SUBCUTANEOUS
Qty: 0 | Refills: 0 | Status: DISCONTINUED | OUTPATIENT
Start: 2018-11-14 | End: 2018-11-16

## 2018-11-14 RX ORDER — CHLORHEXIDINE GLUCONATE 213 G/1000ML
1 SOLUTION TOPICAL ONCE
Qty: 0 | Refills: 0 | Status: DISCONTINUED | OUTPATIENT
Start: 2018-11-14 | End: 2018-11-19

## 2018-11-14 RX ORDER — APIXABAN 2.5 MG/1
5 TABLET, FILM COATED ORAL EVERY 12 HOURS
Qty: 0 | Refills: 0 | Status: DISCONTINUED | OUTPATIENT
Start: 2018-11-14 | End: 2018-11-14

## 2018-11-14 RX ADMIN — Medication 1: at 21:39

## 2018-11-14 RX ADMIN — Medication 1 DROP(S): at 18:24

## 2018-11-14 RX ADMIN — APIXABAN 5 MILLIGRAM(S): 2.5 TABLET, FILM COATED ORAL at 05:43

## 2018-11-14 RX ADMIN — GABAPENTIN 300 MILLIGRAM(S): 400 CAPSULE ORAL at 05:43

## 2018-11-14 RX ADMIN — ATORVASTATIN CALCIUM 80 MILLIGRAM(S): 80 TABLET, FILM COATED ORAL at 21:31

## 2018-11-14 RX ADMIN — MORPHINE SULFATE 2 MILLIGRAM(S): 50 CAPSULE, EXTENDED RELEASE ORAL at 14:00

## 2018-11-14 RX ADMIN — OXYCODONE AND ACETAMINOPHEN 1 TABLET(S): 5; 325 TABLET ORAL at 11:00

## 2018-11-14 RX ADMIN — LATANOPROST 1 DROP(S): 0.05 SOLUTION/ DROPS OPHTHALMIC; TOPICAL at 21:35

## 2018-11-14 RX ADMIN — Medication 1 DROP(S): at 10:18

## 2018-11-14 RX ADMIN — GABAPENTIN 300 MILLIGRAM(S): 400 CAPSULE ORAL at 21:31

## 2018-11-14 RX ADMIN — BRIMONIDINE TARTRATE 1 DROP(S): 2 SOLUTION/ DROPS OPHTHALMIC at 21:35

## 2018-11-14 RX ADMIN — Medication 81 MILLIGRAM(S): at 10:16

## 2018-11-14 RX ADMIN — OXYCODONE AND ACETAMINOPHEN 1 TABLET(S): 5; 325 TABLET ORAL at 22:34

## 2018-11-14 RX ADMIN — AMLODIPINE BESYLATE 10 MILLIGRAM(S): 2.5 TABLET ORAL at 05:43

## 2018-11-14 RX ADMIN — OXYCODONE AND ACETAMINOPHEN 1 TABLET(S): 5; 325 TABLET ORAL at 10:10

## 2018-11-14 RX ADMIN — BRIMONIDINE TARTRATE 1 DROP(S): 2 SOLUTION/ DROPS OPHTHALMIC at 13:14

## 2018-11-14 RX ADMIN — GABAPENTIN 300 MILLIGRAM(S): 400 CAPSULE ORAL at 13:13

## 2018-11-14 RX ADMIN — MORPHINE SULFATE 2 MILLIGRAM(S): 50 CAPSULE, EXTENDED RELEASE ORAL at 13:13

## 2018-11-14 RX ADMIN — BRIMONIDINE TARTRATE 1 DROP(S): 2 SOLUTION/ DROPS OPHTHALMIC at 05:45

## 2018-11-14 RX ADMIN — Medication 25 MILLIGRAM(S): at 05:43

## 2018-11-14 RX ADMIN — OXYCODONE AND ACETAMINOPHEN 1 TABLET(S): 5; 325 TABLET ORAL at 21:31

## 2018-11-14 RX ADMIN — OXYCODONE AND ACETAMINOPHEN 1 TABLET(S): 5; 325 TABLET ORAL at 05:49

## 2018-11-14 RX ADMIN — SERTRALINE 50 MILLIGRAM(S): 25 TABLET, FILM COATED ORAL at 10:16

## 2018-11-14 RX ADMIN — OXYCODONE AND ACETAMINOPHEN 1 TABLET(S): 5; 325 TABLET ORAL at 06:54

## 2018-11-14 NOTE — PROGRESS NOTE ADULT - SUBJECTIVE AND OBJECTIVE BOX
Interventional Cardiology PA Precath Note      HPI:  84yo PCN allergic F with sig PMHx of CAD, lateral wall MI s/p 6 stents, DM, HTN, CKD, RLE DVT/PE (last dose of Eliquis 5mg was 11/14/2018), early dementia, s/p LLE diagnostic angio and left great toe partial removal who presents for preop for LE bypass cardiac cath with possible intervention if clinically indicated secondary to pt risk factors, known CAD, abnormal NST. Pt currently denies CP, SOB, dizziness, diaphoresis, palpitations, fatigue, LE edema, orthopnea, PND, syncope.    Of note, pt refused cardiac cath procedure for today (11/14/2018). Consult, procedure and consent will be for tomorrow (11/15/2018).    As per MD note, Anginal class 3    ECG: 10/29/18: Diagnosis Line Normal sinus rhythm  Voltage criteria for left ventricular hypertrophy    TTE with Echo: 11/14/2018:  The left ventricular ejection fraction is estimated to be 60-65%  The left atrium is dilated.  The left ventricular ejection fraction is estimated to be 60-65%  There is mild aortic valve thickening.  There is mild tricuspid regurgitation.  There is no echocardiographic evidence for pulmonary hypertension.  No evidence for any hemodynamically significant valvular disease.    ECHO: 5/17/18: Normal left ventricular size and wall thickness.  Abnormal (paradoxical) septal motion consistent with abnormal conduction. There is basal inferior wall hypokinesis. The left ventricular ejection fraction is 65%.The right ventricle is normal in size and function.The left atrium is mildly dilated. The   Mitral inflow pattern is consistent with impaired left ventricular relaxation   with mildly elevated left atrial pressure (8-14mmHg).  Right atrial size is   normal.Calcified trileaflet aortic valve with prominent thickening on   ventricular side of aortic valve, likely represents a calcification; however   connot rule out a fibroelastoma.  No aortic regurgitation noted.No   hemodynamically significant valvularaortic stenosis.Structurally normal   mitral valve.There is trace mitral regurgitation.Structurally normal   tricuspid   valve.There is trace to mild tricuspid regurgitation.There is mild   pulmonary   hypertension.The pulmonary artery systolic pressure is estimated to be 45   mmHg.Structurally normal pulmonic valve.There is trace pulmonic   regurgitation.There is no pericardial effusion.No prior echo is   available for   comparison.    NUCLEAR STRESS TEST: 05/18/18:  Myocardial perfusion imaging is abnormal. There is a moderate size area   of reversible ischemia involving mid/distal inferior, inferolateral and   inferoseptal segments. Overall left ventricular systolic function is   normal without regional wall motion abnormalities. The EKG stress test is   normal.    CXR: 5/23/18:  IMPRESSION:  Unremarkable    PMH:    Hx of MRSA infection: right great toe (amputated)  Glaucoma: b/l  Falls  Depression  Pulmonary embolism  DVT (deep venous thrombosis): Right lower extremity  MI (myocardial infarction): at age 65 y.o  Peripheral vascular disease  Ulcer of lower limb  Arthropathy  Ischemic heart disease  Hypertension  Hyperlipidemia  Diabetes mellitus: II    PSH:    S/P amputation: may 2018; right great toe  History of surgery: x7 coronary artery stents  History of surgery: right leg bypass 5/2018 with amputation of right great toe  ALL: penicillins (Anaphylaxis) NKFA. Denies shellfish/Contrast dye allergy.    SocHX: Denies EtoH/TOB/IVDU    FHx:     MEDS:   amLODIPine   Tablet 10 milliGRAM(s) Oral daily  aspirin enteric coated 81 milliGRAM(s) Oral daily  atorvastatin 80 milliGRAM(s) Oral at bedtime  brimonidine 0.2% Ophthalmic Solution 1 Drop(s) Both EYES three times a day  dextrose 40% Gel 15 Gram(s) Oral once PRN  dextrose 5%. 1000 milliLiter(s) IV Continuous <Continuous>  dextrose 50% Injectable 12.5 Gram(s) IV Push once  dextrose 50% Injectable 25 Gram(s) IV Push once  dextrose 50% Injectable 25 Gram(s) IV Push once  gabapentin 300 milliGRAM(s) Oral three times a day  glucagon  Injectable 1 milliGRAM(s) IntraMuscular once PRN  insulin lispro (HumaLOG) corrective regimen sliding scale   SubCutaneous Before meals and at bedtime  latanoprost 0.005% Ophthalmic Solution 1 Drop(s) Both EYES at bedtime  metoprolol succinate ER 25 milliGRAM(s) Oral daily  morphine  - Injectable 2 milliGRAM(s) IV Push every 30 minutes PRN  oxyCODONE    5 mG/acetaminophen 325 mG 1 Tablet(s) Oral every 4 hours PRN  sertraline 50 milliGRAM(s) Oral daily  timolol 0.5% Solution 1 Drop(s) Both EYES two times a day    T(C): 36.4 (11-14-18 @ 10:17), Max: 36.6 (11-14-18 @ 00:22)  HR: 73 (11-14-18 @ 08:55) (46 - 73)  BP: 121/63 (11-14-18 @ 08:55) (115/58 - 156/57)  RR: 18 (11-14-18 @ 08:55) (9 - 21)  SpO2: 97% (11-14-18 @ 08:55) (95% - 100%)  Wt(kg): --  HEENT: NCAT, EOMI, PERRLA  NECK: No JVD, No carotid bruits B/L, +2 Carotid pulses B/L  PULM:  CTA B/L No W/R/R  CARD: RRR, +S1, +S2, No M/R/G  ABD: ND, +BS, NT, no masses  EXT: Warm, pedal edema yes/no, pitting/non-pitting  RECTAL: Guaiac negative/positive   NEURO: A & O x 3, no focal neurologic deficits  PULSES:	   B	       R	                FEM         	                                 DP/PT  Right		                                Yes/No     Bruit	    Left		                                        Yes/No     Bruit                                11.0   4.5   )-----------( 149      ( 14 Nov 2018 06:38 )             34.8     11-14    140  |  102  |  25<H>  ----------------------------<  115<H>  4.6   |  26  |  0.86    Ca    8.9      14 Nov 2018 06:38  Mg     2.2     11-14              EKG:					  ASA _____				Mallampati class: _________	            Anginal Class: _________  A/P:  84yo PCN allergic F with sig PMHx of CAD, lateral wall MI s/p 6 stents, DM, HTN, CKD, RLE DVT/PE (last dose of Eliquis 5mg was 11/14/2018), early dementia, s/p LLE diagnostic angio and left great toe partial removal who presents for preop for LE bypass cardiac cath with possible intervention if clinically indicated secondary to pt risk factors, known CAD, abnormal NST. Pt currently denies CP, SOB, dizziness, diaphoresis, palpitations, fatigue, LE edema, orthopnea, PND, syncope.  -	Pt refused for procedure today with Dr. Hicks (11/14/2018), consult and consent to be signed tomorrow for cardiac cath with Dr. Chand (11/15/2018). Continue to hold Eliquis and NPO after midnight. Dr. Hicks, Dr. Izquierdo and Dr. Chand made aware.  -	Precath orders are in and Vascular Service made aware.  Sedation Plan:   Moderate      Patient Is Suitable Candidate For Sedation?    Yes       Risks & benefits of procedure and sedation and risks and benefits for the alternative therapy have been explained to the patient in layman’s terms including but not limited to: allergic reaction, bleeding, infection, arrhythmia, respiratory compromise, renal and vascular compromise, limb damage, MI, CVA, emergent CABG/Vascular Surgery and death. Informed consent obtained and in chart. Interventional Cardiology PA Precath Note      HPI:  84yo PCN allergic F with sig PMHx of CAD, lateral wall MI s/p 6 stents, DM, HTN, CKD, RLE DVT/PE (last dose of Eliquis 5mg was 11/14/2018), early dementia, s/p LLE diagnostic angio and left great toe partial removal who presents for preop for LE bypass cardiac cath with possible intervention if clinically indicated secondary to pt risk factors, known CAD, abnormal NST. Pt currently denies CP, SOB, dizziness, diaphoresis, palpitations, fatigue, LE edema, orthopnea, PND, syncope.    Of note, pt refused cardiac cath procedure for today (11/14/2018). Consult, procedure and consent will be for tomorrow (11/15/2018).    As per MD note, Anginal class 3    ECG: 10/29/18: Diagnosis Line Normal sinus rhythm  Voltage criteria for left ventricular hypertrophy    TTE with Echo: 11/14/2018:  The left ventricular ejection fraction is estimated to be 60-65%  The left atrium is dilated.  The left ventricular ejection fraction is estimated to be 60-65%  There is mild aortic valve thickening.  There is mild tricuspid regurgitation.  There is no echocardiographic evidence for pulmonary hypertension.  No evidence for any hemodynamically significant valvular disease.    ECHO: 5/17/18: Normal left ventricular size and wall thickness.  Abnormal (paradoxical) septal motion consistent with abnormal conduction. There is basal inferior wall hypokinesis. The left ventricular ejection fraction is 65%.The right ventricle is normal in size and function.The left atrium is mildly dilated. The   Mitral inflow pattern is consistent with impaired left ventricular relaxation   with mildly elevated left atrial pressure (8-14mmHg).  Right atrial size is   normal.Calcified trileaflet aortic valve with prominent thickening on   ventricular side of aortic valve, likely represents a calcification; however   connot rule out a fibroelastoma.  No aortic regurgitation noted.No   hemodynamically significant valvularaortic stenosis.Structurally normal   mitral valve.There is trace mitral regurgitation.Structurally normal   tricuspid   valve.There is trace to mild tricuspid regurgitation.There is mild   pulmonary   hypertension.The pulmonary artery systolic pressure is estimated to be 45   mmHg.Structurally normal pulmonic valve.There is trace pulmonic   regurgitation.There is no pericardial effusion.No prior echo is   available for   comparison.    NUCLEAR STRESS TEST: 05/18/18:  Myocardial perfusion imaging is abnormal. There is a moderate size area   of reversible ischemia involving mid/distal inferior, inferolateral and   inferoseptal segments. Overall left ventricular systolic function is   normal without regional wall motion abnormalities. The EKG stress test is   normal.    CXR: 5/23/18:  IMPRESSION:  Unremarkable    PMH:    Hx of MRSA infection: right great toe (amputated)  Glaucoma: b/l  Falls  Depression  Pulmonary embolism  DVT (deep venous thrombosis): Right lower extremity  MI (myocardial infarction): at age 65 y.o  Peripheral vascular disease  Ulcer of lower limb  Arthropathy  Ischemic heart disease  Hypertension  Hyperlipidemia  Diabetes mellitus: II    PSH:    S/P amputation: may 2018; right great toe  History of surgery: x7 coronary artery stents  History of surgery: right leg bypass 5/2018 with amputation of right great toe  ALL: penicillins (Anaphylaxis) NKFA. Denies shellfish/Contrast dye allergy.    SocHX: Denies EtoH/TOB/IVDU    FHx:     MEDS:   amLODIPine   Tablet 10 milliGRAM(s) Oral daily  aspirin enteric coated 81 milliGRAM(s) Oral daily  atorvastatin 80 milliGRAM(s) Oral at bedtime  brimonidine 0.2% Ophthalmic Solution 1 Drop(s) Both EYES three times a day  dextrose 40% Gel 15 Gram(s) Oral once PRN  dextrose 5%. 1000 milliLiter(s) IV Continuous <Continuous>  dextrose 50% Injectable 12.5 Gram(s) IV Push once  dextrose 50% Injectable 25 Gram(s) IV Push once  dextrose 50% Injectable 25 Gram(s) IV Push once  gabapentin 300 milliGRAM(s) Oral three times a day  glucagon  Injectable 1 milliGRAM(s) IntraMuscular once PRN  insulin lispro (HumaLOG) corrective regimen sliding scale   SubCutaneous Before meals and at bedtime  latanoprost 0.005% Ophthalmic Solution 1 Drop(s) Both EYES at bedtime  metoprolol succinate ER 25 milliGRAM(s) Oral daily  morphine  - Injectable 2 milliGRAM(s) IV Push every 30 minutes PRN  oxyCODONE    5 mG/acetaminophen 325 mG 1 Tablet(s) Oral every 4 hours PRN  sertraline 50 milliGRAM(s) Oral daily  timolol 0.5% Solution 1 Drop(s) Both EYES two times a day    T(C): 36.4 (11-14-18 @ 10:17), Max: 36.6 (11-14-18 @ 00:22)  HR: 73 (11-14-18 @ 08:55) (46 - 73)  BP: 121/63 (11-14-18 @ 08:55) (115/58 - 156/57)  RR: 18 (11-14-18 @ 08:55) (9 - 21)  SpO2: 97% (11-14-18 @ 08:55) (95% - 100%)  Wt(kg): --  HEENT: NCAT, EOMI, PERRLA  NECK: No JVD, No carotid bruits B/L, +2 Carotid pulses B/L  PULM:  CTA B/L No W/R/R  CARD: RRR, +S1, +S2, No M/R/G  ABD: ND, +BS, NT, no masses  EXT: Warm, pedal edema yes/no, pitting/non-pitting  RECTAL: Guaiac negative/positive   NEURO: A & O x 3, no focal neurologic deficits  PULSES:	   B	       R	                FEM         	                                 DP/PT  Right		                                Yes/No     Bruit	    Left		                                        Yes/No     Bruit                                11.0   4.5   )-----------( 149      ( 14 Nov 2018 06:38 )             34.8     11-14    140  |  102  |  25<H>  ----------------------------<  115<H>  4.6   |  26  |  0.86    Ca    8.9      14 Nov 2018 06:38  Mg     2.2     11-14              EKG:					  ASA _____				Mallampati class: _________	            Anginal Class: _________  A/P:  84yo PCN allergic F with sig PMHx of CAD, lateral wall MI s/p 6 stents, DM, HTN, CKD, RLE DVT/PE (last dose of Eliquis 5mg was 11/14/2018), early dementia, s/p LLE diagnostic angio and left great toe partial removal who presents for preop for LE bypass cardiac cath with possible intervention if clinically indicated in the setting of pt risk factors, known CAD, abnormal NST.  Dr. Hicks, Dr. Izquierdo and Dr. Chand made aware.  -	Pt refused for procedure today with Dr. Hicks (11/14/2018), consult and consent to be signed tomorrow for cardiac cath with Dr. Chand (11/15/2018).  -	Pt refused H&P by bedside.  -	Vascular Service made aware. Continue to hold Eliquis and NPO after midnight.  -	Precath orders are in.  Sedation Plan:   Moderate      Patient Is Suitable Candidate For Sedation?    Yes       Risks & benefits of procedure and sedation and risks and benefits for the alternative therapy have been explained to the patient in layman’s terms including but not limited to: allergic reaction, bleeding, infection, arrhythmia, respiratory compromise, renal and vascular compromise, limb damage, MI, CVA, emergent CABG/Vascular Surgery and death. Informed consent obtained and in chart. Interventional Cardiology PA Precath Note      HPI:  84yo PCN allergic F with sig PMHx of CAD, lateral wall MI s/p 6 stents (reportedly by Dr. Juarez in [facility]), DM, HTN, CKD, RLE DVT/PE (last dose of Eliquis 5mg was 11/14/2018), early dementia, s/p LLE diagnostic angio and left great toe partial removal who presents for preop for LE bypass cardiac cath with possible intervention if clinically indicated secondary to pt risk factors, known CAD, abnormal NST. Pt currently denies CP, SOB, dizziness, diaphoresis, palpitations, fatigue, LE edema, orthopnea, PND, syncope.    Of note, pt refused cardiac cath procedure for today (11/14/2018). Consult, procedure and consent will be for tomorrow (11/15/2018) in the presence of her daughter, Anne Betts, who reports will come in from 7am to 9am.    As per MD note, Anginal class 3    ECG: 10/29/18: Diagnosis Line Normal sinus rhythm  Voltage criteria for left ventricular hypertrophy    TTE with Echo: 11/14/2018:  The left ventricular ejection fraction is estimated to be 60-65%  The left atrium is dilated.  The left ventricular ejection fraction is estimated to be 60-65%  There is mild aortic valve thickening.  There is mild tricuspid regurgitation.  There is no echocardiographic evidence for pulmonary hypertension.  No evidence for any hemodynamically significant valvular disease.    ECHO: 5/17/18: Normal left ventricular size and wall thickness.  Abnormal (paradoxical) septal motion consistent with abnormal conduction. There is basal inferior wall hypokinesis. The left ventricular ejection fraction is 65%.The right ventricle is normal in size and function.The left atrium is mildly dilated. The   Mitral inflow pattern is consistent with impaired left ventricular relaxation   with mildly elevated left atrial pressure (8-14mmHg).  Right atrial size is   normal.Calcified trileaflet aortic valve with prominent thickening on   ventricular side of aortic valve, likely represents a calcification; however   connot rule out a fibroelastoma.  No aortic regurgitation noted.No   hemodynamically significant valvularaortic stenosis.Structurally normal   mitral valve.There is trace mitral regurgitation.Structurally normal   tricuspid   valve.There is trace to mild tricuspid regurgitation.There is mild   pulmonary   hypertension.The pulmonary artery systolic pressure is estimated to be 45   mmHg.Structurally normal pulmonic valve.There is trace pulmonic   regurgitation.There is no pericardial effusion.No prior echo is   available for   comparison.    NUCLEAR STRESS TEST: 05/18/18:  Myocardial perfusion imaging is abnormal. There is a moderate size area   of reversible ischemia involving mid/distal inferior, inferolateral and   inferoseptal segments. Overall left ventricular systolic function is   normal without regional wall motion abnormalities. The EKG stress test is   normal.    CXR: 5/23/18:  IMPRESSION:  Unremarkable    PMH:    Hx of MRSA infection: right great toe (amputated)  Glaucoma: b/l  Falls  Depression  Pulmonary embolism  DVT (deep venous thrombosis): Right lower extremity  MI (myocardial infarction): at age 65 y.o  Peripheral vascular disease  Ulcer of lower limb  Arthropathy  Ischemic heart disease  Hypertension  Hyperlipidemia  Diabetes mellitus: II    PSH:    S/P amputation: may 2018; right great toe  History of surgery: x7 coronary artery stents  History of surgery: right leg bypass 5/2018 with amputation of right great toe  ALL: penicillins (Anaphylaxis) NKFA. Denies shellfish/Contrast dye allergy.    SocHX: Denies EtoH/TOB/IVDU    FHx:     MEDS:   amLODIPine   Tablet 10 milliGRAM(s) Oral daily  aspirin enteric coated 81 milliGRAM(s) Oral daily  atorvastatin 80 milliGRAM(s) Oral at bedtime  brimonidine 0.2% Ophthalmic Solution 1 Drop(s) Both EYES three times a day  dextrose 40% Gel 15 Gram(s) Oral once PRN  dextrose 5%. 1000 milliLiter(s) IV Continuous <Continuous>  dextrose 50% Injectable 12.5 Gram(s) IV Push once  dextrose 50% Injectable 25 Gram(s) IV Push once  dextrose 50% Injectable 25 Gram(s) IV Push once  gabapentin 300 milliGRAM(s) Oral three times a day  glucagon  Injectable 1 milliGRAM(s) IntraMuscular once PRN  insulin lispro (HumaLOG) corrective regimen sliding scale   SubCutaneous Before meals and at bedtime  latanoprost 0.005% Ophthalmic Solution 1 Drop(s) Both EYES at bedtime  metoprolol succinate ER 25 milliGRAM(s) Oral daily  morphine  - Injectable 2 milliGRAM(s) IV Push every 30 minutes PRN  oxyCODONE    5 mG/acetaminophen 325 mG 1 Tablet(s) Oral every 4 hours PRN  sertraline 50 milliGRAM(s) Oral daily  timolol 0.5% Solution 1 Drop(s) Both EYES two times a day    T(C): 36.4 (11-14-18 @ 10:17), Max: 36.6 (11-14-18 @ 00:22)  HR: 73 (11-14-18 @ 08:55) (46 - 73)  BP: 121/63 (11-14-18 @ 08:55) (115/58 - 156/57)  RR: 18 (11-14-18 @ 08:55) (9 - 21)  SpO2: 97% (11-14-18 @ 08:55) (95% - 100%)  Wt(kg): --  HEENT: NCAT, EOMI, PERRLA  NECK: No JVD, No carotid bruits B/L, +2 Carotid pulses B/L  PULM:  CTA B/L No W/R/R  CARD: RRR, +S1, +S2, No M/R/G  ABD: ND, +BS, NT, no masses  EXT: Warm, pedal edema yes/no, pitting/non-pitting  RECTAL: Guaiac negative/positive   NEURO: A & O x 3, no focal neurologic deficits  PULSES:	   B	       R	                FEM         	                                 DP/PT  Right		                                Yes/No     Bruit	    Left		                                        Yes/No     Bruit                                11.0   4.5   )-----------( 149      ( 14 Nov 2018 06:38 )             34.8     11-14    140  |  102  |  25<H>  ----------------------------<  115<H>  4.6   |  26  |  0.86    Ca    8.9      14 Nov 2018 06:38  Mg     2.2     11-14              EKG:					  ASA _____				Mallampati class: _________	            Anginal Class: _________  A/P:  84yo PCN allergic F with sig PMHx of CAD, lateral wall MI s/p 6 stents, DM, HTN, CKD, RLE DVT/PE (last dose of Eliquis 5mg was 11/14/2018), early dementia, s/p LLE diagnostic angio and left great toe partial removal who presents for preop for LE bypass cardiac cath with possible intervention if clinically indicated in the setting of pt risk factors, known CAD, abnormal NST.  Dr. Hicks, Dr. Izquierdo and Dr. Chand made aware.  -	Pt refused for procedure today with Dr. Hicks (11/14/2018), consult and consent to be signed tomorrow for cardiac cath with Dr. Chand (11/15/2018).  -	Pt refused H&P by bedside, reports to be agreeable with the presence of her daughter, Anne Betts, who reports to be coming tomorrow (11/15/2018) from 7 to 9AM.  -	Vascular Service made aware. Continue to hold Eliquis and NPO after midnight.  -	Precath orders are in. F/U on new EKG.  Sedation Plan:   Moderate      Patient Is Suitable Candidate For Sedation?    Yes       Risks & benefits of procedure and sedation and risks and benefits for the alternative therapy have been explained to the patient in layman’s terms including but not limited to: allergic reaction, bleeding, infection, arrhythmia, respiratory compromise, renal and vascular compromise, limb damage, MI, CVA, emergent CABG/Vascular Surgery and death. Informed consent obtained and in chart. Interventional Cardiology PA Precath Note      HPI:  84yo PCN allergic F with sig PMHx of CAD, lateral wall MI s/p 6 stents (reportedly by Dr. Juarez in [facility]), DM, HTN, CKD, RLE DVT/PE (last dose of Eliquis 5mg was 11/14/2018), early dementia, s/p LLE diagnostic angio and left great toe partial removal who presents for preop for LE bypass cardiac cath with possible intervention if clinically indicated secondary to pt risk factors, known CAD, abnormal NST. Pt currently denies CP, SOB, dizziness, diaphoresis, palpitations, fatigue, LE edema, orthopnea, PND, syncope.    Of note, pt refused cardiac cath procedure for today (11/14/2018). Consult, procedure and consent will be for tomorrow (11/15/2018) in the presence of her daughter, Anne Betts, who reports will come in from 7am to 9am.    As per MD note, Anginal class 3    ECG: 10/29/18: Diagnosis Line Normal sinus rhythm  Voltage criteria for left ventricular hypertrophy    TTE with Echo: 11/14/2018:  The left ventricular ejection fraction is estimated to be 60-65%  The left atrium is dilated.  The left ventricular ejection fraction is estimated to be 60-65%  There is mild aortic valve thickening.  There is mild tricuspid regurgitation.  There is no echocardiographic evidence for pulmonary hypertension.  No evidence for any hemodynamically significant valvular disease.    ECHO: 5/17/18: Normal left ventricular size and wall thickness.  Abnormal (paradoxical) septal motion consistent with abnormal conduction. There is basal inferior wall hypokinesis. The left ventricular ejection fraction is 65%.The right ventricle is normal in size and function.The left atrium is mildly dilated. The   Mitral inflow pattern is consistent with impaired left ventricular relaxation   with mildly elevated left atrial pressure (8-14mmHg).  Right atrial size is   normal.Calcified trileaflet aortic valve with prominent thickening on   ventricular side of aortic valve, likely represents a calcification; however   connot rule out a fibroelastoma.  No aortic regurgitation noted.No   hemodynamically significant valvularaortic stenosis.Structurally normal   mitral valve.There is trace mitral regurgitation.Structurally normal   tricuspid   valve.There is trace to mild tricuspid regurgitation.There is mild   pulmonary   hypertension.The pulmonary artery systolic pressure is estimated to be 45   mmHg.Structurally normal pulmonic valve.There is trace pulmonic   regurgitation.There is no pericardial effusion.No prior echo is   available for   comparison.    NUCLEAR STRESS TEST: 05/18/18:  Myocardial perfusion imaging is abnormal. There is a moderate size area   of reversible ischemia involving mid/distal inferior, inferolateral and   inferoseptal segments. Overall left ventricular systolic function is   normal without regional wall motion abnormalities. The EKG stress test is   normal.    CXR: 5/23/18:  IMPRESSION:  Unremarkable    PMH:    Hx of MRSA infection: right great toe (amputated)  Glaucoma: b/l  Falls  Depression  Pulmonary embolism  DVT (deep venous thrombosis): Right lower extremity  MI (myocardial infarction): at age 65 y.o  Peripheral vascular disease  Ulcer of lower limb  Arthropathy  Ischemic heart disease  Hypertension  Hyperlipidemia  Diabetes mellitus: II    PSH:    S/P amputation: may 2018; right great toe  History of surgery: x7 coronary artery stents  History of surgery: right leg bypass 5/2018 with amputation of right great toe  ALL: penicillins (Anaphylaxis) NKFA. Denies shellfish/Contrast dye allergy.    SocHX: Denies EtoH/TOB/IVDU    FHx:     MEDS:   amLODIPine   Tablet 10 milliGRAM(s) Oral daily  aspirin enteric coated 81 milliGRAM(s) Oral daily  atorvastatin 80 milliGRAM(s) Oral at bedtime  brimonidine 0.2% Ophthalmic Solution 1 Drop(s) Both EYES three times a day  dextrose 40% Gel 15 Gram(s) Oral once PRN  dextrose 5%. 1000 milliLiter(s) IV Continuous <Continuous>  dextrose 50% Injectable 12.5 Gram(s) IV Push once  dextrose 50% Injectable 25 Gram(s) IV Push once  dextrose 50% Injectable 25 Gram(s) IV Push once  gabapentin 300 milliGRAM(s) Oral three times a day  glucagon  Injectable 1 milliGRAM(s) IntraMuscular once PRN  insulin lispro (HumaLOG) corrective regimen sliding scale   SubCutaneous Before meals and at bedtime  latanoprost 0.005% Ophthalmic Solution 1 Drop(s) Both EYES at bedtime  metoprolol succinate ER 25 milliGRAM(s) Oral daily  morphine  - Injectable 2 milliGRAM(s) IV Push every 30 minutes PRN  oxyCODONE    5 mG/acetaminophen 325 mG 1 Tablet(s) Oral every 4 hours PRN  sertraline 50 milliGRAM(s) Oral daily  timolol 0.5% Solution 1 Drop(s) Both EYES two times a day    T(C): 36.4 (11-14-18 @ 10:17), Max: 36.6 (11-14-18 @ 00:22)  HR: 73 (11-14-18 @ 08:55) (46 - 73)  BP: 121/63 (11-14-18 @ 08:55) (115/58 - 156/57)  RR: 18 (11-14-18 @ 08:55) (9 - 21)  SpO2: 97% (11-14-18 @ 08:55) (95% - 100%)  Wt(kg): --  HEENT: NCAT, EOMI, PERRLA  NECK: No JVD, No carotid bruits B/L, +2 Carotid pulses B/L  PULM:  CTA B/L No W/R/R  CARD: RRR, +S1, +S2, No M/R/G  ABD: ND, +BS, NT, no masses  EXT: Warm, pedal edema yes/no, pitting/non-pitting  RECTAL: Guaiac negative/positive   NEURO: A & O x 3, no focal neurologic deficits  PULSES:	   B	       R	                FEM         	                                 DP/PT  Right		                                Yes/No     Bruit	    Left		                                        Yes/No     Bruit                                11.0   4.5   )-----------( 149      ( 14 Nov 2018 06:38 )             34.8     11-14    140  |  102  |  25<H>  ----------------------------<  115<H>  4.6   |  26  |  0.86    Ca    8.9      14 Nov 2018 06:38  Mg     2.2     11-14              EKG:					  ASA _____				Mallampati class: _________	            Anginal Class: _________  A/P:  84yo PCN allergic F with sig PMHx of CAD, lateral wall MI s/p 6 stents, DM, HTN, CKD, RLE DVT/PE (last dose of Eliquis 5mg was 11/14/2018), early dementia, s/p LLE diagnostic angio and left great toe partial removal who presents for preop for LE bypass cardiac cath with possible intervention if clinically indicated in the setting of pt risk factors, known CAD, abnormal NST.  Dr. Hicks, Dr. Izquierdo and Dr. Chand made aware.  -	Pt refused for procedure today with Dr. Hicks (11/14/2018), consult and consent to be signed tomorrow for cardiac cath with Dr. Chand (11/15/2018).  -	Pt refused H&P by bedside, reports to be agreeable with the presence of her daughter, Anne Betts, who reports to be coming tomorrow (11/15/2018) from 7 to 9AM.  -	Vascular Service made aware. Continue to hold Eliquis and NPO after midnight.  -	Precath orders are in. F/U on new EKG. F/U on cath report (pt daughter reported from [facility], cath lab [phone])  Sedation Plan:   Moderate      Patient Is Suitable Candidate For Sedation?    Yes       Risks & benefits of procedure and sedation and risks and benefits for the alternative therapy have been explained to the patient in layman’s terms including but not limited to: allergic reaction, bleeding, infection, arrhythmia, respiratory compromise, renal and vascular compromise, limb damage, MI, CVA, emergent CABG/Vascular Surgery and death. Informed consent obtained and in chart. Interventional Cardiology PA Precath Note      HPI:  84yo PCN allergic F with sig PMHx of CAD, lateral wall MI s/p 6 stents (reportedly by Dr. Juarez in [facility]), DM, HTN, CKD, RLE DVT/PE (last dose of Eliquis 5mg 5AM 11/14/2018), early dementia, s/p LLE diagnostic angio and left great toe partial removal who presents for preop for LE bypass cardiac cath with possible intervention if clinically indicated secondary to pt risk factors, known CAD, abnormal NST. Pt currently denies CP, SOB, dizziness, diaphoresis, palpitations, fatigue, LE edema, orthopnea, PND, syncope.    Of note, pt refused cardiac cath procedure for today (11/14/2018). Confirm HPI, procedure and consent will be for tomorrow (11/15/2018) in the presence of her daughter, Anne Betts, who reports will come in from 7am to 9am.    As per MD note, Anginal class 3    ECG: 10/29/18: Diagnosis Line Normal sinus rhythm  Voltage criteria for left ventricular hypertrophy    TTE with Echo: 11/14/2018:  The left ventricular ejection fraction is estimated to be 60-65%  The left atrium is dilated.  The left ventricular ejection fraction is estimated to be 60-65%  There is mild aortic valve thickening.  There is mild tricuspid regurgitation.  There is no echocardiographic evidence for pulmonary hypertension.  No evidence for any hemodynamically significant valvular disease.    ECHO: 5/17/18: Normal left ventricular size and wall thickness.  Abnormal (paradoxical) septal motion consistent with abnormal conduction. There is basal inferior wall hypokinesis. The left ventricular ejection fraction is 65%.The right ventricle is normal in size and function.The left atrium is mildly dilated. The   Mitral inflow pattern is consistent with impaired left ventricular relaxation   with mildly elevated left atrial pressure (8-14mmHg).  Right atrial size is   normal.Calcified trileaflet aortic valve with prominent thickening on   ventricular side of aortic valve, likely represents a calcification; however   connot rule out a fibroelastoma.  No aortic regurgitation noted.No   hemodynamically significant valvularaortic stenosis.Structurally normal   mitral valve.There is trace mitral regurgitation.Structurally normal   tricuspid   valve.There is trace to mild tricuspid regurgitation.There is mild   pulmonary   hypertension.The pulmonary artery systolic pressure is estimated to be 45   mmHg.Structurally normal pulmonic valve.There is trace pulmonic   regurgitation.There is no pericardial effusion.No prior echo is   available for   comparison.    NUCLEAR STRESS TEST: 05/18/18:  Myocardial perfusion imaging is abnormal. There is a moderate size area   of reversible ischemia involving mid/distal inferior, inferolateral and   inferoseptal segments. Overall left ventricular systolic function is   normal without regional wall motion abnormalities. The EKG stress test is   normal.    CXR: 5/23/18:  IMPRESSION:  Unremarkable    PMH:    Hx of MRSA infection: right great toe (amputated)  Glaucoma: b/l  Falls  Depression  Pulmonary embolism  DVT (deep venous thrombosis): Right lower extremity  MI (myocardial infarction): at age 65 y.o  Peripheral vascular disease  Ulcer of lower limb  Arthropathy  Ischemic heart disease  Hypertension  Hyperlipidemia  Diabetes mellitus: II    PSH:    S/P amputation: may 2018; right great toe  History of surgery: x7 coronary artery stents  History of surgery: right leg bypass 5/2018 with amputation of right great toe  ALL: penicillins (Anaphylaxis) NKFA. Denies shellfish/Contrast dye allergy.    SocHX: Denies EtoH/TOB/IVDU    FHx:     MEDS:   amLODIPine   Tablet 10 milliGRAM(s) Oral daily  aspirin enteric coated 81 milliGRAM(s) Oral daily  atorvastatin 80 milliGRAM(s) Oral at bedtime  brimonidine 0.2% Ophthalmic Solution 1 Drop(s) Both EYES three times a day  dextrose 40% Gel 15 Gram(s) Oral once PRN  dextrose 5%. 1000 milliLiter(s) IV Continuous <Continuous>  dextrose 50% Injectable 12.5 Gram(s) IV Push once  dextrose 50% Injectable 25 Gram(s) IV Push once  dextrose 50% Injectable 25 Gram(s) IV Push once  gabapentin 300 milliGRAM(s) Oral three times a day  glucagon  Injectable 1 milliGRAM(s) IntraMuscular once PRN  insulin lispro (HumaLOG) corrective regimen sliding scale   SubCutaneous Before meals and at bedtime  latanoprost 0.005% Ophthalmic Solution 1 Drop(s) Both EYES at bedtime  metoprolol succinate ER 25 milliGRAM(s) Oral daily  morphine  - Injectable 2 milliGRAM(s) IV Push every 30 minutes PRN  oxyCODONE    5 mG/acetaminophen 325 mG 1 Tablet(s) Oral every 4 hours PRN  sertraline 50 milliGRAM(s) Oral daily  timolol 0.5% Solution 1 Drop(s) Both EYES two times a day    T(C): 36.4 (11-14-18 @ 10:17), Max: 36.6 (11-14-18 @ 00:22)  HR: 73 (11-14-18 @ 08:55) (46 - 73)  BP: 121/63 (11-14-18 @ 08:55) (115/58 - 156/57)  RR: 18 (11-14-18 @ 08:55) (9 - 21)  SpO2: 97% (11-14-18 @ 08:55) (95% - 100%)  Wt(kg): --  HEENT: NCAT, EOMI, PERRLA  NECK: No JVD, No carotid bruits B/L, +2 Carotid pulses B/L  PULM:  CTA B/L No W/R/R  CARD: RRR, +S1, +S2, No M/R/G  ABD: ND, +BS, NT, no masses  EXT: Warm, pedal edema yes/no, pitting/non-pitting  RECTAL: Guaiac negative/positive   NEURO: A & O x 3, no focal neurologic deficits  PULSES:	   B	       R	                FEM         	                                 DP/PT  Right		                                Yes/No     Bruit	    Left		                                        Yes/No     Bruit                                11.0   4.5   )-----------( 149      ( 14 Nov 2018 06:38 )             34.8     11-14    140  |  102  |  25<H>  ----------------------------<  115<H>  4.6   |  26  |  0.86    Ca    8.9      14 Nov 2018 06:38  Mg     2.2     11-14              EKG:					  ASA _____				Mallampati class: _________	            Anginal Class: _________  A/P:  84yo PCN allergic F with sig PMHx of CAD, lateral wall MI s/p 6 stents, DM, HTN, CKD, RLE DVT/PE (last dose of Eliquis 5mg at 5AM 11/14/2018), early dementia, s/p LLE diagnostic angio and left great toe partial removal who presents for preop for LE bypass cardiac cath with possible intervention if clinically indicated in the setting of pt risk factors, known CAD, abnormal NST.  Dr. Hicks, Dr. Izquierdo and Dr. Chand made aware.  -	Pt refused for procedure today with Dr. Hicks (11/14/2018), consult and consent to be signed tomorrow for cardiac cath with Dr. Chand (11/15/2018).  -	Pt refused H&P by bedside, reports to be agreeable with the presence of her daughter, Anne Betts, who reports to be coming tomorrow (11/15/2018) from 7 to 9AM.  -	Vascular Service made aware. Continue to hold Eliquis and NPO after midnight.  -	Precath orders are in. F/U on new EKG. F/U on cath report (pt daughter reported from [facility], cath lab [phone])  Sedation Plan:   Moderate      Patient Is Suitable Candidate For Sedation?    Yes       Risks & benefits of procedure and sedation and risks and benefits for the alternative therapy have been explained to the patient in layman’s terms including but not limited to: allergic reaction, bleeding, infection, arrhythmia, respiratory compromise, renal and vascular compromise, limb damage, MI, CVA, emergent CABG/Vascular Surgery and death. Informed consent obtained and in chart. Interventional Cardiology PA Precath Note    *Hx by daughter Anne Betts and pt    HPI:  84yo PCN allergic F with sig PMHx of CAD, daughter claims "inferior wall MI s/p 7 stents" (reportedly by Dr. Juarez twice in [facility]), DM II, HTN, CKD, RLE DVT/PE (last dose of Eliquis 5mg 5AM 11/14/2018), early dementia, hx of multiple unwitnessed falls (daughter claims non-cardiac dx, non-neuro dx), s/p LLE diagnostic angio and left great toe partial removal who presents for preop for LE bypass cardiac cath (daughter claims that Dr. Castellanos that surgery is scheduled after Thanksgiving) with possible intervention if clinically indicated secondary to pt risk factors, CCS Anginal Equivalent III sxs, known CAD and abnormal NST. Daughter reports that she brought her mother from [place] in April 2018 and that pt c/o of decreased exertional tolerance and fatigue. Of note, she cannot walk a flight of stairs or a city block. Pt denies CP, SOB at rest, dizziness, diaphoresis, palpitations, fatigue, LE edema, orthopnea, PND, syncope.    As per MD note, Anginal class 3    ECG: 10/29/18: Diagnosis Line Normal sinus rhythm  Voltage criteria for left ventricular hypertrophy    TTE with Echo: 11/14/2018:  The left ventricular ejection fraction is estimated to be 60-65%  The left atrium is dilated.  The left ventricular ejection fraction is estimated to be 60-65%  There is mild aortic valve thickening.  There is mild tricuspid regurgitation.  There is no echocardiographic evidence for pulmonary hypertension.  No evidence for any hemodynamically significant valvular disease.    ECHO: 5/17/18: Normal left ventricular size and wall thickness.  Abnormal (paradoxical) septal motion consistent with abnormal conduction. There is basal inferior wall hypokinesis. The left ventricular ejection fraction is 65%.The right ventricle is normal in size and function.The left atrium is mildly dilated. The   Mitral inflow pattern is consistent with impaired left ventricular relaxation   with mildly elevated left atrial pressure (8-14mmHg).  Right atrial size is   normal.Calcified trileaflet aortic valve with prominent thickening on   ventricular side of aortic valve, likely represents a calcification; however   connot rule out a fibroelastoma.  No aortic regurgitation noted.No   hemodynamically significant valvularaortic stenosis.Structurally normal   mitral valve.There is trace mitral regurgitation.Structurally normal   tricuspid   valve.There is trace to mild tricuspid regurgitation.There is mild   pulmonary   hypertension.The pulmonary artery systolic pressure is estimated to be 45   mmHg.Structurally normal pulmonic valve.There is trace pulmonic   regurgitation.There is no pericardial effusion.No prior echo is   available for   comparison.    NUCLEAR STRESS TEST: 05/18/18:  Myocardial perfusion imaging is abnormal. There is a moderate size area   of reversible ischemia involving mid/distal inferior, inferolateral and   inferoseptal segments. Overall left ventricular systolic function is   normal without regional wall motion abnormalities. The EKG stress test is   normal.    CXR: 5/23/18:  IMPRESSION:  Unremarkable    PMH:    Hx of MRSA infection: right great toe (amputated)  Glaucoma: b/l  Falls  Depression  Pulmonary embolism  DVT (deep venous thrombosis): Right lower extremity  MI (myocardial infarction): at age 65 y.o  Peripheral vascular disease  Ulcer of lower limb  Arthropathy  Ischemic heart disease  Hypertension  Hyperlipidemia  Diabetes mellitus: II    PSH:    S/P amputation: may 2018; right great toe  History of surgery: x7 coronary artery stents  History of surgery: right leg bypass 5/2018 with amputation of right great toe  ALL: penicillins (Anaphylaxis) NKFA. Denies shellfish/Contrast dye allergy.    SocHX: Denies EtoH/TOB/IVDU    FHx: Mother- glaucoma, PVD    MEDS:   amLODIPine   Tablet 10 milliGRAM(s) Oral daily  aspirin enteric coated 81 milliGRAM(s) Oral daily  atorvastatin 80 milliGRAM(s) Oral at bedtime  brimonidine 0.2% Ophthalmic Solution 1 Drop(s) Both EYES three times a day  chlorhexidine 4% Liquid 1 Application(s) Topical once  dextrose 40% Gel 15 Gram(s) Oral once PRN  dextrose 5%. 1000 milliLiter(s) IV Continuous <Continuous>  dextrose 50% Injectable 12.5 Gram(s) IV Push once  dextrose 50% Injectable 25 Gram(s) IV Push once  dextrose 50% Injectable 25 Gram(s) IV Push once  gabapentin 300 milliGRAM(s) Oral three times a day  glucagon  Injectable 1 milliGRAM(s) IntraMuscular once PRN  insulin lispro (HumaLOG) corrective regimen sliding scale   SubCutaneous Before meals and at bedtime  latanoprost 0.005% Ophthalmic Solution 1 Drop(s) Both EYES at bedtime  metoprolol succinate ER 25 milliGRAM(s) Oral daily  morphine  - Injectable 2 milliGRAM(s) IV Push every 30 minutes PRN  oxyCODONE    5 mG/acetaminophen 325 mG 1 Tablet(s) Oral every 4 hours PRN  sertraline 50 milliGRAM(s) Oral daily  sodium chloride 0.9%. 1000 milliLiter(s) IV Continuous <Continuous>  timolol 0.5% Solution 1 Drop(s) Both EYES two times a day    T(C): 36.2 (11-15-18 @ 05:59), Max: 37.6 (11-14-18 @ 22:48)  HR: 66 (11-15-18 @ 06:16) (66 - 87)  BP: 143/64 (11-15-18 @ 06:16) (111/54 - 181/81)  RR: 16 (11-15-18 @ 06:16) (16 - 18)  SpO2: 100% (11-15-18 @ 06:16) (96% - 100%)  Wt(kg): 86kg  HEENT: NCAT, EOMI, PERRLA  NECK: No JVD, No carotid bruits B/L, +2 Carotid pulses B/L  PULM:  CTA B/L No W/R/R  CARD: RRR, +S1, +S2, No M/R/G  ABD: ND, +BS, NT, no masses  EXT: Warm, pedal edema yes/no, pitting/non-pitting  RECTAL: Guaiac negative/positive   NEURO: A & O x 3, no focal neurologic deficits  PULSES:	   B/L 1+ Radial	                B/L palpable FEM pulses        	              B/L 1+ DP/ faint PT  2+ carotid pulses, +right Carotid Bruit		                          	                              11.0   4.5   )-----------( 149      ( 14 Nov 2018 06:38 )             34.8     11-14    140  |  102  |  25<H>  ----------------------------<  115<H>  4.6   |  26  |  0.86    Ca    8.9      14 Nov 2018 06:38  Mg     2.2     11-14              EKG:					  ASA III				Mallampati class: IV	            Anginal Class: III  A/P:  84yo PCN allergic F with sig PMHx of CAD, lateral wall MI s/p 6 stents, DM, HTN, CKD, RLE DVT/PE (last dose of Eliquis 5mg at 5AM 11/14/2018), early dementia, s/p LLE diagnostic angio and left great toe partial removal who presents for preop for LE bypass cardiac cath with possible intervention if clinically indicated in the setting of pt risk factors, CCS Anginal Equivalent III sxs, known CAD, abnormal NST.  Dr. Hicks, Dr. Izquierdo and Dr. Chand made aware.  -	Pt refused for procedure with Dr. Hicks (11/14/2018), consult and consent to be signed tomorrow for cardiac cath with Dr. Chand. Vascular Service made aware. Continue to hold Eliquis and NPO after midnight.  -	Pt refused H&P by bedside, reports to be agreeable with the presence of her daughter, Anne Betts, who reports to be coming tomorrow from 7 to 9AM. UPDATE: H&P and precath consent obtained (11/15/2018)  -	Precath orders are in. F/U on new EKG. F/U on cath report (pt daughter reported from [facility], cath lab [phone])    Sedation Plan:   Moderate      Patient Is Suitable Candidate For Sedation?    Yes       Risks & benefits of procedure and sedation and risks and benefits for the alternative therapy have been explained to the patient in layman’s terms including but not limited to: allergic reaction, bleeding, infection, arrhythmia, respiratory compromise, renal and vascular compromise, limb damage, MI, CVA, emergent CABG/Vascular Surgery and death. Informed consent obtained and in chart. Interventional Cardiology PA Precath Note    *Hx by daughter Anne Betts and pt    HPI:  84yo PCN allergic F with sig PMHx of CAD, daughter claims "inferior wall MI s/p 7 stents" (reportedly by Dr. Juarez twice in [facility]), DM II, HTN, CKD, RLE DVT/PE (last dose of Eliquis 5mg 5AM 11/14/2018), early dementia, hx of multiple unwitnessed falls (daughter claims non-cardiac dx, non-neuro dx), s/p LLE diagnostic angio and left great toe partial removal who presents for preop for LE bypass cardiac cath (daughter claims that Dr. Castellanos that surgery is scheduled after Thanksgiving) with possible intervention if clinically indicated secondary to pt risk factors, CCS Anginal Equivalent III sxs, known CAD and abnormal NST. Daughter reports that she brought her mother from [place] in April 2018 and that pt c/o of decreased exertional tolerance and fatigue. Of note, she cannot walk a flight of stairs or a city block. Pt denies CP, SOB at rest, dizziness, diaphoresis, palpitations, fatigue, LE edema, orthopnea, PND, syncope.    As per MD note, Anginal class 3    ECG: 10/29/18: Diagnosis Line Normal sinus rhythm  Voltage criteria for left ventricular hypertrophy    TTE with Echo: 11/14/2018:  The left ventricular ejection fraction is estimated to be 60-65%  The left atrium is dilated.  The left ventricular ejection fraction is estimated to be 60-65%  There is mild aortic valve thickening.  There is mild tricuspid regurgitation.  There is no echocardiographic evidence for pulmonary hypertension.  No evidence for any hemodynamically significant valvular disease.    ECHO: 5/17/18: Normal left ventricular size and wall thickness.  Abnormal (paradoxical) septal motion consistent with abnormal conduction. There is basal inferior wall hypokinesis. The left ventricular ejection fraction is 65%.The right ventricle is normal in size and function.The left atrium is mildly dilated. The   Mitral inflow pattern is consistent with impaired left ventricular relaxation   with mildly elevated left atrial pressure (8-14mmHg).  Right atrial size is   normal.Calcified trileaflet aortic valve with prominent thickening on   ventricular side of aortic valve, likely represents a calcification; however   connot rule out a fibroelastoma.  No aortic regurgitation noted.No   hemodynamically significant valvularaortic stenosis.Structurally normal   mitral valve.There is trace mitral regurgitation.Structurally normal   tricuspid   valve.There is trace to mild tricuspid regurgitation.There is mild   pulmonary   hypertension.The pulmonary artery systolic pressure is estimated to be 45   mmHg.Structurally normal pulmonic valve.There is trace pulmonic   regurgitation.There is no pericardial effusion.No prior echo is   available for   comparison.    NUCLEAR STRESS TEST: 05/18/18:  Myocardial perfusion imaging is abnormal. There is a moderate size area   of reversible ischemia involving mid/distal inferior, inferolateral and   inferoseptal segments. Overall left ventricular systolic function is   normal without regional wall motion abnormalities. The EKG stress test is   normal.    CXR: 5/23/18:  IMPRESSION:  Unremarkable    PMH:    Hx of MRSA infection: right great toe (amputated)  Glaucoma: b/l  Falls  Depression  Pulmonary embolism  DVT (deep venous thrombosis): Right lower extremity  MI (myocardial infarction): at age 65 y.o  Peripheral vascular disease  Ulcer of lower limb  Arthropathy  Ischemic heart disease  Hypertension  Hyperlipidemia  Diabetes mellitus: II    PSH:    S/P amputation: may 2018; right great toe  History of surgery: x7 coronary artery stents  History of surgery: right leg bypass 5/2018 with amputation of right great toe  ALL: penicillins (Anaphylaxis) NKFA. Denies shellfish/Contrast dye allergy.    SocHX: Denies EtoH/TOB/IVDU    FHx: Mother- glaucoma, PVD    MEDS:   amLODIPine   Tablet 10 milliGRAM(s) Oral daily  aspirin enteric coated 81 milliGRAM(s) Oral daily  atorvastatin 80 milliGRAM(s) Oral at bedtime  brimonidine 0.2% Ophthalmic Solution 1 Drop(s) Both EYES three times a day  chlorhexidine 4% Liquid 1 Application(s) Topical once  dextrose 40% Gel 15 Gram(s) Oral once PRN  dextrose 5%. 1000 milliLiter(s) IV Continuous <Continuous>  dextrose 50% Injectable 12.5 Gram(s) IV Push once  dextrose 50% Injectable 25 Gram(s) IV Push once  dextrose 50% Injectable 25 Gram(s) IV Push once  gabapentin 300 milliGRAM(s) Oral three times a day  glucagon  Injectable 1 milliGRAM(s) IntraMuscular once PRN  insulin lispro (HumaLOG) corrective regimen sliding scale   SubCutaneous Before meals and at bedtime  latanoprost 0.005% Ophthalmic Solution 1 Drop(s) Both EYES at bedtime  metoprolol succinate ER 25 milliGRAM(s) Oral daily  morphine  - Injectable 2 milliGRAM(s) IV Push every 30 minutes PRN  oxyCODONE    5 mG/acetaminophen 325 mG 1 Tablet(s) Oral every 4 hours PRN  sertraline 50 milliGRAM(s) Oral daily  sodium chloride 0.9%. 1000 milliLiter(s) IV Continuous <Continuous>  timolol 0.5% Solution 1 Drop(s) Both EYES two times a day    T(C): 36.2 (11-15-18 @ 05:59), Max: 37.6 (11-14-18 @ 22:48)  HR: 66 (11-15-18 @ 06:16) (66 - 87)  BP: 143/64 (11-15-18 @ 06:16) (111/54 - 181/81)  RR: 16 (11-15-18 @ 06:16) (16 - 18)  SpO2: 100% (11-15-18 @ 06:16) (96% - 100%)  Wt(kg): 86kg  HEENT: NCAT, EOMI, PERRLA  NECK: No JVD, No carotid bruits B/L, +2 Carotid pulses B/L  PULM:  CTA B/L No W/R/R  CARD: RRR, +S1, +S2, No M/R/G  ABD: ND, +BS, NT, no masses  EXT: Warm, pedal edema yes/no, pitting/non-pitting  RECTAL: Guaiac negative/positive   NEURO: A & O x 3, no focal neurologic deficits  PULSES:	   B/L 1+ Radial	                B/L palpable FEM pulses        	              B/L 1+ DP/ faint PT  2+ carotid pulses, +right Carotid Bruit		                          	                              11.0   4.5   )-----------( 149      ( 14 Nov 2018 06:38 )             34.8     11-14    140  |  102  |  25<H>  ----------------------------<  115<H>  4.6   |  26  |  0.86    Ca    8.9      14 Nov 2018 06:38  Mg     2.2     11-14              EKG:					  ASA III				Mallampati class: IV	            Anginal Class: III  A/P:  84yo PCN allergic F with sig PMHx of CAD, lateral wall MI s/p 6 stents, DM, HTN, CKD, RLE DVT/PE (last dose of Eliquis 5mg at 5AM 11/14/2018), early dementia, s/p LLE diagnostic angio and left great toe partial removal who presents for preop for LE bypass cardiac cath with possible intervention if clinically indicated in the setting of pt risk factors, CCS Anginal Equivalent III sxs, known CAD, abnormal NST.  Dr. Hicks, Dr. Izquierdo and Dr. Chand made aware.  -	Pt refused for procedure with Dr. Hicks (11/14/2018), consult and consent to be signed tomorrow for cardiac cath with Dr. Chand. Vascular Service made aware. Continue to hold Eliquis and NPO after midnight.  -	Pt refused H&P by bedside, reports to be agreeable with the presence of her daughter, Anne Betts, who reports to be coming tomorrow from 7 to 9AM. UPDATE: H&P and precath consent obtained (11/15/2018)  -	Precath orders are in. F/U on new EKG. F/U on cath report (pt daughter reported from [facility], cath lab [phone]) UPDATE: Successful, awaiting inbound fax.    Sedation Plan:   Moderate      Patient Is Suitable Candidate For Sedation?    Yes       Risks & benefits of procedure and sedation and risks and benefits for the alternative therapy have been explained to the patient in layman’s terms including but not limited to: allergic reaction, bleeding, infection, arrhythmia, respiratory compromise, renal and vascular compromise, limb damage, MI, CVA, emergent CABG/Vascular Surgery and death. Informed consent obtained and in chart. Interventional Cardiology PA Precath Note    *Hx by daughter Anne Betts and pt    HPI:  84yo PCN allergic F with sig PMHx of CAD, daughter claims "inferior wall MI s/p 7 stents" (reportedly by Dr. Juarez twice in [facility]), DM II, HTN, CKD, RLE DVT/PE (last dose of Eliquis 5mg 5AM 11/14/2018), early dementia, hx of multiple unwitnessed falls (daughter claims non-cardiac dx, non-neuro dx), s/p LLE diagnostic angio and left great toe partial removal who presents for preop for LE bypass cardiac cath (daughter claims that Dr. Castellanos that surgery is scheduled after Thanksgiving) with possible intervention if clinically indicated secondary to pt risk factors, CCS Anginal Equivalent III sxs, known CAD and abnormal NST. Daughter reports that she brought her mother from [place] in April 2018 and that pt c/o of decreased exertional tolerance and fatigue. Of note, she cannot walk a flight of stairs or a city block. Pt denies CP, SOB at rest, dizziness, diaphoresis, palpitations, fatigue, LE edema, orthopnea, PND, syncope.    As per MD note, Anginal class 3    ECG: 10/29/18: Diagnosis Line Normal sinus rhythm  Voltage criteria for left ventricular hypertrophy    TTE with Echo: 11/14/2018:  The left ventricular ejection fraction is estimated to be 60-65%  The left atrium is dilated.  The left ventricular ejection fraction is estimated to be 60-65%  There is mild aortic valve thickening.  There is mild tricuspid regurgitation.  There is no echocardiographic evidence for pulmonary hypertension.  No evidence for any hemodynamically significant valvular disease.    ECHO: 5/17/18: Normal left ventricular size and wall thickness.  Abnormal (paradoxical) septal motion consistent with abnormal conduction. There is basal inferior wall hypokinesis. The left ventricular ejection fraction is 65%.The right ventricle is normal in size and function.The left atrium is mildly dilated. The   Mitral inflow pattern is consistent with impaired left ventricular relaxation   with mildly elevated left atrial pressure (8-14mmHg).  Right atrial size is   normal.Calcified trileaflet aortic valve with prominent thickening on   ventricular side of aortic valve, likely represents a calcification; however   connot rule out a fibroelastoma.  No aortic regurgitation noted.No   hemodynamically significant valvularaortic stenosis.Structurally normal   mitral valve.There is trace mitral regurgitation.Structurally normal   tricuspid   valve.There is trace to mild tricuspid regurgitation.There is mild   pulmonary   hypertension.The pulmonary artery systolic pressure is estimated to be 45   mmHg.Structurally normal pulmonic valve.There is trace pulmonic   regurgitation.There is no pericardial effusion.No prior echo is   available for   comparison.    NUCLEAR STRESS TEST: 05/18/18:  Myocardial perfusion imaging is abnormal. There is a moderate size area   of reversible ischemia involving mid/distal inferior, inferolateral and   inferoseptal segments. Overall left ventricular systolic function is   normal without regional wall motion abnormalities. The EKG stress test is   normal.    CXR: 5/23/18:  IMPRESSION:  Unremarkable    PMH:    Hx of MRSA infection: right great toe (amputated)  Glaucoma: b/l  Falls  Depression  Pulmonary embolism  DVT (deep venous thrombosis): Right lower extremity  MI (myocardial infarction): at age 65 y.o  Peripheral vascular disease  Ulcer of lower limb  Arthropathy  Ischemic heart disease  Hypertension  Hyperlipidemia  Diabetes mellitus: II    PSH:    S/P amputation: may 2018; right great toe  History of surgery: x7 coronary artery stents  History of surgery: right leg bypass 5/2018 with amputation of right great toe  ALL: penicillins (Anaphylaxis) NKFA. Denies shellfish/Contrast dye allergy.    SocHX: Denies EtoH/TOB/IVDU    FHx: Mother- glaucoma, PVD    MEDS:   amLODIPine   Tablet 10 milliGRAM(s) Oral daily  aspirin enteric coated 81 milliGRAM(s) Oral daily  atorvastatin 80 milliGRAM(s) Oral at bedtime  brimonidine 0.2% Ophthalmic Solution 1 Drop(s) Both EYES three times a day  chlorhexidine 4% Liquid 1 Application(s) Topical once  dextrose 40% Gel 15 Gram(s) Oral once PRN  dextrose 5%. 1000 milliLiter(s) IV Continuous <Continuous>  dextrose 50% Injectable 12.5 Gram(s) IV Push once  dextrose 50% Injectable 25 Gram(s) IV Push once  dextrose 50% Injectable 25 Gram(s) IV Push once  gabapentin 300 milliGRAM(s) Oral three times a day  glucagon  Injectable 1 milliGRAM(s) IntraMuscular once PRN  insulin lispro (HumaLOG) corrective regimen sliding scale   SubCutaneous Before meals and at bedtime  latanoprost 0.005% Ophthalmic Solution 1 Drop(s) Both EYES at bedtime  metoprolol succinate ER 25 milliGRAM(s) Oral daily  morphine  - Injectable 2 milliGRAM(s) IV Push every 30 minutes PRN  oxyCODONE    5 mG/acetaminophen 325 mG 1 Tablet(s) Oral every 4 hours PRN  sertraline 50 milliGRAM(s) Oral daily  sodium chloride 0.9%. 1000 milliLiter(s) IV Continuous <Continuous>  timolol 0.5% Solution 1 Drop(s) Both EYES two times a day    T(C): 36.2 (11-15-18 @ 05:59), Max: 37.6 (11-14-18 @ 22:48)  HR: 66 (11-15-18 @ 06:16) (66 - 87)  BP: 143/64 (11-15-18 @ 06:16) (111/54 - 181/81)  RR: 16 (11-15-18 @ 06:16) (16 - 18)  SpO2: 100% (11-15-18 @ 06:16) (96% - 100%)  Wt(kg): 86kg  HEENT: NCAT, EOMI, PERRLA  NECK: No JVD, No carotid bruits B/L, +2 Carotid pulses B/L  PULM:  CTA B/L No W/R/R  CARD: RRR, +S1, +S2, No M/R/G  ABD: ND, +BS, NT, no masses  EXT: Warm, pedal edema yes/no, pitting/non-pitting  RECTAL: Guaiac negative/positive   NEURO: A & O x 3, no focal neurologic deficits  PULSES:	   B/L 1+ Radial	                B/L palpable FEM pulses        	                R 1+ DP/ faint PT, unable to ascertian L DP/PT secondary to C/D/I s/p L great toe partial removal  B/L 2+ carotid pulses, +right Carotid Bruit		                          	                              11.0   4.5   )-----------( 149      ( 14 Nov 2018 06:38 )             34.8     11-14    140  |  102  |  25<H>  ----------------------------<  115<H>  4.6   |  26  |  0.86    Ca    8.9      14 Nov 2018 06:38  Mg     2.2     11-14              EKG:					  ASA III				Mallampati class: IV	            Anginal Class: III  A/P:  84yo PCN allergic F with sig PMHx of CAD, lateral wall MI s/p 6 stents, DM, HTN, CKD, RLE DVT/PE (last dose of Eliquis 5mg at 5AM 11/14/2018), early dementia, s/p LLE diagnostic angio and left great toe partial removal who presents for preop for LE bypass cardiac cath with possible intervention if clinically indicated in the setting of pt risk factors, CCS Anginal Equivalent III sxs, known CAD, abnormal NST.  Dr. Hicks, Dr. Izquierdo and Dr. Chand made aware.  -	Pt refused for procedure with Dr. Hicks (11/14/2018), consult and consent to be signed tomorrow for cardiac cath with Dr. Chand. Vascular Service made aware. Continue to hold Eliquis and NPO after midnight.  -	Pt refused H&P by bedside, reports to be agreeable with the presence of her daughter, Anne Betts, who reports to be coming tomorrow from 7 to 9AM. UPDATE: H&P and precath consent obtained (11/15/2018)  -	Precath orders are in. F/U on new EKG. F/U on cath report (pt daughter reported from [facility], cath lab [phone]) UPDATE: Successful, awaiting inbound fax.    Sedation Plan:   Moderate      Patient Is Suitable Candidate For Sedation?    Yes       Risks & benefits of procedure and sedation and risks and benefits for the alternative therapy have been explained to the patient in layman’s terms including but not limited to: allergic reaction, bleeding, infection, arrhythmia, respiratory compromise, renal and vascular compromise, limb damage, MI, CVA, emergent CABG/Vascular Surgery and death. Informed consent obtained and in chart. Interventional Cardiology PA Precath Note    *Hx by daughter Anne Betts and pt    HPI:  84yo PCN allergic F with sig PMHx of CAD, daughter claims "inferior wall MI s/p 7 stents" (reportedly by Dr. Juarez twice in [facility]), DM II, HTN, CKD, RLE DVT/PE (last dose of Eliquis 5mg 5AM 11/14/2018), early dementia, hx of multiple unwitnessed falls (daughter claims non-cardiac dx, non-neuro dx), s/p LLE diagnostic angio and left great toe partial removal who presents for preop for LE bypass cardiac cath (daughter claims that Dr. Castellanos that surgery is scheduled after Thanksgiving) with possible intervention if clinically indicated secondary to pt risk factors, CCS Anginal Equivalent III sxs, known CAD and abnormal NST. Daughter reports that she brought her mother from [place] in April 2018 and that pt c/o of decreased exertional tolerance and fatigue. Of note, she cannot walk a flight of stairs or a city block. Pt denies CP, SOB at rest, dizziness, diaphoresis, palpitations, fatigue, LE edema, orthopnea, PND, syncope.    As per MD note, Anginal class 3    ECG: 10/29/18: Diagnosis Line Normal sinus rhythm  Voltage criteria for left ventricular hypertrophy    TTE with Echo: 11/14/2018:  The left ventricular ejection fraction is estimated to be 60-65%  The left atrium is dilated.  The left ventricular ejection fraction is estimated to be 60-65%  There is mild aortic valve thickening.  There is mild tricuspid regurgitation.  There is no echocardiographic evidence for pulmonary hypertension.  No evidence for any hemodynamically significant valvular disease.    ECHO: 5/17/18: Normal left ventricular size and wall thickness.  Abnormal (paradoxical) septal motion consistent with abnormal conduction. There is basal inferior wall hypokinesis. The left ventricular ejection fraction is 65%.The right ventricle is normal in size and function.The left atrium is mildly dilated. The   Mitral inflow pattern is consistent with impaired left ventricular relaxation   with mildly elevated left atrial pressure (8-14mmHg).  Right atrial size is   normal.Calcified trileaflet aortic valve with prominent thickening on   ventricular side of aortic valve, likely represents a calcification; however   connot rule out a fibroelastoma.  No aortic regurgitation noted.No   hemodynamically significant valvularaortic stenosis.Structurally normal   mitral valve.There is trace mitral regurgitation.Structurally normal   tricuspid   valve.There is trace to mild tricuspid regurgitation.There is mild   pulmonary   hypertension.The pulmonary artery systolic pressure is estimated to be 45   mmHg.Structurally normal pulmonic valve.There is trace pulmonic   regurgitation.There is no pericardial effusion.No prior echo is   available for   comparison.    NUCLEAR STRESS TEST: 05/18/18:  Myocardial perfusion imaging is abnormal. There is a moderate size area   of reversible ischemia involving mid/distal inferior, inferolateral and   inferoseptal segments. Overall left ventricular systolic function is   normal without regional wall motion abnormalities. The EKG stress test is   normal.    CXR: 5/23/18:  IMPRESSION:  Unremarkable    PMH:    Hx of MRSA infection: right great toe (amputated)  Glaucoma: b/l  Falls  Depression  Pulmonary embolism  DVT (deep venous thrombosis): Right lower extremity  MI (myocardial infarction): at age 65 y.o  Peripheral vascular disease  Ulcer of lower limb  Arthropathy  Ischemic heart disease  Hypertension  Hyperlipidemia  Diabetes mellitus: II    PSH:    S/P amputation: may 2018; right great toe  History of surgery: x7 coronary artery stents  History of surgery: right leg bypass 5/2018 with amputation of right great toe  ALL: penicillins (Anaphylaxis) NKFA. Denies shellfish/Contrast dye allergy.    SocHX: Denies EtoH/TOB/IVDU    FHx: Mother- glaucoma, PVD    MEDS:   amLODIPine   Tablet 10 milliGRAM(s) Oral daily  aspirin enteric coated 81 milliGRAM(s) Oral daily  atorvastatin 80 milliGRAM(s) Oral at bedtime  brimonidine 0.2% Ophthalmic Solution 1 Drop(s) Both EYES three times a day  chlorhexidine 4% Liquid 1 Application(s) Topical once  dextrose 40% Gel 15 Gram(s) Oral once PRN  dextrose 5%. 1000 milliLiter(s) IV Continuous <Continuous>  dextrose 50% Injectable 12.5 Gram(s) IV Push once  dextrose 50% Injectable 25 Gram(s) IV Push once  dextrose 50% Injectable 25 Gram(s) IV Push once  gabapentin 300 milliGRAM(s) Oral three times a day  glucagon  Injectable 1 milliGRAM(s) IntraMuscular once PRN  insulin lispro (HumaLOG) corrective regimen sliding scale   SubCutaneous Before meals and at bedtime  latanoprost 0.005% Ophthalmic Solution 1 Drop(s) Both EYES at bedtime  metoprolol succinate ER 25 milliGRAM(s) Oral daily  morphine  - Injectable 2 milliGRAM(s) IV Push every 30 minutes PRN  oxyCODONE    5 mG/acetaminophen 325 mG 1 Tablet(s) Oral every 4 hours PRN  sertraline 50 milliGRAM(s) Oral daily  sodium chloride 0.9%. 1000 milliLiter(s) IV Continuous <Continuous>  timolol 0.5% Solution 1 Drop(s) Both EYES two times a day    T(C): 36.2 (11-15-18 @ 05:59), Max: 37.6 (11-14-18 @ 22:48)  HR: 66 (11-15-18 @ 06:16) (66 - 87)  BP: 143/64 (11-15-18 @ 06:16) (111/54 - 181/81)  RR: 16 (11-15-18 @ 06:16) (16 - 18)  SpO2: 100% (11-15-18 @ 06:16) (96% - 100%)  Wt(kg): 86kg  HEENT: NCAT, EOMI, PERRLA  NECK: No JVD, + R carotid bruit, +2 Carotid pulses B/L  PULM:  CTA B/L No W/R/R  CARD: RRR, +S1, +S2, No M/R/G  ABD: ND, +BS, NT, no masses  EXT: Warm, no pedal edema   RECTAL: Guaiac negative/positive   NEURO: A & O x 3, no focal neurologic deficits  PULSES:	   B/L 1+ Radial	                B/L palpable FEM pulses        	                R 1+ DP/ faint PT, unable to ascertain L DP/PT secondary to C/D/I s/p L great toe partial removal  B/L 2+ carotid pulses, +right Carotid Bruit		                          	                              11.0   4.5   )-----------( 149      ( 14 Nov 2018 06:38 )             34.8     11-14    140  |  102  |  25<H>  ----------------------------<  115<H>  4.6   |  26  |  0.86    Ca    8.9      14 Nov 2018 06:38  Mg     2.2     11-14              EKG:					  ASA III				Mallampati class: IV	            Anginal Class: III  A/P:  84yo PCN allergic F with sig PMHx of CAD, lateral wall MI s/p 6 stents, DM, HTN, CKD, RLE DVT/PE (last dose of Eliquis 5mg at 5AM 11/14/2018), early dementia, s/p LLE diagnostic angio and left great toe partial removal who presents for preop for LE bypass cardiac cath with possible intervention if clinically indicated in the setting of pt risk factors, CCS Anginal Equivalent III sxs, known CAD, abnormal NST.  Dr. Hicks, Dr. Izquierdo and Dr. Chand made aware.  -	Pt refused for procedure with Dr. Hicks (11/14/2018), consult and consent to be signed tomorrow for cardiac cath with Dr. Chand. Vascular Service made aware. Continue to hold Eliquis and NPO after midnight.  -	Pt refused H&P by bedside, reports to be agreeable with the presence of her daughter, Anne Betts, who reports to be coming tomorrow from 7 to 9AM. UPDATE: H&P and precath consent obtained (11/15/2018)  -	Precath orders are in. F/U on new EKG. F/U on cath report (pt daughter reported from [facility], cath lab [phone]) UPDATE: Successful, awaiting inbound fax.    Sedation Plan:   Moderate      Patient Is Suitable Candidate For Sedation?    Yes       Risks & benefits of procedure and sedation and risks and benefits for the alternative therapy have been explained to the patient in layman’s terms including but not limited to: allergic reaction, bleeding, infection, arrhythmia, respiratory compromise, renal and vascular compromise, limb damage, MI, CVA, emergent CABG/Vascular Surgery and death. Informed consent obtained and in chart. Interventional Cardiology PA Precath Note    *Hx by daughter Anne Betts and pt    HPI:  84yo PCN allergic F with sig PMHx of CAD, daughter claims "inferior wall MI s/p 7 stents" (reportedly by Dr. Juarez twice in [facility]), DM II, HTN, CKD, RLE DVT/PE (last dose of Eliquis 5mg 5AM 11/14/2018), early dementia, hx of multiple unwitnessed falls (daughter claims non-cardiac dx, non-neuro dx), s/p LLE diagnostic angio and left great toe partial removal who presents for preop for LE bypass cardiac cath (daughter claims that Dr. Castellanos that surgery is scheduled after Thanksgiving) with possible intervention if clinically indicated secondary to pt risk factors, CCS Anginal Equivalent III sxs, known CAD and abnormal NST. Daughter reports that she brought her mother from [place] in April 2018 and that pt c/o of decreased exertional tolerance and fatigue. Of note, she cannot walk a flight of stairs or a city block. Pt denies CP, SOB at rest, dizziness, diaphoresis, palpitations, fatigue, LE edema, orthopnea, PND, syncope.    As per MD note, Anginal class 3    ECG: 10/29/18: Diagnosis Line Normal sinus rhythm  Voltage criteria for left ventricular hypertrophy    TTE with Echo: 11/14/2018:  The left ventricular ejection fraction is estimated to be 60-65%  The left atrium is dilated.  The left ventricular ejection fraction is estimated to be 60-65%  There is mild aortic valve thickening.  There is mild tricuspid regurgitation.  There is no echocardiographic evidence for pulmonary hypertension.  No evidence for any hemodynamically significant valvular disease.    ECHO: 5/17/18: Normal left ventricular size and wall thickness.  Abnormal (paradoxical) septal motion consistent with abnormal conduction. There is basal inferior wall hypokinesis. The left ventricular ejection fraction is 65%.The right ventricle is normal in size and function.The left atrium is mildly dilated. The   Mitral inflow pattern is consistent with impaired left ventricular relaxation   with mildly elevated left atrial pressure (8-14mmHg).  Right atrial size is   normal.Calcified trileaflet aortic valve with prominent thickening on   ventricular side of aortic valve, likely represents a calcification; however   connot rule out a fibroelastoma.  No aortic regurgitation noted.No   hemodynamically significant valvularaortic stenosis.Structurally normal   mitral valve.There is trace mitral regurgitation.Structurally normal   tricuspid   valve.There is trace to mild tricuspid regurgitation.There is mild   pulmonary   hypertension.The pulmonary artery systolic pressure is estimated to be 45   mmHg.Structurally normal pulmonic valve.There is trace pulmonic   regurgitation.There is no pericardial effusion.No prior echo is   available for   comparison.    NUCLEAR STRESS TEST: 05/18/18:  Myocardial perfusion imaging is abnormal. There is a moderate size area   of reversible ischemia involving mid/distal inferior, inferolateral and   inferoseptal segments. Overall left ventricular systolic function is   normal without regional wall motion abnormalities. The EKG stress test is   normal.    CXR: 5/23/18:  IMPRESSION:  Unremarkable    PMH:    Hx of MRSA infection: right great toe (amputated)  Glaucoma: b/l  Falls  Depression  Pulmonary embolism  DVT (deep venous thrombosis): Right lower extremity  MI (myocardial infarction): at age 65 y.o  Peripheral vascular disease  Ulcer of lower limb  Arthropathy  Ischemic heart disease  Hypertension  Hyperlipidemia  Diabetes mellitus: II    PSH:    S/P amputation: may 2018; right great toe  History of surgery: x7 coronary artery stents  History of surgery: right leg bypass 5/2018 with amputation of right great toe  ALL: penicillins (Anaphylaxis) NKFA. Denies shellfish/Contrast dye allergy.    SocHX: Denies EtoH/TOB/IVDU    FHx: Mother- glaucoma, PVD    MEDS:   amLODIPine   Tablet 10 milliGRAM(s) Oral daily  aspirin enteric coated 81 milliGRAM(s) Oral daily  atorvastatin 80 milliGRAM(s) Oral at bedtime  brimonidine 0.2% Ophthalmic Solution 1 Drop(s) Both EYES three times a day  chlorhexidine 4% Liquid 1 Application(s) Topical once  dextrose 40% Gel 15 Gram(s) Oral once PRN  dextrose 5%. 1000 milliLiter(s) IV Continuous <Continuous>  dextrose 50% Injectable 12.5 Gram(s) IV Push once  dextrose 50% Injectable 25 Gram(s) IV Push once  dextrose 50% Injectable 25 Gram(s) IV Push once  gabapentin 300 milliGRAM(s) Oral three times a day  glucagon  Injectable 1 milliGRAM(s) IntraMuscular once PRN  insulin lispro (HumaLOG) corrective regimen sliding scale   SubCutaneous Before meals and at bedtime  latanoprost 0.005% Ophthalmic Solution 1 Drop(s) Both EYES at bedtime  metoprolol succinate ER 25 milliGRAM(s) Oral daily  morphine  - Injectable 2 milliGRAM(s) IV Push every 30 minutes PRN  oxyCODONE    5 mG/acetaminophen 325 mG 1 Tablet(s) Oral every 4 hours PRN  sertraline 50 milliGRAM(s) Oral daily  sodium chloride 0.9%. 1000 milliLiter(s) IV Continuous <Continuous>  timolol 0.5% Solution 1 Drop(s) Both EYES two times a day    T(C): 36.2 (11-15-18 @ 05:59), Max: 37.6 (11-14-18 @ 22:48)  HR: 66 (11-15-18 @ 06:16) (66 - 87)  BP: 143/64 (11-15-18 @ 06:16) (111/54 - 181/81)  RR: 16 (11-15-18 @ 06:16) (16 - 18)  SpO2: 100% (11-15-18 @ 06:16) (96% - 100%)  Wt(kg): 86kg  HEENT: NCAT, EOMI, PERRLA  NECK: No JVD, + R carotid bruit, +2 Carotid pulses B/L  PULM:  CTA B/L No W/R/R  CARD: RRR, +S1, +S2, No M/R/G  ABD: ND, +BS, NT, no masses  EXT: Warm, no pedal edema   RECTAL: Guaiac negative/positive   NEURO: A & O x 3, no focal neurologic deficits  PULSES:	   B/L 1+ Radial	                B/L palpable FEM pulses        	                R 1+ DP/ faint PT, unable to ascertain L DP/PT secondary to C/D/I s/p L great toe partial removal  B/L 2+ carotid pulses, +right Carotid Bruit		                          	                              11.0   4.5   )-----------( 149      ( 14 Nov 2018 06:38 )             34.8     11-14    140  |  102  |  25<H>  ----------------------------<  115<H>  4.6   |  26  |  0.86    Ca    8.9      14 Nov 2018 06:38  Mg     2.2     11-14              EKG:					  ASA III				Mallampati class: IV	            Anginal Class: III  A/P:  84yo PCN allergic F with sig PMHx of CAD, lateral wall MI s/p 6 stents, DM, HTN, CKD, RLE DVT/PE (last dose of Eliquis 5mg at 5AM 11/14/2018), early dementia, s/p LLE diagnostic angio and left great toe partial removal who presents for preop for LE bypass cardiac cath with possible intervention if clinically indicated in the setting of pt risk factors, CCS Anginal Equivalent III sxs, known CAD, abnormal NST.  Dr. Hicks, Dr. Izquierdo and Dr. Chand made aware.  -	Pt refused for procedure with Dr. Hicks (11/14/2018), consult and consent to be signed tomorrow for cardiac cath with Dr. Chand. Vascular Service made aware. Continue to hold Eliquis and NPO after midnight.  -	Pt refused H&P by bedside, reports to be agreeable with the presence of her daughter, Anne Betts, who reports to be coming tomorrow from 7 to 9AM. UPDATE: H&P and precath consent obtained (11/15/2018)  -	Precath orders are in. F/U on new EKG. F/U on cath report (pt daughter reported from [facility], cath lab [phone]) UPDATE: Successful, awaiting inbound fax.  -	As d/w Dr. Chand, pt will be loaded with ASA 325mg PO x1 and Plavix 600mg PO x1 pre-procedure.     Sedation Plan:   Moderate      Patient Is Suitable Candidate For Sedation?    Yes       Risks & benefits of procedure and sedation and risks and benefits for the alternative therapy have been explained to the patient in layman’s terms including but not limited to: allergic reaction, bleeding, infection, arrhythmia, respiratory compromise, renal and vascular compromise, limb damage, MI, CVA, emergent CABG/Vascular Surgery and death. Informed consent obtained and in chart. Interventional Cardiology PA Precath Note    *Hx by daughter Anne Betts and pt    HPI:  82yo PCN allergic F with sig PMHx of CAD, daughter claims "inferior wall MI s/p 7 stents" (reportedly by Dr. Juarez twice in [facility]), DM II, HTN, CKD, RLE DVT/PE (last dose of Eliquis 5mg 5AM 11/14/2018), early dementia, hx of multiple unwitnessed falls (daughter claims non-cardiac dx, non-neuro dx), s/p LLE diagnostic angio and left great toe partial removal who presents for preop for LE bypass cardiac cath (daughter claims that Dr. Castellanos that surgery is scheduled after Thanksgiving) with possible intervention if clinically indicated secondary to pt risk factors, CCS Anginal Equivalent III sxs, known CAD and abnormal NST. Daughter reports that she brought her mother from [place] in April 2018 and that pt c/o of decreased exertional tolerance and fatigue. Of note, she cannot walk a flight of stairs or a city block. Pt denies CP, SOB at rest, dizziness, diaphoresis, palpitations, fatigue, LE edema, orthopnea, PND, syncope.    As per MD note, Anginal class 3    ECG: 10/29/18: Diagnosis Line Normal sinus rhythm  Voltage criteria for left ventricular hypertrophy    TTE with Echo: 11/14/2018:  The left ventricular ejection fraction is estimated to be 60-65%  The left atrium is dilated.  The left ventricular ejection fraction is estimated to be 60-65%  There is mild aortic valve thickening.  There is mild tricuspid regurgitation.  There is no echocardiographic evidence for pulmonary hypertension.  No evidence for any hemodynamically significant valvular disease.    ECHO: 5/17/18: Normal left ventricular size and wall thickness.  Abnormal (paradoxical) septal motion consistent with abnormal conduction. There is basal inferior wall hypokinesis. The left ventricular ejection fraction is 65%.The right ventricle is normal in size and function.The left atrium is mildly dilated. The   Mitral inflow pattern is consistent with impaired left ventricular relaxation   with mildly elevated left atrial pressure (8-14mmHg).  Right atrial size is   normal.Calcified trileaflet aortic valve with prominent thickening on   ventricular side of aortic valve, likely represents a calcification; however   connot rule out a fibroelastoma.  No aortic regurgitation noted.No   hemodynamically significant valvularaortic stenosis.Structurally normal   mitral valve.There is trace mitral regurgitation.Structurally normal   tricuspid   valve.There is trace to mild tricuspid regurgitation.There is mild   pulmonary   hypertension.The pulmonary artery systolic pressure is estimated to be 45   mmHg.Structurally normal pulmonic valve.There is trace pulmonic   regurgitation.There is no pericardial effusion.No prior echo is   available for   comparison.    NUCLEAR STRESS TEST: 05/18/18:  Myocardial perfusion imaging is abnormal. There is a moderate size area   of reversible ischemia involving mid/distal inferior, inferolateral and   inferoseptal segments. Overall left ventricular systolic function is   normal without regional wall motion abnormalities. The EKG stress test is   normal.    CXR: 5/23/18:  IMPRESSION:  Unremarkable    PMH:    Hx of MRSA infection: right great toe (amputated)  Glaucoma: b/l  Falls  Depression  Pulmonary embolism  DVT (deep venous thrombosis): Right lower extremity  MI (myocardial infarction): at age 65 y.o  Peripheral vascular disease  Ulcer of lower limb  Arthropathy  Ischemic heart disease  Hypertension  Hyperlipidemia  Diabetes mellitus: II    PSH:    S/P amputation: may 2018; right great toe  History of surgery: x7 coronary artery stents  History of surgery: right leg bypass 5/2018 with amputation of right great toe  ALL: penicillins (Anaphylaxis) NKFA. Denies shellfish/Contrast dye allergy.    SocHX: Denies EtoH/TOB/IVDU    FHx: Mother- glaucoma, PVD    MEDS:   amLODIPine   Tablet 10 milliGRAM(s) Oral daily  aspirin enteric coated 81 milliGRAM(s) Oral daily  atorvastatin 80 milliGRAM(s) Oral at bedtime  brimonidine 0.2% Ophthalmic Solution 1 Drop(s) Both EYES three times a day  chlorhexidine 4% Liquid 1 Application(s) Topical once  dextrose 40% Gel 15 Gram(s) Oral once PRN  dextrose 5%. 1000 milliLiter(s) IV Continuous <Continuous>  dextrose 50% Injectable 12.5 Gram(s) IV Push once  dextrose 50% Injectable 25 Gram(s) IV Push once  dextrose 50% Injectable 25 Gram(s) IV Push once  gabapentin 300 milliGRAM(s) Oral three times a day  glucagon  Injectable 1 milliGRAM(s) IntraMuscular once PRN  insulin lispro (HumaLOG) corrective regimen sliding scale   SubCutaneous Before meals and at bedtime  latanoprost 0.005% Ophthalmic Solution 1 Drop(s) Both EYES at bedtime  metoprolol succinate ER 25 milliGRAM(s) Oral daily  morphine  - Injectable 2 milliGRAM(s) IV Push every 30 minutes PRN  oxyCODONE    5 mG/acetaminophen 325 mG 1 Tablet(s) Oral every 4 hours PRN  sertraline 50 milliGRAM(s) Oral daily  sodium chloride 0.9%. 1000 milliLiter(s) IV Continuous <Continuous>  timolol 0.5% Solution 1 Drop(s) Both EYES two times a day    T(C): 36.2 (11-15-18 @ 05:59), Max: 37.6 (11-14-18 @ 22:48)  HR: 66 (11-15-18 @ 06:16) (66 - 87)  BP: 143/64 (11-15-18 @ 06:16) (111/54 - 181/81)  RR: 16 (11-15-18 @ 06:16) (16 - 18)  SpO2: 100% (11-15-18 @ 06:16) (96% - 100%)  Wt(kg): 86kg  HEENT: NCAT, EOMI, PERRLA  NECK: No JVD, + R carotid bruit, +2 Carotid pulses B/L  PULM:  CTA B/L No W/R/R  CARD: RRR, +S1, +S2, No M/R/G  ABD: ND, +BS, NT, no masses  EXT: Warm, no pedal edema   RECTAL: Guaiac negative/positive   NEURO: A & O x 3, no focal neurologic deficits  PULSES:	   B/L 1+ Radial	                B/L palpable FEM pulses, unable to auscultate secondary to body habitus        	                R 1+ DP/ faint PT, unable to ascertain L DP/PT secondary to C/D/I s/p L great toe partial removal  B/L 2+ carotid pulses, +right Carotid Bruit		                          	                              11.0   4.5   )-----------( 149      ( 14 Nov 2018 06:38 )             34.8     11-14    140  |  102  |  25<H>  ----------------------------<  115<H>  4.6   |  26  |  0.86    Ca    8.9      14 Nov 2018 06:38  Mg     2.2     11-14              EKG:					  ASA III				Mallampati class: IV	            Anginal Class: III  A/P:  82yo PCN allergic F with sig PMHx of CAD, lateral wall MI s/p 6 stents, DM, HTN, CKD, RLE DVT/PE (last dose of Eliquis 5mg at 5AM 11/14/2018), early dementia, s/p LLE diagnostic angio and left great toe partial removal who presents for preop for LE bypass cardiac cath with possible intervention if clinically indicated in the setting of pt risk factors, CCS Anginal Equivalent III sxs, known CAD, abnormal NST.  Dr. Hicks, Dr. Izquierdo and Dr. Chand made aware.  -	Pt refused for procedure with Dr. Hicks (11/14/2018), consult and consent to be signed tomorrow for cardiac cath with Dr. Chand. Vascular Service made aware. Continue to hold Eliquis and NPO after midnight.  -	Pt refused H&P by bedside, reports to be agreeable with the presence of her daughter, Anne Betts, who reports to be coming tomorrow from 7 to 9AM. UPDATE: H&P and precath consent obtained (11/15/2018)  -	Precath orders are in. F/U on new EKG. F/U on cath report (pt daughter reported from [facility], cath lab [phone]) UPDATE: Successful, awaiting inbound fax.  -	As d/w Dr. Chand, pt will be loaded with ASA 325mg PO x1 and Plavix 600mg PO x1 pre-procedure.     Sedation Plan:   Moderate      Patient Is Suitable Candidate For Sedation?    Yes       Risks & benefits of procedure and sedation and risks and benefits for the alternative therapy have been explained to the patient in layman’s terms including but not limited to: allergic reaction, bleeding, infection, arrhythmia, respiratory compromise, renal and vascular compromise, limb damage, MI, CVA, emergent CABG/Vascular Surgery and death. Informed consent obtained and in chart. Interventional Cardiology PA Precath Note    *Hx by daughter Anne Betts and pt    HPI:  82yo PCN allergic F with sig PMHx of CAD, daughter claims "inferior wall MI" (as per cardiac cath report (10/26/2009) 4 stents in the left main, prox-LAd, mid-LAD and prox-RCA), DM II, HTN, CKD, RLE DVT/PE (last dose of Eliquis 5mg 5AM 11/14/2018), early dementia, hx of multiple unwitnessed falls (daughter claims non-cardiac dx, non-neuro dx), s/p LLE diagnostic angio and left great toe partial removal who presents for preop for LE bypass cardiac cath (daughter claims that Dr. Castellanos that surgery is scheduled after Thanksgiving) with possible intervention if clinically indicated secondary to pt risk factors, CCS Anginal Equivalent III sxs, known CAD and abnormal NST. Daughter reports that she brought her mother from [place] in April 2018 and that since pt c/o of decreased exertional tolerance and fatigue. Of note, she cannot walk a flight of stairs or a city block. Pt denies CP, SOB at rest, dizziness, diaphoresis, palpitations, fatigue, LE edema, orthopnea, PND, syncope.    As per MD note, Anginal class 3    CATH HX: 10/26/2009: [facility]:   Instent restenosis of the ostial circumflex. Patent stent of the left main. Patent stent of the proximal and mid left anterior descending. Patent stent of the proximal and mid right coronary artery. Successful PCTA of the ostial circumflex.    ECG: 10/29/18: Diagnosis Line Normal sinus rhythm  Voltage criteria for left ventricular hypertrophy    TTE with Echo: 11/14/2018:  The left ventricular ejection fraction is estimated to be 60-65%  The left atrium is dilated.  The left ventricular ejection fraction is estimated to be 60-65%  There is mild aortic valve thickening.  There is mild tricuspid regurgitation.  There is no echocardiographic evidence for pulmonary hypertension.  No evidence for any hemodynamically significant valvular disease.    ECHO: 5/17/18: Normal left ventricular size and wall thickness.  Abnormal (paradoxical) septal motion consistent with abnormal conduction. There is basal inferior wall hypokinesis. The left ventricular ejection fraction is 65%.The right ventricle is normal in size and function.The left atrium is mildly dilated. The   Mitral inflow pattern is consistent with impaired left ventricular relaxation   with mildly elevated left atrial pressure (8-14mmHg).  Right atrial size is   normal.Calcified trileaflet aortic valve with prominent thickening on   ventricular side of aortic valve, likely represents a calcification; however   connot rule out a fibroelastoma.  No aortic regurgitation noted.No   hemodynamically significant valvularaortic stenosis.Structurally normal   mitral valve.There is trace mitral regurgitation.Structurally normal   tricuspid   valve.There is trace to mild tricuspid regurgitation.There is mild   pulmonary   hypertension.The pulmonary artery systolic pressure is estimated to be 45   mmHg.Structurally normal pulmonic valve.There is trace pulmonic   regurgitation.There is no pericardial effusion.No prior echo is   available for   comparison.    NUCLEAR STRESS TEST: 05/18/18:  Myocardial perfusion imaging is abnormal. There is a moderate size area   of reversible ischemia involving mid/distal inferior, inferolateral and   inferoseptal segments. Overall left ventricular systolic function is   normal without regional wall motion abnormalities. The EKG stress test is   normal.    CXR: 5/23/18:  IMPRESSION:  Unremarkable    PMH:    Hx of MRSA infection: right great toe (amputated)  Glaucoma: b/l  Falls  Depression  Pulmonary embolism  DVT (deep venous thrombosis): Right lower extremity  MI (myocardial infarction): at age 65 y.o  Peripheral vascular disease  Ulcer of lower limb  Arthropathy  Ischemic heart disease  Hypertension  Hyperlipidemia  Diabetes mellitus: II    PSH:    S/P amputation: may 2018; right great toe  History of surgery: x7 coronary artery stents  History of surgery: right leg bypass 5/2018 with amputation of right great toe  ALL: penicillins (Anaphylaxis) NKFA. Denies shellfish/Contrast dye allergy.    SocHX: Denies EtoH/TOB/IVDU    FHx: Mother- glaucoma, PVD    MEDS:   amLODIPine   Tablet 10 milliGRAM(s) Oral daily  aspirin enteric coated 81 milliGRAM(s) Oral daily  atorvastatin 80 milliGRAM(s) Oral at bedtime  brimonidine 0.2% Ophthalmic Solution 1 Drop(s) Both EYES three times a day  chlorhexidine 4% Liquid 1 Application(s) Topical once  dextrose 40% Gel 15 Gram(s) Oral once PRN  dextrose 5%. 1000 milliLiter(s) IV Continuous <Continuous>  dextrose 50% Injectable 12.5 Gram(s) IV Push once  dextrose 50% Injectable 25 Gram(s) IV Push once  dextrose 50% Injectable 25 Gram(s) IV Push once  gabapentin 300 milliGRAM(s) Oral three times a day  glucagon  Injectable 1 milliGRAM(s) IntraMuscular once PRN  insulin lispro (HumaLOG) corrective regimen sliding scale   SubCutaneous Before meals and at bedtime  latanoprost 0.005% Ophthalmic Solution 1 Drop(s) Both EYES at bedtime  metoprolol succinate ER 25 milliGRAM(s) Oral daily  morphine  - Injectable 2 milliGRAM(s) IV Push every 30 minutes PRN  oxyCODONE    5 mG/acetaminophen 325 mG 1 Tablet(s) Oral every 4 hours PRN  sertraline 50 milliGRAM(s) Oral daily  sodium chloride 0.9%. 1000 milliLiter(s) IV Continuous <Continuous>  timolol 0.5% Solution 1 Drop(s) Both EYES two times a day    T(C): 36.2 (11-15-18 @ 05:59), Max: 37.6 (11-14-18 @ 22:48)  HR: 66 (11-15-18 @ 06:16) (66 - 87)  BP: 143/64 (11-15-18 @ 06:16) (111/54 - 181/81)  RR: 16 (11-15-18 @ 06:16) (16 - 18)  SpO2: 100% (11-15-18 @ 06:16) (96% - 100%)  Wt(kg): 86kg  HEENT: NCAT, EOMI, PERRLA  NECK: No JVD, + R carotid bruit, +2 Carotid pulses B/L  PULM:  CTA B/L No W/R/R  CARD: RRR, +S1, +S2, No M/R/G  ABD: ND, +BS, NT, no masses  EXT: Warm, no pedal edema   RECTAL: Guaiac negative/positive   NEURO: A & O x 3, no focal neurologic deficits  PULSES:	   B/L 1+ Radial	                B/L palpable FEM pulses, unable to auscultate secondary to body habitus        	                R 1+ DP/ faint PT, unable to ascertain L DP/PT secondary to C/D/I s/p L great toe partial removal  B/L 2+ carotid pulses, +right Carotid Bruit		                          	                              11.0   4.5   )-----------( 149      ( 14 Nov 2018 06:38 )             34.8     11-14    140  |  102  |  25<H>  ----------------------------<  115<H>  4.6   |  26  |  0.86    Ca    8.9      14 Nov 2018 06:38  Mg     2.2     11-14              EKG:					  ASA III				Mallampati class: IV	            Anginal Class: III  A/P:  82yo PCN allergic F with sig PMHx of CAD, lateral wall MI s/p 4 stents, DM, HTN, CKD, RLE DVT/PE (last dose of Eliquis 5mg at 5AM 11/14/2018), early dementia, s/p LLE diagnostic angio and left great toe partial removal who presents for preop for LE bypass cardiac cath with possible intervention if clinically indicated in the setting of pt risk factors, CCS Anginal Equivalent III sxs, known CAD, abnormal NST.  Dr. Hicks, Dr. Izquierdo and Dr. Chand made aware.  -	Pt refused for procedure with Dr. Hicks (11/14/2018), consult and consent to be signed tomorrow for cardiac cath with Dr. Chand. Vascular Service made aware. Continue to hold Eliquis and NPO after midnight.  -	Pt refused H&P by bedside, reports to be agreeable with the presence of her daughter, Anne Betts, who reports to be coming tomorrow from 7 to 9AM. UPDATE: H&P and precath consent obtained (11/15/2018)  -	Precath orders are in. F/U on new EKG. F/U on cath report (pt daughter reported from [facility], cath lab [phone]) UPDATE: Successful, awaiting inbound fax.  -	As d/w Dr. Chand, pt will be loaded with ASA 325mg PO x1 and Plavix 600mg PO x1 pre-procedure.     Sedation Plan:   Moderate      Patient Is Suitable Candidate For Sedation?    Yes       Risks & benefits of procedure and sedation and risks and benefits for the alternative therapy have been explained to the patient in layman’s terms including but not limited to: allergic reaction, bleeding, infection, arrhythmia, respiratory compromise, renal and vascular compromise, limb damage, MI, CVA, emergent CABG/Vascular Surgery and death. Informed consent obtained and in chart. Interventional Cardiology PA Precath Note    *Hx by daughter Anne Betts and pt    HPI:  82yo PCN allergic F with sig PMHx of CAD, daughter claims "inferior wall MI" (as per cardiac cath report (10/26/2009) 4 stents in the left main, prox-LAd, mid-LAD and prox-RCA), DM II, HTN, CKD, RLE DVT/PE (last dose of Eliquis 5mg 5AM 11/14/2018), early dementia, hx of multiple unwitnessed falls (daughter claims non-cardiac dx, non-neuro dx), s/p LLE diagnostic angio and left great toe partial removal who presents for preop for LE bypass cardiac cath (daughter claims that Dr. Castellanos that surgery is scheduled after Thanksgiving) with possible intervention if clinically indicated secondary to pt risk factors, CCS Anginal Equivalent III sxs, known CAD and abnormal NST. Daughter reports that she brought her mother from [place] in April 2018 and that since pt c/o of decreased exertional tolerance and fatigue. Of note, she cannot walk a flight of stairs or a city block. Pt denies CP, SOB at rest, dizziness, diaphoresis, palpitations, fatigue, LE edema, orthopnea, PND, syncope.    As per MD note, Anginal class 3    CATH HX: 10/26/2009: [facility]:   Instent restenosis of the ostial circumflex. Patent stent of the left main. Patent stent of the proximal and mid left anterior descending. Patent stent of the proximal and mid right coronary artery. Successful PCTA of the ostial circumflex.    ECG: 10/29/18: Diagnosis Line Normal sinus rhythm  Voltage criteria for left ventricular hypertrophy    TTE with Echo: 11/14/2018:  The left ventricular ejection fraction is estimated to be 60-65%  The left atrium is dilated.  The left ventricular ejection fraction is estimated to be 60-65%  There is mild aortic valve thickening.  There is mild tricuspid regurgitation.  There is no echocardiographic evidence for pulmonary hypertension.  No evidence for any hemodynamically significant valvular disease.    ECHO: 5/17/18: Normal left ventricular size and wall thickness.  Abnormal (paradoxical) septal motion consistent with abnormal conduction. There is basal inferior wall hypokinesis. The left ventricular ejection fraction is 65%.The right ventricle is normal in size and function.The left atrium is mildly dilated. The   Mitral inflow pattern is consistent with impaired left ventricular relaxation   with mildly elevated left atrial pressure (8-14mmHg).  Right atrial size is   normal.Calcified trileaflet aortic valve with prominent thickening on   ventricular side of aortic valve, likely represents a calcification; however   connot rule out a fibroelastoma.  No aortic regurgitation noted.No   hemodynamically significant valvularaortic stenosis.Structurally normal   mitral valve.There is trace mitral regurgitation.Structurally normal   tricuspid   valve.There is trace to mild tricuspid regurgitation.There is mild   pulmonary   hypertension.The pulmonary artery systolic pressure is estimated to be 45   mmHg.Structurally normal pulmonic valve.There is trace pulmonic   regurgitation.There is no pericardial effusion.No prior echo is   available for   comparison.    NUCLEAR STRESS TEST: 05/18/18:  Myocardial perfusion imaging is abnormal. There is a moderate size area   of reversible ischemia involving mid/distal inferior, inferolateral and   inferoseptal segments. Overall left ventricular systolic function is   normal without regional wall motion abnormalities. The EKG stress test is   normal.    CXR: 5/23/18:  IMPRESSION:  Unremarkable    PMH:    Hx of MRSA infection: right great toe (amputated)  Glaucoma: b/l  Falls  Depression  Pulmonary embolism  DVT (deep venous thrombosis): Right lower extremity  MI (myocardial infarction): at age 65 y.o  Peripheral vascular disease  Ulcer of lower limb  Arthropathy  Ischemic heart disease  Hypertension  Hyperlipidemia  Diabetes mellitus: II    PSH:    S/P amputation: may 2018; right great toe  History of surgery: x7 coronary artery stents  History of surgery: right leg bypass 5/2018 with amputation of right great toe  ALL: penicillins (Anaphylaxis) NKFA. Denies shellfish/Contrast dye allergy.    SocHX: Denies EtoH/TOB/IVDU    FHx: Mother- glaucoma, PVD    MEDS:   amLODIPine   Tablet 10 milliGRAM(s) Oral daily  aspirin enteric coated 81 milliGRAM(s) Oral daily  atorvastatin 80 milliGRAM(s) Oral at bedtime  brimonidine 0.2% Ophthalmic Solution 1 Drop(s) Both EYES three times a day  chlorhexidine 4% Liquid 1 Application(s) Topical once  dextrose 40% Gel 15 Gram(s) Oral once PRN  dextrose 5%. 1000 milliLiter(s) IV Continuous <Continuous>  dextrose 50% Injectable 12.5 Gram(s) IV Push once  dextrose 50% Injectable 25 Gram(s) IV Push once  dextrose 50% Injectable 25 Gram(s) IV Push once  gabapentin 300 milliGRAM(s) Oral three times a day  glucagon  Injectable 1 milliGRAM(s) IntraMuscular once PRN  insulin lispro (HumaLOG) corrective regimen sliding scale   SubCutaneous Before meals and at bedtime  latanoprost 0.005% Ophthalmic Solution 1 Drop(s) Both EYES at bedtime  metoprolol succinate ER 25 milliGRAM(s) Oral daily  morphine  - Injectable 2 milliGRAM(s) IV Push every 30 minutes PRN  oxyCODONE    5 mG/acetaminophen 325 mG 1 Tablet(s) Oral every 4 hours PRN  sertraline 50 milliGRAM(s) Oral daily  sodium chloride 0.9%. 1000 milliLiter(s) IV Continuous <Continuous>  timolol 0.5% Solution 1 Drop(s) Both EYES two times a day    T(C): 36.2 (11-15-18 @ 05:59), Max: 37.6 (11-14-18 @ 22:48)  HR: 66 (11-15-18 @ 06:16) (66 - 87)  BP: 143/64 (11-15-18 @ 06:16) (111/54 - 181/81)  RR: 16 (11-15-18 @ 06:16) (16 - 18)  SpO2: 100% (11-15-18 @ 06:16) (96% - 100%)  Wt(kg): 86kg  HEENT: NCAT, EOMI, PERRLA  NECK: No JVD, + R carotid bruit, +2 Carotid pulses B/L  PULM:  CTA B/L No W/R/R  CARD: RRR, +S1, +S2, No M/R/G  ABD: ND, +BS, NT, no masses  EXT: Warm, no pedal edema   RECTAL: Guaiac negative  NEURO: A & O x 3, no focal neurologic deficits  PULSES:	   B/L 1+ Radial	                B/L palpable FEM pulses, unable to auscultate secondary to body habitus        	                R 1+ DP/ faint PT, unable to ascertain L DP/PT secondary to intact dressing s/p L great toe partial removal  B/L 2+ carotid pulses, +right Carotid Bruit		                          	                              11.0   4.5   )-----------( 149      ( 14 Nov 2018 06:38 )             34.8     11-14    140  |  102  |  25<H>  ----------------------------<  115<H>  4.6   |  26  |  0.86    Ca    8.9      14 Nov 2018 06:38  Mg     2.2     11-14              EKG:					  ASA III				Mallampati class: IV	            Anginal Class: III  A/P:  82yo PCN allergic F with sig PMHx of CAD, lateral wall MI s/p 4 stents, DM, HTN, CKD, RLE DVT/PE (last dose of Eliquis 5mg at 5AM 11/14/2018), early dementia, s/p LLE diagnostic angio and left great toe partial removal who presents for preop for LE bypass cardiac cath with possible intervention if clinically indicated in the setting of pt risk factors, CCS Anginal Equivalent III sxs, known CAD, abnormal NST.  Dr. Hicks, Dr. Izquierdo and Dr. Chand made aware.  -	Pt refused for procedure with Dr. Hicks (11/14/2018), consult and consent to be signed tomorrow for cardiac cath with Dr. Chand. Vascular Service made aware. Continue to hold Eliquis and NPO after midnight.  -	Pt refused H&P by bedside, reports to be agreeable with the presence of her daughter, Anne Betts, who reports to be coming tomorrow from 7 to 9AM. UPDATE: H&P and precath consent obtained (11/15/2018)  -	Precath orders are in. F/U on new EKG. F/U on cath report (pt daughter reported from [facility], cath lab [phone]) UPDATE: Successful, received and in pt's chart (11/15/2018)  -	As d/w Dr. Chand, pt will be loaded with ASA 325mg PO x1 and Plavix 600mg PO x1 pre-procedure.     Sedation Plan:   Moderate      Patient Is Suitable Candidate For Sedation?    Yes       Risks & benefits of procedure and sedation and risks and benefits for the alternative therapy have been explained to the patient in layman’s terms including but not limited to: allergic reaction, bleeding, infection, arrhythmia, respiratory compromise, renal and vascular compromise, limb damage, MI, CVA, emergent CABG/Vascular Surgery and death. Informed consent obtained and in chart. Interventional Cardiology PA Precath Note    *Hx by daughter Anne Betts and pt    HPI:  82yo PCN allergic F with sig PMHx of CAD, daughter claims "inferior wall MI" (as per cardiac cath report (10/26/2009) 4 stents in the left main, prox-LAd, mid-LAD and prox-RCA), DM II, HTN, CKD, RLE DVT/PE (last dose of Eliquis 5mg 5AM 11/14/2018), early dementia, hx of multiple unwitnessed falls (daughter claims non-cardiac dx, non-neuro dx), s/p LLE diagnostic angio and left great toe partial removal who presents for preop for LE bypass cardiac cath (daughter claims that Dr. Castellanos that surgery is scheduled after Thanksgiving) with possible intervention if clinically indicated secondary to pt risk factors, CCS Anginal Equivalent III sxs, known CAD and abnormal NST. Daughter reports that she brought her mother from [place] in April 2018 and that since pt c/o of decreased exertional tolerance and fatigue. Of note, she cannot walk a flight of stairs or a city block. Pt denies CP, SOB at rest, dizziness, diaphoresis, palpitations, fatigue, LE edema, orthopnea, PND, syncope.    As per MD note, Anginal class 3    CATH HX: 10/26/2009: Pending sale to [facility]:   Instent restenosis of the ostial circumflex. Patent stent of the left main. Patent stent of the proximal and mid left anterior descending. Patent stent of the proximal and mid right coronary artery. Successful PCTA of the ostial circumflex.    ECG: 10/29/18: Diagnosis Line Normal sinus rhythm  Voltage criteria for left ventricular hypertrophy    TTE with Echo: 11/14/2018:  The left ventricular ejection fraction is estimated to be 60-65%  The left atrium is dilated.  The left ventricular ejection fraction is estimated to be 60-65%  There is mild aortic valve thickening.  There is mild tricuspid regurgitation.  There is no echocardiographic evidence for pulmonary hypertension.  No evidence for any hemodynamically significant valvular disease.    ECHO: 5/17/18: Normal left ventricular size and wall thickness.  Abnormal (paradoxical) septal motion consistent with abnormal conduction. There is basal inferior wall hypokinesis. The left ventricular ejection fraction is 65%.The right ventricle is normal in size and function.The left atrium is mildly dilated. The   Mitral inflow pattern is consistent with impaired left ventricular relaxation   with mildly elevated left atrial pressure (8-14mmHg).  Right atrial size is   normal.Calcified trileaflet aortic valve with prominent thickening on   ventricular side of aortic valve, likely represents a calcification; however   connot rule out a fibroelastoma.  No aortic regurgitation noted.No   hemodynamically significant valvularaortic stenosis.Structurally normal   mitral valve.There is trace mitral regurgitation.Structurally normal   tricuspid   valve.There is trace to mild tricuspid regurgitation.There is mild   pulmonary   hypertension.The pulmonary artery systolic pressure is estimated to be 45   mmHg.Structurally normal pulmonic valve.There is trace pulmonic   regurgitation.There is no pericardial effusion.No prior echo is   available for   comparison.    NUCLEAR STRESS TEST: 05/18/18:  Myocardial perfusion imaging is abnormal. There is a moderate size area   of reversible ischemia involving mid/distal inferior, inferolateral and   inferoseptal segments. Overall left ventricular systolic function is   normal without regional wall motion abnormalities. The EKG stress test is   normal.    CXR: 5/23/18:  IMPRESSION:  Unremarkable    PMH:    Hx of MRSA infection: right great toe (amputated)  Glaucoma: b/l  Falls  Depression  Pulmonary embolism  DVT (deep venous thrombosis): Right lower extremity  MI (myocardial infarction): at age 65 y.o  Peripheral vascular disease  Ulcer of lower limb  Arthropathy  Ischemic heart disease  Hypertension  Hyperlipidemia  Diabetes mellitus: II    PSH:    S/P amputation: may 2018; right great toe  History of surgery: x7 coronary artery stents  History of surgery: right leg bypass 5/2018 with amputation of right great toe  ALL: penicillins (Anaphylaxis) NKFA. Denies shellfish/Contrast dye allergy.    SocHX: Denies EtoH/TOB/IVDU    FHx: Mother- glaucoma, PVD    MEDS:   amLODIPine   Tablet 10 milliGRAM(s) Oral daily  aspirin enteric coated 81 milliGRAM(s) Oral daily  atorvastatin 80 milliGRAM(s) Oral at bedtime  brimonidine 0.2% Ophthalmic Solution 1 Drop(s) Both EYES three times a day  chlorhexidine 4% Liquid 1 Application(s) Topical once  dextrose 40% Gel 15 Gram(s) Oral once PRN  dextrose 5%. 1000 milliLiter(s) IV Continuous <Continuous>  dextrose 50% Injectable 12.5 Gram(s) IV Push once  dextrose 50% Injectable 25 Gram(s) IV Push once  dextrose 50% Injectable 25 Gram(s) IV Push once  gabapentin 300 milliGRAM(s) Oral three times a day  glucagon  Injectable 1 milliGRAM(s) IntraMuscular once PRN  insulin lispro (HumaLOG) corrective regimen sliding scale   SubCutaneous Before meals and at bedtime  latanoprost 0.005% Ophthalmic Solution 1 Drop(s) Both EYES at bedtime  metoprolol succinate ER 25 milliGRAM(s) Oral daily  morphine  - Injectable 2 milliGRAM(s) IV Push every 30 minutes PRN  oxyCODONE    5 mG/acetaminophen 325 mG 1 Tablet(s) Oral every 4 hours PRN  sertraline 50 milliGRAM(s) Oral daily  sodium chloride 0.9%. 1000 milliLiter(s) IV Continuous <Continuous>  timolol 0.5% Solution 1 Drop(s) Both EYES two times a day    T(C): 36.2 (11-15-18 @ 05:59), Max: 37.6 (11-14-18 @ 22:48)  HR: 66 (11-15-18 @ 06:16) (66 - 87)  BP: 143/64 (11-15-18 @ 06:16) (111/54 - 181/81)  RR: 16 (11-15-18 @ 06:16) (16 - 18)  SpO2: 100% (11-15-18 @ 06:16) (96% - 100%)  Wt(kg): 86kg  HEENT: NCAT, EOMI, PERRLA  NECK: No JVD, + R carotid bruit, +2 Carotid pulses B/L  PULM:  CTA B/L No W/R/R  CARD: RRR, +S1, +S2, No M/R/G  ABD: ND, +BS, NT, no masses  EXT: Warm, no pedal edema   RECTAL: Guaiac negative  NEURO: A & O x 3, no focal neurologic deficits  PULSES:	   B/L 1+ Radial	                B/L 2+ no bruits        	                R 1+ DP/ faint PT, unable to ascertain L DP/PT secondary to intact dressing s/p L great toe partial removal  B/L 2+ carotid pulses, +right Carotid Bruit		                          	                              11.0   4.5   )-----------( 149      ( 14 Nov 2018 06:38 )             34.8     11-14    140  |  102  |  25<H>  ----------------------------<  115<H>  4.6   |  26  |  0.86    Ca    8.9      14 Nov 2018 06:38  Mg     2.2     11-14              EKG:					  ASA III				Mallampati class: IV	            Anginal Class: III  A/P:  82yo PCN allergic F with sig PMHx of CAD, lateral wall MI s/p 4 stents, DM, HTN, CKD, RLE DVT/PE (last dose of Eliquis 5mg at 5AM 11/14/2018), early dementia, s/p LLE diagnostic angio and left great toe partial removal who presents for preop for LE bypass cardiac cath with possible intervention if clinically indicated in the setting of pt risk factors, CCS Anginal Equivalent III sxs, known CAD, abnormal NST.  Dr. Hicks, Dr. Izquierdo and Dr. Chand made aware.  -	Pt refused for procedure with Dr. Hicks (11/14/2018), consult and consent to be signed tomorrow for cardiac cath with Dr. Chand. Vascular Service made aware. Continue to hold Eliquis and NPO after midnight.  -	Pt refused H&P by bedside, reports to be agreeable with the presence of her daughter, Anne Betts, who reports to be coming tomorrow from 7 to 9AM. UPDATE: H&P and precath consent obtained (11/15/2018)  -	Precath orders are in. F/U on new EKG. F/U on cath report (pt daughter reported from [facility], cath lab [phone]) UPDATE: Successful, received and in pt's chart (11/15/2018)  -	As d/w Dr. Chand, pt will be loaded with ASA 325mg PO x1 and Plavix 600mg PO x1 pre-procedure.     Sedation Plan:   Moderate      Patient Is Suitable Candidate For Sedation?    Yes       Risks & benefits of procedure and sedation and risks and benefits for the alternative therapy have been explained to the patient in layman’s terms including but not limited to: allergic reaction, bleeding, infection, arrhythmia, respiratory compromise, renal and vascular compromise, limb damage, MI, CVA, emergent CABG/Vascular Surgery and death. Informed consent obtained and in chart.

## 2018-11-14 NOTE — PROGRESS NOTE ADULT - SUBJECTIVE AND OBJECTIVE BOX
24hr Events:  O/N: Eliquis given at 5am, right groin soft, no bleeding or hematoma, VSS  11/13: OR for LLE angiogram, L 1st toe amputation; POC WNL      Assessment/Plan;  85 y/o F s/p LLE diagnositic angio and left great toe partial amputation    Pain/nausea control PRN  Home meds  Incentive spirometer/OOB/Ambulate  Vitals per protocol  Consistent carb diet  F/u Echo  F/u vein mapping 24hr Events:  O/N: Eliquis given at 5am, right groin soft, no bleeding or hematoma, VSS  11/13: OR for LLE angiogram, L 1st toe amputation; POC WNL    S. No complaints.    amLODIPine   Tablet 10  apixaban 5  aspirin enteric coated 81  metoprolol succinate ER 25      Allergies    penicillins (Anaphylaxis)    Intolerances        Vital Signs Last 24 Hrs  T(C): 36.5 (14 Nov 2018 06:11), Max: 36.6 (14 Nov 2018 00:22)  T(F): 97.7 (14 Nov 2018 06:11), Max: 97.9 (14 Nov 2018 00:22)  HR: 61 (14 Nov 2018 05:30) (46 - 64)  BP: 122/60 (14 Nov 2018 05:30) (115/58 - 156/57)  BP(mean): 87 (13 Nov 2018 21:00) (70 - 111)  RR: 16 (14 Nov 2018 05:30) (9 - 21)  SpO2: 97% (14 Nov 2018 05:30) (95% - 100%)    Physical Exam:  General: awake, alert, NAD  Pulmonary:  Cardiovascular:  Abdominal:  Extremities: Right groin soft, No hematoma.  Left great toe open amp site - pink tissue with some oozing.   Pulses:   Right:                                                                           Left:  FEM [ ]2+ [ ]1+ [ ] doppler                                            FEM [ ]2+ [ ]1+ [ ] doppler    POP [ ]2+ [ ]1+ [ ] doppler                                            POP [ ]2+ [ ]1+ [ ] doppler    DP [ ]2+ [ ]1+ [ ] doppler                                               DP [ ]2+ [ ]1+ [ ] doppler  PT[ ]2+ [ ]1+ [ ] doppler                                                 PT [ ]2+ [ ]1+ [ ] doppler      LABS:                        11.0   4.5   )-----------( 149      ( 14 Nov 2018 06:38 )             34.8     11-14    140  |  102  |  25<H>  ----------------------------<  115<H>  4.6   |  26  |  0.86    Ca    8.9      14 Nov 2018 06:38  Mg     2.2     11-14            RADIOLOGY & ADDITIONAL TESTS:      Assessment/Plan;  83 y/o F s/p LLE diagnositic angio and left great toe partial amputation. Will need bypass.    Cards consult.  Pain/nausea control PRN  Home meds  Incentive spirometer/OOB/Ambulate  Vitals per protocol  Consistent carb diet  F/u Echo  F/u vein mapping

## 2018-11-14 NOTE — CONSULT NOTE ADULT - SUBJECTIVE AND OBJECTIVE BOX
REASON FOR CONSULT:    HISTORY OF PRESENT ILLNESS:    PAST MEDICAL & SURGICAL HISTORY:  MRSA infection: right great toe ( amputated)  Glaucoma: b/l  Falls  Depression  Pulmonary embolism  DVT (deep venous thrombosis): Right lower extremity  MI (myocardial infarction): at age 65 y.o  Peripheral vascular disease  Ulcer of lower limb  Arthropathy  Ischemic heart disease  Hypertension  Hyperlipidemia  Diabetes mellitus: II  S/P amputation: may 2018; right great toe  History of surgery: x7 coronary artery stents  History of surgery: right leg bypass 5/2018 with amputation of right great toe      [ ] Diabetes   [ ] Hypertension  [ ] Hyperlipidemia  [ ] CAD  [ ] PCI  [ ] CABG    PREVIOUS DIAGNOSTIC TESTING:    [ ] Echocardiogram:  [ ]  Catheterization:  [ ] Stress Test:  	    MEDICATIONS:  amLODIPine   Tablet 10 milliGRAM(s) Oral daily  metoprolol succinate ER 25 milliGRAM(s) Oral daily        gabapentin 300 milliGRAM(s) Oral three times a day  morphine  - Injectable 2 milliGRAM(s) IV Push every 30 minutes PRN  oxyCODONE    5 mG/acetaminophen 325 mG 1 Tablet(s) Oral every 4 hours PRN  sertraline 50 milliGRAM(s) Oral daily      atorvastatin 80 milliGRAM(s) Oral at bedtime  dextrose 40% Gel 15 Gram(s) Oral once PRN  dextrose 50% Injectable 12.5 Gram(s) IV Push once  dextrose 50% Injectable 25 Gram(s) IV Push once  dextrose 50% Injectable 25 Gram(s) IV Push once  glucagon  Injectable 1 milliGRAM(s) IntraMuscular once PRN  insulin lispro (HumaLOG) corrective regimen sliding scale   SubCutaneous Before meals and at bedtime    apixaban 5 milliGRAM(s) Oral every 12 hours  aspirin enteric coated 81 milliGRAM(s) Oral daily  brimonidine 0.2% Ophthalmic Solution 1 Drop(s) Both EYES three times a day  dextrose 5%. 1000 milliLiter(s) IV Continuous <Continuous>  latanoprost 0.005% Ophthalmic Solution 1 Drop(s) Both EYES at bedtime  timolol 0.5% Solution 1 Drop(s) Both EYES two times a day      FAMILY HISTORY:      SOCIAL HISTORY:    [ ] Non-smoker  [ ] Smoker  [ ] Alcohol    Allergies    penicillins (Anaphylaxis)    Intolerances    	    REVIEW OF SYSTEMS:    [x] as per HPI  CONSTITUTIONAL: No fever, weight loss, or fatigue  ENT:  No difficulty hearing, tinnitus, vertigo; No sinus or throat pain  RESPIRATORY: No cough, wheezing, chills or hemoptysis; No Shortness of Breath  CARDIOVASCULAR: No chest pain, palpitations, dizziness, or leg swelling  GASTROINTESTINAL: No abdominal or epigastric pain. No nausea, vomiting, or hematemesis; No diarrhea or constipation. No melena or hematochezia.  GENITOURINARY: No dysuria, frequency, hematuria, or incontinence  NEUROLOGICAL: No headaches, memory loss, loss of strength, numbness, or tremors  MUSCULOSKELETAL: No joint pain or swelling; No muscle, back, or extremity pain  [x] All others negative	  [ ] Unable to obtain    PHYSICAL EXAM:  T(C): 36.5 (11-14-18 @ 06:11), Max: 36.6 (11-14-18 @ 00:22)  HR: 73 (11-14-18 @ 08:55) (46 - 73)  BP: 121/63 (11-14-18 @ 08:55) (115/58 - 156/57)  RR: 18 (11-14-18 @ 08:55) (9 - 21)  SpO2: 97% (11-14-18 @ 08:55) (95% - 100%)  Wt(kg): --  I&O's Summary    13 Nov 2018 07:01  -  14 Nov 2018 07:00  --------------------------------------------------------  IN: 1200 mL / OUT: 200 mL / NET: 1000 mL        Appearance: Normal	  HEENT:   Normal oral mucosa, PERRL, EOMI	  Lymphatic: No lymphadenopathy  Cardiovascular: Normal S1 S2, No JVD, No murmurs, No edema  Respiratory: Lungs clear to auscultation	  Psychiatry: A & O x 3, Mood & affect appropriate  Gastrointestinal:  Soft, Non-tender, + BS	  Skin: No rashes, No ecchymoses, No cyanosis	  Neurologic: Non-focal  Extremities: Normal range of motion, No clubbing, cyanosis or edema  Vascular: Peripheral pulses palpable 2+ bilaterally    TELEMETRY: 	    ECG:  < from: 12 Lead ECG (10.29.18 @ 14:12) >  Diagnosis Line Normal sinus rhythm  Voltage criteria for left ventricular hypertrophy    < end of copied text >    ECHO: < from: Echocardiogram (05.17.18 @ 08:13) >  Normal left ventricular size and wall thickness.Abnormal (paradoxical)   septal   motion consistent with abnormal conductionThere is basal inferior wall   hypokinesis.The left ventricular ejection fraction is 65%.The right   ventricle   is normal in size and function.The left atrium is mildly dilated.The   mitral   inflow pattern is consistent with impaired left ventricular relaxation   with   mildly elevated left atrial pressure (8-14mmHg).  Right atrial size is   normal.Calcified trileaflet aortic valve with prominent thickening on   ventricular side of aortic valve, likely represents a calcification;   howwever   connot rule out a fibroelastoma.  No aortic regurgitation noted.No   hemodynamically significant valvularaortic stenosis.Structurally normal   mitral valve.There is trace mitral regurgitation.Structurally normal   tricuspid   valve.There is trace to mild tricuspid regurgitation.There is mild   pulmonary   hypertension.The pulmonary artery systolic pressure is estimated to be 45   mmHg.Structurally normal pulmonic valve.There is trace pulmonic   regurgitation.There is no pericardial effusion.No prior echo is   available for   comparison.    < end of copied text >    STRESS: < from: Pharm Thallium Stress (Lexiscan) -LEXMIB (05.18.18 @ 17:01) >  Myocardial perfusion imaging is abnormal. There is a moderate size area   of reversible ischemia involving mid/distal inferior, inferolateral and   inferoseptal segments. Overall left ventricular systolic function is   normal without regional wall motion abnormalities. The EKG stress test is   normal.    < end of copied text >    CATH:  	  RADIOLOGY:  CXR: < from: Xray Chest 1 View-PORTABLE IMMEDIATE (05.23.18 @ 20:38) >  IMPRESSION:  Unremarkable    < end of copied text >    CT:  US:   	  	  LABS:	 	    CARDIAC MARKERS:                                  11.0   4.5   )-----------( 149      ( 14 Nov 2018 06:38 )             34.8     11-14    140  |  102  |  25<H>  ----------------------------<  115<H>  4.6   |  26  |  0.86    Ca    8.9      14 Nov 2018 06:38  Mg     2.2     11-14      proBNP:   Lipid Profile:   HgA1c: Hemoglobin A1C, Whole Blood: 6.8 % (11-14 @ 06:38)    TSH:     ASSESSMENT/PLAN: 	    Preop CV eval - cardiac optimized pensing cardiac cath today  83yoF with sig PMHx of CAD, lateral wall MI 1999 s/p 6 stents, dm, htn, ckd,   she has an abnormal stress test for now a vascular procedure.  Will need cardiac cath today.   MET is 4 .  ASA class is 3.  Weinberg cardiac risk factor is high

## 2018-11-15 LAB
ANION GAP SERPL CALC-SCNC: 10 MMOL/L — SIGNIFICANT CHANGE UP (ref 5–17)
ANION GAP SERPL CALC-SCNC: 14 MMOL/L — SIGNIFICANT CHANGE UP (ref 5–17)
BASOPHILS NFR BLD AUTO: 0.1 % — SIGNIFICANT CHANGE UP (ref 0–2)
BLD GP AB SCN SERPL QL: NEGATIVE — SIGNIFICANT CHANGE UP
BUN SERPL-MCNC: 14 MG/DL — SIGNIFICANT CHANGE UP (ref 7–23)
BUN SERPL-MCNC: 21 MG/DL — SIGNIFICANT CHANGE UP (ref 7–23)
CALCIUM SERPL-MCNC: 8.9 MG/DL — SIGNIFICANT CHANGE UP (ref 8.4–10.5)
CALCIUM SERPL-MCNC: 9.1 MG/DL — SIGNIFICANT CHANGE UP (ref 8.4–10.5)
CHLORIDE SERPL-SCNC: 102 MMOL/L — SIGNIFICANT CHANGE UP (ref 96–108)
CHLORIDE SERPL-SCNC: 94 MMOL/L — LOW (ref 96–108)
CO2 SERPL-SCNC: 24 MMOL/L — SIGNIFICANT CHANGE UP (ref 22–31)
CO2 SERPL-SCNC: 26 MMOL/L — SIGNIFICANT CHANGE UP (ref 22–31)
CREAT SERPL-MCNC: 0.74 MG/DL — SIGNIFICANT CHANGE UP (ref 0.5–1.3)
CREAT SERPL-MCNC: 0.85 MG/DL — SIGNIFICANT CHANGE UP (ref 0.5–1.3)
EOSINOPHIL NFR BLD AUTO: 0.3 % — SIGNIFICANT CHANGE UP (ref 0–6)
GLUCOSE BLDC GLUCOMTR-MCNC: 108 MG/DL — HIGH (ref 70–99)
GLUCOSE BLDC GLUCOMTR-MCNC: 118 MG/DL — HIGH (ref 70–99)
GLUCOSE BLDC GLUCOMTR-MCNC: 131 MG/DL — HIGH (ref 70–99)
GLUCOSE BLDC GLUCOMTR-MCNC: 165 MG/DL — HIGH (ref 70–99)
GLUCOSE BLDC GLUCOMTR-MCNC: 193 MG/DL — HIGH (ref 70–99)
GLUCOSE SERPL-MCNC: 111 MG/DL — HIGH (ref 70–99)
GLUCOSE SERPL-MCNC: 178 MG/DL — HIGH (ref 70–99)
HCT VFR BLD CALC: 34.9 % — SIGNIFICANT CHANGE UP (ref 34.5–45)
HCT VFR BLD CALC: 34.9 % — SIGNIFICANT CHANGE UP (ref 34.5–45)
HGB BLD-MCNC: 11.3 G/DL — LOW (ref 11.5–15.5)
HGB BLD-MCNC: 11.5 G/DL — SIGNIFICANT CHANGE UP (ref 11.5–15.5)
LYMPHOCYTES # BLD AUTO: 10.3 % — LOW (ref 13–44)
MAGNESIUM SERPL-MCNC: 2.1 MG/DL — SIGNIFICANT CHANGE UP (ref 1.6–2.6)
MAGNESIUM SERPL-MCNC: 2.1 MG/DL — SIGNIFICANT CHANGE UP (ref 1.6–2.6)
MCHC RBC-ENTMCNC: 29.5 PG — SIGNIFICANT CHANGE UP (ref 27–34)
MCHC RBC-ENTMCNC: 29.5 PG — SIGNIFICANT CHANGE UP (ref 27–34)
MCHC RBC-ENTMCNC: 32.4 G/DL — SIGNIFICANT CHANGE UP (ref 32–36)
MCHC RBC-ENTMCNC: 33 G/DL — SIGNIFICANT CHANGE UP (ref 32–36)
MCV RBC AUTO: 89.5 FL — SIGNIFICANT CHANGE UP (ref 80–100)
MCV RBC AUTO: 91.1 FL — SIGNIFICANT CHANGE UP (ref 80–100)
MONOCYTES NFR BLD AUTO: 5.6 % — SIGNIFICANT CHANGE UP (ref 2–14)
NEUTROPHILS NFR BLD AUTO: 83.7 % — HIGH (ref 43–77)
PLATELET # BLD AUTO: 138 K/UL — LOW (ref 150–400)
PLATELET # BLD AUTO: 147 K/UL — LOW (ref 150–400)
POTASSIUM SERPL-MCNC: 4 MMOL/L — SIGNIFICANT CHANGE UP (ref 3.5–5.3)
POTASSIUM SERPL-MCNC: 4.1 MMOL/L — SIGNIFICANT CHANGE UP (ref 3.5–5.3)
POTASSIUM SERPL-SCNC: 4 MMOL/L — SIGNIFICANT CHANGE UP (ref 3.5–5.3)
POTASSIUM SERPL-SCNC: 4.1 MMOL/L — SIGNIFICANT CHANGE UP (ref 3.5–5.3)
PREALB SERPL-MCNC: 19 MG/DL — LOW (ref 20–40)
RBC # BLD: 3.83 M/UL — SIGNIFICANT CHANGE UP (ref 3.8–5.2)
RBC # BLD: 3.9 M/UL — SIGNIFICANT CHANGE UP (ref 3.8–5.2)
RBC # FLD: 13.9 % — SIGNIFICANT CHANGE UP (ref 10.3–16.9)
RBC # FLD: 14.2 % — SIGNIFICANT CHANGE UP (ref 10.3–16.9)
RH IG SCN BLD-IMP: POSITIVE — SIGNIFICANT CHANGE UP
SODIUM SERPL-SCNC: 132 MMOL/L — LOW (ref 135–145)
SODIUM SERPL-SCNC: 138 MMOL/L — SIGNIFICANT CHANGE UP (ref 135–145)
SURGICAL PATHOLOGY STUDY: SIGNIFICANT CHANGE UP
WBC # BLD: 11.2 K/UL — HIGH (ref 3.8–10.5)
WBC # BLD: 8.1 K/UL — SIGNIFICANT CHANGE UP (ref 3.8–10.5)
WBC # FLD AUTO: 11.2 K/UL — HIGH (ref 3.8–10.5)
WBC # FLD AUTO: 8.1 K/UL — SIGNIFICANT CHANGE UP (ref 3.8–10.5)

## 2018-11-15 PROCEDURE — 93010 ELECTROCARDIOGRAM REPORT: CPT | Mod: 76

## 2018-11-15 PROCEDURE — 92928 PRQ TCAT PLMT NTRAC ST 1 LES: CPT | Mod: RC

## 2018-11-15 PROCEDURE — 99233 SBSQ HOSP IP/OBS HIGH 50: CPT

## 2018-11-15 PROCEDURE — 93458 L HRT ARTERY/VENTRICLE ANGIO: CPT | Mod: 26,XU

## 2018-11-15 PROCEDURE — 93970 EXTREMITY STUDY: CPT | Mod: 26

## 2018-11-15 PROCEDURE — 71045 X-RAY EXAM CHEST 1 VIEW: CPT | Mod: 26

## 2018-11-15 RX ORDER — ACETAMINOPHEN 500 MG
650 TABLET ORAL ONCE
Qty: 0 | Refills: 0 | Status: COMPLETED | OUTPATIENT
Start: 2018-11-15 | End: 2018-11-15

## 2018-11-15 RX ORDER — CIPROFLOXACIN LACTATE 400MG/40ML
400 VIAL (ML) INTRAVENOUS EVERY 12 HOURS
Qty: 0 | Refills: 0 | Status: DISCONTINUED | OUTPATIENT
Start: 2018-11-15 | End: 2018-11-19

## 2018-11-15 RX ORDER — CLOPIDOGREL BISULFATE 75 MG/1
600 TABLET, FILM COATED ORAL ONCE
Qty: 0 | Refills: 0 | Status: COMPLETED | OUTPATIENT
Start: 2018-11-15 | End: 2018-11-15

## 2018-11-15 RX ORDER — CLOPIDOGREL BISULFATE 75 MG/1
75 TABLET, FILM COATED ORAL DAILY
Qty: 0 | Refills: 0 | Status: DISCONTINUED | OUTPATIENT
Start: 2018-11-16 | End: 2018-11-19

## 2018-11-15 RX ORDER — ASPIRIN/CALCIUM CARB/MAGNESIUM 324 MG
243 TABLET ORAL ONCE
Qty: 0 | Refills: 0 | Status: COMPLETED | OUTPATIENT
Start: 2018-11-15 | End: 2018-11-15

## 2018-11-15 RX ORDER — APIXABAN 2.5 MG/1
5 TABLET, FILM COATED ORAL EVERY 12 HOURS
Qty: 0 | Refills: 0 | Status: DISCONTINUED | OUTPATIENT
Start: 2018-11-15 | End: 2018-11-19

## 2018-11-15 RX ORDER — VANCOMYCIN HCL 1 G
1250 VIAL (EA) INTRAVENOUS EVERY 12 HOURS
Qty: 0 | Refills: 0 | Status: DISCONTINUED | OUTPATIENT
Start: 2018-11-15 | End: 2018-11-17

## 2018-11-15 RX ADMIN — GABAPENTIN 300 MILLIGRAM(S): 400 CAPSULE ORAL at 22:34

## 2018-11-15 RX ADMIN — LATANOPROST 1 DROP(S): 0.05 SOLUTION/ DROPS OPHTHALMIC; TOPICAL at 22:41

## 2018-11-15 RX ADMIN — Medication 1 DROP(S): at 18:04

## 2018-11-15 RX ADMIN — Medication 650 MILLIGRAM(S): at 19:57

## 2018-11-15 RX ADMIN — Medication 1: at 12:25

## 2018-11-15 RX ADMIN — GABAPENTIN 300 MILLIGRAM(S): 400 CAPSULE ORAL at 05:18

## 2018-11-15 RX ADMIN — AMLODIPINE BESYLATE 10 MILLIGRAM(S): 2.5 TABLET ORAL at 05:18

## 2018-11-15 RX ADMIN — SERTRALINE 50 MILLIGRAM(S): 25 TABLET, FILM COATED ORAL at 12:24

## 2018-11-15 RX ADMIN — ATORVASTATIN CALCIUM 80 MILLIGRAM(S): 80 TABLET, FILM COATED ORAL at 22:34

## 2018-11-15 RX ADMIN — OXYCODONE AND ACETAMINOPHEN 1 TABLET(S): 5; 325 TABLET ORAL at 06:18

## 2018-11-15 RX ADMIN — OXYCODONE AND ACETAMINOPHEN 1 TABLET(S): 5; 325 TABLET ORAL at 05:18

## 2018-11-15 RX ADMIN — SODIUM CHLORIDE 60 MILLILITER(S): 9 INJECTION INTRAMUSCULAR; INTRAVENOUS; SUBCUTANEOUS at 01:14

## 2018-11-15 RX ADMIN — BRIMONIDINE TARTRATE 1 DROP(S): 2 SOLUTION/ DROPS OPHTHALMIC at 22:43

## 2018-11-15 RX ADMIN — Medication 650 MILLIGRAM(S): at 20:27

## 2018-11-15 RX ADMIN — BRIMONIDINE TARTRATE 1 DROP(S): 2 SOLUTION/ DROPS OPHTHALMIC at 05:22

## 2018-11-15 RX ADMIN — OXYCODONE AND ACETAMINOPHEN 1 TABLET(S): 5; 325 TABLET ORAL at 17:51

## 2018-11-15 RX ADMIN — APIXABAN 5 MILLIGRAM(S): 2.5 TABLET, FILM COATED ORAL at 17:57

## 2018-11-15 RX ADMIN — Medication 1 DROP(S): at 05:21

## 2018-11-15 RX ADMIN — CLOPIDOGREL BISULFATE 600 MILLIGRAM(S): 75 TABLET, FILM COATED ORAL at 12:24

## 2018-11-15 RX ADMIN — Medication 243 MILLIGRAM(S): at 12:24

## 2018-11-15 RX ADMIN — OXYCODONE AND ACETAMINOPHEN 1 TABLET(S): 5; 325 TABLET ORAL at 18:20

## 2018-11-15 RX ADMIN — BRIMONIDINE TARTRATE 1 DROP(S): 2 SOLUTION/ DROPS OPHTHALMIC at 13:11

## 2018-11-15 RX ADMIN — OXYCODONE AND ACETAMINOPHEN 1 TABLET(S): 5; 325 TABLET ORAL at 10:45

## 2018-11-15 RX ADMIN — Medication 81 MILLIGRAM(S): at 05:20

## 2018-11-15 RX ADMIN — Medication 166.67 MILLIGRAM(S): at 23:50

## 2018-11-15 RX ADMIN — GABAPENTIN 300 MILLIGRAM(S): 400 CAPSULE ORAL at 13:09

## 2018-11-15 RX ADMIN — OXYCODONE AND ACETAMINOPHEN 1 TABLET(S): 5; 325 TABLET ORAL at 10:14

## 2018-11-15 RX ADMIN — Medication 1: at 22:06

## 2018-11-15 RX ADMIN — Medication 25 MILLIGRAM(S): at 05:18

## 2018-11-15 NOTE — PHYSICAL THERAPY INITIAL EVALUATION ADULT - GENERAL OBSERVATIONS, REHAB EVAL
patient received semi-supine with no acute distress. +EKG, +IV, +left foot dressing clean/dry/intact

## 2018-11-15 NOTE — PROGRESS NOTE ADULT - SUBJECTIVE AND OBJECTIVE BOX
INTERVAL HISTORY:  The patient has not had any chest pain or dyspnea. Prior to admission she ambulates with a walker short distances without difficulty. She has not had palpitations or dizziness.	  Chart, [facility] medications and allergies reviewed    MEDICATIONS:  MEDICATIONS  (STANDING):  amLODIPine   Tablet 10 milliGRAM(s) Oral daily  aspirin enteric coated 81 milliGRAM(s) Oral daily  atorvastatin 80 milliGRAM(s) Oral at bedtime  brimonidine 0.2% Ophthalmic Solution 1 Drop(s) Both EYES three times a day  chlorhexidine 4% Liquid 1 Application(s) Topical once  dextrose 5%. 1000 milliLiter(s) (50 mL/Hr) IV Continuous <Continuous>  dextrose 50% Injectable 12.5 Gram(s) IV Push once  dextrose 50% Injectable 25 Gram(s) IV Push once  dextrose 50% Injectable 25 Gram(s) IV Push once  gabapentin 300 milliGRAM(s) Oral three times a day  insulin lispro (HumaLOG) corrective regimen sliding scale   SubCutaneous Before meals and at bedtime  latanoprost 0.005% Ophthalmic Solution 1 Drop(s) Both EYES at bedtime  metoprolol succinate ER 25 milliGRAM(s) Oral daily  sertraline 50 milliGRAM(s) Oral daily  sodium chloride 0.9%. 1000 milliLiter(s) (60 mL/Hr) IV Continuous <Continuous>  timolol 0.5% Solution 1 Drop(s) Both EYES two times a day      REVIEW OF SYSTEMS:  CONSTITUTIONAL: No fever, no weight loss, no fatigue  PULMONARY: No cough, no wheezing, chills no hemoptysis; No Shortness of Breath  CARDIOVASCULAR: No chest pain, no palpitations, no syncope, dizziness, no leg swelling  GASTROINTESTINAL: No abdominal pain. No nausea, no  vomiting, no hematemesis; No diarrhea, no constipation. No melena, no hematochezia.  GENITOURINARY: No dysuria, no frequency, no hematuria, no incontinence  SKIN: No itching, no burning, no rashes, no lesions     PHYSICAL EXAM:  T(C): 36.2 (11-15-18 @ 05:59), Max: 37.6 (11-14-18 @ 22:48)  HR: 66 (11-15-18 @ 06:16) (66 - 87)  BP: 143/64 (11-15-18 @ 06:16) (111/54 - 181/81)  RR: 16 (11-15-18 @ 06:16) (16 - 18)  SpO2: 100% (11-15-18 @ 06:16) (96% - 100%)  Wt(kg): --  I&O's Summary    14 Nov 2018 07:01  -  15 Nov 2018 07:00  --------------------------------------------------------  IN: 900 mL / OUT: 550 mL / NET: 350 mL        Appearance:  No deformities, normal appearance	  HEENT:   Normal oral mucosa, PERRL, EOMI	  Neck: No jvd , no masses  Lymphatic: No  lymphadenopathy  Pulmonary: Lungs clear to auscultation and percussion  Cardiovascular: Normal S1 S2, No JVD, No murmur, No edema	  Abdomen:  Soft, Non-tender, + BS  Skin: No rashes, No ecchymoses	  Extremities:  No clubbing or cyanosis, L bandage, R toe amputation  MSK: Normal range of motion, no joint swelling    LABS:		  CARDIAC MARKERS:                         11.7   4.8   )-----------( 148      ( 14 Nov 2018 15:50 )             36.7   11-14    136  |  98  |  26<H>  ----------------------------<  132<H>  4.3   |  26  |  1.16    Ca    8.9      14 Nov 2018 15:50  Mg     2.2     11-14      proBNP:  Lipid Profile:  HgA1c:  TSH:  PT/INR - ( 14 Nov 2018 15:50 )   PT: 13.2 sec;   INR: 1.16          PTT - ( 14 Nov 2018 15:50 )  PTT:30.6 sec    TELEMETRY: 	NSR           ECG:  NSR LVH	            RADIOLOGY:   DIAGNOSTIC TESTING: [ ] Echocardiogram: [ ]  Catheterization: [ ] Stress Test:      ASSESSMENT/PLAN: 	    Coronary artery disease-the patient has a history of coronary stenting. Her activity is limited. Her EKG does not show ischemia. The patient's recent nuclear stress test showed a moderate defect. The risks benefits and alternatives to coronary angiogram were explained to the patient and daughter and they agree. Continue current medication.    Peripheral vascular disease-for bypass.    Hypertension-blood pressure is labile. Treat p.r.n. if over 160/100.     Diabetes-follow blood glucose.

## 2018-11-15 NOTE — PHYSICAL THERAPY INITIAL EVALUATION ADULT - IMPAIRMENTS CONTRIBUTING IMPAIRED BED MOBILITY, REHAB EVAL
danmoie with supervision, c/o 10/10 pain with LE dependent dangle position/decreased strength/impaired postural control/pain/impaired balance

## 2018-11-15 NOTE — PROGRESS NOTE ADULT - SUBJECTIVE AND OBJECTIVE BOX
O/N: VALERIE, VSS                                    Assessment/Plan;  85 y/o F s/p LLE diagnositic angio and left great toe partial amputation. Will need bypass.    Cardiac Cath today.  Pain/nausea control PRN  Home meds  Incentive spirometer/OOB/Ambulate  Vitals per protocol  Consistent carb diet  F/u Echo  F/u vein mapping O/N: VALERIE, VSS      S. No complaints. Pain in toe improving.    amLODIPine   Tablet 10  aspirin enteric coated 81  metoprolol succinate ER 25      Allergies    penicillins (Anaphylaxis)    Intolerances        Vital Signs Last 24 Hrs  T(C): 36.2 (15 Nov 2018 05:59), Max: 37.6 (14 Nov 2018 22:48)  T(F): 97.1 (15 Nov 2018 05:59), Max: 99.7 (14 Nov 2018 22:48)  HR: 66 (15 Nov 2018 06:16) (66 - 87)  BP: 143/64 (15 Nov 2018 06:16) (111/54 - 181/81)  BP(mean): --  RR: 16 (15 Nov 2018 06:16) (16 - 18)  SpO2: 100% (15 Nov 2018 06:16) (96% - 100%)    Physical Exam:  General: awake, alert, NAD  Pulmonary:  Cardiovascular:  Abdominal:  Extremities: Right great toe amp site clean, no bleeding.   Pulses:   Right:                                                                           Left:  FEM [ ]2+ [ ]1+ [ ] doppler                                            FEM [ ]2+ [ ]1+ [ ] doppler    POP [ ]2+ [ ]1+ [ ] doppler                                            POP [ ]2+ [ ]1+ [ ] doppler    DP [ ]2+ [ ]1+ [ ] doppler                                               DP [ ]2+ [ ]1+ [ ] doppler  PT[ ]2+ [ ]1+ [ ] doppler                                                 PT [ ]2+ [ ]1+ [ ] doppler      LABS:                        11.7   4.8   )-----------( 148      ( 14 Nov 2018 15:50 )             36.7     11-14    136  |  98  |  26<H>  ----------------------------<  132<H>  4.3   |  26  |  1.16    Ca    8.9      14 Nov 2018 15:50  Mg     2.2     11-14      PT/INR - ( 14 Nov 2018 15:50 )   PT: 13.2 sec;   INR: 1.16          PTT - ( 14 Nov 2018 15:50 )  PTT:30.6 sec      RADIOLOGY & ADDITIONAL TESTS:              Assessment/Plan;  85 y/o F s/p LLE diagnositic angio and left great toe partial amputation. Will need bypass.    Cardiac Cath today.  Holding Eliquis  Pain/nausea control PRN  Home meds  Incentive spirometer/OOB/Ambulate  Vitals per protocol  Consistent carb diet  F/u Echo  F/u vein mapping

## 2018-11-15 NOTE — PHYSICAL THERAPY INITIAL EVALUATION ADULT - ADDITIONAL COMMENTS
lives in [place], has been here since April of this year. Per family member patient will stay in [place]. Patient was admitted from rehab where she was ambulating with a walker with 1 person assist

## 2018-11-15 NOTE — PHYSICAL THERAPY INITIAL EVALUATION ADULT - PERTINENT HX OF CURRENT PROBLEM, REHAB EVAL
84 year old female s/p LLE diagnostic angio and left great toe partial removal who presents for preop for LE bypass cardiac cath with possible intervention if clinically indicated in the setting of pt risk factors, CCS Anginal Equivalent III sxs, known CAD, abnormal NST.

## 2018-11-16 LAB
ANION GAP SERPL CALC-SCNC: 13 MMOL/L — SIGNIFICANT CHANGE UP (ref 5–17)
APPEARANCE UR: CLEAR — SIGNIFICANT CHANGE UP
BILIRUB UR-MCNC: NEGATIVE — SIGNIFICANT CHANGE UP
BUN SERPL-MCNC: 12 MG/DL — SIGNIFICANT CHANGE UP (ref 7–23)
CALCIUM SERPL-MCNC: 9.2 MG/DL — SIGNIFICANT CHANGE UP (ref 8.4–10.5)
CHLORIDE SERPL-SCNC: 96 MMOL/L — SIGNIFICANT CHANGE UP (ref 96–108)
CO2 SERPL-SCNC: 27 MMOL/L — SIGNIFICANT CHANGE UP (ref 22–31)
COLOR SPEC: YELLOW — SIGNIFICANT CHANGE UP
CREAT SERPL-MCNC: 0.83 MG/DL — SIGNIFICANT CHANGE UP (ref 0.5–1.3)
DIFF PNL FLD: ABNORMAL
GLUCOSE BLDC GLUCOMTR-MCNC: 108 MG/DL — HIGH (ref 70–99)
GLUCOSE BLDC GLUCOMTR-MCNC: 115 MG/DL — HIGH (ref 70–99)
GLUCOSE BLDC GLUCOMTR-MCNC: 163 MG/DL — HIGH (ref 70–99)
GLUCOSE BLDC GLUCOMTR-MCNC: 185 MG/DL — HIGH (ref 70–99)
GLUCOSE SERPL-MCNC: 108 MG/DL — HIGH (ref 70–99)
GLUCOSE UR QL: NEGATIVE — SIGNIFICANT CHANGE UP
HCT VFR BLD CALC: 36.4 % — SIGNIFICANT CHANGE UP (ref 34.5–45)
HGB BLD-MCNC: 11.9 G/DL — SIGNIFICANT CHANGE UP (ref 11.5–15.5)
KETONES UR-MCNC: NEGATIVE — SIGNIFICANT CHANGE UP
LEUKOCYTE ESTERASE UR-ACNC: NEGATIVE — SIGNIFICANT CHANGE UP
MAGNESIUM SERPL-MCNC: 2 MG/DL — SIGNIFICANT CHANGE UP (ref 1.6–2.6)
MCHC RBC-ENTMCNC: 29.4 PG — SIGNIFICANT CHANGE UP (ref 27–34)
MCHC RBC-ENTMCNC: 32.7 G/DL — SIGNIFICANT CHANGE UP (ref 32–36)
MCV RBC AUTO: 89.9 FL — SIGNIFICANT CHANGE UP (ref 80–100)
NITRITE UR-MCNC: NEGATIVE — SIGNIFICANT CHANGE UP
PH UR: 7 — SIGNIFICANT CHANGE UP (ref 5–8)
PLATELET # BLD AUTO: 143 K/UL — LOW (ref 150–400)
POTASSIUM SERPL-MCNC: 3.7 MMOL/L — SIGNIFICANT CHANGE UP (ref 3.5–5.3)
POTASSIUM SERPL-SCNC: 3.7 MMOL/L — SIGNIFICANT CHANGE UP (ref 3.5–5.3)
PROT UR-MCNC: NEGATIVE MG/DL — SIGNIFICANT CHANGE UP
RBC # BLD: 4.05 M/UL — SIGNIFICANT CHANGE UP (ref 3.8–5.2)
RBC # FLD: 14.3 % — SIGNIFICANT CHANGE UP (ref 10.3–16.9)
SODIUM SERPL-SCNC: 136 MMOL/L — SIGNIFICANT CHANGE UP (ref 135–145)
SP GR SPEC: 1.01 — SIGNIFICANT CHANGE UP (ref 1–1.03)
UROBILINOGEN FLD QL: 0.2 E.U./DL — SIGNIFICANT CHANGE UP
WBC # BLD: 8.6 K/UL — SIGNIFICANT CHANGE UP (ref 3.8–10.5)
WBC # FLD AUTO: 8.6 K/UL — SIGNIFICANT CHANGE UP (ref 3.8–10.5)

## 2018-11-16 PROCEDURE — 73630 X-RAY EXAM OF FOOT: CPT | Mod: 26,LT

## 2018-11-16 PROCEDURE — 99232 SBSQ HOSP IP/OBS MODERATE 35: CPT

## 2018-11-16 PROCEDURE — 99231 SBSQ HOSP IP/OBS SF/LOW 25: CPT | Mod: 25

## 2018-11-16 RX ORDER — POTASSIUM CHLORIDE 20 MEQ
20 PACKET (EA) ORAL ONCE
Qty: 0 | Refills: 0 | Status: COMPLETED | OUTPATIENT
Start: 2018-11-16 | End: 2018-11-16

## 2018-11-16 RX ADMIN — APIXABAN 5 MILLIGRAM(S): 2.5 TABLET, FILM COATED ORAL at 06:42

## 2018-11-16 RX ADMIN — Medication 1: at 11:52

## 2018-11-16 RX ADMIN — Medication 166.67 MILLIGRAM(S): at 14:41

## 2018-11-16 RX ADMIN — GABAPENTIN 300 MILLIGRAM(S): 400 CAPSULE ORAL at 21:56

## 2018-11-16 RX ADMIN — Medication 1 DROP(S): at 06:49

## 2018-11-16 RX ADMIN — BRIMONIDINE TARTRATE 1 DROP(S): 2 SOLUTION/ DROPS OPHTHALMIC at 06:50

## 2018-11-16 RX ADMIN — Medication 81 MILLIGRAM(S): at 11:52

## 2018-11-16 RX ADMIN — CLOPIDOGREL BISULFATE 75 MILLIGRAM(S): 75 TABLET, FILM COATED ORAL at 11:52

## 2018-11-16 RX ADMIN — SERTRALINE 50 MILLIGRAM(S): 25 TABLET, FILM COATED ORAL at 11:52

## 2018-11-16 RX ADMIN — Medication 200 MILLIGRAM(S): at 01:14

## 2018-11-16 RX ADMIN — OXYCODONE AND ACETAMINOPHEN 1 TABLET(S): 5; 325 TABLET ORAL at 15:37

## 2018-11-16 RX ADMIN — OXYCODONE AND ACETAMINOPHEN 1 TABLET(S): 5; 325 TABLET ORAL at 06:42

## 2018-11-16 RX ADMIN — AMLODIPINE BESYLATE 10 MILLIGRAM(S): 2.5 TABLET ORAL at 06:41

## 2018-11-16 RX ADMIN — Medication 20 MILLIEQUIVALENT(S): at 08:33

## 2018-11-16 RX ADMIN — OXYCODONE AND ACETAMINOPHEN 1 TABLET(S): 5; 325 TABLET ORAL at 02:00

## 2018-11-16 RX ADMIN — OXYCODONE AND ACETAMINOPHEN 1 TABLET(S): 5; 325 TABLET ORAL at 01:14

## 2018-11-16 RX ADMIN — ATORVASTATIN CALCIUM 80 MILLIGRAM(S): 80 TABLET, FILM COATED ORAL at 21:56

## 2018-11-16 RX ADMIN — Medication 1: at 16:31

## 2018-11-16 RX ADMIN — OXYCODONE AND ACETAMINOPHEN 1 TABLET(S): 5; 325 TABLET ORAL at 19:40

## 2018-11-16 RX ADMIN — BRIMONIDINE TARTRATE 1 DROP(S): 2 SOLUTION/ DROPS OPHTHALMIC at 14:42

## 2018-11-16 RX ADMIN — OXYCODONE AND ACETAMINOPHEN 1 TABLET(S): 5; 325 TABLET ORAL at 07:30

## 2018-11-16 RX ADMIN — LATANOPROST 1 DROP(S): 0.05 SOLUTION/ DROPS OPHTHALMIC; TOPICAL at 21:56

## 2018-11-16 RX ADMIN — GABAPENTIN 300 MILLIGRAM(S): 400 CAPSULE ORAL at 06:42

## 2018-11-16 RX ADMIN — Medication 25 MILLIGRAM(S): at 06:42

## 2018-11-16 RX ADMIN — Medication 200 MILLIGRAM(S): at 11:53

## 2018-11-16 RX ADMIN — Medication 1 DROP(S): at 19:32

## 2018-11-16 RX ADMIN — OXYCODONE AND ACETAMINOPHEN 1 TABLET(S): 5; 325 TABLET ORAL at 20:41

## 2018-11-16 RX ADMIN — APIXABAN 5 MILLIGRAM(S): 2.5 TABLET, FILM COATED ORAL at 16:31

## 2018-11-16 RX ADMIN — GABAPENTIN 300 MILLIGRAM(S): 400 CAPSULE ORAL at 14:41

## 2018-11-16 RX ADMIN — OXYCODONE AND ACETAMINOPHEN 1 TABLET(S): 5; 325 TABLET ORAL at 14:41

## 2018-11-16 RX ADMIN — BRIMONIDINE TARTRATE 1 DROP(S): 2 SOLUTION/ DROPS OPHTHALMIC at 21:57

## 2018-11-16 NOTE — PROGRESS NOTE ADULT - SUBJECTIVE AND OBJECTIVE BOX
INTERVENTIONAL CARDIOLOGY FOLLOW UP NOTE    -Patient seen and examined this am  -No events overnight  -No complaints this am    VASCULAR ACCESS EXAM:     RADIAL:   2+ left radial pulse   Normal capillary refill. No evidence of motor or sensory deficits of digits, hand, wrist or forearm.   -Access site clean, non-tender, without ecchymosis or hematoma.    A/P  85 yo M with PMH HTN, PAD now s/p PTCA of RCA via radial approach with no evidence of vascular complications post procedure.     -continue with current medications including dual antiplatelet therapy   -discharge instructions as per protocol   -outpatient follow up with primary cardiologist

## 2018-11-16 NOTE — PROGRESS NOTE ADULT - SUBJECTIVE AND OBJECTIVE BOX
INTERVAL HISTORY:  The patient has no chest pain or dyspnea. 	  Chart, [facility] medications and allergies reviewed    MEDICATIONS:  MEDICATIONS  (STANDING):  amLODIPine   Tablet 10 milliGRAM(s) Oral daily  apixaban 5 milliGRAM(s) Oral every 12 hours  aspirin enteric coated 81 milliGRAM(s) Oral daily  atorvastatin 80 milliGRAM(s) Oral at bedtime  brimonidine 0.2% Ophthalmic Solution 1 Drop(s) Both EYES three times a day  chlorhexidine 4% Liquid 1 Application(s) Topical once  ciprofloxacin   IVPB 400 milliGRAM(s) IV Intermittent every 12 hours  clopidogrel Tablet 75 milliGRAM(s) Oral daily  dextrose 5%. 1000 milliLiter(s) (50 mL/Hr) IV Continuous <Continuous>  dextrose 50% Injectable 12.5 Gram(s) IV Push once  dextrose 50% Injectable 25 Gram(s) IV Push once  dextrose 50% Injectable 25 Gram(s) IV Push once  gabapentin 300 milliGRAM(s) Oral three times a day  insulin lispro (HumaLOG) corrective regimen sliding scale   SubCutaneous Before meals and at bedtime  latanoprost 0.005% Ophthalmic Solution 1 Drop(s) Both EYES at bedtime  metoprolol succinate ER 25 milliGRAM(s) Oral daily  sertraline 50 milliGRAM(s) Oral daily  timolol 0.5% Solution 1 Drop(s) Both EYES two times a day  vancomycin  IVPB 1250 milliGRAM(s) IV Intermittent every 12 hours      REVIEW OF SYSTEMS:  CONSTITUTIONAL: No fever, no weight loss, no fatigue  PULMONARY: No cough, no wheezing, chills no hemoptysis; No Shortness of Breath  CARDIOVASCULAR: No chest pain, no palpitations, no syncope, dizziness, no leg swelling  GASTROINTESTINAL: No abdominal pain. No nausea, no  vomiting, no hematemesis; No diarrhea, no constipation. No melena, no hematochezia.  GENITOURINARY: No dysuria, no frequency, no hematuria, no incontinence  SKIN: No itching, no burning, no rashes, no lesions     PHYSICAL EXAM:  T(C): 37.7 (18 @ 06:04), Max: 39.4 (11-15-18 @ 18:00)  HR: 65 (18 @ 06:52) (64 - 82)  BP: 146/64 (18 @ 06:52) (131/61 - 164/60)  RR: 16 (18 @ 06:52) ( - )  SpO2: 98% (18 @ 06:52) (97% - 98%)  Wt(kg): --  I&O's Summary    15 Nov 2018 07:01  -  2018 07:00  --------------------------------------------------------  IN: 810 mL / OUT: 750 mL / NET: 60 mL        Appearance:  No deformities, normal appearance	  HEENT:   Normal oral mucosa, PERRL, EOMI	  Neck: No jvd , no masses  Lymphatic: No  lymphadenopathy  Pulmonary: Lungs clear to auscultation and percussion  Cardiovascular: Normal S1 S2, No JVD, No murmur, No edema	  Abdomen:  Soft, Non-tender, + BS  Skin: No rashes, No ecchymoses	  Extremities:  No clubbing or cyanosis, L bandage, R toe amputation  MSK: Normal range of motion, no joint swelling    LABS:		  CARDIAC MARKERS:                         11.9   8.6   )-----------( 143      ( 2018 06:23 )             36.4       136  |  96  |  12  ----------------------------<  108<H>  3.7   |  27  |  0.83    Ca    9.2      2018 06:23  Mg     2.0           proBNP:  Lipid Profile:  HgA1c:  TSH:  PT/INR - ( 2018 15:50 )   PT: 13.2 sec;   INR: 1.16          PTT - ( 2018 15:50 )  PTT:30.6 sec    Culture - Blood (collected 11-15-18 @ 19:36)  Source: .Blood Blood  Preliminary Report (18 @ 08:03):    No growth at 12 hours    Culture - Blood (collected 11-15-18 @ 19:36)  Source: .Blood Blood  Preliminary Report (18 @ 08:03):    No growth at 12 hours    Urinalysis Basic - ( 2018 00:17 )    Color: Yellow / Appearance: Clear / S.010 / pH: x  Gluc: x / Ketone: NEGATIVE  / Bili: Negative / Urobili: 0.2 E.U./dL   Blood: x / Protein: NEGATIVE mg/dL / Nitrite: NEGATIVE   Leuk Esterase: NEGATIVE / RBC: < 5 /HPF / WBC < 5 /HPF   Sq Epi: x / Non Sq Epi: 0-5 /HPF / Bacteria: Present /HPF      TELEMETRY: 	           ECG:  	            RADIOLOGY:   DIAGNOSTIC TESTING: [ ] Echocardiogram: [ ]  Catheterization: [ ] Stress Test:      ASSESSMENT/PLAN: INTERVAL HISTORY:  The patient has no chest pain or dyspnea. 	  Chart, [facility] medications and allergies reviewed    MEDICATIONS:  MEDICATIONS  (STANDING):  amLODIPine   Tablet 10 milliGRAM(s) Oral daily  apixaban 5 milliGRAM(s) Oral every 12 hours  aspirin enteric coated 81 milliGRAM(s) Oral daily  atorvastatin 80 milliGRAM(s) Oral at bedtime  brimonidine 0.2% Ophthalmic Solution 1 Drop(s) Both EYES three times a day  chlorhexidine 4% Liquid 1 Application(s) Topical once  ciprofloxacin   IVPB 400 milliGRAM(s) IV Intermittent every 12 hours  clopidogrel Tablet 75 milliGRAM(s) Oral daily  dextrose 5%. 1000 milliLiter(s) (50 mL/Hr) IV Continuous <Continuous>  dextrose 50% Injectable 12.5 Gram(s) IV Push once  dextrose 50% Injectable 25 Gram(s) IV Push once  dextrose 50% Injectable 25 Gram(s) IV Push once  gabapentin 300 milliGRAM(s) Oral three times a day  insulin lispro (HumaLOG) corrective regimen sliding scale   SubCutaneous Before meals and at bedtime  latanoprost 0.005% Ophthalmic Solution 1 Drop(s) Both EYES at bedtime  metoprolol succinate ER 25 milliGRAM(s) Oral daily  sertraline 50 milliGRAM(s) Oral daily  timolol 0.5% Solution 1 Drop(s) Both EYES two times a day  vancomycin  IVPB 1250 milliGRAM(s) IV Intermittent every 12 hours      REVIEW OF SYSTEMS:  CONSTITUTIONAL: No fever, no weight loss, no fatigue  PULMONARY: No cough, no wheezing, chills no hemoptysis; No Shortness of Breath  CARDIOVASCULAR: No chest pain, no palpitations, no syncope, dizziness, no leg swelling  GASTROINTESTINAL: No abdominal pain. No nausea, no  vomiting, no hematemesis; No diarrhea, no constipation. No melena, no hematochezia.  GENITOURINARY: No dysuria, no frequency, no hematuria, no incontinence  SKIN: No itching, no burning, no rashes, no lesions     PHYSICAL EXAM:  T(C): 37.7 (18 @ 06:04), Max: 39.4 (11-15-18 @ 18:00)  HR: 65 (18 @ 06:52) (64 - 82)  BP: 146/64 (18 @ 06:52) (131/61 - 164/60)  RR: 16 (18 @ 06:52) ( - )  SpO2: 98% (18 @ 06:52) (97% - 98%)  Wt(kg): --  I&O's Summary    15 Nov 2018 07:01  -  2018 07:00  --------------------------------------------------------  IN: 810 mL / OUT: 750 mL / NET: 60 mL        Appearance:  No deformities, normal appearance	  HEENT:   Normal oral mucosa, PERRL, EOMI	  Neck: No jvd , no masses  Lymphatic: No  lymphadenopathy  Pulmonary: Lungs clear to auscultation and percussion  Cardiovascular: Normal S1 S2, No JVD, No murmur, No edema	  Abdomen:  Soft, Non-tender, + BS  Skin: No rashes, No ecchymoses	  Extremities:  No clubbing or cyanosis, L bandage, R toe amputation  MSK: Normal range of motion, no joint swelling    LABS:		  CARDIAC MARKERS:                         11.9   8.6   )-----------( 143      ( 2018 06:23 )             36.4       136  |  96  |  12  ----------------------------<  108<H>  3.7   |  27  |  0.83    Ca    9.2      2018 06:23  Mg     2.0           proBNP:  Lipid Profile:  HgA1c:  TSH:  PT/INR - ( 2018 15:50 )   PT: 13.2 sec;   INR: 1.16          PTT - ( 2018 15:50 )  PTT:30.6 sec    Culture - Blood (collected 11-15-18 @ 19:36)  Source: .Blood Blood  Preliminary Report (18 @ 08:03):    No growth at 12 hours    Culture - Blood (collected 11-15-18 @ 19:36)  Source: .Blood Blood  Preliminary Report (18 @ 08:03):    No growth at 12 hours    Urinalysis Basic - ( 2018 00:17 )    Color: Yellow / Appearance: Clear / S.010 / pH: x  Gluc: x / Ketone: NEGATIVE  / Bili: Negative / Urobili: 0.2 E.U./dL   Blood: x / Protein: NEGATIVE mg/dL / Nitrite: NEGATIVE   Leuk Esterase: NEGATIVE / RBC: < 5 /HPF / WBC < 5 /HPF   Sq Epi: x / Non Sq Epi: 0-5 /HPF / Bacteria: Present /HPF      TELEMETRY: NSR	           ECG:  	            RADIOLOGY:   DIAGNOSTIC TESTING: [ ] Echocardiogram: [x ]  Catheterization: [ ] Stress Test:      ASSESSMENT/PLAN: 	    Coronary artery disease-the patient has a history of coronary stenting. Her activity is limited. Her EKG does not show ischemia. The patient's recent nuclear stress test showed a moderate defect. The  coronary angiogram showed a distal 95% RCA lesion. It was stented with a IZA. The patient will continue aspirin and Plavix for her surgery. The procedure reduced her risk but she is still at increased risk for surgery with the recent stent. The procedure is urgent. The patient is being evaluated for her fever. However from a cardiac standpoint she is optimized for surgery.    Peripheral vascular disease-for bypass.    Hypertension-blood pressure is labile. Treat p.r.n. if over 160/100.     Diabetes-follow blood glucose.    Fever- as per sugical team.

## 2018-11-16 NOTE — DIETITIAN INITIAL EVALUATION ADULT. - ENERGY NEEDS
Height: 5'4" Weight: 191.5 lbs (11/16),  lbs+/-10%, %%, BMI 32.5,  IBW used to calculate EER as per pt's current body weight >120% of IBW  Estimated nutrient needs based on [facility] SOC for maintenance in older adults adjusted for post-op

## 2018-11-16 NOTE — DIETITIAN INITIAL EVALUATION ADULT. - PERTINENT MEDS FT
Aspirin EC, Ciprofloxacin, Vanco, Amlodipine, Atorvastatin, Gabapentin, Humalog SS, Metoprolol succinate, Morphine, Oxycodone

## 2018-11-16 NOTE — PROGRESS NOTE ADULT - SUBJECTIVE AND OBJECTIVE BOX
O/N: Started Vanc/Zosyn for increasing WBC, straight cath ua neg, cxr clear                      Assessment/Plan;  83 y/o F s/p LLE diagnositic angio and left great toe partial amputation. Will need bypass.    Vanc/zosyn 11/15  Cardiac Cath today.  Holding Eliquis  Pain/nausea control PRN  Home meds  Incentive spirometer/OOB/Ambulate  Vitals per protocol  Consistent carb diet  F/u Echo  F/u vein mapping  f/u foot xray O/N: Started Vanc/Zosyn for increasing WBC, straight cath ua neg, cxr clear      S. No complaints.    ciprofloxacin   IVPB 400  vancomycin  IVPB 1250  amLODIPine   Tablet 10  apixaban 5  aspirin enteric coated 81  ciprofloxacin   IVPB 400  clopidogrel Tablet 75  metoprolol succinate ER 25  vancomycin  IVPB 1250      Allergies    penicillins (Anaphylaxis)    Intolerances        Vital Signs Last 24 Hrs  T(C): 37.7 (2018 06:04), Max: 39.4 (15 Nov 2018 18:00)  T(F): 99.9 (2018 06:04), Max: 103 (15 Nov 2018 18:00)  HR: 65 (2018 06:52) (64 - 82)  BP: 146/64 (2018 06:52) (131/61 - 164/60)  BP(mean): --  RR: 16 (2018 06:52) (16 - 17)  SpO2: 98% (2018 06:52) (97% - 100%)    Physical Exam:  General: awake, alert, resting comfortably  Pulmonary: nonlabored breating  Cardiovascular:  Abdominal:  Extremities: Left great toe amp site with necrotic edges. No pus or erythema of surrounding skin.  Pulses:   Right:                                                                           Left:  FEM [ ]2+ [ ]1+ [ ] doppler                                            FEM [ ]2+ [ ]1+ [ ] doppler    POP [ ]2+ [ ]1+ [ ] doppler                                            POP [ ]2+ [ ]1+ [ ] doppler    DP [ ]2+ [ ]1+ [ ] doppler                                               DP [ ]2+ [ ]1+ [ ] doppler  PT[ ]2+ [ ]1+ [ ] doppler                                                 PT [ ]2+ [ ]1+ [ ] doppler      LABS:                        11.9   8.6   )-----------( 143      ( 2018 06:23 )             36.4     11-16    136  |  96  |  12  ----------------------------<  108<H>  3.7   |  27  |  0.83    Ca    9.2      2018 06:23  Mg     2.0     11-16      PT/INR - ( 2018 15:50 )   PT: 13.2 sec;   INR: 1.16          PTT - ( 2018 15:50 )  PTT:30.6 sec  Urinalysis Basic - ( 2018 00:17 )    Color: Yellow / Appearance: Clear / S.010 / pH: x  Gluc: x / Ketone: NEGATIVE  / Bili: Negative / Urobili: 0.2 E.U./dL   Blood: x / Protein: NEGATIVE mg/dL / Nitrite: NEGATIVE   Leuk Esterase: NEGATIVE / RBC: < 5 /HPF / WBC < 5 /HPF   Sq Epi: x / Non Sq Epi: 0-5 /HPF / Bacteria: Present /HPF        RADIOLOGY & ADDITIONAL TESTS:                  Assessment/Plan;  85 y/o F s/p LLE diagnositic angio and left great toe partial amputation. Will need bypass. s/p cardiac cath with IZA dRCA. Spiked temp 103 post cath. Fever w/u completed.       Vanc/zosyn 11/15   Eliquis restarted  CXR no infiltrates or consolidations  UA negative. Urine Cx pending  Blood Cx x 2 pending.  f/u foot xray.  Pain/nausea control PRN  Home meds  Incentive spirometer/OOB/Ambulate  Vitals per protocol  Consistent carb diet  f/u foot xray

## 2018-11-16 NOTE — DIETITIAN INITIAL EVALUATION ADULT. - OTHER INFO
84 y.o F from home with PMHx of DM, HLD, s/p R leg bypass and R great toe amputation. Pt p/w non-healing L great toe ulcer, s/p LLE angiogram and toe amputation 11/13. Pt underwent cardiac cath 11/15. Pt follows regular diet (no dietary restrictions), good appetite, NKFA. Pt is currently tolerating CSTCHO diet with fair 50-74% PO intake of meals, endorses good appetite. Denies N/V/D/C or pain. +BM 11/13. Skin intact with surgical incisions. Nutrition edu provided to pt with handouts. Please add DASH/TLC to current diet rx. Discharge planning to [facility] in progress by SW/CM

## 2018-11-17 LAB
ANION GAP SERPL CALC-SCNC: 10 MMOL/L — SIGNIFICANT CHANGE UP (ref 5–17)
BUN SERPL-MCNC: 16 MG/DL — SIGNIFICANT CHANGE UP (ref 7–23)
CALCIUM SERPL-MCNC: 9.2 MG/DL — SIGNIFICANT CHANGE UP (ref 8.4–10.5)
CHLORIDE SERPL-SCNC: 102 MMOL/L — SIGNIFICANT CHANGE UP (ref 96–108)
CO2 SERPL-SCNC: 27 MMOL/L — SIGNIFICANT CHANGE UP (ref 22–31)
CREAT SERPL-MCNC: 0.98 MG/DL — SIGNIFICANT CHANGE UP (ref 0.5–1.3)
CULTURE RESULTS: SIGNIFICANT CHANGE UP
GLUCOSE BLDC GLUCOMTR-MCNC: 100 MG/DL — HIGH (ref 70–99)
GLUCOSE BLDC GLUCOMTR-MCNC: 105 MG/DL — HIGH (ref 70–99)
GLUCOSE BLDC GLUCOMTR-MCNC: 126 MG/DL — HIGH (ref 70–99)
GLUCOSE BLDC GLUCOMTR-MCNC: 218 MG/DL — HIGH (ref 70–99)
GLUCOSE SERPL-MCNC: 164 MG/DL — HIGH (ref 70–99)
HCT VFR BLD CALC: 35.8 % — SIGNIFICANT CHANGE UP (ref 34.5–45)
HGB BLD-MCNC: 11.7 G/DL — SIGNIFICANT CHANGE UP (ref 11.5–15.5)
MAGNESIUM SERPL-MCNC: 2.4 MG/DL — SIGNIFICANT CHANGE UP (ref 1.6–2.6)
MCHC RBC-ENTMCNC: 29.8 PG — SIGNIFICANT CHANGE UP (ref 27–34)
MCHC RBC-ENTMCNC: 32.7 G/DL — SIGNIFICANT CHANGE UP (ref 32–36)
MCV RBC AUTO: 91.1 FL — SIGNIFICANT CHANGE UP (ref 80–100)
PLATELET # BLD AUTO: 147 K/UL — LOW (ref 150–400)
POTASSIUM SERPL-MCNC: 4.6 MMOL/L — SIGNIFICANT CHANGE UP (ref 3.5–5.3)
POTASSIUM SERPL-SCNC: 4.6 MMOL/L — SIGNIFICANT CHANGE UP (ref 3.5–5.3)
RBC # BLD: 3.93 M/UL — SIGNIFICANT CHANGE UP (ref 3.8–5.2)
RBC # FLD: 14.3 % — SIGNIFICANT CHANGE UP (ref 10.3–16.9)
SODIUM SERPL-SCNC: 139 MMOL/L — SIGNIFICANT CHANGE UP (ref 135–145)
SPECIMEN SOURCE: SIGNIFICANT CHANGE UP
VANCOMYCIN FLD-MCNC: 15.8 UG/ML — SIGNIFICANT CHANGE UP
VANCOMYCIN TROUGH SERPL-MCNC: 21.4 UG/ML — HIGH (ref 10–20)
WBC # BLD: 7 K/UL — SIGNIFICANT CHANGE UP (ref 3.8–10.5)
WBC # FLD AUTO: 7 K/UL — SIGNIFICANT CHANGE UP (ref 3.8–10.5)

## 2018-11-17 PROCEDURE — 99233 SBSQ HOSP IP/OBS HIGH 50: CPT

## 2018-11-17 RX ORDER — VANCOMYCIN HCL 1 G
1000 VIAL (EA) INTRAVENOUS EVERY 12 HOURS
Qty: 0 | Refills: 0 | Status: DISCONTINUED | OUTPATIENT
Start: 2018-11-17 | End: 2018-11-19

## 2018-11-17 RX ADMIN — Medication 2: at 11:48

## 2018-11-17 RX ADMIN — Medication 200 MILLIGRAM(S): at 00:04

## 2018-11-17 RX ADMIN — Medication 1 DROP(S): at 18:03

## 2018-11-17 RX ADMIN — OXYCODONE AND ACETAMINOPHEN 1 TABLET(S): 5; 325 TABLET ORAL at 21:47

## 2018-11-17 RX ADMIN — LATANOPROST 1 DROP(S): 0.05 SOLUTION/ DROPS OPHTHALMIC; TOPICAL at 21:49

## 2018-11-17 RX ADMIN — OXYCODONE AND ACETAMINOPHEN 1 TABLET(S): 5; 325 TABLET ORAL at 22:30

## 2018-11-17 RX ADMIN — Medication 25 MILLIGRAM(S): at 06:41

## 2018-11-17 RX ADMIN — APIXABAN 5 MILLIGRAM(S): 2.5 TABLET, FILM COATED ORAL at 06:40

## 2018-11-17 RX ADMIN — OXYCODONE AND ACETAMINOPHEN 1 TABLET(S): 5; 325 TABLET ORAL at 06:41

## 2018-11-17 RX ADMIN — OXYCODONE AND ACETAMINOPHEN 1 TABLET(S): 5; 325 TABLET ORAL at 00:35

## 2018-11-17 RX ADMIN — Medication 166.67 MILLIGRAM(S): at 00:05

## 2018-11-17 RX ADMIN — SERTRALINE 50 MILLIGRAM(S): 25 TABLET, FILM COATED ORAL at 11:49

## 2018-11-17 RX ADMIN — BRIMONIDINE TARTRATE 1 DROP(S): 2 SOLUTION/ DROPS OPHTHALMIC at 15:02

## 2018-11-17 RX ADMIN — OXYCODONE AND ACETAMINOPHEN 1 TABLET(S): 5; 325 TABLET ORAL at 07:41

## 2018-11-17 RX ADMIN — Medication 200 MILLIGRAM(S): at 11:48

## 2018-11-17 RX ADMIN — GABAPENTIN 300 MILLIGRAM(S): 400 CAPSULE ORAL at 06:40

## 2018-11-17 RX ADMIN — Medication 1 DROP(S): at 06:40

## 2018-11-17 RX ADMIN — BRIMONIDINE TARTRATE 1 DROP(S): 2 SOLUTION/ DROPS OPHTHALMIC at 21:50

## 2018-11-17 RX ADMIN — AMLODIPINE BESYLATE 10 MILLIGRAM(S): 2.5 TABLET ORAL at 06:40

## 2018-11-17 RX ADMIN — GABAPENTIN 300 MILLIGRAM(S): 400 CAPSULE ORAL at 14:59

## 2018-11-17 RX ADMIN — CLOPIDOGREL BISULFATE 75 MILLIGRAM(S): 75 TABLET, FILM COATED ORAL at 11:49

## 2018-11-17 RX ADMIN — OXYCODONE AND ACETAMINOPHEN 1 TABLET(S): 5; 325 TABLET ORAL at 00:05

## 2018-11-17 RX ADMIN — APIXABAN 5 MILLIGRAM(S): 2.5 TABLET, FILM COATED ORAL at 18:02

## 2018-11-17 RX ADMIN — Medication 81 MILLIGRAM(S): at 11:49

## 2018-11-17 RX ADMIN — BRIMONIDINE TARTRATE 1 DROP(S): 2 SOLUTION/ DROPS OPHTHALMIC at 06:41

## 2018-11-17 RX ADMIN — GABAPENTIN 300 MILLIGRAM(S): 400 CAPSULE ORAL at 21:47

## 2018-11-17 RX ADMIN — ATORVASTATIN CALCIUM 80 MILLIGRAM(S): 80 TABLET, FILM COATED ORAL at 21:46

## 2018-11-17 NOTE — PROGRESS NOTE ADULT - ASSESSMENT
83 y/o F with PMH of HTN, PAD now s/p PTCA of RCA with IZA, s/p LLE  angio and left great toe partial amputation.  Awaiting bypass.  1. Coronary artery disease- s/p PTCA of RCA with IZA:  continue aspirin and Plavix for her surgery.  The procedure is urgent. No more fever spikes post cath.  From a cardiac standpoint she is optimized for surgery. Cont statin.    2. Peripheral vascular disease-for bypass.    3. Hypertension-blood pressure is labile. Treat p.r.n. if over 160/100.     4. Diabetes-follow blood glucose.    Fever- as per sugical team.

## 2018-11-17 NOTE — PROGRESS NOTE ADULT - SUBJECTIVE AND OBJECTIVE BOX
O/N: VALERIE, VSS  11/16: VALERIE, VSS; foot xr normal                                      83 y/o F s/p LLE diagnositic angio and left great toe partial amputation    - Pain/Nausea control PRN  - ASA/Plavix, Eliquis  - Home meds - holding Losartan, Hydralazine  - Vanc/Cipro  - Regular (Consistent carb) diet  - Dispo: [facility] as per PT O/N: VALERIE, VSS  11/16: VALERIE, VSS; foot xr normal    ciprofloxacin   IVPB 400  vancomycin  IVPB 1250  amLODIPine   Tablet 10  apixaban 5  aspirin enteric coated 81  ciprofloxacin   IVPB 400  clopidogrel Tablet 75  metoprolol succinate ER 25  vancomycin  IVPB 1250      Allergies    penicillins (Anaphylaxis)    Intolerances        Vital Signs Last 24 Hrs  T(C): 36.9 (17 Nov 2018 09:18), Max: 37.1 (17 Nov 2018 06:21)  T(F): 98.5 (17 Nov 2018 09:18), Max: 98.7 (17 Nov 2018 06:21)  HR: 64 (17 Nov 2018 06:06) (59 - 64)  BP: 120/56 (17 Nov 2018 06:06) (109/55 - 136/65)  BP(mean): --  RR: 16 (17 Nov 2018 06:06) (16 - 16)  SpO2: 94% (17 Nov 2018 06:06) (94% - 98%)  I&O's Summary    16 Nov 2018 07:01  -  17 Nov 2018 07:00  --------------------------------------------------------  IN: 990 mL / OUT: 0 mL / NET: 990 mL        Physical Exam:  General: awake, alert, resting comfortably  Pulmonary: nonlabored breathing    Cardiovascular:  Abdominal:  Extremities: left great toe amp site with necrotic edges. No pus or erythema of surrounding skin.        LABS:                        11.9   8.6   )-----------( 143      ( 16 Nov 2018 06:23 )             36.4     11-16    136  |  96  |  12  ----------------------------<  108<H>  3.7   |  27  |  0.83    Ca    9.2      16 Nov 2018 06:23  Mg     2.0     11-16          83 y/o F s/p LLE diagnositic angio and left great toe partial amputation    - Pain/Nausea control PRN  - ASA/Plavix, Eliquis  - Home meds - holding Losartan, Hydralazine  - Vanc/Cipro  - Regular (Consistent carb) diet  - Dispo: [facility] as per PT

## 2018-11-17 NOTE — PROGRESS NOTE ADULT - SUBJECTIVE AND OBJECTIVE BOX
INTERVAL HISTORY: No fever spikes. States just feels tired. No CP/SOB/palpitations  	  MEDICATIONS:  amLODIPine   Tablet 10 milliGRAM(s) Oral daily  metoprolol succinate ER 25 milliGRAM(s) Oral daily  ciprofloxacin   IVPB 400 milliGRAM(s) IV Intermittent every 12 hours  vancomycin  IVPB 1250 milliGRAM(s) IV Intermittent every 12 hours  gabapentin 300 milliGRAM(s) Oral three times a day  morphine  - Injectable 2 milliGRAM(s) IV Push every 30 minutes PRN  oxyCODONE    5 mG/acetaminophen 325 mG 1 Tablet(s) Oral every 4 hours PRN  sertraline 50 milliGRAM(s) Oral daily  atorvastatin 80 milliGRAM(s) Oral at bedtime  insulin lispro (HumaLOG) corrective regimen sliding scale   SubCutaneous Before meals and at bedtime  apixaban 5 milliGRAM(s) Oral every 12 hours  aspirin enteric coated 81 milliGRAM(s) Oral daily  clopidogrel Tablet 75 milliGRAM(s) Oral daily      Cardiac: As per History  Pulmonary: As per History  10 point review of systems otherwise negative.       PHYSICAL EXAM:  T(C): 36.9 (11-17-18 @ 09:18), Max: 37.1 (11-17-18 @ 06:21)  HR: 64 (11-17-18 @ 06:06) (59 - 64)  BP: 120/56 (11-17-18 @ 06:06) (109/55 - 136/65)  RR: 16 (11-17-18 @ 06:06) (16 - 16)  SpO2: 94% (11-17-18 @ 06:06) (94% - 98%)  Wt(kg): --  I&O's Summary    16 Nov 2018 07:01  -  17 Nov 2018 07:00  --------------------------------------------------------  IN: 990 mL / OUT: 0 mL / NET: 990 mL          Appearance: Normal	  HEENT:   Normal oral mucosa, PERRL, EOMI	  Cardiovascular: Normal S1 S2, No JVD, No murmurs, No edema  Respiratory: Lungs decreased breath sounds at the bases  Psychiatry: A & O x 3, Mood & affect appropriate  Gastrointestinal:  Soft, Non-tender, + BS	  Skin: No rashes, No ecchymoses, No cyanosis  Neurologic: Non-focal  Extremities:  No clubbing, cyanosis or edema, Left foot in dressing    TELEMETRY: 	    ECG:  	reviewed  RADIOLOGY:   DIAGNOSTIC TESTING:  [ ] Stress Test:  moderate defect    LABS:	 	                         11.9   8.6   )-----------( 143      ( 16 Nov 2018 06:23 )             36.4     11-16    136  |  96  |  12  ----------------------------<  108<H>  3.7   |  27  |  0.83    Ca    9.2      16 Nov 2018 06:23  Mg     2.0     11-16           ASSESSMENT/PLAN:

## 2018-11-18 LAB
GLUCOSE BLDC GLUCOMTR-MCNC: 111 MG/DL — HIGH (ref 70–99)
GLUCOSE BLDC GLUCOMTR-MCNC: 114 MG/DL — HIGH (ref 70–99)
GLUCOSE BLDC GLUCOMTR-MCNC: 140 MG/DL — HIGH (ref 70–99)
GLUCOSE BLDC GLUCOMTR-MCNC: 169 MG/DL — HIGH (ref 70–99)

## 2018-11-18 RX ORDER — MORPHINE SULFATE 50 MG/1
2 CAPSULE, EXTENDED RELEASE ORAL ONCE
Qty: 0 | Refills: 0 | Status: DISCONTINUED | OUTPATIENT
Start: 2018-11-18 | End: 2018-11-18

## 2018-11-18 RX ADMIN — OXYCODONE AND ACETAMINOPHEN 1 TABLET(S): 5; 325 TABLET ORAL at 22:33

## 2018-11-18 RX ADMIN — LATANOPROST 1 DROP(S): 0.05 SOLUTION/ DROPS OPHTHALMIC; TOPICAL at 22:34

## 2018-11-18 RX ADMIN — Medication 1: at 11:55

## 2018-11-18 RX ADMIN — Medication 250 MILLIGRAM(S): at 11:54

## 2018-11-18 RX ADMIN — MORPHINE SULFATE 2 MILLIGRAM(S): 50 CAPSULE, EXTENDED RELEASE ORAL at 11:55

## 2018-11-18 RX ADMIN — GABAPENTIN 300 MILLIGRAM(S): 400 CAPSULE ORAL at 22:33

## 2018-11-18 RX ADMIN — Medication 1 DROP(S): at 18:50

## 2018-11-18 RX ADMIN — APIXABAN 5 MILLIGRAM(S): 2.5 TABLET, FILM COATED ORAL at 18:51

## 2018-11-18 RX ADMIN — Medication 81 MILLIGRAM(S): at 11:54

## 2018-11-18 RX ADMIN — MORPHINE SULFATE 2 MILLIGRAM(S): 50 CAPSULE, EXTENDED RELEASE ORAL at 12:28

## 2018-11-18 RX ADMIN — Medication 250 MILLIGRAM(S): at 00:45

## 2018-11-18 RX ADMIN — OXYCODONE AND ACETAMINOPHEN 1 TABLET(S): 5; 325 TABLET ORAL at 06:41

## 2018-11-18 RX ADMIN — ATORVASTATIN CALCIUM 80 MILLIGRAM(S): 80 TABLET, FILM COATED ORAL at 22:33

## 2018-11-18 RX ADMIN — BRIMONIDINE TARTRATE 1 DROP(S): 2 SOLUTION/ DROPS OPHTHALMIC at 06:43

## 2018-11-18 RX ADMIN — Medication 250 MILLIGRAM(S): at 18:51

## 2018-11-18 RX ADMIN — Medication 1 DROP(S): at 06:42

## 2018-11-18 RX ADMIN — GABAPENTIN 300 MILLIGRAM(S): 400 CAPSULE ORAL at 14:05

## 2018-11-18 RX ADMIN — SERTRALINE 50 MILLIGRAM(S): 25 TABLET, FILM COATED ORAL at 11:54

## 2018-11-18 RX ADMIN — OXYCODONE AND ACETAMINOPHEN 1 TABLET(S): 5; 325 TABLET ORAL at 07:48

## 2018-11-18 RX ADMIN — BRIMONIDINE TARTRATE 1 DROP(S): 2 SOLUTION/ DROPS OPHTHALMIC at 14:05

## 2018-11-18 RX ADMIN — CLOPIDOGREL BISULFATE 75 MILLIGRAM(S): 75 TABLET, FILM COATED ORAL at 11:54

## 2018-11-18 RX ADMIN — APIXABAN 5 MILLIGRAM(S): 2.5 TABLET, FILM COATED ORAL at 06:42

## 2018-11-18 RX ADMIN — GABAPENTIN 300 MILLIGRAM(S): 400 CAPSULE ORAL at 06:41

## 2018-11-18 RX ADMIN — Medication 200 MILLIGRAM(S): at 00:44

## 2018-11-18 RX ADMIN — Medication 25 MILLIGRAM(S): at 06:41

## 2018-11-18 RX ADMIN — Medication 200 MILLIGRAM(S): at 11:54

## 2018-11-18 RX ADMIN — AMLODIPINE BESYLATE 10 MILLIGRAM(S): 2.5 TABLET ORAL at 06:42

## 2018-11-18 RX ADMIN — BRIMONIDINE TARTRATE 1 DROP(S): 2 SOLUTION/ DROPS OPHTHALMIC at 22:34

## 2018-11-18 NOTE — PROGRESS NOTE ADULT - SUBJECTIVE AND OBJECTIVE BOX
24hr Events:  O/N:10pm Vanc trough 15.8, vanc dose reduced to 1000mg BID from 1250, pm dose given, VSS  11/17: afebrile, vanco trough at 12 = 21.4 - held dose - will recheck in PM        Assessment/Plan;  85 y/o F s/p LLE diagnositic angio and left great toe partial amputation    - Pain/Nausea control PRN  - ASA/Plavix, Eliquis  - Home meds - holding Losartan, Hydralazine  - Vanc/Cipro  - Regular (Consistent carb) diet  - Dispo: [facility] as per PT

## 2018-11-19 ENCOUNTER — TRANSCRIPTION ENCOUNTER (OUTPATIENT)
Age: 83
End: 2018-11-19

## 2018-11-19 VITALS
HEART RATE: 62 BPM | SYSTOLIC BLOOD PRESSURE: 139 MMHG | OXYGEN SATURATION: 99 % | RESPIRATION RATE: 18 BRPM | DIASTOLIC BLOOD PRESSURE: 60 MMHG

## 2018-11-19 LAB
ANION GAP SERPL CALC-SCNC: 11 MMOL/L — SIGNIFICANT CHANGE UP (ref 5–17)
BUN SERPL-MCNC: 20 MG/DL — SIGNIFICANT CHANGE UP (ref 7–23)
CALCIUM SERPL-MCNC: 9.5 MG/DL — SIGNIFICANT CHANGE UP (ref 8.4–10.5)
CHLORIDE SERPL-SCNC: 98 MMOL/L — SIGNIFICANT CHANGE UP (ref 96–108)
CO2 SERPL-SCNC: 27 MMOL/L — SIGNIFICANT CHANGE UP (ref 22–31)
CREAT SERPL-MCNC: 0.84 MG/DL — SIGNIFICANT CHANGE UP (ref 0.5–1.3)
GLUCOSE BLDC GLUCOMTR-MCNC: 107 MG/DL — HIGH (ref 70–99)
GLUCOSE BLDC GLUCOMTR-MCNC: 204 MG/DL — HIGH (ref 70–99)
GLUCOSE SERPL-MCNC: 122 MG/DL — HIGH (ref 70–99)
HCT VFR BLD CALC: 35.1 % — SIGNIFICANT CHANGE UP (ref 34.5–45)
HGB BLD-MCNC: 11.3 G/DL — LOW (ref 11.5–15.5)
MAGNESIUM SERPL-MCNC: 2.2 MG/DL — SIGNIFICANT CHANGE UP (ref 1.6–2.6)
MCHC RBC-ENTMCNC: 29.1 PG — SIGNIFICANT CHANGE UP (ref 27–34)
MCHC RBC-ENTMCNC: 32.2 G/DL — SIGNIFICANT CHANGE UP (ref 32–36)
MCV RBC AUTO: 90.5 FL — SIGNIFICANT CHANGE UP (ref 80–100)
PLATELET # BLD AUTO: 166 K/UL — SIGNIFICANT CHANGE UP (ref 150–400)
POTASSIUM SERPL-MCNC: 4.1 MMOL/L — SIGNIFICANT CHANGE UP (ref 3.5–5.3)
POTASSIUM SERPL-SCNC: 4.1 MMOL/L — SIGNIFICANT CHANGE UP (ref 3.5–5.3)
RBC # BLD: 3.88 M/UL — SIGNIFICANT CHANGE UP (ref 3.8–5.2)
RBC # FLD: 14 % — SIGNIFICANT CHANGE UP (ref 10.3–16.9)
SODIUM SERPL-SCNC: 136 MMOL/L — SIGNIFICANT CHANGE UP (ref 135–145)
VANCOMYCIN TROUGH SERPL-MCNC: 21.8 UG/ML — HIGH (ref 10–20)
WBC # BLD: 5.8 K/UL — SIGNIFICANT CHANGE UP (ref 3.8–10.5)
WBC # FLD AUTO: 5.8 K/UL — SIGNIFICANT CHANGE UP (ref 3.8–10.5)

## 2018-11-19 PROCEDURE — 99232 SBSQ HOSP IP/OBS MODERATE 35: CPT

## 2018-11-19 RX ORDER — MOXIFLOXACIN HYDROCHLORIDE TABLETS, 400 MG 400 MG/1
1 TABLET, FILM COATED ORAL
Qty: 14 | Refills: 0 | OUTPATIENT
Start: 2018-11-19 | End: 2018-11-25

## 2018-11-19 RX ORDER — CLOPIDOGREL BISULFATE 75 MG/1
1 TABLET, FILM COATED ORAL
Qty: 0 | Refills: 0 | COMMUNITY
Start: 2018-11-19

## 2018-11-19 RX ORDER — CIPROFLOXACIN LACTATE 400MG/40ML
40 VIAL (ML) INTRAVENOUS
Qty: 0 | Refills: 0 | COMMUNITY
Start: 2018-11-19

## 2018-11-19 RX ADMIN — SERTRALINE 50 MILLIGRAM(S): 25 TABLET, FILM COATED ORAL at 11:01

## 2018-11-19 RX ADMIN — Medication 200 MILLIGRAM(S): at 11:01

## 2018-11-19 RX ADMIN — GABAPENTIN 300 MILLIGRAM(S): 400 CAPSULE ORAL at 06:42

## 2018-11-19 RX ADMIN — OXYCODONE AND ACETAMINOPHEN 1 TABLET(S): 5; 325 TABLET ORAL at 06:42

## 2018-11-19 RX ADMIN — GABAPENTIN 300 MILLIGRAM(S): 400 CAPSULE ORAL at 13:07

## 2018-11-19 RX ADMIN — AMLODIPINE BESYLATE 10 MILLIGRAM(S): 2.5 TABLET ORAL at 06:42

## 2018-11-19 RX ADMIN — BRIMONIDINE TARTRATE 1 DROP(S): 2 SOLUTION/ DROPS OPHTHALMIC at 13:07

## 2018-11-19 RX ADMIN — CLOPIDOGREL BISULFATE 75 MILLIGRAM(S): 75 TABLET, FILM COATED ORAL at 11:01

## 2018-11-19 RX ADMIN — OXYCODONE AND ACETAMINOPHEN 1 TABLET(S): 5; 325 TABLET ORAL at 07:34

## 2018-11-19 RX ADMIN — Medication 25 MILLIGRAM(S): at 06:42

## 2018-11-19 RX ADMIN — Medication 81 MILLIGRAM(S): at 11:01

## 2018-11-19 RX ADMIN — Medication 1 DROP(S): at 06:43

## 2018-11-19 RX ADMIN — Medication 2: at 11:01

## 2018-11-19 RX ADMIN — Medication 200 MILLIGRAM(S): at 00:57

## 2018-11-19 RX ADMIN — APIXABAN 5 MILLIGRAM(S): 2.5 TABLET, FILM COATED ORAL at 06:42

## 2018-11-19 RX ADMIN — BRIMONIDINE TARTRATE 1 DROP(S): 2 SOLUTION/ DROPS OPHTHALMIC at 06:44

## 2018-11-19 NOTE — DISCHARGE NOTE ADULT - PATIENT PORTAL LINK FT
You can access the [email or web address] Patient Portal, offered by [facility], by registering with the following website: [email or web address]

## 2018-11-19 NOTE — DISCHARGE NOTE ADULT - HOSPITAL COURSE
85 yo female with h/o DM and HLD s/p right leg bypass and R great toe amputation, now healed, presents with a nonhealing L great toe ulcer. Patient staying in rehab center in [place] and using wheelchair.  Patient reports rest pain in the left foot. Prior MATY in the left leg was 0.4. Denies CP/SOB. ROS otherwise negative. During admission s/p Angiogram shows patent aorta and bilateral iliac vessels. Patent L CFA and profunda. L SFA occluded the origin. Extensive collateralization throughout leg with no named vessels until DP reconstitutes distally. Left 1st toe with purulent/fibrinous material, amputated to healthy appearing tissue. Minimal bleeding 11/13. s/p cardiac cath distal RCA with IZA 11/15.  Had temp 103 rectally postop which resolved.  Has been afebrile >24hours. On day of discharge patient was stable to be d/c'd to [facility] on cipro x1week. Still holding losartan and hydralazine due to  pressures in the 110s may restart in rehab when appropriate.

## 2018-11-19 NOTE — PROGRESS NOTE ADULT - PROVIDER SPECIALTY LIST ADULT
Cardiology
Intervent Cardiology
Vascular Surgery

## 2018-11-19 NOTE — DISCHARGE NOTE ADULT - MEDICATION SUMMARY - MEDICATIONS TO TAKE
I will START or STAY ON the medications listed below when I get home from the hospital:    acetaminophen 325 mg oral tablet  -- 2 tab(s) by mouth every 6 hours, As needed, Mild Pain (1 - 3)  -- Indication: For Pain control    oxyCODONE-acetaminophen 5 mg-325 mg oral tablet  -- 1 tab(s) by mouth every 6 hours, As Needed - 10)  -- Indication: For Pain control    aspirin 81 mg oral delayed release tablet  -- 1 tab(s) by mouth once a day  -- Indication: For Pain control    losartan 100 mg oral tablet  -- 1 tab(s) by mouth once a day  -- Indication: For Hypertension    Eliquis 5 mg oral tablet  -- 1 tab(s) by mouth 2 times a day  -- Indication: For anticoagulation    gabapentin 300 mg oral capsule  -- 1 cap(s) by mouth 3 times a day  -- Indication: For Pain control    sertraline 50 mg oral tablet  -- 1 tab(s) by mouth once a day  -- Indication: For antidepressant    metFORMIN 750 mg oral tablet, extended release  -- 1 tab(s) by mouth once a day  -- Indication: For Diabetes    Crestor 20 mg oral tablet  -- 1 tab(s) by mouth once a day (at bedtime)  -- Indication: For Hyperlipidemia    clopidogrel 75 mg oral tablet  -- 1 tab(s) by mouth once a day  -- Indication: For antiplatelet    Xanax 0.25 mg oral tablet  -- 1 tab(s) by mouth 3 times a day  -- Indication: For anxiety    metoprolol succinate 25 mg oral tablet, extended release  -- 1 tab(s) by mouth once a day  -- Indication: For Hypertension    amLODIPine 10 mg oral tablet  -- 1 tab(s) by mouth once a day  -- Indication: For Hypertension    Lumigan 0.01% ophthalmic solution  -- 1 drop(s) to each affected eye once a day (in the evening)  -- Indication: For Glaucoma    Alphagan  -- to each affected eye 3 times a day  -- Indication: For Glaucoma    Timoptic 0.5% ophthalmic solution  -- 1 drop(s) to each affected eye 2 times a day  -- Indication: For Glaucoma    ciprofloxacin 10 mg/mL intravenous solution  -- 40 milliliter(s) intravenous every 12 hours  -- Indication: For antibiotic    hydrALAZINE 25 mg oral tablet  -- 1 tab(s) by mouth 4 times a day  -- Indication: For Hypertension

## 2018-11-19 NOTE — PROGRESS NOTE ADULT - SUBJECTIVE AND OBJECTIVE BOX
24hr Event:  O/N: VALERIE, VSS  11/18: VALERIE, VSS        Assessment/Plan:  83 y/o F s/p LLE diagnositic angio and left great toe partial amputation    - Pain/Nausea control PRN  - ASA/Plavix, Eliquis  - Home meds - holding Losartan, Hydralazine  - Vanc/Cipro  - Regular (Consistent carb) diet  - Dispo: [facility] as per PT 24hr Event:  O/N: VALERIE, VSS  11/18: VALERIE, VSS    ciprofloxacin   IVPB 400  vancomycin  IVPB 1000  amLODIPine   Tablet 10  apixaban 5  aspirin enteric coated 81  ciprofloxacin   IVPB 400  clopidogrel Tablet 75  metoprolol succinate ER 25  vancomycin  IVPB 1000        Vital Signs Last 24 Hrs  T(C): 36.2 (19 Nov 2018 05:03), Max: 37.1 (18 Nov 2018 21:50)  T(F): 97.2 (19 Nov 2018 05:03), Max: 98.8 (18 Nov 2018 21:50)  HR: 58 (19 Nov 2018 06:41) (56 - 71)  BP: 133/59 (19 Nov 2018 06:41) (101/50 - 163/72)  BP(mean): --  RR: 18 (19 Nov 2018 05:28) (18 - 18)  SpO2: 100% (19 Nov 2018 05:28) (94% - 100%)  I&O's Summary    18 Nov 2018 07:01  -  19 Nov 2018 07:00  --------------------------------------------------------  IN: 300 mL / OUT: 0 mL / NET: 300 mL        Physical Exam:  General: NAD, resting comfortably in bed  Pulmonary: Nonlabored breathing, no respiratory distress  Extremities: left great toe amp site with necrotic edges. No pus or erythema of surrounding skin.      LABS:                        11.3   5.8   )-----------( 166      ( 19 Nov 2018 06:30 )             35.1     11-19    136  |  98  |  20  ----------------------------<  122<H>  4.1   |  27  |  0.84    Ca    9.5      19 Nov 2018 06:30  Mg     2.2     11-19        Assessment/Plan:  85 y/o F s/p LLE diagnositic angio and left great toe partial amputation    - Pain/Nausea control PRN  - ASA/Plavix, Eliquis  - Home meds - holding Losartan, Hydralazine  - Vanc/Cipro  - ISS  - Consistent carb diet  - Dispo: [facility] as per PT

## 2018-11-19 NOTE — PROGRESS NOTE ADULT - SUBJECTIVE AND OBJECTIVE BOX
INTERVAL HISTORY:  The patient  denies chest discomfort or dyspnea.	  Chart, [facility] medications and allergies reviewed    MEDICATIONS:  MEDICATIONS  (STANDING):  amLODIPine   Tablet 10 milliGRAM(s) Oral daily  apixaban 5 milliGRAM(s) Oral every 12 hours  aspirin enteric coated 81 milliGRAM(s) Oral daily  atorvastatin 80 milliGRAM(s) Oral at bedtime  brimonidine 0.2% Ophthalmic Solution 1 Drop(s) Both EYES three times a day  chlorhexidine 4% Liquid 1 Application(s) Topical once  ciprofloxacin   IVPB 400 milliGRAM(s) IV Intermittent every 12 hours  clopidogrel Tablet 75 milliGRAM(s) Oral daily  dextrose 5%. 1000 milliLiter(s) (50 mL/Hr) IV Continuous <Continuous>  dextrose 50% Injectable 12.5 Gram(s) IV Push once  dextrose 50% Injectable 25 Gram(s) IV Push once  dextrose 50% Injectable 25 Gram(s) IV Push once  gabapentin 300 milliGRAM(s) Oral three times a day  insulin lispro (HumaLOG) corrective regimen sliding scale   SubCutaneous Before meals and at bedtime  latanoprost 0.005% Ophthalmic Solution 1 Drop(s) Both EYES at bedtime  metoprolol succinate ER 25 milliGRAM(s) Oral daily  sertraline 50 milliGRAM(s) Oral daily  timolol 0.5% Solution 1 Drop(s) Both EYES two times a day  vancomycin  IVPB 1000 milliGRAM(s) IV Intermittent every 12 hours      REVIEW OF SYSTEMS:  CONSTITUTIONAL: No fever, no weight loss, no fatigue  PULMONARY: No cough, no wheezing, chills no hemoptysis; No Shortness of Breath  CARDIOVASCULAR: No chest pain, no palpitations, no syncope, dizziness, no leg swelling  GASTROINTESTINAL: No abdominal pain. No nausea, no  vomiting, no hematemesis; No diarrhea, no constipation. No melena, no hematochezia.  GENITOURINARY: No dysuria, no frequency, no hematuria, no incontinence  SKIN: No itching, no burning, no rashes, no lesions     PHYSICAL EXAM:  T(C): 36.2 (11-19-18 @ 05:03), Max: 37.1 (11-18-18 @ 21:50)  HR: 58 (11-19-18 @ 06:41) (56 - 71)  BP: 133/59 (11-19-18 @ 06:41) (101/50 - 163/72)  RR: 18 (11-19-18 @ 05:28) (18 - 18)  SpO2: 100% (11-19-18 @ 05:28) (94% - 100%)  Wt(kg): --  I&O's Summary    18 Nov 2018 07:01  -  19 Nov 2018 07:00  --------------------------------------------------------  IN: 300 mL / OUT: 0 mL / NET: 300 mL        Appearance:  No deformities, normal appearance	  HEENT:   Normal oral mucosa, PERRL, EOMI	  Neck: No jvd , no masses  Lymphatic: No  lymphadenopathy  Pulmonary: Lungs clear to auscultation and percussion  Cardiovascular: Normal S1 S2, No JVD, No murmur, No edema	  Abdomen:  Soft, Non-tender, + BS  Skin: No rashes, No ecchymoses	  Extremities:  No clubbing or cyanosis, L bandage, R toe amputation  MSK: Normal range of motion, no joint swelling    LABS:		  CARDIAC MARKERS:                         11.3   5.8   )-----------( 166      ( 19 Nov 2018 06:30 )             35.1   11-19    136  |  98  |  20  ----------------------------<  122<H>  4.1   |  27  |  0.84    Ca    9.5      19 Nov 2018 06:30  Mg     2.2     11-19      proBNP:  Lipid Profile:  HgA1c:  TSH:      Culture - Urine (collected 11-16-18 @ 06:17)  Source: .Urine Clean Catch (Midstream)  Final Report (11-17-18 @ 09:29):    Insignificant amount of mixed growth.    Culture - Blood (collected 11-15-18 @ 19:36)  Source: .Blood Blood  Preliminary Report (11-18-18 @ 20:01):    No growth at 3 days.    Culture - Blood (collected 11-15-18 @ 19:36)  Source: .Blood Blood  Preliminary Report (11-18-18 @ 20:01):    No growth at 3 days.      TELEMETRY: NSR	           ECG:  	            RADIOLOGY:   DIAGNOSTIC TESTING: [ ] Echocardiogram: [ ]  Catheterization: [ ] Stress Test:      ASSESSMENT/PLAN: 	  Coronary artery disease-the patient is pain free.  The patient will continue aspirin and Plavix.     Peripheral vascular disease- s/p amputation.    Hypertension-blood pressure is labile. Treat p.r.n. if over 160/100.     Diabetes-follow blood glucose.    Fever- resolved.

## 2018-11-19 NOTE — PROGRESS NOTE ADULT - REASON FOR ADMISSION
Abnormal NST, preop for LE bypass
Elective LLE Angiogram and toe amputation
Peripheral vascular disease
Elective LLE Angiogram and toe amputation

## 2018-11-19 NOTE — DISCHARGE NOTE ADULT - PLAN OF CARE
wound healing Follow-up with Dr. Castellanos in 1-2 weeks in office at [phone].   Wound care: Remove dressings to shower with soap and water. Dry well. Paint with betadine. Dampen gauze with saline solution. Place on wound. Cover with dry gauze and Kerlix wrap daily. Please call your doctor if you have a fever of over 101.9 or swelling/bleeding at wound.   Abx: Cipro x1 week end date 11/25/18  ****Hypertensive meds: holding losartan and hydralazine restart as appropriate

## 2018-11-19 NOTE — DISCHARGE NOTE ADULT - CARE PROVIDER_API CALL
Vicky Castellanos), Surgery; Vascular Surgery  [address]  [address]  [place], [place] [address]  Phone: [phone]  Fax: [phone]

## 2018-11-19 NOTE — DISCHARGE NOTE ADULT - CARE PLAN
Principal Discharge DX:	Ulcer of left lower extremity, limited to breakdown of skin  Goal:	wound healing  Assessment and plan of treatment:	Follow-up with Dr. Castellanos in 1-2 weeks in office at [phone].   Wound care: Remove dressings to shower with soap and water. Dry well. Paint with betadine. Dampen gauze with saline solution. Place on wound. Cover with dry gauze and Kerlix wrap daily. Please call your doctor if you have a fever of over 101.9 or swelling/bleeding at wound.   Abx: Cipro x1 week end date 11/25/18  ****Hypertensive meds: holding losartan and hydralazine restart as appropriate

## 2018-11-20 LAB
CULTURE RESULTS: SIGNIFICANT CHANGE UP
CULTURE RESULTS: SIGNIFICANT CHANGE UP
SPECIMEN SOURCE: SIGNIFICANT CHANGE UP
SPECIMEN SOURCE: SIGNIFICANT CHANGE UP

## 2018-11-21 PROBLEM — I82.409 ACUTE EMBOLISM AND THROMBOSIS OF UNSPECIFIED DEEP VEINS OF UNSPECIFIED LOWER EXTREMITY: Chronic | Status: ACTIVE | Noted: 2018-11-12

## 2018-11-21 PROBLEM — I26.99 OTHER PULMONARY EMBOLISM WITHOUT ACUTE COR PULMONALE: Chronic | Status: ACTIVE | Noted: 2018-11-12
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
## 2018-11-21 PROBLEM — I21.9 ACUTE MYOCARDIAL INFARCTION, UNSPECIFIED: Chronic | Status: ACTIVE | Noted: 2018-11-12

## 2018-11-21 PROBLEM — H40.9 UNSPECIFIED GLAUCOMA: Chronic | Status: ACTIVE | Noted: 2018-11-12

## 2018-11-21 PROBLEM — F32.9 MAJOR DEPRESSIVE DISORDER, SINGLE EPISODE, UNSPECIFIED: Chronic | Status: ACTIVE | Noted: 2018-11-12

## 2018-11-28 DIAGNOSIS — I25.110 ATHEROSCLEROTIC HEART DISEASE OF NATIVE CORONARY ARTERY WITH UNSTABLE ANGINA PECTORIS: ICD-10-CM

## 2018-11-28 DIAGNOSIS — E78.5 HYPERLIPIDEMIA, UNSPECIFIED: ICD-10-CM

## 2018-11-28 DIAGNOSIS — I25.2 OLD MYOCARDIAL INFARCTION: ICD-10-CM

## 2018-11-28 DIAGNOSIS — I73.9 PERIPHERAL VASCULAR DISEASE, UNSPECIFIED: ICD-10-CM

## 2018-11-28 DIAGNOSIS — Z86.718 PERSONAL HISTORY OF OTHER VENOUS THROMBOSIS AND EMBOLISM: ICD-10-CM

## 2018-11-28 DIAGNOSIS — Z91.81 HISTORY OF FALLING: ICD-10-CM

## 2018-11-28 DIAGNOSIS — E11.9 TYPE 2 DIABETES MELLITUS WITHOUT COMPLICATIONS: ICD-10-CM

## 2018-11-28 DIAGNOSIS — L97.521 NON-PRESSURE CHRONIC ULCER OF OTHER PART OF LEFT FOOT LIMITED TO BREAKDOWN OF SKIN: ICD-10-CM

## 2018-11-28 DIAGNOSIS — I70.245 ATHEROSCLEROSIS OF NATIVE ARTERIES OF LEFT LEG WITH ULCERATION OF OTHER PART OF FOOT: ICD-10-CM

## 2018-11-28 DIAGNOSIS — Z79.82 LONG TERM (CURRENT) USE OF ASPIRIN: ICD-10-CM

## 2018-11-28 DIAGNOSIS — I10 ESSENTIAL (PRIMARY) HYPERTENSION: ICD-10-CM

## 2018-11-28 DIAGNOSIS — Z89.411 ACQUIRED ABSENCE OF RIGHT GREAT TOE: ICD-10-CM

## 2018-11-28 DIAGNOSIS — Z95.5 PRESENCE OF CORONARY ANGIOPLASTY IMPLANT AND GRAFT: ICD-10-CM

## 2018-11-28 DIAGNOSIS — R50.82 POSTPROCEDURAL FEVER: ICD-10-CM

## 2018-11-28 DIAGNOSIS — Z79.01 LONG TERM (CURRENT) USE OF ANTICOAGULANTS: ICD-10-CM

## 2018-11-28 DIAGNOSIS — Z88.0 ALLERGY STATUS TO PENICILLIN: ICD-10-CM

## 2018-11-28 DIAGNOSIS — F32.9 MAJOR DEPRESSIVE DISORDER, SINGLE EPISODE, UNSPECIFIED: ICD-10-CM

## 2018-11-28 DIAGNOSIS — H40.9 UNSPECIFIED GLAUCOMA: ICD-10-CM

## 2018-11-28 DIAGNOSIS — F03.90 UNSPECIFIED DEMENTIA WITHOUT BEHAVIORAL DISTURBANCE: ICD-10-CM

## 2018-12-02 PROCEDURE — C1725: CPT

## 2018-12-02 PROCEDURE — 83735 ASSAY OF MAGNESIUM: CPT

## 2018-12-02 PROCEDURE — 71045 X-RAY EXAM CHEST 1 VIEW: CPT

## 2018-12-02 PROCEDURE — 80048 BASIC METABOLIC PNL TOTAL CA: CPT

## 2018-12-02 PROCEDURE — C8929: CPT

## 2018-12-02 PROCEDURE — 84134 ASSAY OF PREALBUMIN: CPT

## 2018-12-02 PROCEDURE — 73630 X-RAY EXAM OF FOOT: CPT

## 2018-12-02 PROCEDURE — 85027 COMPLETE CBC AUTOMATED: CPT

## 2018-12-02 PROCEDURE — 80202 ASSAY OF VANCOMYCIN: CPT

## 2018-12-02 PROCEDURE — 86901 BLOOD TYPING SEROLOGIC RH(D): CPT

## 2018-12-02 PROCEDURE — 36415 COLL VENOUS BLD VENIPUNCTURE: CPT

## 2018-12-02 PROCEDURE — 97110 THERAPEUTIC EXERCISES: CPT

## 2018-12-02 PROCEDURE — 93970 EXTREMITY STUDY: CPT

## 2018-12-02 PROCEDURE — C1769: CPT

## 2018-12-02 PROCEDURE — 88305 TISSUE EXAM BY PATHOLOGIST: CPT

## 2018-12-02 PROCEDURE — 81001 URINALYSIS AUTO W/SCOPE: CPT

## 2018-12-02 PROCEDURE — C1874: CPT

## 2018-12-02 PROCEDURE — 82962 GLUCOSE BLOOD TEST: CPT

## 2018-12-02 PROCEDURE — 85025 COMPLETE CBC W/AUTO DIFF WBC: CPT

## 2018-12-02 PROCEDURE — 88311 DECALCIFY TISSUE: CPT

## 2018-12-02 PROCEDURE — 93005 ELECTROCARDIOGRAM TRACING: CPT

## 2018-12-02 PROCEDURE — C1894: CPT

## 2018-12-02 PROCEDURE — 87040 BLOOD CULTURE FOR BACTERIA: CPT

## 2018-12-02 PROCEDURE — 86900 BLOOD TYPING SEROLOGIC ABO: CPT

## 2018-12-02 PROCEDURE — 85730 THROMBOPLASTIN TIME PARTIAL: CPT

## 2018-12-02 PROCEDURE — 86850 RBC ANTIBODY SCREEN: CPT

## 2018-12-02 PROCEDURE — 76000 FLUOROSCOPY <1 HR PHYS/QHP: CPT

## 2018-12-02 PROCEDURE — 97161 PT EVAL LOW COMPLEX 20 MIN: CPT

## 2018-12-02 PROCEDURE — C1887: CPT

## 2018-12-02 PROCEDURE — 83036 HEMOGLOBIN GLYCOSYLATED A1C: CPT

## 2018-12-02 PROCEDURE — 87086 URINE CULTURE/COLONY COUNT: CPT

## 2018-12-02 PROCEDURE — 85610 PROTHROMBIN TIME: CPT

## 2018-12-06 PROBLEM — A49.02 METHICILLIN RESISTANT STAPHYLOCOCCUS AUREUS INFECTION, UNSPECIFIED SITE: Chronic | Status: ACTIVE | Noted: 2018-11-13

## 2019-01-07 ENCOUNTER — APPOINTMENT (OUTPATIENT)
Dept: VASCULAR SURGERY | Facility: CLINIC | Age: 84
End: 2019-01-07

## 2019-01-28 ENCOUNTER — APPOINTMENT (OUTPATIENT)
Dept: VASCULAR SURGERY | Facility: CLINIC | Age: 84
End: 2019-01-28
Payer: MEDICARE

## 2019-01-28 PROCEDURE — 99024 POSTOP FOLLOW-UP VISIT: CPT

## 2019-01-28 PROCEDURE — 93923 UPR/LXTR ART STDY 3+ LVLS: CPT | Mod: 79

## 2019-01-28 NOTE — ASSESSMENT
[FreeTextEntry1] : 83 yo female with h/o DM and HLD s/p right leg bypass, R great toe amputation, now healed, presenting for follow-up s/p LLE angiogram/PTA and left great toe amputation. Amputation site nonhealing with yellowish exudate. Plan for left leg bypass. Risk, benefits, and alternatives to the proposed procedure were discussed with the patient and all questions were answered to their satisfaction. WIll schedule this procedure. \par

## 2019-01-28 NOTE — PHYSICAL EXAM
[Normal Breath Sounds] : Normal breath sounds [Normal Heart Sounds] : normal heart sounds [Ankle Swelling (On Exam)] : present [Ankle Swelling On The Left] : of the left ankle [Ankle Swelling On The Right] : mild [Skin Ulcer] : ulcer [Alert] : alert [Oriented to Person] : oriented to person [Oriented to Place] : oriented to place [Oriented to Time] : oriented to time [Calm] : calm [JVD] : no jugular venous distention  [Varicose Veins Of Lower Extremities] : not present [] : not present [de-identified] : pleasant, NAD [de-identified] : NCAT  [FreeTextEntry1] : left toe amputation site with yellowish exudate, no signs of infection [de-identified] : right great toe ulcer healed. No redness, drainage or erythema.  Left great toe partial nail avulsion, nail bed appears ulcerated, no signs of infection, no bleeding, no discharge

## 2019-01-28 NOTE — HISTORY OF PRESENT ILLNESS
[FreeTextEntry1] : 83 yo female presents for follow up visit and evaluation s/p LLE angiogram and left great toe amputation. Patient with history of right leg bypass and right great toe amputation, which has healed. Patient is using a wheelchair and is still in a rehab center in [place]. She reports that her left toe amputation site is nonhealing, with small amounts of drainage.\par \par

## 2019-01-28 NOTE — END OF VISIT
[FreeTextEntry3] : Documented by Rolando Coleman acting as a scribe for Dr. Vicky Castellanos on 1/28/2019

## 2019-01-28 NOTE — REVIEW OF SYSTEMS
[Limb Pain] : limb pain [As Noted in HPI] : as noted in HPI [Skin Wound] : skin wound [Negative] : Heme/Lymph

## 2019-02-11 ENCOUNTER — INPATIENT (INPATIENT)
Facility: HOSPITAL | Age: 84
LOS: 14 days | Discharge: NURSING, REHAB OR LONG-TERM CARE | DRG: 253 | End: 2019-02-26
Attending: SURGERY | Admitting: SURGERY
Payer: COMMERCIAL

## 2019-02-11 VITALS
HEART RATE: 72 BPM | OXYGEN SATURATION: 98 % | SYSTOLIC BLOOD PRESSURE: 134 MMHG | WEIGHT: 179.9 LBS | RESPIRATION RATE: 18 BRPM | DIASTOLIC BLOOD PRESSURE: 86 MMHG | TEMPERATURE: 98 F

## 2019-02-11 DIAGNOSIS — Z98.890 OTHER SPECIFIED POSTPROCEDURAL STATES: Chronic | ICD-10-CM

## 2019-02-11 DIAGNOSIS — Z89.9 ACQUIRED ABSENCE OF LIMB, UNSPECIFIED: Chronic | ICD-10-CM

## 2019-02-11 LAB
ALBUMIN SERPL ELPH-MCNC: 4 G/DL — SIGNIFICANT CHANGE UP (ref 3.3–5)
ALP SERPL-CCNC: 108 U/L — SIGNIFICANT CHANGE UP (ref 40–120)
ALT FLD-CCNC: 25 U/L — SIGNIFICANT CHANGE UP (ref 10–45)
ANION GAP SERPL CALC-SCNC: 11 MMOL/L — SIGNIFICANT CHANGE UP (ref 5–17)
APTT BLD: 29.7 SEC — SIGNIFICANT CHANGE UP (ref 27.5–36.3)
AST SERPL-CCNC: 34 U/L — SIGNIFICANT CHANGE UP (ref 10–40)
BASOPHILS # BLD AUTO: 0.02 K/UL — SIGNIFICANT CHANGE UP (ref 0–0.2)
BASOPHILS NFR BLD AUTO: 0.3 % — SIGNIFICANT CHANGE UP (ref 0–2)
BILIRUB SERPL-MCNC: 0.3 MG/DL — SIGNIFICANT CHANGE UP (ref 0.2–1.2)
BUN SERPL-MCNC: 16 MG/DL — SIGNIFICANT CHANGE UP (ref 7–23)
CALCIUM SERPL-MCNC: 10.1 MG/DL — SIGNIFICANT CHANGE UP (ref 8.4–10.5)
CHLORIDE SERPL-SCNC: 102 MMOL/L — SIGNIFICANT CHANGE UP (ref 96–108)
CO2 SERPL-SCNC: 26 MMOL/L — SIGNIFICANT CHANGE UP (ref 22–31)
CREAT SERPL-MCNC: 0.66 MG/DL — SIGNIFICANT CHANGE UP (ref 0.5–1.3)
CRP SERPL-MCNC: 0.11 MG/DL — SIGNIFICANT CHANGE UP (ref 0–0.4)
EOSINOPHIL # BLD AUTO: 0.19 K/UL — SIGNIFICANT CHANGE UP (ref 0–0.5)
EOSINOPHIL NFR BLD AUTO: 3 % — SIGNIFICANT CHANGE UP (ref 0–6)
ERYTHROCYTE [SEDIMENTATION RATE] IN BLOOD: 30 MM/HR — HIGH
GLUCOSE BLDC GLUCOMTR-MCNC: 221 MG/DL — HIGH (ref 70–99)
GLUCOSE SERPL-MCNC: 134 MG/DL — HIGH (ref 70–99)
HCT VFR BLD CALC: 42.7 % — SIGNIFICANT CHANGE UP (ref 34.5–45)
HGB BLD-MCNC: 13.5 G/DL — SIGNIFICANT CHANGE UP (ref 11.5–15.5)
IMM GRANULOCYTES NFR BLD AUTO: 0.3 % — SIGNIFICANT CHANGE UP (ref 0–1.5)
INR BLD: 1.05 — SIGNIFICANT CHANGE UP (ref 0.88–1.16)
LYMPHOCYTES # BLD AUTO: 1.7 K/UL — SIGNIFICANT CHANGE UP (ref 1–3.3)
LYMPHOCYTES # BLD AUTO: 26.9 % — SIGNIFICANT CHANGE UP (ref 13–44)
MCHC RBC-ENTMCNC: 29 PG — SIGNIFICANT CHANGE UP (ref 27–34)
MCHC RBC-ENTMCNC: 31.6 GM/DL — LOW (ref 32–36)
MCV RBC AUTO: 91.6 FL — SIGNIFICANT CHANGE UP (ref 80–100)
MONOCYTES # BLD AUTO: 0.76 K/UL — SIGNIFICANT CHANGE UP (ref 0–0.9)
MONOCYTES NFR BLD AUTO: 12 % — SIGNIFICANT CHANGE UP (ref 2–14)
NEUTROPHILS # BLD AUTO: 3.62 K/UL — SIGNIFICANT CHANGE UP (ref 1.8–7.4)
NEUTROPHILS NFR BLD AUTO: 57.5 % — SIGNIFICANT CHANGE UP (ref 43–77)
NRBC # BLD: 0 /100 WBCS — SIGNIFICANT CHANGE UP (ref 0–0)
PLATELET # BLD AUTO: 211 K/UL — SIGNIFICANT CHANGE UP (ref 150–400)
POTASSIUM SERPL-MCNC: SIGNIFICANT CHANGE UP MMOL/L (ref 3.5–5.3)
POTASSIUM SERPL-SCNC: SIGNIFICANT CHANGE UP MMOL/L (ref 3.5–5.3)
PROT SERPL-MCNC: 7.9 G/DL — SIGNIFICANT CHANGE UP (ref 6–8.3)
PROTHROM AB SERPL-ACNC: 11.9 SEC — SIGNIFICANT CHANGE UP (ref 10–12.9)
RBC # BLD: 4.66 M/UL — SIGNIFICANT CHANGE UP (ref 3.8–5.2)
RBC # FLD: 14.1 % — SIGNIFICANT CHANGE UP (ref 10.3–14.5)
SODIUM SERPL-SCNC: 139 MMOL/L — SIGNIFICANT CHANGE UP (ref 135–145)
WBC # BLD: 6.31 K/UL — SIGNIFICANT CHANGE UP (ref 3.8–10.5)
WBC # FLD AUTO: 6.31 K/UL — SIGNIFICANT CHANGE UP (ref 3.8–10.5)

## 2019-02-11 PROCEDURE — 71046 X-RAY EXAM CHEST 2 VIEWS: CPT | Mod: 26

## 2019-02-11 PROCEDURE — 73630 X-RAY EXAM OF FOOT: CPT | Mod: 26,LT

## 2019-02-11 PROCEDURE — 99285 EMERGENCY DEPT VISIT HI MDM: CPT | Mod: 25

## 2019-02-11 PROCEDURE — 99234 HOSP IP/OBS SM DT SF/LOW 45: CPT | Mod: GC,57,24

## 2019-02-11 PROCEDURE — 93010 ELECTROCARDIOGRAM REPORT: CPT

## 2019-02-11 RX ORDER — LATANOPROST 0.05 MG/ML
1 SOLUTION/ DROPS OPHTHALMIC; TOPICAL AT BEDTIME
Qty: 0 | Refills: 0 | Status: DISCONTINUED | OUTPATIENT
Start: 2019-02-11 | End: 2019-02-12

## 2019-02-11 RX ORDER — VANCOMYCIN HCL 1 G
1000 VIAL (EA) INTRAVENOUS EVERY 12 HOURS
Refills: 0 | Status: DISCONTINUED | OUTPATIENT
Start: 2019-02-11 | End: 2019-02-12

## 2019-02-11 RX ORDER — GLUCAGON INJECTION, SOLUTION 0.5 MG/.1ML
1 INJECTION, SOLUTION SUBCUTANEOUS ONCE
Qty: 0 | Refills: 0 | Status: DISCONTINUED | OUTPATIENT
Start: 2019-02-11 | End: 2019-02-12

## 2019-02-11 RX ORDER — HEPARIN SODIUM 5000 [USP'U]/ML
INJECTION INTRAVENOUS; SUBCUTANEOUS
Qty: 25000 | Refills: 0 | Status: DISCONTINUED | OUTPATIENT
Start: 2019-02-11 | End: 2019-02-12

## 2019-02-11 RX ORDER — SODIUM CHLORIDE 9 MG/ML
1000 INJECTION INTRAMUSCULAR; INTRAVENOUS; SUBCUTANEOUS
Qty: 0 | Refills: 0 | Status: DISCONTINUED | OUTPATIENT
Start: 2019-02-11 | End: 2019-02-12

## 2019-02-11 RX ORDER — SODIUM CHLORIDE 9 MG/ML
1000 INJECTION, SOLUTION INTRAVENOUS
Qty: 0 | Refills: 0 | Status: DISCONTINUED | OUTPATIENT
Start: 2019-02-11 | End: 2019-02-12

## 2019-02-11 RX ORDER — CLOPIDOGREL BISULFATE 75 MG/1
75 TABLET, FILM COATED ORAL DAILY
Qty: 0 | Refills: 0 | Status: DISCONTINUED | OUTPATIENT
Start: 2019-02-11 | End: 2019-02-12

## 2019-02-11 RX ORDER — TIMOLOL 0.5 %
1 DROPS OPHTHALMIC (EYE)
Qty: 0 | Refills: 0 | Status: DISCONTINUED | OUTPATIENT
Start: 2019-02-11 | End: 2019-02-12

## 2019-02-11 RX ORDER — CIPROFLOXACIN LACTATE 400MG/40ML
500 VIAL (ML) INTRAVENOUS EVERY 12 HOURS
Refills: 0 | Status: DISCONTINUED | OUTPATIENT
Start: 2019-02-11 | End: 2019-02-12

## 2019-02-11 RX ORDER — ASPIRIN/CALCIUM CARB/MAGNESIUM 324 MG
81 TABLET ORAL DAILY
Qty: 0 | Refills: 0 | Status: DISCONTINUED | OUTPATIENT
Start: 2019-02-11 | End: 2019-02-12

## 2019-02-11 RX ORDER — DEXTROSE 50 % IN WATER 50 %
15 SYRINGE (ML) INTRAVENOUS ONCE
Qty: 0 | Refills: 0 | Status: DISCONTINUED | OUTPATIENT
Start: 2019-02-11 | End: 2019-02-12

## 2019-02-11 RX ORDER — GABAPENTIN 400 MG/1
300 CAPSULE ORAL THREE TIMES A DAY
Qty: 0 | Refills: 0 | Status: DISCONTINUED | OUTPATIENT
Start: 2019-02-11 | End: 2019-02-12

## 2019-02-11 RX ORDER — DEXTROSE 50 % IN WATER 50 %
12.5 SYRINGE (ML) INTRAVENOUS ONCE
Qty: 0 | Refills: 0 | Status: DISCONTINUED | OUTPATIENT
Start: 2019-02-11 | End: 2019-02-12

## 2019-02-11 RX ORDER — ATORVASTATIN CALCIUM 80 MG/1
40 TABLET, FILM COATED ORAL AT BEDTIME
Qty: 0 | Refills: 0 | Status: DISCONTINUED | OUTPATIENT
Start: 2019-02-11 | End: 2019-02-12

## 2019-02-11 RX ORDER — METOPROLOL TARTRATE 50 MG
25 TABLET ORAL DAILY
Qty: 0 | Refills: 0 | Status: DISCONTINUED | OUTPATIENT
Start: 2019-02-11 | End: 2019-02-12

## 2019-02-11 RX ORDER — INSULIN LISPRO 100/ML
VIAL (ML) SUBCUTANEOUS
Qty: 0 | Refills: 0 | Status: DISCONTINUED | OUTPATIENT
Start: 2019-02-11 | End: 2019-02-12

## 2019-02-11 RX ORDER — SERTRALINE 25 MG/1
50 TABLET, FILM COATED ORAL DAILY
Qty: 0 | Refills: 0 | Status: DISCONTINUED | OUTPATIENT
Start: 2019-02-11 | End: 2019-02-12

## 2019-02-11 RX ORDER — HYDRALAZINE HCL 50 MG
25 TABLET ORAL
Qty: 0 | Refills: 0 | Status: DISCONTINUED | OUTPATIENT
Start: 2019-02-11 | End: 2019-02-12

## 2019-02-11 RX ORDER — ONDANSETRON 8 MG/1
4 TABLET, FILM COATED ORAL EVERY 6 HOURS
Qty: 0 | Refills: 0 | Status: DISCONTINUED | OUTPATIENT
Start: 2019-02-11 | End: 2019-02-12

## 2019-02-11 RX ORDER — AMLODIPINE BESYLATE 2.5 MG/1
10 TABLET ORAL DAILY
Qty: 0 | Refills: 0 | Status: DISCONTINUED | OUTPATIENT
Start: 2019-02-11 | End: 2019-02-12

## 2019-02-11 RX ORDER — CIPROFLOXACIN LACTATE 400MG/40ML
500 VIAL (ML) INTRAVENOUS EVERY 12 HOURS
Refills: 0 | Status: DISCONTINUED | OUTPATIENT
Start: 2019-02-11 | End: 2019-02-11

## 2019-02-11 RX ORDER — BRIMONIDINE TARTRATE 2 MG/MG
1 SOLUTION/ DROPS OPHTHALMIC THREE TIMES A DAY
Qty: 0 | Refills: 0 | Status: DISCONTINUED | OUTPATIENT
Start: 2019-02-11 | End: 2019-02-12

## 2019-02-11 RX ADMIN — GABAPENTIN 300 MILLIGRAM(S): 400 CAPSULE ORAL at 22:04

## 2019-02-11 RX ADMIN — Medication 4: at 22:13

## 2019-02-11 RX ADMIN — Medication 1 DROP(S): at 22:38

## 2019-02-11 RX ADMIN — BRIMONIDINE TARTRATE 1 DROP(S): 2 SOLUTION/ DROPS OPHTHALMIC at 22:37

## 2019-02-11 RX ADMIN — HEPARIN SODIUM 1500 UNIT(S)/HR: 5000 INJECTION INTRAVENOUS; SUBCUTANEOUS at 22:00

## 2019-02-11 RX ADMIN — Medication 500 MILLIGRAM(S): at 19:42

## 2019-02-11 RX ADMIN — ATORVASTATIN CALCIUM 40 MILLIGRAM(S): 80 TABLET, FILM COATED ORAL at 22:04

## 2019-02-11 RX ADMIN — Medication 25 MILLIGRAM(S): at 19:43

## 2019-02-11 RX ADMIN — LATANOPROST 1 DROP(S): 0.05 SOLUTION/ DROPS OPHTHALMIC; TOPICAL at 22:37

## 2019-02-11 RX ADMIN — Medication 4: at 16:23

## 2019-02-11 RX ADMIN — Medication 250 MILLIGRAM(S): at 21:30

## 2019-02-11 NOTE — ED PROVIDER NOTE - PHYSICAL EXAMINATION
yellowish dc draining from wound of left great toe. no erythema. no edema.  distal sensation intact. pulses obtained with doppler by vascular.

## 2019-02-11 NOTE — PROGRESS NOTE ADULT - SUBJECTIVE AND OBJECTIVE BOX
Pre-op Diagnosis: PAD  Procedure: LLE Bypass  Surgeon: Emanuel    Consent in chart                          13.5   6.31  )-----------( 211      ( 11 Feb 2019 13:07 )             42.7     02-11    139  |  102  |  16  ----------------------------<  134<H>  See note   |  26  |  0.66    Ca    10.1      11 Feb 2019 13:07    TPro  7.9  /  Alb  4.0  /  TBili  0.3  /  DBili  x   /  AST  34  /  ALT  25  /  AlkPhos  108  02-11    PT/INR - ( 11 Feb 2019 13:07 )   PT: 11.9 sec;   INR: 1.05     PTT - ( 11 Feb 2019 13:07 )  PTT:29.7 sec      Type & Screen: O+ Ab neg  CXR: clear lungs, official read pending  EKG: NSR      A/P: 84yFemale planned for above procedure  1. NPO past midnight, except medications  2. Home medication as appropriate  3. [ x] Blood on hold, Units: 2u PRBC  4. NS@55ml/hr  5. Glucose control  6. F/u 2am labs

## 2019-02-11 NOTE — H&P ADULT - HISTORY OF PRESENT ILLNESS
85 yo female with h/o DM and HLD s/p right leg bypass and R great toe amputation, now healed, presents with a nonhealing L great toe ulcer, which has been previously debrided. 11/13 Aniogram shows patent aorta and bilateral iliac vessels. Patent L CFA and profunda. L SFA occluded at the origin. Extensive collateralization throughout leg with no named vessels until DP reconstitutes distally. Patient notes that she has been having increased claudication symptoms in the left leg with walking. Denies CP, SOB.

## 2019-02-11 NOTE — H&P ADULT - NSHPLABSRESULTS_GEN_ALL_CORE
LABS/RADIOLOGY RESULTS:                          13.5   6.31  )-----------( 211      ( 11 Feb 2019 13:07 )             42.7   02-11    139  |  102  |  16  ----------------------------<  134<H>  See note   |  26  |  0.66    Ca    10.1      11 Feb 2019 13:07    TPro  7.9  /  Alb  4.0  /  TBili  0.3  /  DBili  x   /  AST  34  /  ALT  25  /  AlkPhos  108  02-11  PT/INR - ( 11 Feb 2019 13:07 )   PT: 11.9 sec;   INR: 1.05          PTT - ( 11 Feb 2019 13:07 )  PTT:29.7 secBlood Cultures

## 2019-02-11 NOTE — H&P ADULT - ASSESSMENT
83 yo female s/p R leg bypass and right great toe amputation presenting with non-healing ulcer of the L great toe. Pain of the left foot with claudication pain. Now presenting for LLE bypass.    Will admit to Dr. Castellanos  LLE bypass tomorrow  Home medications  ASA/Plavix  Heparin gtt  Will hold multiple Anti-HTN meds  Hold Eliquis  ISS  Regular Diet CC, NPO @ MN

## 2019-02-11 NOTE — ED ADULT NURSE NOTE - OBJECTIVE STATEMENT
Pt c/o left s/p toe amputation at night, denies pain at present time, as per nursing home documents pt is to have left leg bypass due to poor circulation no acute distress MD at bedside

## 2019-02-11 NOTE — H&P ADULT - NSHPPHYSICALEXAM_GEN_ALL_CORE
Returned from xray, rates pain at 7/10 & denies nausea at this time.  Will continue to monitor   General: WN/WD NAD  Neurology: A&Ox3, nonfocal, SARAH x 4  Respiratory: CTA B/L, No wheezing, rales, rhonchi  CV: RRR, S1S2, no murmurs, rubs or gallops  Abdominal: Soft, NT, ND +BS,   Extremities: Left great toe with non-healing dry gangrene at site of amputation  Pulses: Right DP/PT Biphasic. Left DP biphasic, mono vs. no signal in PT

## 2019-02-11 NOTE — ED PROVIDER NOTE - MEDICAL DECISION MAKING DETAILS
ulcer of left great toe not healing well. no erythema. VSS. labs, ecg and x-rays. pt evaluated in ED by vascular and recommend admission.

## 2019-02-11 NOTE — ED PROVIDER NOTE - ATTENDING CONTRIBUTION TO CARE
Pt is an 83yo f, h/o dm, htn, hld, dvt, cad, pvd, s/p partial amp of left great toe, who p/w non-healing wound to toe w/ associated pain. + slight pus drainage starting last week, no fever/ chills. No n/t/w. No cp, sob, palp, dizziness.     VITAL SIGNS: I have reviewed nursing notes and confirm.  CONSTITUTIONAL: Well-developed; well-nourished; in no acute distress.   SKIN:  warm and dry, no acute rash.   HEAD:  normocephalic, atraumatic.  EYES: PERRL, EOM intact; conjunctiva and sclera clear.  ENT: No nasal discharge; airway clear.   NECK: Supple; non tender.  CARD: S1, S2 normal; no murmurs, gallops, or rubs. Regular rate and rhythm.   RESP:  Clear to auscultation b/l, no wheezes, rales or rhonchi.  ABD: Normal bowel sounds; soft; non-distended; non-tender; no guarding/ rebound.  EXT: Left foot: + partial amp of left great toe w/ yellow dc from open wound, no blood. No associated erythema, lymphangitis, swelling, warmth. + dopplerable distal pulses by vascular.   NEURO: Alert, oriented, grossly unremarkable  PSYCH: Cooperative, mood and affect appropriate.    No leukocytosis on labs. + mildly elev esr. Pt seen by vascular and to be admitted for bypass surgery to [facility].

## 2019-02-11 NOTE — ED ADULT NURSE NOTE - NSIMPLEMENTINTERV_GEN_ALL_ED
Implemented All Universal Safety Interventions:  [place] to call system. Call bell, personal items and telephone within reach. Instruct patient to call for assistance. Room bathroom lighting operational. Non-slip footwear when patient is off stretcher. Physically safe environment: no spills, clutter or unnecessary equipment. Stretcher in lowest position, wheels locked, appropriate side rails in place.

## 2019-02-11 NOTE — ED PROVIDER NOTE - OBJECTIVE STATEMENT
85 y/o female with hx of DM, HTN, HLD, DVT, CAD c/o left foot pain. pt states partial amputation of left great toe in November and told wound not healing well and to come for bypass. no fever or chills. pt states pain is mainly at night after being on feet all day. no numbness or tingling. no weakness. no further complaints.

## 2019-02-12 ENCOUNTER — RESULT REVIEW (OUTPATIENT)
Age: 84
End: 2019-02-12

## 2019-02-12 LAB
ANION GAP SERPL CALC-SCNC: 11 MMOL/L — SIGNIFICANT CHANGE UP (ref 5–17)
ANION GAP SERPL CALC-SCNC: 7 MMOL/L — SIGNIFICANT CHANGE UP (ref 5–17)
APTT BLD: 187.3 SEC — CRITICAL HIGH (ref 27.5–36.3)
BASE EXCESS BLDA CALC-SCNC: 0.6 MMOL/L — SIGNIFICANT CHANGE UP (ref -2–3)
BASE EXCESS BLDA CALC-SCNC: 2.3 MMOL/L — SIGNIFICANT CHANGE UP (ref -2–3)
BASOPHILS # BLD AUTO: 0.02 K/UL — SIGNIFICANT CHANGE UP (ref 0–0.2)
BASOPHILS NFR BLD AUTO: 0.2 % — SIGNIFICANT CHANGE UP (ref 0–2)
BUN SERPL-MCNC: 14 MG/DL — SIGNIFICANT CHANGE UP (ref 7–23)
BUN SERPL-MCNC: 19 MG/DL — SIGNIFICANT CHANGE UP (ref 7–23)
CA-I BLDA-SCNC: 1.01 MMOL/L — LOW (ref 1.12–1.3)
CA-I BLDA-SCNC: 1.07 MMOL/L — LOW (ref 1.12–1.3)
CALCIUM SERPL-MCNC: 9.2 MG/DL — SIGNIFICANT CHANGE UP (ref 8.4–10.5)
CALCIUM SERPL-MCNC: 9.4 MG/DL — SIGNIFICANT CHANGE UP (ref 8.4–10.5)
CHLORIDE SERPL-SCNC: 103 MMOL/L — SIGNIFICANT CHANGE UP (ref 96–108)
CHLORIDE SERPL-SCNC: 106 MMOL/L — SIGNIFICANT CHANGE UP (ref 96–108)
CO2 SERPL-SCNC: 27 MMOL/L — SIGNIFICANT CHANGE UP (ref 22–31)
CO2 SERPL-SCNC: 27 MMOL/L — SIGNIFICANT CHANGE UP (ref 22–31)
COHGB MFR BLDA: 0.1 % — SIGNIFICANT CHANGE UP
COHGB MFR BLDA: 0.3 % — SIGNIFICANT CHANGE UP
CREAT SERPL-MCNC: 0.75 MG/DL — SIGNIFICANT CHANGE UP (ref 0.5–1.3)
CREAT SERPL-MCNC: 0.92 MG/DL — SIGNIFICANT CHANGE UP (ref 0.5–1.3)
EOSINOPHIL # BLD AUTO: 0.25 K/UL — SIGNIFICANT CHANGE UP (ref 0–0.5)
EOSINOPHIL NFR BLD AUTO: 2.9 % — SIGNIFICANT CHANGE UP (ref 0–6)
GLUCOSE BLDC GLUCOMTR-MCNC: 118 MG/DL — HIGH (ref 70–99)
GLUCOSE BLDC GLUCOMTR-MCNC: 133 MG/DL — HIGH (ref 70–99)
GLUCOSE BLDC GLUCOMTR-MCNC: 134 MG/DL — HIGH (ref 70–99)
GLUCOSE BLDC GLUCOMTR-MCNC: 141 MG/DL — HIGH (ref 70–99)
GLUCOSE SERPL-MCNC: 134 MG/DL — HIGH (ref 70–99)
GLUCOSE SERPL-MCNC: 161 MG/DL — HIGH (ref 70–99)
HCO3 BLDA-SCNC: 24 MMOL/L — SIGNIFICANT CHANGE UP (ref 21–28)
HCO3 BLDA-SCNC: 25 MMOL/L — SIGNIFICANT CHANGE UP (ref 21–28)
HCT VFR BLD CALC: 34.5 % — SIGNIFICANT CHANGE UP (ref 34.5–45)
HCT VFR BLD CALC: 34.8 % — SIGNIFICANT CHANGE UP (ref 34.5–45)
HCT VFR BLD CALC: 35.2 % — SIGNIFICANT CHANGE UP (ref 34.5–45)
HGB BLD-MCNC: 10.8 G/DL — LOW (ref 11.5–15.5)
HGB BLD-MCNC: 11.5 G/DL — SIGNIFICANT CHANGE UP (ref 11.5–15.5)
HGB BLD-MCNC: 11.5 G/DL — SIGNIFICANT CHANGE UP (ref 11.5–15.5)
HGB BLDA-MCNC: 10.5 G/DL — LOW (ref 11.5–15.5)
HGB BLDA-MCNC: 9.7 G/DL — LOW (ref 11.5–15.5)
IMM GRANULOCYTES NFR BLD AUTO: 0.2 % — SIGNIFICANT CHANGE UP (ref 0–1.5)
LYMPHOCYTES # BLD AUTO: 1.91 K/UL — SIGNIFICANT CHANGE UP (ref 1–3.3)
LYMPHOCYTES # BLD AUTO: 22.2 % — SIGNIFICANT CHANGE UP (ref 13–44)
MAGNESIUM SERPL-MCNC: 2 MG/DL — SIGNIFICANT CHANGE UP (ref 1.6–2.6)
MCHC RBC-ENTMCNC: 29.4 PG — SIGNIFICANT CHANGE UP (ref 27–34)
MCHC RBC-ENTMCNC: 30.2 PG — SIGNIFICANT CHANGE UP (ref 27–34)
MCHC RBC-ENTMCNC: 30.4 PG — SIGNIFICANT CHANGE UP (ref 27–34)
MCHC RBC-ENTMCNC: 31.3 GM/DL — LOW (ref 32–36)
MCHC RBC-ENTMCNC: 32.7 GM/DL — SIGNIFICANT CHANGE UP (ref 32–36)
MCHC RBC-ENTMCNC: 33 GM/DL — SIGNIFICANT CHANGE UP (ref 32–36)
MCV RBC AUTO: 92.1 FL — SIGNIFICANT CHANGE UP (ref 80–100)
MCV RBC AUTO: 92.4 FL — SIGNIFICANT CHANGE UP (ref 80–100)
MCV RBC AUTO: 94 FL — SIGNIFICANT CHANGE UP (ref 80–100)
METHGB MFR BLDA: 0 % — SIGNIFICANT CHANGE UP
METHGB MFR BLDA: 0.3 % — SIGNIFICANT CHANGE UP
MONOCYTES # BLD AUTO: 0.81 K/UL — SIGNIFICANT CHANGE UP (ref 0–0.9)
MONOCYTES NFR BLD AUTO: 9.4 % — SIGNIFICANT CHANGE UP (ref 2–14)
NEUTROPHILS # BLD AUTO: 5.61 K/UL — SIGNIFICANT CHANGE UP (ref 1.8–7.4)
NEUTROPHILS NFR BLD AUTO: 65.1 % — SIGNIFICANT CHANGE UP (ref 43–77)
NRBC # BLD: 0 /100 WBCS — SIGNIFICANT CHANGE UP (ref 0–0)
O2 CT VFR BLDA CALC: SIGNIFICANT CHANGE UP (ref 15–23)
O2 CT VFR BLDA CALC: SIGNIFICANT CHANGE UP (ref 15–23)
OXYHGB MFR BLDA: 99 % — SIGNIFICANT CHANGE UP (ref 94–100)
OXYHGB MFR BLDA: 99 % — SIGNIFICANT CHANGE UP (ref 94–100)
PCO2 BLDA: 31 MMHG — LOW (ref 32–45)
PCO2 BLDA: 34 MMHG — SIGNIFICANT CHANGE UP (ref 32–45)
PH BLDA: 7.47 — HIGH (ref 7.35–7.45)
PH BLDA: 7.52 — HIGH (ref 7.35–7.45)
PHOSPHATE SERPL-MCNC: 4 MG/DL — SIGNIFICANT CHANGE UP (ref 2.5–4.5)
PLATELET # BLD AUTO: 163 K/UL — SIGNIFICANT CHANGE UP (ref 150–400)
PLATELET # BLD AUTO: 170 K/UL — SIGNIFICANT CHANGE UP (ref 150–400)
PLATELET # BLD AUTO: 172 K/UL — SIGNIFICANT CHANGE UP (ref 150–400)
PO2 BLDA: 319 MMHG — HIGH (ref 83–108)
PO2 BLDA: 343 MMHG — HIGH (ref 83–108)
POTASSIUM BLDA-SCNC: 3.4 MMOL/L — LOW (ref 3.5–4.9)
POTASSIUM BLDA-SCNC: 3.6 MMOL/L — SIGNIFICANT CHANGE UP (ref 3.5–4.9)
POTASSIUM SERPL-MCNC: 4.2 MMOL/L — SIGNIFICANT CHANGE UP (ref 3.5–5.3)
POTASSIUM SERPL-MCNC: 4.4 MMOL/L — SIGNIFICANT CHANGE UP (ref 3.5–5.3)
POTASSIUM SERPL-SCNC: 4.2 MMOL/L — SIGNIFICANT CHANGE UP (ref 3.5–5.3)
POTASSIUM SERPL-SCNC: 4.4 MMOL/L — SIGNIFICANT CHANGE UP (ref 3.5–5.3)
RBC # BLD: 3.67 M/UL — LOW (ref 3.8–5.2)
RBC # BLD: 3.78 M/UL — LOW (ref 3.8–5.2)
RBC # BLD: 3.81 M/UL — SIGNIFICANT CHANGE UP (ref 3.8–5.2)
RBC # FLD: 14.2 % — SIGNIFICANT CHANGE UP (ref 10.3–14.5)
RBC # FLD: 14.2 % — SIGNIFICANT CHANGE UP (ref 10.3–14.5)
RBC # FLD: 14.3 % — SIGNIFICANT CHANGE UP (ref 10.3–14.5)
SAO2 % BLDA: 100 % — SIGNIFICANT CHANGE UP (ref 95–100)
SAO2 % BLDA: 99 % — SIGNIFICANT CHANGE UP (ref 95–100)
SODIUM BLDA-SCNC: 135 MMOL/L — LOW (ref 138–146)
SODIUM BLDA-SCNC: 136 MMOL/L — LOW (ref 138–146)
SODIUM SERPL-SCNC: 140 MMOL/L — SIGNIFICANT CHANGE UP (ref 135–145)
SODIUM SERPL-SCNC: 141 MMOL/L — SIGNIFICANT CHANGE UP (ref 135–145)
WBC # BLD: 6.56 K/UL — SIGNIFICANT CHANGE UP (ref 3.8–10.5)
WBC # BLD: 6.7 K/UL — SIGNIFICANT CHANGE UP (ref 3.8–10.5)
WBC # BLD: 8.62 K/UL — SIGNIFICANT CHANGE UP (ref 3.8–10.5)
WBC # FLD AUTO: 6.56 K/UL — SIGNIFICANT CHANGE UP (ref 3.8–10.5)
WBC # FLD AUTO: 6.7 K/UL — SIGNIFICANT CHANGE UP (ref 3.8–10.5)
WBC # FLD AUTO: 8.62 K/UL — SIGNIFICANT CHANGE UP (ref 3.8–10.5)

## 2019-02-12 PROCEDURE — 36140 INTRO NDL ICATH UPR/LXTR ART: CPT | Mod: 59,GC

## 2019-02-12 PROCEDURE — 35566 ART BYP FEM-ANT-POST TIB/PRL: CPT | Mod: 82,LT,GC

## 2019-02-12 PROCEDURE — 99223 1ST HOSP IP/OBS HIGH 75: CPT

## 2019-02-12 PROCEDURE — 35302 RECHANNELING OF ARTERY: CPT | Mod: LT,59,GC

## 2019-02-12 PROCEDURE — 35371 RECHANNELING OF ARTERY: CPT | Mod: LT,59,GC

## 2019-02-12 PROCEDURE — 93010 ELECTROCARDIOGRAM REPORT: CPT

## 2019-02-12 PROCEDURE — 75710 ARTERY X-RAYS ARM/LEG: CPT | Mod: 26,59,GC

## 2019-02-12 PROCEDURE — 35566 ART BYP FEM-ANT-POST TIB/PRL: CPT | Mod: LT,GC

## 2019-02-12 RX ORDER — SENNA PLUS 8.6 MG/1
2 TABLET ORAL DAILY
Qty: 0 | Refills: 0 | Status: DISCONTINUED | OUTPATIENT
Start: 2019-02-12 | End: 2019-02-26

## 2019-02-12 RX ORDER — DOCUSATE SODIUM 100 MG
100 CAPSULE ORAL THREE TIMES A DAY
Qty: 0 | Refills: 0 | Status: DISCONTINUED | OUTPATIENT
Start: 2019-02-12 | End: 2019-02-26

## 2019-02-12 RX ORDER — HEPARIN SODIUM 5000 [USP'U]/ML
1200 INJECTION INTRAVENOUS; SUBCUTANEOUS
Qty: 25000 | Refills: 0 | Status: DISCONTINUED | OUTPATIENT
Start: 2019-02-12 | End: 2019-02-12

## 2019-02-12 RX ORDER — CLOPIDOGREL BISULFATE 75 MG/1
75 TABLET, FILM COATED ORAL DAILY
Qty: 0 | Refills: 0 | Status: DISCONTINUED | OUTPATIENT
Start: 2019-02-12 | End: 2019-02-12

## 2019-02-12 RX ORDER — ASPIRIN/CALCIUM CARB/MAGNESIUM 324 MG
81 TABLET ORAL DAILY
Qty: 0 | Refills: 0 | Status: DISCONTINUED | OUTPATIENT
Start: 2019-02-12 | End: 2019-02-26

## 2019-02-12 RX ORDER — VANCOMYCIN HCL 1 G
1000 VIAL (EA) INTRAVENOUS EVERY 12 HOURS
Qty: 0 | Refills: 0 | Status: DISCONTINUED | OUTPATIENT
Start: 2019-02-12 | End: 2019-02-14

## 2019-02-12 RX ORDER — MORPHINE SULFATE 50 MG/1
2 CAPSULE, EXTENDED RELEASE ORAL EVERY 4 HOURS
Qty: 0 | Refills: 0 | Status: DISCONTINUED | OUTPATIENT
Start: 2019-02-12 | End: 2019-02-13

## 2019-02-12 RX ORDER — METOCLOPRAMIDE HCL 10 MG
10 TABLET ORAL ONCE
Qty: 0 | Refills: 0 | Status: DISCONTINUED | OUTPATIENT
Start: 2019-02-12 | End: 2019-02-26

## 2019-02-12 RX ORDER — ACETAMINOPHEN 500 MG
650 TABLET ORAL EVERY 6 HOURS
Qty: 0 | Refills: 0 | Status: DISCONTINUED | OUTPATIENT
Start: 2019-02-12 | End: 2019-02-26

## 2019-02-12 RX ORDER — SODIUM CHLORIDE 9 MG/ML
1000 INJECTION INTRAMUSCULAR; INTRAVENOUS; SUBCUTANEOUS
Qty: 0 | Refills: 0 | Status: DISCONTINUED | OUTPATIENT
Start: 2019-02-12 | End: 2019-02-14

## 2019-02-12 RX ORDER — CIPROFLOXACIN LACTATE 400MG/40ML
500 VIAL (ML) INTRAVENOUS EVERY 12 HOURS
Qty: 0 | Refills: 0 | Status: DISCONTINUED | OUTPATIENT
Start: 2019-02-12 | End: 2019-02-20

## 2019-02-12 RX ORDER — ONDANSETRON 8 MG/1
4 TABLET, FILM COATED ORAL EVERY 6 HOURS
Qty: 0 | Refills: 0 | Status: DISCONTINUED | OUTPATIENT
Start: 2019-02-12 | End: 2019-02-26

## 2019-02-12 RX ADMIN — Medication 25 MILLIGRAM(S): at 06:29

## 2019-02-12 RX ADMIN — Medication 100 MILLIGRAM(S): at 22:51

## 2019-02-12 RX ADMIN — AMLODIPINE BESYLATE 10 MILLIGRAM(S): 2.5 TABLET ORAL at 09:29

## 2019-02-12 RX ADMIN — Medication 1 DROP(S): at 06:32

## 2019-02-12 RX ADMIN — GABAPENTIN 300 MILLIGRAM(S): 400 CAPSULE ORAL at 06:29

## 2019-02-12 RX ADMIN — HEPARIN SODIUM 0 UNIT(S)/HR: 5000 INJECTION INTRAVENOUS; SUBCUTANEOUS at 05:32

## 2019-02-12 RX ADMIN — Medication 500 MILLIGRAM(S): at 22:52

## 2019-02-12 RX ADMIN — SODIUM CHLORIDE 55 MILLILITER(S): 9 INJECTION INTRAMUSCULAR; INTRAVENOUS; SUBCUTANEOUS at 09:10

## 2019-02-12 RX ADMIN — Medication 500 MILLIGRAM(S): at 06:29

## 2019-02-12 RX ADMIN — BRIMONIDINE TARTRATE 1 DROP(S): 2 SOLUTION/ DROPS OPHTHALMIC at 06:32

## 2019-02-12 RX ADMIN — HEPARIN SODIUM 12 UNIT(S)/HR: 5000 INJECTION INTRAVENOUS; SUBCUTANEOUS at 06:41

## 2019-02-12 RX ADMIN — Medication 250 MILLIGRAM(S): at 09:09

## 2019-02-12 NOTE — PROGRESS NOTE ADULT - SUBJECTIVE AND OBJECTIVE BOX
Procedure:  Surgeon:    S: Pt has no complaints. Denies CP, SOB, ISRAEL, calf tenderness. Pain controlled with medication.    O:  T(C): 36.2 (02-12-19 @ 19:00), Max: 36.2 (02-12-19 @ 19:00)  T(F): 97.2 (02-12-19 @ 19:00), Max: 97.2 (02-12-19 @ 19:00)  HR: 52 (02-12-19 @ 20:00) (50 - 62)  BP: 129/55 (02-12-19 @ 20:00) (80/46 - 140/56)  RR: 14 (02-12-19 @ 20:00) (8 - 14)  SpO2: 96% (02-12-19 @ 20:00) (95% - 100%)  Wt(kg): --                        10.8   8.62  )-----------( 163      ( 12 Feb 2019 18:14 )             34.5     02-12    140  |  106  |  14  ----------------------------<  161<H>  4.4   |  27  |  0.75    Ca    9.4      12 Feb 2019 18:14  Phos  4.0     02-12  Mg     2.0     02-12    TPro  7.9  /  Alb  4.0  /  TBili  0.3  /  DBili  x   /  AST  34  /  ALT  25  /  AlkPhos  108  02-11      Gen: NAD, resting comfortably in bed  C/V: NSR  Pulm: Nonlabored breathing, no respiratory distress  Abd: soft, NT/ND Incision:  Extrem: WWP, no calf edema, SCDs in place      A/P: 84yFemale s/p above procedure  Diet:  IVF:  Pain/nausea control  DVT ppx:  Dispo plan: Procedure: left femoral endarterectomy with patch angioplasty, SFA-DP bypass with rGSV and completion angio   Surgeon: Dr. Castellanos    S: Pt has no complaints. Denies CP, SOB, dizziness, nausea or vomiting. Pain controlled with medication.    O:  T(C): 36.2 (02-12-19 @ 19:00), Max: 36.2 (02-12-19 @ 19:00)  T(F): 97.2 (02-12-19 @ 19:00), Max: 97.2 (02-12-19 @ 19:00)  HR: 52 (02-12-19 @ 20:00) (50 - 62)  BP: 129/55 (02-12-19 @ 20:00) (80/46 - 140/56)  RR: 14 (02-12-19 @ 20:00) (8 - 14)  SpO2: 96% (02-12-19 @ 20:00) (95% - 100%)  Wt(kg): --                        10.8   8.62  )-----------( 163      ( 12 Feb 2019 18:14 )             34.5     02-12    140  |  106  |  14  ----------------------------<  161<H>  4.4   |  27  |  0.75    Ca    9.4      12 Feb 2019 18:14  Phos  4.0     02-12  Mg     2.0     02-12    TPro  7.9  /  Alb  4.0  /  TBili  0.3  /  DBili  x   /  AST  34  /  ALT  25  /  AlkPhos  108  02-11      Gen: NAD, resting comfortably in bed  C/V: NSR  Pulm: Nonlabored breathing, no respiratory distress  Extrem: LLE incision with dressing in place, dressing intact with some areas of blood stains, ISHAN drain present,   Vascular: Palpable DP pulse      A/P: 84yFemale s/p above procedure  Diet: CLD  IVF: NS@65ml/hr  Pain/nausea control  Home medication as appropriate  Vanc/Zosyn  F/u AM labs

## 2019-02-12 NOTE — BRIEF OPERATIVE NOTE - PROCEDURE
<<-----Click on this checkbox to enter Procedure Vascular surgery procedure  02/12/2019  left femoral endarterectomy with patch angioplasty, SFA-DP bypass with rGSV and completion angio  Active  MVISMER

## 2019-02-12 NOTE — CONSULT NOTE ADULT - SUBJECTIVE AND OBJECTIVE BOX
CHIEF COMPLAINT:  Preop evaluation. Fatigue with walking  HISTORY OF PRESENT ILLNESS:  83yoF with sig PMHx of CAD, lateral wall MI 1999 s/p 7 stents, dm, htn, ckd, RLE DVT, PE. early dementia presents with left great toe ulcer and pain.    Patient has had ulcers for years.  s/p right leg bypass and R great toe amputation, now healed, presents with a nonhealing L great toe ulcer, which has been previously debrided. 11/13 Aniogram shows patent aorta and bilateral iliac vessels. Patent L CFA and profunda. L SFA occluded at the origin. Extensive collateralization throughout leg with no named vessels until DP reconstitutes distally. Patient notes that she has been having increased claudication symptoms in the left leg with walking. Patient denies CP/N/V.    The patient's nuclear stress test six months ago showed inferior ischemia. She had a stent placed in the distal right coronary artery in November of 2018.The patient walks a short distance outdoors and feels fatigued. She has not had chest pain or dyspnea. There has been no dizziness or palpitations.    Chart, PMH, medication and allergies reviewed  PAST MEDICAL & SURGICAL HISTORY:  MRSA infection: right great toe ( amputated)  Glaucoma: b/l  Falls  Depression  Pulmonary embolism  DVT (deep venous thrombosis): Right lower extremity  MI (myocardial infarction): at age 65 y.o  Peripheral vascular disease  Ulcer of lower limb  Arthropathy  Ischemic heart disease  Hypertension  Hyperlipidemia  Diabetes mellitus: II  S/P amputation: may 2018; right great toe  History of surgery: x7 coronary artery stents  History of surgery: right leg bypass 5/2018 with amputation of right great toe      [ x ] Diabetes   [ x ] Hypertension  [ x ] Hyperlipidemia  [ x ] CAD  [ x ] PCI  [  ] CABG    PREVIOUS DIAGNOSTIC TESTING:    [ x ] Echocardiogram:  [x  ]  Catheterization:  [ x ] Stress Test:  	    MEDICATIONS:  MEDICATIONS  (STANDING):  amLODIPine   Tablet 10 milliGRAM(s) Oral daily  aspirin enteric coated 81 milliGRAM(s) Oral daily  atorvastatin 40 milliGRAM(s) Oral at bedtime  brimonidine 0.2% Ophthalmic Solution 1 Drop(s) Both EYES three times a day  ciprofloxacin     Tablet 500 milliGRAM(s) Oral every 12 hours  clopidogrel Tablet 75 milliGRAM(s) Oral daily  dextrose 5%. 1000 milliLiter(s) (50 mL/Hr) IV Continuous <Continuous>  dextrose 50% Injectable 12.5 Gram(s) IV Push once  gabapentin 300 milliGRAM(s) Oral three times a day  heparin  Infusion 1200 Unit(s)/Hr (12 mL/Hr) IV Continuous <Continuous>  hydrALAZINE 25 milliGRAM(s) Oral two times a day  insulin lispro (HumaLOG) corrective regimen sliding scale   SubCutaneous Before meals and at bedtime  latanoprost 0.005% Ophthalmic Solution 1 Drop(s) Both EYES at bedtime  metoprolol succinate ER 25 milliGRAM(s) Oral daily  sertraline 50 milliGRAM(s) Oral daily  sodium chloride 0.9%. 1000 milliLiter(s) (55 mL/Hr) IV Continuous <Continuous>  timolol 0.5% Solution 1 Drop(s) Both EYES two times a day  vancomycin  IVPB 1000 milliGRAM(s) IV Intermittent every 12 hours      FAMILY HISTORY:  No pertinent family history in first degree relatives      SOCIAL HISTORY:    [  ] Never smoker  [x  ] Non-smoker  [  ] Smoker  [  ] Social alcohol use  [  ] Former smoker    Allergies    Goldbond (Unknown)  penicillins (Anaphylaxis)    Intolerances    	    REVIEW OF SYSTEMS:  CONSTITUTIONAL: No fever, weight loss, or fatigue  EYES: No eye pain, visual disturbances, or discharge  ENT:  No difficulty hearing, tinnitus, vertigo; No sinus or throat pain  NECK: No pain or stiffness  PULMONARY: No cough, no wheezing, chills or hemoptysis; No Shortness of Breath  CARDIOVASCULAR: No chest pain, no  palpitations, no passing out, dizziness, no leg swelling  GASTROINTESTINAL: No abdominal  pain. No nausea, vomiting, or hematemesis; No diarrhea or constipation. No melena or hematochezia.  GENITOURINARY: No dysuria, frequency, hematuria, or incontinence  NEUROLOGICAL: No headaches, memory loss, loss of strength, numbness, or tremors  SKIN: No itching, burning, rashes, or lesions , bilateral toe amputations  LYMPH Nodes: No enlarged glands  ENDOCRINE: No heat or cold intolerance; No hair loss  MUSCULOSKELETAL: No joint pain or swelling; No muscle, back, or extremity pain  PSYCHIATRIC: No depression, anxiety, mood swings, or difficulty sleeping  HEME/LYMPH: No easy bruising, or bleeding gums  ALLERGY AND IMMUNOLOGIC: No hives or eczema	    PHYSICAL EXAM:  T(C): 36.9 (02-12-19 @ 06:23), Max: 37.2 (02-11-19 @ 13:55)  HR: 66 (02-12-19 @ 06:19) (66 - 84)  BP: 138/63 (02-12-19 @ 06:19) (134/86 - 155/64)  RR: 18 (02-12-19 @ 06:19) (16 - 18)  SpO2: 97% (02-12-19 @ 06:19) (97% - 99%)  Wt(kg): --  I&O's Summary      Appearance: Normal	  HEENT:   Normal oral mucosa, PERRL, EOMI	  Lymphatic: No lymphadenopathy  Cardiovascular: Normal S1 S2, No JVD, No murmur, No edema  Pulmonary: Lungs clear to auscultation	  Psychiatry: A & O x 3, Mood & affect appropriate  Gastrointestinal:  Soft, Non-tender, + BS	  Skin: No rashes, No ecchymoses, No cyanosis, bilateral toe amputations	  Neurologic: Non-focal  Extremities: Normal range of motion, No clubbing or cyanosis  	 	  CARDIAC MARKERS:                                11.5   6.70  )-----------( 172      ( 12 Feb 2019 04:38 )             35.2     02-12    141  |  103  |  19  ----------------------------<  134<H>  4.2   |  27  |  0.92    Ca    9.2      12 Feb 2019 04:38  Phos  4.0     02-12  Mg     2.0     02-12    TPro  7.9  /  Alb  4.0  /  TBili  0.3  /  DBili  x   /  AST  34  /  ALT  25  /  AlkPhos  108  02-11    proBNP:  Lipid Profile:  HgA1c:  TSH:  PT/INR - ( 11 Feb 2019 13:07 )   PT: 11.9 sec;   INR: 1.05          PTT - ( 12 Feb 2019 04:38 )  PTT:187.3 sec  TELEMETRY: 	NSR    ECG:  NSR LAHB	  RADIOLOGY:	cxr no chf    ASSESSMENT/PLAN: 	  Coronary artery disease-the patient is pain free. She has fatigue with exertion but no clear dyspnea. The patient was fully revascularized in November. Her EKG shows only a left anterior hemiblock. The patient will continue aspirin and Plavix. The patient's cardiac surgical risk is increased by her age coronary disease and poor functional capacity. Her RCRI score is one. Her surgery is not high risk. The patient is not high risk. She is optimized for surgery.    Peripheral vascular disease- for bypass.    Hypertension-blood pressure is labile but acceptable.     Diabetes-follow blood glucose.

## 2019-02-12 NOTE — PACU DISCHARGE NOTE - COMMENTS
Pt discharged from PACU after meeting criteria. Report given to 5U RN Cassandra. VSS. RRWNL on room air. Cardiac monitor showing SB/ SR. +doppler pulses of lower extremities. Dressing to LLE surgical site blood stained.,vascular team aware. Right EJ 16g with NS at 65cc/h Left HLx2 both 22g.Abd obese tolerated meds, sips of water and jello. Vivar 16fr insitu to bedside drainage. Pt t/f on bed with IVF in progress. [place] taken out, octavio pressure held and dry dsg placed.

## 2019-02-12 NOTE — PROGRESS NOTE ADULT - ASSESSMENT
Assessment/Plan;  85 yo female s/p R leg bypass and right great toe amputation presenting with non-healing ulcer of the L great toe. Pain of the left foot with claudication pain, presenting for LLE bypass today      NPO/IVF for LLE bypass today  Home medications  ASA/Plavix  Discontinue Heparin gtt for OR today  Will hold multiple Anti-HTN meds  Hold Eliquis  ISS

## 2019-02-12 NOTE — PROGRESS NOTE ADULT - SUBJECTIVE AND OBJECTIVE BOX
24hr Events:  O/N: NPO/IVF, pre-oped, consented, 4am PTT  187, heparin held 1hr, restarted at 12ml/hr from 15ml/hr  2/11: Started on Vanc and Cipro,         Assessment/Plan;  85 yo female s/p R leg bypass and right great toe amputation presenting with non-healing ulcer of the L great toe. Pain of the left foot with claudication pain. Now presenting for LLE bypass.      NPO/IVF for LLE bypass tomorrow  Home medications  ASA/Plavix  Heparin gtt  Will hold multiple Anti-HTN meds  Hold Eliquis  ISS 24hr Events:  O/N: NPO/IVF, pre-oped, consented, 4am PTT  187, heparin held 1hr, restarted at 12ml/hr from 15ml/hr  2/11: Started on Vanc and Cipro,     SUBJECTIVE: Pain is well controlled, NPO for procedure today. Heparin discontinued this morning for procedure today.    amLODIPine   Tablet 10 milliGRAM(s) Oral daily  aspirin enteric coated 81 milliGRAM(s) Oral daily  ciprofloxacin     Tablet 500 milliGRAM(s) Oral every 12 hours  clopidogrel Tablet 75 milliGRAM(s) Oral daily  hydrALAZINE 25 milliGRAM(s) Oral two times a day  metoprolol succinate ER 25 milliGRAM(s) Oral daily  vancomycin  IVPB 1000 milliGRAM(s) IV Intermittent every 12 hours      Vital Signs Last 24 Hrs  T(C): 36.9 (12 Feb 2019 06:23), Max: 37.2 (11 Feb 2019 13:55)  T(F): 98.4 (12 Feb 2019 06:23), Max: 98.9 (11 Feb 2019 13:55)  HR: 66 (12 Feb 2019 06:19) (66 - 84)  BP: 138/63 (12 Feb 2019 06:19) (134/86 - 155/64)  BP(mean): 91 (12 Feb 2019 06:19) (91 - 98)  RR: 18 (12 Feb 2019 06:19) (16 - 18)  SpO2: 97% (12 Feb 2019 06:19) (97% - 99%)    General: NAD, resting comfortably in bed  Pulm: Nonlabored breathing, no respiratory distress  Abd: soft, NT/ND.  Extremities: Left great toe with non-healing dry gangrene at site of amputation  Pulses: Right DP/PT Biphasic. Left DP biphasic, mono vs. no signal in PT      LABS:                        11.5   6.70  )-----------( 172      ( 12 Feb 2019 04:38 )             35.2     02-12    141  |  103  |  19  ----------------------------<  134<H>  4.2   |  27  |  0.92    Ca    9.2      12 Feb 2019 04:38  Phos  4.0     02-12  Mg     2.0     02-12    TPro  7.9  /  Alb  4.0  /  TBili  0.3  /  DBili  x   /  AST  34  /  ALT  25  /  AlkPhos  108  02-11    PT/INR - ( 11 Feb 2019 13:07 )   PT: 11.9 sec;   INR: 1.05          PTT - ( 12 Feb 2019 04:38 )  PTT:187.3 sec

## 2019-02-13 LAB
ALBUMIN SERPL ELPH-MCNC: 3.3 G/DL — SIGNIFICANT CHANGE UP (ref 3.3–5)
ALP SERPL-CCNC: 77 U/L — SIGNIFICANT CHANGE UP (ref 40–120)
ALT FLD-CCNC: 16 U/L — SIGNIFICANT CHANGE UP (ref 10–45)
ANION GAP SERPL CALC-SCNC: 9 MMOL/L — SIGNIFICANT CHANGE UP (ref 5–17)
ANION GAP SERPL CALC-SCNC: 9 MMOL/L — SIGNIFICANT CHANGE UP (ref 5–17)
APTT BLD: 124.4 SEC — CRITICAL HIGH (ref 27.5–36.3)
AST SERPL-CCNC: 14 U/L — SIGNIFICANT CHANGE UP (ref 10–40)
BILIRUB SERPL-MCNC: 0.4 MG/DL — SIGNIFICANT CHANGE UP (ref 0.2–1.2)
BUN SERPL-MCNC: 14 MG/DL — SIGNIFICANT CHANGE UP (ref 7–23)
BUN SERPL-MCNC: 15 MG/DL — SIGNIFICANT CHANGE UP (ref 7–23)
CALCIUM SERPL-MCNC: 9 MG/DL — SIGNIFICANT CHANGE UP (ref 8.4–10.5)
CALCIUM SERPL-MCNC: 9.1 MG/DL — SIGNIFICANT CHANGE UP (ref 8.4–10.5)
CHLORIDE SERPL-SCNC: 103 MMOL/L — SIGNIFICANT CHANGE UP (ref 96–108)
CHLORIDE SERPL-SCNC: 107 MMOL/L — SIGNIFICANT CHANGE UP (ref 96–108)
CO2 SERPL-SCNC: 25 MMOL/L — SIGNIFICANT CHANGE UP (ref 22–31)
CO2 SERPL-SCNC: 26 MMOL/L — SIGNIFICANT CHANGE UP (ref 22–31)
CREAT SERPL-MCNC: 0.8 MG/DL — SIGNIFICANT CHANGE UP (ref 0.5–1.3)
CREAT SERPL-MCNC: 0.8 MG/DL — SIGNIFICANT CHANGE UP (ref 0.5–1.3)
EXTRA BLUE TOP TUBE: SIGNIFICANT CHANGE UP
GLUCOSE BLDC GLUCOMTR-MCNC: 109 MG/DL — HIGH (ref 70–99)
GLUCOSE BLDC GLUCOMTR-MCNC: 150 MG/DL — HIGH (ref 70–99)
GLUCOSE BLDC GLUCOMTR-MCNC: 180 MG/DL — HIGH (ref 70–99)
GLUCOSE BLDC GLUCOMTR-MCNC: 183 MG/DL — HIGH (ref 70–99)
GLUCOSE SERPL-MCNC: 144 MG/DL — HIGH (ref 70–99)
GLUCOSE SERPL-MCNC: 159 MG/DL — HIGH (ref 70–99)
HCT VFR BLD CALC: 31.3 % — LOW (ref 34.5–45)
HCT VFR BLD CALC: 33.4 % — LOW (ref 34.5–45)
HGB BLD-MCNC: 10.1 G/DL — LOW (ref 11.5–15.5)
HGB BLD-MCNC: 10.2 G/DL — LOW (ref 11.5–15.5)
LACTATE SERPL-SCNC: 2.1 MMOL/L — HIGH (ref 0.5–2)
MAGNESIUM SERPL-MCNC: 1.6 MG/DL — SIGNIFICANT CHANGE UP (ref 1.6–2.6)
MAGNESIUM SERPL-MCNC: 1.7 MG/DL — SIGNIFICANT CHANGE UP (ref 1.6–2.6)
MCHC RBC-ENTMCNC: 29.2 PG — SIGNIFICANT CHANGE UP (ref 27–34)
MCHC RBC-ENTMCNC: 30.3 PG — SIGNIFICANT CHANGE UP (ref 27–34)
MCHC RBC-ENTMCNC: 30.5 GM/DL — LOW (ref 32–36)
MCHC RBC-ENTMCNC: 32.3 GM/DL — SIGNIFICANT CHANGE UP (ref 32–36)
MCV RBC AUTO: 94 FL — SIGNIFICANT CHANGE UP (ref 80–100)
MCV RBC AUTO: 95.7 FL — SIGNIFICANT CHANGE UP (ref 80–100)
NRBC # BLD: 0 /100 WBCS — SIGNIFICANT CHANGE UP (ref 0–0)
NRBC # BLD: 0 /100 WBCS — SIGNIFICANT CHANGE UP (ref 0–0)
PA ADP PRP-ACNC: 317 PRU — SIGNIFICANT CHANGE UP (ref 194–418)
PA ADP PRP-ACNC: 327 PRU — SIGNIFICANT CHANGE UP (ref 194–418)
PHOSPHATE SERPL-MCNC: 4.3 MG/DL — SIGNIFICANT CHANGE UP (ref 2.5–4.5)
PHOSPHATE SERPL-MCNC: 4.5 MG/DL — SIGNIFICANT CHANGE UP (ref 2.5–4.5)
PLATELET # BLD AUTO: 149 K/UL — LOW (ref 150–400)
PLATELET # BLD AUTO: 153 K/UL — SIGNIFICANT CHANGE UP (ref 150–400)
PLATELET RESPONSE ASPIRIN RESULT: 410 ARU — SIGNIFICANT CHANGE UP (ref 350–700)
POTASSIUM SERPL-MCNC: 4.4 MMOL/L — SIGNIFICANT CHANGE UP (ref 3.5–5.3)
POTASSIUM SERPL-MCNC: 4.5 MMOL/L — SIGNIFICANT CHANGE UP (ref 3.5–5.3)
POTASSIUM SERPL-SCNC: 4.4 MMOL/L — SIGNIFICANT CHANGE UP (ref 3.5–5.3)
POTASSIUM SERPL-SCNC: 4.5 MMOL/L — SIGNIFICANT CHANGE UP (ref 3.5–5.3)
PROT SERPL-MCNC: 5.7 G/DL — LOW (ref 6–8.3)
RBC # BLD: 3.33 M/UL — LOW (ref 3.8–5.2)
RBC # BLD: 3.49 M/UL — LOW (ref 3.8–5.2)
RBC # FLD: 14 % — SIGNIFICANT CHANGE UP (ref 10.3–14.5)
RBC # FLD: 14.3 % — SIGNIFICANT CHANGE UP (ref 10.3–14.5)
SODIUM SERPL-SCNC: 138 MMOL/L — SIGNIFICANT CHANGE UP (ref 135–145)
SODIUM SERPL-SCNC: 141 MMOL/L — SIGNIFICANT CHANGE UP (ref 135–145)
WBC # BLD: 8.6 K/UL — SIGNIFICANT CHANGE UP (ref 3.8–10.5)
WBC # BLD: 8.62 K/UL — SIGNIFICANT CHANGE UP (ref 3.8–10.5)
WBC # FLD AUTO: 8.6 K/UL — SIGNIFICANT CHANGE UP (ref 3.8–10.5)
WBC # FLD AUTO: 8.62 K/UL — SIGNIFICANT CHANGE UP (ref 3.8–10.5)

## 2019-02-13 PROCEDURE — 70496 CT ANGIOGRAPHY HEAD: CPT | Mod: 26

## 2019-02-13 PROCEDURE — 99223 1ST HOSP IP/OBS HIGH 75: CPT

## 2019-02-13 PROCEDURE — 76377 3D RENDER W/INTRP POSTPROCES: CPT | Mod: 26

## 2019-02-13 PROCEDURE — 70450 CT HEAD/BRAIN W/O DYE: CPT | Mod: 26

## 2019-02-13 PROCEDURE — 99291 CRITICAL CARE FIRST HOUR: CPT | Mod: 24

## 2019-02-13 PROCEDURE — 99232 SBSQ HOSP IP/OBS MODERATE 35: CPT

## 2019-02-13 PROCEDURE — 0042T: CPT

## 2019-02-13 PROCEDURE — 70450 CT HEAD/BRAIN W/O DYE: CPT | Mod: 26,59

## 2019-02-13 PROCEDURE — 37215 TRANSCATH STENT CCA W/EPS: CPT | Mod: RT

## 2019-02-13 PROCEDURE — 70498 CT ANGIOGRAPHY NECK: CPT | Mod: 26

## 2019-02-13 RX ORDER — CLOPIDOGREL BISULFATE 75 MG/1
150 TABLET, FILM COATED ORAL ONCE
Qty: 0 | Refills: 0 | Status: COMPLETED | OUTPATIENT
Start: 2019-02-13 | End: 2019-02-13

## 2019-02-13 RX ORDER — DEXTROSE 50 % IN WATER 50 %
25 SYRINGE (ML) INTRAVENOUS ONCE
Qty: 0 | Refills: 0 | Status: DISCONTINUED | OUTPATIENT
Start: 2019-02-13 | End: 2019-02-26

## 2019-02-13 RX ORDER — SODIUM CHLORIDE 9 MG/ML
500 INJECTION INTRAMUSCULAR; INTRAVENOUS; SUBCUTANEOUS ONCE
Qty: 0 | Refills: 0 | Status: COMPLETED | OUTPATIENT
Start: 2019-02-13 | End: 2019-02-13

## 2019-02-13 RX ORDER — HEPARIN SODIUM 5000 [USP'U]/ML
INJECTION INTRAVENOUS; SUBCUTANEOUS
Qty: 25000 | Refills: 0 | Status: DISCONTINUED | OUTPATIENT
Start: 2019-02-13 | End: 2019-02-13

## 2019-02-13 RX ORDER — MAGNESIUM SULFATE 500 MG/ML
2 VIAL (ML) INJECTION ONCE
Qty: 0 | Refills: 0 | Status: COMPLETED | OUTPATIENT
Start: 2019-02-13 | End: 2019-02-13

## 2019-02-13 RX ORDER — HEPARIN SODIUM 5000 [USP'U]/ML
8 INJECTION INTRAVENOUS; SUBCUTANEOUS
Qty: 25000 | Refills: 0 | Status: DISCONTINUED | OUTPATIENT
Start: 2019-02-13 | End: 2019-02-14

## 2019-02-13 RX ORDER — SODIUM CHLORIDE 9 MG/ML
1000 INJECTION, SOLUTION INTRAVENOUS
Qty: 0 | Refills: 0 | Status: DISCONTINUED | OUTPATIENT
Start: 2019-02-13 | End: 2019-02-26

## 2019-02-13 RX ORDER — INSULIN LISPRO 100/ML
VIAL (ML) SUBCUTANEOUS
Qty: 0 | Refills: 0 | Status: DISCONTINUED | OUTPATIENT
Start: 2019-02-13 | End: 2019-02-26

## 2019-02-13 RX ORDER — GLUCAGON INJECTION, SOLUTION 0.5 MG/.1ML
1 INJECTION, SOLUTION SUBCUTANEOUS ONCE
Qty: 0 | Refills: 0 | Status: DISCONTINUED | OUTPATIENT
Start: 2019-02-13 | End: 2019-02-26

## 2019-02-13 RX ORDER — DEXTROSE 50 % IN WATER 50 %
15 SYRINGE (ML) INTRAVENOUS ONCE
Qty: 0 | Refills: 0 | Status: DISCONTINUED | OUTPATIENT
Start: 2019-02-13 | End: 2019-02-26

## 2019-02-13 RX ORDER — HEPARIN SODIUM 5000 [USP'U]/ML
1000 INJECTION INTRAVENOUS; SUBCUTANEOUS
Qty: 25000 | Refills: 0 | Status: DISCONTINUED | OUTPATIENT
Start: 2019-02-13 | End: 2019-02-13

## 2019-02-13 RX ORDER — DEXTROSE 50 % IN WATER 50 %
12.5 SYRINGE (ML) INTRAVENOUS ONCE
Qty: 0 | Refills: 0 | Status: DISCONTINUED | OUTPATIENT
Start: 2019-02-13 | End: 2019-02-26

## 2019-02-13 RX ADMIN — MORPHINE SULFATE 2 MILLIGRAM(S): 50 CAPSULE, EXTENDED RELEASE ORAL at 00:26

## 2019-02-13 RX ADMIN — MORPHINE SULFATE 2 MILLIGRAM(S): 50 CAPSULE, EXTENDED RELEASE ORAL at 04:52

## 2019-02-13 RX ADMIN — Medication 500 MILLIGRAM(S): at 18:39

## 2019-02-13 RX ADMIN — Medication 250 MILLIGRAM(S): at 01:25

## 2019-02-13 RX ADMIN — Medication 650 MILLIGRAM(S): at 07:56

## 2019-02-13 RX ADMIN — SODIUM CHLORIDE 2000 MILLILITER(S): 9 INJECTION INTRAMUSCULAR; INTRAVENOUS; SUBCUTANEOUS at 14:41

## 2019-02-13 RX ADMIN — Medication 100 MILLIGRAM(S): at 22:57

## 2019-02-13 RX ADMIN — Medication 50 GRAM(S): at 16:31

## 2019-02-13 RX ADMIN — Medication 81 MILLIGRAM(S): at 18:38

## 2019-02-13 RX ADMIN — Medication 100 MILLIGRAM(S): at 06:17

## 2019-02-13 RX ADMIN — Medication 2: at 18:38

## 2019-02-13 RX ADMIN — Medication 500 MILLIGRAM(S): at 06:17

## 2019-02-13 RX ADMIN — MORPHINE SULFATE 2 MILLIGRAM(S): 50 CAPSULE, EXTENDED RELEASE ORAL at 04:27

## 2019-02-13 RX ADMIN — Medication 250 MILLIGRAM(S): at 16:15

## 2019-02-13 RX ADMIN — MORPHINE SULFATE 2 MILLIGRAM(S): 50 CAPSULE, EXTENDED RELEASE ORAL at 00:58

## 2019-02-13 RX ADMIN — CLOPIDOGREL BISULFATE 150 MILLIGRAM(S): 75 TABLET, FILM COATED ORAL at 18:38

## 2019-02-13 NOTE — PROGRESS NOTE ADULT - SUBJECTIVE AND OBJECTIVE BOX
24hr Events:  O/N: POC WNL, post-op labs and EKG WNL  2/12: Optimized per Dr. Junior, Hep DC'd, OR for fem distal bypass to DP w/common femoral endarterectomy and patch, done with reverse saphenous vein.Restarted asa, holding plavix and SQH        Assessment/Plan;  85 yo female s/p R leg bypass and right great toe amputation presenting with non-healing ulcer of the L great toe. Pain of the left foot with claudication pain, presenting for LLE bypass today      Clear liquid diet  Home medications  ASA/Plavix  Home medication as appropriate  Hold Eliquis  ISS   F/u AM labs  F/u 12pm vanc trough 24hr Events:  O/N: POC WNL, post-op labs and EKG WNL  2/12: Optimized per Dr. Junior, Ochoa DC'd, OR for fem distal bypass to DP w/common femoral endarterectomy and patch, done with reverse saphenous vein.Restarted asa, holding plavix and SQH    S. No complaints.    ciprofloxacin     Tablet 500  vancomycin  IVPB 1000  aspirin enteric coated 81  ciprofloxacin     Tablet 500  vancomycin  IVPB 1000      Allergies    Goldbond (Unknown)  penicillins (Anaphylaxis)    Intolerances        Vital Signs Last 24 Hrs  T(C): 35.9 (12 Feb 2019 23:00), Max: 36.2 (12 Feb 2019 19:00)  T(F): 96.7 (12 Feb 2019 23:00), Max: 97.2 (12 Feb 2019 19:00)  HR: 68 (13 Feb 2019 04:25) (50 - 71)  BP: 143/65 (13 Feb 2019 04:25) (80/46 - 150/62)  BP(mean): 84 (12 Feb 2019 23:00) (61 - 91)  RR: 16 (13 Feb 2019 04:25) (8 - 18)  SpO2: 98% (13 Feb 2019 04:25) (94% - 100%)    Physical Exam:  General: Awake, alert, NAD   Pulmonary:  Cardiovascular:  Abdominal:  Extremities: Left leg wounds - groin to lower leg - cdi. ISHAN 45cc ON. DP 2+ puls  Pulses:   Right:                                                                           Left:  FEM [ ]2+ [ ]1+ [ ] doppler                                            FEM [ ]2+ [ ]1+ [ ] doppler    POP [ ]2+ [ ]1+ [ ] doppler                                            POP [ ]2+ [ ]1+ [ ] doppler    DP [ ]2+ [ ]1+ [ ] doppler                                               DP [ ]2+ [ ]1+ [ ] doppler  PT[ ]2+ [ ]1+ [ ] doppler                                                 PT [ ]2+ [ ]1+ [ ] doppler      LABS:                        10.8   8.62  )-----------( 163      ( 12 Feb 2019 18:14 )             34.5     02-12    140  |  106  |  14  ----------------------------<  161<H>  4.4   |  27  |  0.75    Ca    9.4      12 Feb 2019 18:14  Phos  4.0     02-12  Mg     2.0     02-12    TPro  7.9  /  Alb  4.0  /  TBili  0.3  /  DBili  x   /  AST  34  /  ALT  25  /  AlkPhos  108  02-11    PT/INR - ( 11 Feb 2019 13:07 )   PT: 11.9 sec;   INR: 1.05          PTT - ( 12 Feb 2019 04:38 )  PTT:187.3 sec      RADIOLOGY & ADDITIONAL TESTS:      Assessment/Plan;  85 yo female s/p R leg bypass and right great toe amputation presenting with non-healing ulcer of the L great toe. Pain of the left foot with claudication pain, presenting for LLE bypass today      DM diet.  f/u AM labs if stable - resume SQH and plavix.   d/c serrato cath.  Bed rest today.  Home medications  ASA/Plavix  Home medication as appropriate  Hold Eliquis  ISS   F/u AM labs  F/u 12pm vanc trough

## 2019-02-13 NOTE — PROVIDER CONTACT NOTE (CHANGE IN STATUS NOTIFICATION) - ASSESSMENT
Patient previously alert and oriented, normal speech now presents with change in mental status, garbled speech and left sided weakness.

## 2019-02-13 NOTE — PROVIDER CONTACT NOTE (CHANGE IN STATUS NOTIFICATION) - ACTION/TREATMENT ORDERED:
Patient taken to CT on monitor with neurology PA and Vascular PA. Blood work drawn as per orders and sent to lab.

## 2019-02-13 NOTE — PROGRESS NOTE ADULT - SUBJECTIVE AND OBJECTIVE BOX
INTERVAL HISTORY:  Positional left leg pain. No chest pain or dyspnea.	  Chart, PMH medications and allergies reviewed    MEDICATIONS:  MEDICATIONS  (STANDING):  aspirin enteric coated 81 milliGRAM(s) Oral daily  ciprofloxacin     Tablet 500 milliGRAM(s) Oral every 12 hours  docusate sodium 100 milliGRAM(s) Oral three times a day  sodium chloride 0.9%. 1000 milliLiter(s) (65 mL/Hr) IV Continuous <Continuous>  vancomycin  IVPB 1000 milliGRAM(s) IV Intermittent every 12 hours      REVIEW OF SYSTEMS:  CONSTITUTIONAL: No fever, no weight loss, no fatigue  PULMONARY: No cough, no wheezing, chills no hemoptysis; No Shortness of Breath  CARDIOVASCULAR: No chest pain, no palpitations, no syncope, dizziness, no leg swelling  GASTROINTESTINAL: No abdominal pain. No nausea, no  vomiting, no hematemesis; No diarrhea, no constipation. No melena, no hematochezia.  GENITOURINARY: No dysuria, no frequency, no hematuria, no incontinence  SKIN: No itching, no burning, no rashes, no lesions     PHYSICAL EXAM:  T(C): 35.9 (02-12-19 @ 23:00), Max: 36.2 (02-12-19 @ 19:00)  HR: 68 (02-13-19 @ 04:25) (50 - 71)  BP: 143/65 (02-13-19 @ 04:25) (80/46 - 150/62)  RR: 16 (02-13-19 @ 04:25) (8 - 18)  SpO2: 98% (02-13-19 @ 04:25) (94% - 100%)  Wt(kg): --  I&O's Summary    12 Feb 2019 07:01  -  13 Feb 2019 07:00  --------------------------------------------------------  IN: 2905 mL / OUT: 715 mL / NET: 2190 mL        Appearance:  No deformities, normal appearance	  HEENT:   Normal oral mucosa, PERRL, EOMI	  Neck: No jvd , no masses  Lymphatic: No  lymphadenopathy  Pulmonary: Lungs clear to auscultation and percussion  Cardiovascular: Normal S1 S2, No JVD, No murmur, No edema	  Abdomen:  Soft, Non-tender, + BS  Skin: No rashes, No ecchymoses	  Extremities:  No clubbing or cyanosis, left warm, bandaged  MSK: Normal range of motion, no joint swelling    LABS:		  CARDIAC MARKERS:                         10.2   8.62  )-----------( 149      ( 13 Feb 2019 07:17 )             33.4   02-12    140  |  106  |  14  ----------------------------<  161<H>  4.4   |  27  |  0.75    Ca    9.4      12 Feb 2019 18:14  Phos  4.0     02-12  Mg     2.0     02-12    TPro  7.9  /  Alb  4.0  /  TBili  0.3  /  DBili  x   /  AST  34  /  ALT  25  /  AlkPhos  108  02-11    proBNP:  Lipid Profile:  HgA1c:  TSH:  PT/INR - ( 11 Feb 2019 13:07 )   PT: 11.9 sec;   INR: 1.05          PTT - ( 12 Feb 2019 04:38 )  PTT:187.3 sec    Culture - Blood (collected 02-11-19 @ 14:49)  Source: .Blood Blood  Preliminary Report (02-12-19 @ 15:00):    No growth at 1 day.    Culture - Blood (collected 02-11-19 @ 14:49)  Source: .Blood Blood  Preliminary Report (02-12-19 @ 15:00):    No growth at 1 day.      TELEMETRY: NSR	           ECG:  	            RADIOLOGY:   DIAGNOSTIC TESTING: [ ] Echocardiogram: [ ]  Catheterization: [ ] Stress Test:      ASSESSMENT/PLAN: 	  Coronary artery disease-The patient is chest pain free post procedure. She will stay in bed today. Even in view of the Resolute stent please restart Plavix.    Peripheral vascular disease- S/P bypass. Her left lower extremity is warm.    Hypertension-blood pressure is labile but acceptable.     Diabetes-follow blood glucose.

## 2019-02-13 NOTE — PROGRESS NOTE ADULT - SUBJECTIVE AND OBJECTIVE BOX
Post op Note    Dx: Rt ICA stent    84y    Female   s/p fem angio and balloon assisted right ICA stent placement after stroke code called for left side weakness and dysarthria. Patient tolerated procedure well without complications. Denies any pain, while recovering comfortably in [address].           T(C): 36.5 (02-13-19 @ 14:18), Max: 36.5 (02-13-19 @ 14:18)  HR: 64 (02-13-19 @ 15:00) (50 - 70)  BP: 90/70 (02-13-19 @ 15:00) (80/46 - 150/62)  RR: 23 (02-13-19 @ 15:00) (8 - 23)  SpO2: 92% (02-13-19 @ 15:00) (92% - 100%)  Wt(kg): --      Physical exam  Awake, alert, oriented x 3, PERRL, EOMI  face symmetric  Follows commands, speech clear  SARAH X4 with good strength   groin site: C/D/I

## 2019-02-13 NOTE — PROGRESS NOTE ADULT - SUBJECTIVE AND OBJECTIVE BOX
NEUROCRITICAL CARE PROGRESS NOTE    GRADY CARDONA   [id number]  Summary:  /  HPI:  83 yo female with h/o DM and HLD s/p right leg bypass and R great toe amputation, now healed, presents with a nonhealing L great toe ulcer, which has been previously debrided. 11/13 Aniogram shows patent aorta and bilateral iliac vessels. Patent L CFA and profunda. L SFA occluded at the origin. Extensive collateralization throughout leg with no named vessels until DP reconstitutes distally. Patient notes that she has been having increased claudication symptoms in the left leg with walking. Denies CP, SOB. (11 Feb 2019 15:04)      84y    Female   s/p fem angio and balloon assisted right ICA stent placement after stroke code called for left side weakness and dysarthria. Patient tolerated procedure well without complications. Denies any pain, while recovering comfortably in [facility].     Vital Signs Last 24 Hrs  T(C): 36.5 (13 Feb 2019 14:18), Max: 36.5 (13 Feb 2019 14:18)  T(F): 97.7 (13 Feb 2019 14:18), Max: 97.7 (13 Feb 2019 14:18)  HR: 64 (13 Feb 2019 15:00) (50 - 70)  BP: 90/70 (13 Feb 2019 15:00) (80/46 - 150/62)  BP(mean): 83 (13 Feb 2019 15:00) (61 - 91)  RR: 23 (13 Feb 2019 15:00) (8 - 23)  SpO2: 92% (13 Feb 2019 15:00) (92% - 100%)    I&O's Detail    12 Feb 2019 07:01  -  13 Feb 2019 07:00  --------------------------------------------------------  IN:    Lactated Ringers IV Bolus: 2000 mL    Oral Fluid: 420 mL    sodium chloride 0.9%.: 910 mL  Total IN: 3330 mL    OUT:    Bulb: 60 mL    Estimated Blood Loss: 400 mL    Indwelling Catheter - Urethral: 255 mL  Total OUT: 715 mL    Total NET: 2615 mL          LABS:                        10.1   8.60  )-----------( 153      ( 13 Feb 2019 09:08 )             31.3     02-13    138  |  103  |  14  ----------------------------<  159<H>  4.4   |  26  |  0.80    Ca    9.0      13 Feb 2019 09:08  Phos  4.3     02-13  Mg     1.6     02-13    TPro  5.7<L>  /  Alb  3.3  /  TBili  0.4  /  DBili  x   /  AST  14  /  ALT  16  /  AlkPhos  77  02-13    PTT - ( 12 Feb 2019 04:38 )  PTT:187.3 sec        CAPILLARY BLOOD GLUCOSE  150 (13 Feb 2019 10:46)      POCT Blood Glucose.: 150 mg/dL (13 Feb 2019 08:42)  POCT Blood Glucose.: 180 mg/dL (13 Feb 2019 06:44)  POCT Blood Glucose.: 133 mg/dL (12 Feb 2019 19:54)  POCT Blood Glucose.: 118 mg/dL (12 Feb 2019 15:48)      Drug Levels: [] N/A    CSF Analysis: [] N/A      Allergies    Goldbond (Unknown)  penicillins (Anaphylaxis)    Intolerances      MEDICATIONS:  Antibiotics:  ciprofloxacin     Tablet 500 milliGRAM(s) Oral every 12 hours  vancomycin  IVPB 1000 milliGRAM(s) IV Intermittent every 12 hours    Neuro:  acetaminophen   Tablet .. 650 milliGRAM(s) Oral every 6 hours PRN  metoclopramide Injectable 10 milliGRAM(s) IV Push once PRN  morphine  - Injectable 2 milliGRAM(s) IV Push every 4 hours PRN  ondansetron Injectable 4 milliGRAM(s) IV Push every 6 hours PRN    Anticoagulation:  aspirin enteric coated 81 milliGRAM(s) Oral daily  clopidogrel Tablet 150 milliGRAM(s) Oral once  heparin  Infusion 1000 Unit(s)/Hr IV Continuous <Continuous>    OTHER:  docusate sodium 100 milliGRAM(s) Oral three times a day  senna 2 Tablet(s) Oral daily PRN    IVF:  magnesium sulfate  IVPB 2 Gram(s) IV Intermittent once  sodium chloride 0.9%. 1000 milliLiter(s) IV Continuous <Continuous>    CULTURES:  Culture Results:   No growth at 2 days. (02-11 @ 14:49)  Culture Results:   No growth at 2 days. (02-11 @ 14:49)    Physical exam  Awake, alert, oriented x 3, PERRL, EOMI  face symmetric  Follows commands, speech clear  SARAH X4 with good strength   groin site: C/D/I NEUROCRITICAL CARE PROGRESS NOTE    GRADY CARDONA   MRN-[id number]  Summary:  /  HPI:  85 yo female with h/o DM and HLD s/p right leg bypass and R great toe amputation, now healed, presents with a nonhealing L great toe ulcer, which has been previously debrided. 11/13 Aniogram shows patent aorta and bilateral iliac vessels. Patent L CFA and profunda. L SFA occluded at the origin. Extensive collateralization throughout leg with no named vessels until DP reconstitutes distally. Patient notes that she has been having increased claudication symptoms in the left leg with walking. Denies CP, SOB. (11 Feb 2019 15:04)      84y    Female   s/p fem angio and balloon assisted right ICA stent placement after stroke code called for left side weakness and dysarthria. Patient tolerated procedure well without complications. Denies any pain, while recovering comfortably in [facility].     Vital Signs Last 24 Hrs  T(C): 36.5 (13 Feb 2019 14:18), Max: 36.5 (13 Feb 2019 14:18)  T(F): 97.7 (13 Feb 2019 14:18), Max: 97.7 (13 Feb 2019 14:18)  HR: 64 (13 Feb 2019 15:00) (50 - 70)  BP: 90/70 (13 Feb 2019 15:00) (80/46 - 150/62)  BP(mean): 83 (13 Feb 2019 15:00) (61 - 91)  RR: 23 (13 Feb 2019 15:00) (8 - 23)  SpO2: 92% (13 Feb 2019 15:00) (92% - 100%)    I&O's Detail    12 Feb 2019 07:01  -  13 Feb 2019 07:00  --------------------------------------------------------  IN:    Lactated Ringers IV Bolus: 2000 mL    Oral Fluid: 420 mL    sodium chloride 0.9%.: 910 mL  Total IN: 3330 mL    OUT:    Bulb: 60 mL    Estimated Blood Loss: 400 mL    Indwelling Catheter - Urethral: 255 mL  Total OUT: 715 mL    Total NET: 2615 mL          LABS:                        10.1   8.60  )-----------( 153      ( 13 Feb 2019 09:08 )             31.3     02-13    138  |  103  |  14  ----------------------------<  159<H>  4.4   |  26  |  0.80    Ca    9.0      13 Feb 2019 09:08  Phos  4.3     02-13  Mg     1.6     02-13    TPro  5.7<L>  /  Alb  3.3  /  TBili  0.4  /  DBili  x   /  AST  14  /  ALT  16  /  AlkPhos  77  02-13    PTT - ( 12 Feb 2019 04:38 )  PTT:187.3 sec        CAPILLARY BLOOD GLUCOSE  150 (13 Feb 2019 10:46)      POCT Blood Glucose.: 150 mg/dL (13 Feb 2019 08:42)  POCT Blood Glucose.: 180 mg/dL (13 Feb 2019 06:44)  POCT Blood Glucose.: 133 mg/dL (12 Feb 2019 19:54)  POCT Blood Glucose.: 118 mg/dL (12 Feb 2019 15:48)      Drug Levels: [] N/A    CSF Analysis: [] N/A      Allergies    Goldbond (Unknown)  penicillins (Anaphylaxis)    Intolerances      MEDICATIONS:  Antibiotics:  ciprofloxacin     Tablet 500 milliGRAM(s) Oral every 12 hours  vancomycin  IVPB 1000 milliGRAM(s) IV Intermittent every 12 hours    Neuro:  acetaminophen   Tablet .. 650 milliGRAM(s) Oral every 6 hours PRN  metoclopramide Injectable 10 milliGRAM(s) IV Push once PRN  morphine  - Injectable 2 milliGRAM(s) IV Push every 4 hours PRN  ondansetron Injectable 4 milliGRAM(s) IV Push every 6 hours PRN    Anticoagulation:  aspirin enteric coated 81 milliGRAM(s) Oral daily  clopidogrel Tablet 150 milliGRAM(s) Oral once  heparin  Infusion 1000 Unit(s)/Hr IV Continuous <Continuous>    OTHER:  docusate sodium 100 milliGRAM(s) Oral three times a day  senna 2 Tablet(s) Oral daily PRN    IVF:  magnesium sulfate  IVPB 2 Gram(s) IV Intermittent once  sodium chloride 0.9%. 1000 milliLiter(s) IV Continuous <Continuous>    CULTURES:  Culture Results:   No growth at 2 days. (02-11 @ 14:49)  Culture Results:   No growth at 2 days. (02-11 @ 14:49)    Physical exam  Awake, alert, oriented x 3, PERRL, EOMI  min L facial palsy symmetric; R eye L hemianopsia; L eye blind   Follows commands, speech clear  L UE weak 4/5  NIHSS 5  groin site: C/D/I

## 2019-02-13 NOTE — BRIEF OPERATIVE NOTE - PROCEDURE
<<-----Click on this checkbox to enter Procedure Cerebral angiography  02/13/2019  With right carotid stent placement and balloon angioplasty  Active  [facility]

## 2019-02-13 NOTE — PROGRESS NOTE ADULT - ASSESSMENT
POD 0 s/p angio and right ICA stent placement successfully today  cont neuro checks  CTH in am  re dose plavix this evening  f/u response testing in am  hep drip titrated to goal PTT 60-80, recheck this evening at 645pm  NV and groin checks  tylenol prn pain  ADAT  SCDs  d/w Dr. Edwards and ICU house staff

## 2019-02-13 NOTE — PROGRESS NOTE ADULT - ASSESSMENT
84 F s/p R ICA stent placement for acute R ICA stroke syndrome. Now recovered and doing well.      POD 0 s/p angio and right ICA stent placement successfully today  cont neuro checks q 1   CTH in am  re dose plavix this evening  f/u response testing in am  hep drip titrated to goal PTT 60-80, recheck this evening at 645pm  NV and groin checks  tylenol prn pain  ADAT  SCDs    I spent 45 minutes of critical care time examining patient, reviewing vitals, labs, medications, imaging and discussing with the team goals of care to prevent life-threatening in this patient who is at high risk for neurological deterioration or death due to:  stroke

## 2019-02-13 NOTE — CONSULT NOTE ADULT - SUBJECTIVE AND OBJECTIVE BOX
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)  Glaucoma: b/l  Falls  Depression  Pulmonary embolism  DVT (deep venous thrombosis): Right lower extremity  MI (myocardial infarction): at age 65 y.o  Peripheral vascular disease  Ulcer of lower limb  Arthropathy  Ischemic heart disease  Hypertension  Hyperlipidemia  Diabetes mellitus: II  S/P amputation: may 2018; right great toe  History of surgery: x7 coronary artery stents  History of surgery: right leg bypass 5/2018 with amputation of right great toe      FAMILY HISTORY:  No pertinent family history in first degree relatives      SOCIAL HISTORY:  Denies smoking, drinking, or drug use    ROS:  Constitutional: No fever, weight loss or fatigue  Eyes: No eye pain, visual disturbances, or discharge  ENMT:  No difficulty hearing, tinnitus, vertigo; No sinus or throat pain  Neck: No pain or stiffness  Respiratory: No cough, wheezing, chills or hemoptysis  Cardiovascular: No chest pain, palpitations, shortness of breath, dizziness or leg swelling  Gastrointestinal: No abdominal pain. No nausea, vomiting or hematemesis; No diarrhea or constipation. Nohematochezia.  Genitourinary: No dysuria, frequency, hematuria or incontinence  Neurological: As per HPI  Skin: No itching, burning, rashes or lesions   Endocrine: No heat or cold intolerance; No hair loss  Musculoskeletal: No joint pain or swelling; No muscle, back or extremity pain  Psychiatric: No depression, anxiety, mood swings or difficulty sleeping  Heme/Lymph: No easy bruising or bleeding gums    MEDICATIONS  (STANDING):  aspirin enteric coated 81 milliGRAM(s) Oral daily  ciprofloxacin     Tablet 500 milliGRAM(s) Oral every 12 hours  docusate sodium 100 milliGRAM(s) Oral three times a day  magnesium sulfate  IVPB 2 Gram(s) IV Intermittent once  sodium chloride 0.9%. 1000 milliLiter(s) (65 mL/Hr) IV Continuous <Continuous>  vancomycin  IVPB 1000 milliGRAM(s) IV Intermittent every 12 hours    MEDICATIONS  (PRN):  acetaminophen   Tablet .. 650 milliGRAM(s) Oral every 6 hours PRN Mild Pain (1 - 3)  metoclopramide Injectable 10 milliGRAM(s) IV Push once PRN Nausea and/or Vomiting  morphine  - Injectable 2 milliGRAM(s) IV Push every 4 hours PRN Severe Pain (7 - 10)  ondansetron Injectable 4 milliGRAM(s) IV Push every 6 hours PRN Nausea  senna 2 Tablet(s) Oral daily PRN Constipation      Allergies    Goldbond (Unknown)  penicillins (Anaphylaxis)    Intolerances        Vital Signs Last 24 Hrs  T(C): 35.9 (12 Feb 2019 23:00), Max: 36.2 (12 Feb 2019 19:00)  T(F): 96.7 (12 Feb 2019 23:00), Max: 97.2 (12 Feb 2019 19:00)  HR: 70 (13 Feb 2019 08:44) (50 - 70)  BP: 133/59 (13 Feb 2019 08:44) (80/46 - 150/62)  BP(mean): 85 (13 Feb 2019 08:44) (61 - 91)  RR: 18 (13 Feb 2019 08:44) (8 - 18)  SpO2: 97% (13 Feb 2019 08:44) (95% - 100%)    PHYSICAL EXAM:  Constitutional: WDWN; NAD  Cardiovascular: RRR, no appreciable murmurs; no carotid bruits  Neurologic:  -Mental status: Awake, alert and oriented to person, place, and time. Speech is fluent with intact naming, repetition, and comprehension.  Recent and remote memory intact.  Attention/concentration intact.  No dysarthria, no aphasia.  Fund of knowledge appropriate.    -Cranial nerves:  II: Visual fields full to confrontation. Pupils PERRL  III, IV, VI: extraocular movements are intact without nystagmus  V: Facial sensation intact V1 through V3 intact bilaterally  VII: Face is symmetric with normal eye closure and smile, no facial droop.  VIII: hearing is intact to finger rub  IX, X: uvula is midline and soft palate rises symmetrically  XI: Head turning and shoulder shrug intact  XII: Tongue protrudes in the midline    -Motor:  Normal bulk and tone, strength 5/5 in bilateral upper and lower extremities.   strength 5/5.  Rapid alternating movements intact and symmetric.   -Sensation: Intact to light touch, proprioception, and pinprick.  No neglect.   -Coordination: No dysmetria on finger-to-nose and heel-to-shin.  No clumsiness.  -Reflexes: 2+ in upper and lower extremities, downgoing toes bilaterally  -Gait: Narrow and steady. No ataxia.  Romberg negative    NIHSS:    Fingerstick Blood Glucose: CAPILLARY BLOOD GLUCOSE  150 (13 Feb 2019 10:46)      POCT Blood Glucose.: 150 mg/dL (13 Feb 2019 08:42)       LABS:                        10.1   8.60  )-----------( 153      ( 13 Feb 2019 09:08 )             31.3     02-13    138  |  103  |  14  ----------------------------<  159<H>  4.4   |  26  |  0.80    Ca    9.0      13 Feb 2019 09:08  Phos  4.3     02-13  Mg     1.6     02-13    TPro  5.7<L>  /  Alb  3.3  /  TBili  0.4  /  DBili  x   /  AST  14  /  ALT  16  /  AlkPhos  77  02-13    PT/INR - ( 11 Feb 2019 13:07 )   PT: 11.9 sec;   INR: 1.05          PTT - ( 12 Feb 2019 04:38 )  PTT:187.3 sec          RADIOLOGY & ADDITIONAL STUDIES:    IV-tPA (Y/N):                                   Bolus time:  Reason IV-tPA not given:    ASSESSMENT/PLAN:    84y Female w/ PMH coming in with **STROKE CODE CONSULT NOTE**    Last known well time/Time of onset of symptoms: 8:30 am    HPI:    PAST MEDICAL & SURGICAL HISTORY:  MRSA infection: right great toe ( amputated)  Glaucoma: b/l  Falls  Depression  Pulmonary embolism  DVT (deep venous thrombosis): Right lower extremity  MI (myocardial infarction): at age 65 y.o  Peripheral vascular disease  Ulcer of lower limb  Arthropathy  Ischemic heart disease  Hypertension  Hyperlipidemia  Diabetes mellitus: II  S/P amputation: may 2018; right great toe  History of surgery: x7 coronary artery stents  History of surgery: right leg bypass 5/2018 with amputation of right great toe      FAMILY HISTORY:  No pertinent family history in first degree relatives      SOCIAL HISTORY:  Denies smoking, drinking, or drug use    ROS:  As per HPI    MEDICATIONS  (STANDING):  aspirin enteric coated 81 milliGRAM(s) Oral daily  ciprofloxacin     Tablet 500 milliGRAM(s) Oral every 12 hours  docusate sodium 100 milliGRAM(s) Oral three times a day  magnesium sulfate  IVPB 2 Gram(s) IV Intermittent once  sodium chloride 0.9%. 1000 milliLiter(s) (65 mL/Hr) IV Continuous <Continuous>  vancomycin  IVPB 1000 milliGRAM(s) IV Intermittent every 12 hours    MEDICATIONS  (PRN):  acetaminophen   Tablet .. 650 milliGRAM(s) Oral every 6 hours PRN Mild Pain (1 - 3)  metoclopramide Injectable 10 milliGRAM(s) IV Push once PRN Nausea and/or Vomiting  morphine  - Injectable 2 milliGRAM(s) IV Push every 4 hours PRN Severe Pain (7 - 10)  ondansetron Injectable 4 milliGRAM(s) IV Push every 6 hours PRN Nausea  senna 2 Tablet(s) Oral daily PRN Constipation      Allergies    Goldbond (Unknown)  penicillins (Anaphylaxis)    Intolerances        Vital Signs Last 24 Hrs  T(C): 35.9 (12 Feb 2019 23:00), Max: 36.2 (12 Feb 2019 19:00)  T(F): 96.7 (12 Feb 2019 23:00), Max: 97.2 (12 Feb 2019 19:00)  HR: 70 (13 Feb 2019 08:44) (50 - 70)  BP: 133/59 (13 Feb 2019 08:44) (80/46 - 150/62)  BP(mean): 85 (13 Feb 2019 08:44) (61 - 91)  RR: 18 (13 Feb 2019 08:44) (8 - 18)  SpO2: 97% (13 Feb 2019 08:44) (95% - 100%)    PHYSICAL EXAM:  Constitutional: WDWN; NAD  Cardiovascular: RRR, no appreciable murmurs; no carotid bruits  Neurologic:  -Mental status: Awake, alert and oriented to person, place, and time. Speech is fluent with intact naming, repetition, and comprehension.  Recent and remote memory intact.  Attention/concentration intact.  No dysarthria, no aphasia.  Fund of knowledge appropriate.    -Cranial nerves:  II: Visual fields full to confrontation. Pupils PERRL  III, IV, VI: extraocular movements are intact without nystagmus  V: Facial sensation intact V1 through V3 intact bilaterally  VII: Face is symmetric with normal eye closure and smile, no facial droop.  VIII: hearing is intact to finger rub  IX, X: uvula is midline and soft palate rises symmetrically  XI: Head turning and shoulder shrug intact  XII: Tongue protrudes in the midline    -Motor:  Normal bulk and tone, strength 5/5 in bilateral upper and lower extremities.   strength 5/5.  Rapid alternating movements intact and symmetric.   -Sensation: Intact to light touch, proprioception, and pinprick.  No neglect.   -Coordination: No dysmetria on finger-to-nose and heel-to-shin.  No clumsiness.  -Reflexes: 2+ in upper and lower extremities, downgoing toes bilaterally  -Gait: Narrow and steady. No ataxia.  Romberg negative    NIHSS:    Fingerstick Blood Glucose: CAPILLARY BLOOD GLUCOSE  150 (13 Feb 2019 10:46)      POCT Blood Glucose.: 150 mg/dL (13 Feb 2019 08:42)       LABS:                        10.1   8.60  )-----------( 153      ( 13 Feb 2019 09:08 )             31.3     02-13    138  |  103  |  14  ----------------------------<  159<H>  4.4   |  26  |  0.80    Ca    9.0      13 Feb 2019 09:08  Phos  4.3     02-13  Mg     1.6     02-13    TPro  5.7<L>  /  Alb  3.3  /  TBili  0.4  /  DBili  x   /  AST  14  /  ALT  16  /  AlkPhos  77  02-13    PT/INR - ( 11 Feb 2019 13:07 )   PT: 11.9 sec;   INR: 1.05          PTT - ( 12 Feb 2019 04:38 )  PTT:187.3 sec          RADIOLOGY & ADDITIONAL STUDIES:  < from: CT Brain Stroke Protocol (02.13.19 @ 09:28) >    IMPRESSION: 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 No intracranial   hemorrhage or acute transcortical infarct.    < end of copied text >    < from: CT Perfusion w/ Maps w/ IV Cont (02.13.19 @ 10:01) >  IMPRESSION: Abnormal perfusion with RAPID calculated mismatch volume of   44 ml and mismatch ratio is 6.5 within the right MCA territory.    < end of copied text >        < from: CT Angio Head w/ IV Cont (02.13.19 @ 10:02) >  IMPRESSION: Attenuated caliber to the right internal carotid artery and   right middle cerebral artery which may be secondary to the proximal   severe carotid stenosis. No large vessel occlusion.    < end of copied text >  < from: CT Angio Head w/ IV Cont (02.13.19 @ 10:02) >  IMPRESSION: Severe right carotid stenosis.    < end of copied text >    IV-tPA (Y/N):      N                               Reason IV-tPA not given: major surgery yesterday    ASSESSMENT/PLAN:    84y Female w/ PMH coming in with **STROKE CODE CONSULT NOTE**    Last known well time/Time of onset of symptoms: 8:30 am    HPI:  83 yo F w/ significant PMH including CAD (w/ 7 stents), DVT previously on Eliquis, HTN, DM, HLD, admitted to vascular for surgery and found to have L weakness. Pt had femoral-distal bypass surgery yesterday and received heparin intraop. Pt had been on Eliquis for the past 2 years for DVTs, and last dose was taken on Saturday. Post op patient was back to normal. This morning daughter was sitting with patient and conversing completely, oriented x 4, and patient was looking forward to [employer]. Around 8:30 daughter noted that pt started to slump to her right and not be able to speak clearly, and noted her left side was weak.    Daughter states that she is unaware of any previous strokes her mother had, and pt states she had a stroke in the past but is unable to give more history.    On initial arrival, pt severely dysarthric and only followed one command and not quite answering questions normally. Pt started to improve when brought to ER for CT scan. CT head showed old stroke but no hemorrhage, possible R MCA hypodensity.   Pt had difficult IV access so there was a time delay prior to CTA. Pt started to improve and move her left side more during CT.     PAST MEDICAL & SURGICAL HISTORY:  MRSA infection: right great toe ( amputated)  Glaucoma: b/l  Falls  Depression  Pulmonary embolism  DVT (deep venous thrombosis): Right lower extremity  MI (myocardial infarction): at age 65 y.o  Peripheral vascular disease  Ulcer of lower limb  Arthropathy  Ischemic heart disease  Hypertension  Hyperlipidemia  Diabetes mellitus: II  S/P amputation: may 2018; right great toe  History of surgery: x7 coronary artery stents  History of surgery: right leg bypass 5/2018 with amputation of right great toe      FAMILY HISTORY:  No pertinent family history in first degree relatives      SOCIAL HISTORY:  Denies smoking, drinking, or drug use    ROS:  As per HPI    MEDICATIONS  (STANDING):  aspirin enteric coated 81 milliGRAM(s) Oral daily  ciprofloxacin     Tablet 500 milliGRAM(s) Oral every 12 hours  docusate sodium 100 milliGRAM(s) Oral three times a day  magnesium sulfate  IVPB 2 Gram(s) IV Intermittent once  sodium chloride 0.9%. 1000 milliLiter(s) (65 mL/Hr) IV Continuous <Continuous>  vancomycin  IVPB 1000 milliGRAM(s) IV Intermittent every 12 hours    MEDICATIONS  (PRN):  acetaminophen   Tablet .. 650 milliGRAM(s) Oral every 6 hours PRN Mild Pain (1 - 3)  metoclopramide Injectable 10 milliGRAM(s) IV Push once PRN Nausea and/or Vomiting  morphine  - Injectable 2 milliGRAM(s) IV Push every 4 hours PRN Severe Pain (7 - 10)  ondansetron Injectable 4 milliGRAM(s) IV Push every 6 hours PRN Nausea  senna 2 Tablet(s) Oral daily PRN Constipation      Allergies    Goldbond (Unknown)  penicillins (Anaphylaxis)        Vital Signs Last 24 Hrs  T(C): 35.9 (12 Feb 2019 23:00), Max: 36.2 (12 Feb 2019 19:00)  T(F): 96.7 (12 Feb 2019 23:00), Max: 97.2 (12 Feb 2019 19:00)  HR: 70 (13 Feb 2019 08:44) (50 - 70)  BP: 133/59 (13 Feb 2019 08:44) (80/46 - 150/62)  BP(mean): 85 (13 Feb 2019 08:44) (61 - 91)  RR: 18 (13 Feb 2019 08:44) (8 - 18)  SpO2: 97% (13 Feb 2019 08:44) (95% - 100%)    PHYSICAL EXAM:  Constitutional: WDWN; NAD. Leaning to her right  Neurologic:  -Mental status: Awake, alert initially severely dysarthric and aphasic - only followed one command but easily distracted. During CT was still somewhat dysarthric but able to tell me her name and age.   -Cranial nerves:  II: No blink to threat of left visual field. Pupils PERRL  III, IV, VI: right gaze but able to look to the left  VII: left facial droop  XII: Tongue protrudes in the midline    -Motor:  Normal bulk and tone, left arm able to move slightly but would drop to the bed when lifted passively. During CT scan, pt started to lift up left arm but would drift slowly. Right arm strong, right leg able to hold up for 5 seconds. Left leg lifted up but drifted to bed.   -Sensation: Did not respond to painful stimuli of left arm, left side hemineglect. During CT, pt improved to start looking left   -Coordination: No dysmetria on finger-to-nose of right hand, unable to cooperate on left.  -Reflexes: 2+ in upper and lower extremities, downgoing toes bilaterally  -Gait: deferred    NIHSS: 18    Fingerstick Blood Glucose: CAPILLARY BLOOD GLUCOSE  150 (13 Feb 2019 10:46)      POCT Blood Glucose.: 150 mg/dL (13 Feb 2019 08:42)       LABS:                        10.1   8.60  )-----------( 153      ( 13 Feb 2019 09:08 )             31.3     02-13    138  |  103  |  14  ----------------------------<  159<H>  4.4   |  26  |  0.80    Ca    9.0      13 Feb 2019 09:08  Phos  4.3     02-13  Mg     1.6     02-13    TPro  5.7<L>  /  Alb  3.3  /  TBili  0.4  /  DBili  x   /  AST  14  /  ALT  16  /  AlkPhos  77  02-13    PT/INR - ( 11 Feb 2019 13:07 )   PT: 11.9 sec;   INR: 1.05          PTT - ( 12 Feb 2019 04:38 )  PTT:187.3 sec          RADIOLOGY & ADDITIONAL STUDIES:  < from: CT Brain Stroke Protocol (02.13.19 @ 09:28) >    IMPRESSION: 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 No intracranial   hemorrhage or acute transcortical infarct.    < end of copied text >    < from: CT Perfusion w/ Maps w/ IV Cont (02.13.19 @ 10:01) >  IMPRESSION: Abnormal perfusion with RAPID calculated mismatch volume of   44 ml and mismatch ratio is 6.5 within the right MCA territory.    < end of copied text >        < from: CT Angio Head w/ IV Cont (02.13.19 @ 10:02) >  IMPRESSION: Attenuated caliber to the right internal carotid artery and   right middle cerebral artery which may be secondary to the proximal   severe carotid stenosis. No large vessel occlusion.    < end of copied text >  < from: CT Angio Head w/ IV Cont (02.13.19 @ 10:02) >  IMPRESSION: Severe right carotid stenosis.    < end of copied text >    IV-tPA (Y/N):      N                               Reason IV-tPA not given: major surgery yesterday    ASSESSMENT/PLAN:    83 yo F w/ significant PMH including CAD (w/ 7 stents), DVT previously on Eliquis, HTN, DM, HLD, admitted to vascular for surgery and found to have L weakness, found to have significant R carotid stenosis and R M2 clot.     -Time delay before CTA attributed to patient's difficult IV access  -CT also showed old R sided infarct  -CT with significant mismatch and CTA as above  -Taken to neurosurgery for possible stenting and/or thrombectomy  -Post op care per neurosurgery  -Will follow post procedure

## 2019-02-13 NOTE — PROVIDER CONTACT NOTE (MEDICATION) - RECOMMENDATIONS
Vascular team made aware of need for sliding scale multiple times to cover am blood glucose. No insulin given to cover FS because no order for sliding scale was put in.

## 2019-02-14 DIAGNOSIS — Z91.89 OTHER SPECIFIED PERSONAL RISK FACTORS, NOT ELSEWHERE CLASSIFIED: ICD-10-CM

## 2019-02-14 DIAGNOSIS — I25.10 ATHEROSCLEROTIC HEART DISEASE OF NATIVE CORONARY ARTERY WITHOUT ANGINA PECTORIS: ICD-10-CM

## 2019-02-14 DIAGNOSIS — I10 ESSENTIAL (PRIMARY) HYPERTENSION: ICD-10-CM

## 2019-02-14 DIAGNOSIS — E78.5 HYPERLIPIDEMIA, UNSPECIFIED: ICD-10-CM

## 2019-02-14 DIAGNOSIS — I63.9 CEREBRAL INFARCTION, UNSPECIFIED: ICD-10-CM

## 2019-02-14 DIAGNOSIS — R63.8 OTHER SYMPTOMS AND SIGNS CONCERNING FOOD AND FLUID INTAKE: ICD-10-CM

## 2019-02-14 DIAGNOSIS — I73.9 PERIPHERAL VASCULAR DISEASE, UNSPECIFIED: ICD-10-CM

## 2019-02-14 DIAGNOSIS — Z29.9 ENCOUNTER FOR PROPHYLACTIC MEASURES, UNSPECIFIED: ICD-10-CM

## 2019-02-14 DIAGNOSIS — E11.9 TYPE 2 DIABETES MELLITUS WITHOUT COMPLICATIONS: ICD-10-CM

## 2019-02-14 LAB
ANION GAP SERPL CALC-SCNC: 10 MMOL/L — SIGNIFICANT CHANGE UP (ref 5–17)
APTT BLD: 54.7 SEC — HIGH (ref 27.5–36.3)
BUN SERPL-MCNC: 10 MG/DL — SIGNIFICANT CHANGE UP (ref 7–23)
CALCIUM SERPL-MCNC: 8.4 MG/DL — SIGNIFICANT CHANGE UP (ref 8.4–10.5)
CHLORIDE SERPL-SCNC: 106 MMOL/L — SIGNIFICANT CHANGE UP (ref 96–108)
CO2 SERPL-SCNC: 24 MMOL/L — SIGNIFICANT CHANGE UP (ref 22–31)
CREAT SERPL-MCNC: 0.83 MG/DL — SIGNIFICANT CHANGE UP (ref 0.5–1.3)
GLUCOSE BLDC GLUCOMTR-MCNC: 115 MG/DL — HIGH (ref 70–99)
GLUCOSE BLDC GLUCOMTR-MCNC: 127 MG/DL — HIGH (ref 70–99)
GLUCOSE BLDC GLUCOMTR-MCNC: 139 MG/DL — HIGH (ref 70–99)
GLUCOSE BLDC GLUCOMTR-MCNC: 171 MG/DL — HIGH (ref 70–99)
GLUCOSE SERPL-MCNC: 114 MG/DL — HIGH (ref 70–99)
HBA1C BLD-MCNC: 6.4 % — HIGH (ref 4–5.6)
HCT VFR BLD CALC: 25.3 % — LOW (ref 34.5–45)
HGB BLD-MCNC: 8.1 G/DL — LOW (ref 11.5–15.5)
MAGNESIUM SERPL-MCNC: 1.9 MG/DL — SIGNIFICANT CHANGE UP (ref 1.6–2.6)
MCHC RBC-ENTMCNC: 29.9 PG — SIGNIFICANT CHANGE UP (ref 27–34)
MCHC RBC-ENTMCNC: 32 GM/DL — SIGNIFICANT CHANGE UP (ref 32–36)
MCV RBC AUTO: 93.4 FL — SIGNIFICANT CHANGE UP (ref 80–100)
NRBC # BLD: 0 /100 WBCS — SIGNIFICANT CHANGE UP (ref 0–0)
PHOSPHATE SERPL-MCNC: 3.4 MG/DL — SIGNIFICANT CHANGE UP (ref 2.5–4.5)
PLATELET # BLD AUTO: 123 K/UL — LOW (ref 150–400)
POTASSIUM SERPL-MCNC: 3.6 MMOL/L — SIGNIFICANT CHANGE UP (ref 3.5–5.3)
POTASSIUM SERPL-SCNC: 3.6 MMOL/L — SIGNIFICANT CHANGE UP (ref 3.5–5.3)
RBC # BLD: 2.71 M/UL — LOW (ref 3.8–5.2)
RBC # FLD: 14.3 % — SIGNIFICANT CHANGE UP (ref 10.3–14.5)
SODIUM SERPL-SCNC: 140 MMOL/L — SIGNIFICANT CHANGE UP (ref 135–145)
VANCOMYCIN TROUGH SERPL-MCNC: 14.6 UG/ML — SIGNIFICANT CHANGE UP (ref 10–20)
WBC # BLD: 8.67 K/UL — SIGNIFICANT CHANGE UP (ref 3.8–10.5)
WBC # FLD AUTO: 8.67 K/UL — SIGNIFICANT CHANGE UP (ref 3.8–10.5)

## 2019-02-14 PROCEDURE — 71045 X-RAY EXAM CHEST 1 VIEW: CPT | Mod: 26

## 2019-02-14 PROCEDURE — 99233 SBSQ HOSP IP/OBS HIGH 50: CPT

## 2019-02-14 PROCEDURE — 99232 SBSQ HOSP IP/OBS MODERATE 35: CPT

## 2019-02-14 PROCEDURE — 70450 CT HEAD/BRAIN W/O DYE: CPT | Mod: 26

## 2019-02-14 RX ORDER — HEPARIN SODIUM 5000 [USP'U]/ML
800 INJECTION INTRAVENOUS; SUBCUTANEOUS
Qty: 25000 | Refills: 0 | Status: DISCONTINUED | OUTPATIENT
Start: 2019-02-14 | End: 2019-02-14

## 2019-02-14 RX ORDER — TICAGRELOR 90 MG/1
90 TABLET ORAL
Qty: 0 | Refills: 0 | Status: DISCONTINUED | OUTPATIENT
Start: 2019-02-14 | End: 2019-02-26

## 2019-02-14 RX ORDER — VANCOMYCIN HCL 1 G
1250 VIAL (EA) INTRAVENOUS EVERY 12 HOURS
Qty: 0 | Refills: 0 | Status: DISCONTINUED | OUTPATIENT
Start: 2019-02-14 | End: 2019-02-15

## 2019-02-14 RX ORDER — HEPARIN SODIUM 5000 [USP'U]/ML
1000 INJECTION INTRAVENOUS; SUBCUTANEOUS
Qty: 25000 | Refills: 0 | Status: DISCONTINUED | OUTPATIENT
Start: 2019-02-14 | End: 2019-02-14

## 2019-02-14 RX ORDER — METOPROLOL TARTRATE 50 MG
25 TABLET ORAL DAILY
Qty: 0 | Refills: 0 | Status: DISCONTINUED | OUTPATIENT
Start: 2019-02-15 | End: 2019-02-26

## 2019-02-14 RX ORDER — POTASSIUM CHLORIDE 20 MEQ
40 PACKET (EA) ORAL ONCE
Qty: 0 | Refills: 0 | Status: COMPLETED | OUTPATIENT
Start: 2019-02-14 | End: 2019-02-14

## 2019-02-14 RX ORDER — TICAGRELOR 90 MG/1
90 TABLET ORAL
Qty: 0 | Refills: 0 | Status: DISCONTINUED | OUTPATIENT
Start: 2019-02-14 | End: 2019-02-14

## 2019-02-14 RX ORDER — ACETAMINOPHEN 500 MG
650 TABLET ORAL EVERY 6 HOURS
Qty: 0 | Refills: 0 | Status: DISCONTINUED | OUTPATIENT
Start: 2019-02-14 | End: 2019-02-16

## 2019-02-14 RX ORDER — PANTOPRAZOLE SODIUM 20 MG/1
40 TABLET, DELAYED RELEASE ORAL
Qty: 0 | Refills: 0 | Status: DISCONTINUED | OUTPATIENT
Start: 2019-02-14 | End: 2019-02-26

## 2019-02-14 RX ORDER — HEPARIN SODIUM 5000 [USP'U]/ML
5000 INJECTION INTRAVENOUS; SUBCUTANEOUS EVERY 8 HOURS
Qty: 0 | Refills: 0 | Status: DISCONTINUED | OUTPATIENT
Start: 2019-02-14 | End: 2019-02-26

## 2019-02-14 RX ORDER — TICAGRELOR 90 MG/1
180 TABLET ORAL ONCE
Qty: 0 | Refills: 0 | Status: COMPLETED | OUTPATIENT
Start: 2019-02-14 | End: 2019-02-14

## 2019-02-14 RX ORDER — SODIUM CHLORIDE 9 MG/ML
500 INJECTION INTRAMUSCULAR; INTRAVENOUS; SUBCUTANEOUS ONCE
Qty: 0 | Refills: 0 | Status: COMPLETED | OUTPATIENT
Start: 2019-02-14 | End: 2019-02-14

## 2019-02-14 RX ORDER — ATORVASTATIN CALCIUM 80 MG/1
80 TABLET, FILM COATED ORAL AT BEDTIME
Qty: 0 | Refills: 0 | Status: DISCONTINUED | OUTPATIENT
Start: 2019-02-14 | End: 2019-02-26

## 2019-02-14 RX ADMIN — Medication 100 MILLIGRAM(S): at 05:47

## 2019-02-14 RX ADMIN — ATORVASTATIN CALCIUM 80 MILLIGRAM(S): 80 TABLET, FILM COATED ORAL at 23:59

## 2019-02-14 RX ADMIN — Medication 650 MILLIGRAM(S): at 23:59

## 2019-02-14 RX ADMIN — Medication 166.67 MILLIGRAM(S): at 18:03

## 2019-02-14 RX ADMIN — PANTOPRAZOLE SODIUM 40 MILLIGRAM(S): 20 TABLET, DELAYED RELEASE ORAL at 10:18

## 2019-02-14 RX ADMIN — SODIUM CHLORIDE 1000 MILLILITER(S): 9 INJECTION INTRAMUSCULAR; INTRAVENOUS; SUBCUTANEOUS at 01:36

## 2019-02-14 RX ADMIN — TICAGRELOR 90 MILLIGRAM(S): 90 TABLET ORAL at 21:49

## 2019-02-14 RX ADMIN — Medication 100 MILLIGRAM(S): at 21:49

## 2019-02-14 RX ADMIN — Medication 100 MILLIGRAM(S): at 13:14

## 2019-02-14 RX ADMIN — Medication 40 MILLIEQUIVALENT(S): at 09:27

## 2019-02-14 RX ADMIN — Medication 81 MILLIGRAM(S): at 11:32

## 2019-02-14 RX ADMIN — Medication 500 MILLIGRAM(S): at 18:03

## 2019-02-14 RX ADMIN — Medication 166.67 MILLIGRAM(S): at 05:42

## 2019-02-14 RX ADMIN — Medication 2: at 18:04

## 2019-02-14 RX ADMIN — Medication 325 MILLIGRAM(S): at 18:03

## 2019-02-14 RX ADMIN — TICAGRELOR 180 MILLIGRAM(S): 90 TABLET ORAL at 09:28

## 2019-02-14 RX ADMIN — Medication 500 MILLIGRAM(S): at 05:47

## 2019-02-14 NOTE — PROGRESS NOTE ADULT - SUBJECTIVE AND OBJECTIVE BOX
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    OVERNIGHT EVENTS:    SUBJECTIVE:    Vital Signs Last 12 Hrs  T(F): 99 (02-14-19 @ 22:07), Max: 100.6 (02-14-19 @ 17:25)  HR: 74 (02-14-19 @ 22:07) (70 - 86)  BP: 143/63 (02-14-19 @ 22:07) (107/47 - 161/51)  BP(mean): 91 (02-14-19 @ 22:07) (54 - 121)  RR: 16 (02-14-19 @ 22:07) (16 - 25)  SpO2: 100% (02-14-19 @ 22:07) (98% - 100%)  I&O's Summary    13 Feb 2019 07:01  -  14 Feb 2019 07:00  --------------------------------------------------------  IN: 2228 mL / OUT: 939 mL / NET: 1289 mL    14 Feb 2019 07:01  -  14 Feb 2019 22:58  --------------------------------------------------------  IN: 970 mL / OUT: 620 mL / NET: 350 mL        PHYSICAL EXAM:  Constitutional: NAD, comfortable in bed.  HEENT: NC/AT, PERRLA, EOMI, no conjunctival pallor or scleral icterus, MMM  Neck: Supple, no JVD  Respiratory: Normal rate, rhythm, depth, effort. CTAB. No w/r/r.   Cardiovascular: RRR, normal S1 and S2, no m/r/g.   Gastrointestinal: +BS, soft NTND, no guarding or rebound tenderness, no palpable masses   Extremities: wwp; no cyanosis, clubbing or edema.   Vascular: Pulses equal and strong throughout.   Neurological: AAOx3, no CN deficits, strength and sensation intact throughout.   Skin: No gross skin abnormalities or rashes        LABS:                        8.1    8.67  )-----------( 123      ( 14 Feb 2019 03:06 )             25.3     02-14    140  |  106  |  10  ----------------------------<  114<H>  3.6   |  24  |  0.83    Ca    8.4      14 Feb 2019 03:02  Phos  3.4     02-14  Mg     1.9     02-14    TPro  5.7<L>  /  Alb  3.3  /  TBili  0.4  /  DBili  x   /  AST  14  /  ALT  16  /  AlkPhos  77  02-13    PTT - ( 14 Feb 2019 03:01 )  PTT:54.7 sec      RADIOLOGY & ADDITIONAL TESTS:  < from: CT Head No Cont (02.14.19 @ 11:26) >  FINDINGS: The CT examination limited by motion artifact and positioning.   There is no significant change inthe hypodensities within the right   anterior frontal and right frontoparietal lobes. There is additional   infarct extending from the right basal ganglia to the corona radiata with   ex vacuo dilatation of the right frontal horn. The ventricles are stable   in size. There is no intracranial hemorrhage. 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     The bony windows demonstrates no fractures. The lenses are absent which   may be secondary to prior cataract surgery. The visualized paranasal   sinuses are within normal limits. The mastoid air cells are well aerated.    IMPRESSION: Limited exam. No intracranial hemorrhage. Stable infarcts. No   intracranial hemorrhage.     < end of copied text >        MEDICATIONS  (STANDING):  acetaminophen   Tablet .. 650 milliGRAM(s) Oral every 6 hours  aspirin enteric coated 81 milliGRAM(s) Oral daily  atorvastatin 80 milliGRAM(s) Oral at bedtime  ciprofloxacin     Tablet 500 milliGRAM(s) Oral every 12 hours  dextrose 5%. 1000 milliLiter(s) (50 mL/Hr) IV Continuous <Continuous>  dextrose 50% Injectable 12.5 Gram(s) IV Push once  dextrose 50% Injectable 25 Gram(s) IV Push once  dextrose 50% Injectable 25 Gram(s) IV Push once  docusate sodium 100 milliGRAM(s) Oral three times a day  insulin lispro (HumaLOG) corrective regimen sliding scale   SubCutaneous Before meals and at bedtime  pantoprazole    Tablet 40 milliGRAM(s) Oral before breakfast  ticagrelor 90 milliGRAM(s) Oral two times a day  vancomycin  IVPB 1250 milliGRAM(s) IV Intermittent every 12 hours    MEDICATIONS  (PRN):  acetaminophen   Tablet .. 650 milliGRAM(s) Oral every 6 hours PRN Mild Pain (1 - 3)  dextrose 40% Gel 15 Gram(s) Oral once PRN Blood Glucose LESS THAN 70 milliGRAM(s)/deciliter  glucagon  Injectable 1 milliGRAM(s) IntraMuscular once PRN Glucose LESS THAN 70 milligrams/deciliter  metoclopramide Injectable 10 milliGRAM(s) IV Push once PRN Nausea and/or Vomiting  ondansetron Injectable 4 milliGRAM(s) IV Push every 6 hours PRN Nausea  senna 2 Tablet(s) Oral daily PRN Constipation TRANSFER from [facility] to [facility]:  83 yo F with PMH of HTN, CAD (stent x7 in 2018), DVT (formerly on Eliquis), DM, HLD, prior R fem-pop bypass and R great toe amputation admitted for L fem-pop bypass on 2/12 experienced abrupt onset of L sided weakness and dysarthria on morning of 2/13 found to have stenosis of R carotid artery, significant perfusion & flow mismatch along with prior R sided infarction in MCA distribution, admitted for management of stroke s/p angiography and R ICA stent placement on 2/13 and continued recovery from L fem-pop bypass. Transferred to [facility] for further CVA workup.    SUBJECTIVE: Patient without complaints at this time. Comfortable in bed. Denies HA, dizziness, blurry vision, CP, SOB, abdominal pain. leg pain well controlled. Reports a cough that started this morning.    Vital Signs Last 12 Hrs  T(F): 99 (02-14-19 @ 22:07), Max: 100.6 (02-14-19 @ 17:25)  HR: 74 (02-14-19 @ 22:07) (70 - 86)  BP: 143/63 (02-14-19 @ 22:07) (107/47 - 161/51)  BP(mean): 91 (02-14-19 @ 22:07) (54 - 121)  RR: 16 (02-14-19 @ 22:07) (16 - 25)  SpO2: 100% (02-14-19 @ 22:07) (98% - 100%)  I&O's Summary    13 Feb 2019 07:01  -  14 Feb 2019 07:00  --------------------------------------------------------  IN: 2228 mL / OUT: 939 mL / NET: 1289 mL    14 Feb 2019 07:01  -  14 Feb 2019 22:58  --------------------------------------------------------  IN: 970 mL / OUT: 620 mL / NET: 350 mL        PHYSICAL EXAM:  Constitutional: NAD, comfortable in bed.  HEENT: NC/AT, PERRLA, EOMI, no conjunctival pallor or scleral icterus, MMM  Neck: Supple, no JVD, R IJ TLC  Respiratory: Normal rate, rhythm, depth, effort. CTAB. No w/r/r.   Cardiovascular: RRR, normal S1 and S2, no m/r/g.   Gastrointestinal: +BS, soft NTND, no guarding or rebound tenderness, no palpable masses   Extremities: wwp; R big toe amputation, L big toe with open wound, no drainage. L leg w drain and dressings up the medial L leg CDI  Vascular: Pulses equal and strong throughout.   Neurological: AAOx3, no CN deficits, strength and sensation intact throughout, except for L LE which is limited by pain.  Skin: No gross skin abnormalities or rashes        LABS:                        8.1    8.67  )-----------( 123      ( 14 Feb 2019 03:06 )             25.3     02-14    140  |  106  |  10  ----------------------------<  114<H>  3.6   |  24  |  0.83    Ca    8.4      14 Feb 2019 03:02  Phos  3.4     02-14  Mg     1.9     02-14    TPro  5.7<L>  /  Alb  3.3  /  TBili  0.4  /  DBili  x   /  AST  14  /  ALT  16  /  AlkPhos  77  02-13    PTT - ( 14 Feb 2019 03:01 )  PTT:54.7 sec      RADIOLOGY & ADDITIONAL TESTS:  < from: CT Head No Cont (02.14.19 @ 11:26) >  FINDINGS: The CT examination limited by motion artifact and positioning.   There is no significant change inthe hypodensities within the right   anterior frontal and right frontoparietal lobes. There is additional   infarct extending from the right basal ganglia to the corona radiata with   ex vacuo dilatation of the right frontal horn. The ventricles are stable   in size. There is no intracranial hemorrhage. 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     The bony windows demonstrates no fractures. The lenses are absent which   may be secondary to prior cataract surgery. The visualized paranasal   sinuses are within normal limits. The mastoid air cells are well aerated.    IMPRESSION: Limited exam. No intracranial hemorrhage. Stable infarcts. No   intracranial hemorrhage.     < end of copied text >        MEDICATIONS  (STANDING):  acetaminophen   Tablet .. 650 milliGRAM(s) Oral every 6 hours  aspirin enteric coated 81 milliGRAM(s) Oral daily  atorvastatin 80 milliGRAM(s) Oral at bedtime  ciprofloxacin     Tablet 500 milliGRAM(s) Oral every 12 hours  dextrose 5%. 1000 milliLiter(s) (50 mL/Hr) IV Continuous <Continuous>  dextrose 50% Injectable 12.5 Gram(s) IV Push once  dextrose 50% Injectable 25 Gram(s) IV Push once  dextrose 50% Injectable 25 Gram(s) IV Push once  docusate sodium 100 milliGRAM(s) Oral three times a day  insulin lispro (HumaLOG) corrective regimen sliding scale   SubCutaneous Before meals and at bedtime  pantoprazole    Tablet 40 milliGRAM(s) Oral before breakfast  ticagrelor 90 milliGRAM(s) Oral two times a day  vancomycin  IVPB 1250 milliGRAM(s) IV Intermittent every 12 hours    MEDICATIONS  (PRN):  acetaminophen   Tablet .. 650 milliGRAM(s) Oral every 6 hours PRN Mild Pain (1 - 3)  dextrose 40% Gel 15 Gram(s) Oral once PRN Blood Glucose LESS THAN 70 milliGRAM(s)/deciliter  glucagon  Injectable 1 milliGRAM(s) IntraMuscular once PRN Glucose LESS THAN 70 milligrams/deciliter  metoclopramide Injectable 10 milliGRAM(s) IV Push once PRN Nausea and/or Vomiting  ondansetron Injectable 4 milliGRAM(s) IV Push every 6 hours PRN Nausea  senna 2 Tablet(s) Oral daily PRN Constipation TRANSFER Acceptance Note: [facility]    85 yo female with HTN, CAD (stent x7 in 2018), DVT (formerly on Eliquis), DM, HLD, prior R fem-pop bypass and R great toe amputation admitted on 2/11/19 for L fem-pop bypass, which was performed without complication on 2/12.    Patient experienced abrupt onset of L sided weakness and dysarthria on morning of 2/13. Stroke code called and CTA showed stenosis of R carotid artery, and CT perfusion showed significant perfusion & flow mismatch along with prior R sided infarction in MCA distribution. Patient was admitted for management of stroke s/p angiography and R ICA stent placement on 2/13 and continued recovery from L fem-pop bypass. Transferred to [facility] for further CVA workup.    SUBJECTIVE: Patient without complaints at this time. Comfortable in bed. Denies HA, dizziness, blurry vision, CP, SOB, abdominal pain. leg pain well controlled. Reports a cough that started this morning. Continued dysarthria, but otherwise reports no extremity weakness.     Vital Signs Last 12 Hrs  T(F): 99 (02-14-19 @ 22:07), Max: 100.6 (02-14-19 @ 17:25)  HR: 74 (02-14-19 @ 22:07) (70 - 86)  BP: 143/63 (02-14-19 @ 22:07) (107/47 - 161/51)  BP(mean): 91 (02-14-19 @ 22:07) (54 - 121)  RR: 16 (02-14-19 @ 22:07) (16 - 25)  SpO2: 100% (02-14-19 @ 22:07) (98% - 100%)  I&O's Summary    13 Feb 2019 07:01  -  14 Feb 2019 07:00  --------------------------------------------------------  IN: 2228 mL / OUT: 939 mL / NET: 1289 mL    14 Feb 2019 07:01  -  14 Feb 2019 22:58  --------------------------------------------------------  IN: 970 mL / OUT: 620 mL / NET: 350 mL        PHYSICAL EXAM:  Constitutional: NAD, elderly female, comfortable in bed. Appears stated age  HEENT: NC/AT, right pupil RRL, left pupil unreactive (likely surgical pupil), EOMI, no conjunctival pallor or scleral icterus, MMM  Neck: Supple, no JVD, right EJ IV in place   Respiratory: Normal rate, rhythm, depth, effort. CTAB. No w/r/r.   Cardiovascular: RRR, normal S1 and S2, no m/r/g.   Gastrointestinal: +BS, soft NTND, no guarding or rebound tenderness, no palpable masses   Extremities: wwp; R 1st toe amputation, L 1st toe toe with open wound, no drainage. L leg w ISHAN drain with small amount of serosanguious output, and CDI dressings up the medial L leg; right leg with large vertical healed surgical scar   Vascular: Pulses equal and strong throughout, including 2+ bilateral posterior tibial pulses   Neurological: AAOx3,CNs II-XII intact, strength and sensation intact throughout, except for L LE which is limited by pain. Moderate dysarthria.   Skin: No gross skin abnormalities or rashes        LABS:                        8.1    8.67  )-----------( 123      ( 14 Feb 2019 03:06 )             25.3     02-14    140  |  106  |  10  ----------------------------<  114<H>  3.6   |  24  |  0.83    Ca    8.4      14 Feb 2019 03:02  Phos  3.4     02-14  Mg     1.9     02-14    TPro  5.7<L>  /  Alb  3.3  /  TBili  0.4  /  DBili  x   /  AST  14  /  ALT  16  /  AlkPhos  77  02-13    PTT - ( 14 Feb 2019 03:01 )  PTT:54.7 sec      RADIOLOGY & ADDITIONAL TESTS:  < from: CT Head No Cont (02.14.19 @ 11:26) >  FINDINGS: The CT examination limited by motion artifact and positioning.   There is no significant change inthe hypodensities within the right   anterior frontal and right frontoparietal lobes. There is additional   infarct extending from the right basal ganglia to the corona radiata with   ex vacuo dilatation of the right frontal horn. The ventricles are stable   in size. There is no intracranial hemorrhage. 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     The bony windows demonstrates no fractures. The lenses are absent which   may be secondary to prior cataract surgery. The visualized paranasal   sinuses are within normal limits. The mastoid air cells are well aerated.    IMPRESSION: Limited exam. No intracranial hemorrhage. Stable infarcts. No   intracranial hemorrhage.     < end of copied text >        MEDICATIONS  (STANDING):  acetaminophen   Tablet .. 650 milliGRAM(s) Oral every 6 hours  aspirin enteric coated 81 milliGRAM(s) Oral daily  atorvastatin 80 milliGRAM(s) Oral at bedtime  ciprofloxacin     Tablet 500 milliGRAM(s) Oral every 12 hours  dextrose 5%. 1000 milliLiter(s) (50 mL/Hr) IV Continuous <Continuous>  dextrose 50% Injectable 12.5 Gram(s) IV Push once  dextrose 50% Injectable 25 Gram(s) IV Push once  dextrose 50% Injectable 25 Gram(s) IV Push once  docusate sodium 100 milliGRAM(s) Oral three times a day  insulin lispro (HumaLOG) corrective regimen sliding scale   SubCutaneous Before meals and at bedtime  pantoprazole    Tablet 40 milliGRAM(s) Oral before breakfast  ticagrelor 90 milliGRAM(s) Oral two times a day  vancomycin  IVPB 1250 milliGRAM(s) IV Intermittent every 12 hours    MEDICATIONS  (PRN):  acetaminophen   Tablet .. 650 milliGRAM(s) Oral every 6 hours PRN Mild Pain (1 - 3)  dextrose 40% Gel 15 Gram(s) Oral once PRN Blood Glucose LESS THAN 70 milliGRAM(s)/deciliter  glucagon  Injectable 1 milliGRAM(s) IntraMuscular once PRN Glucose LESS THAN 70 milligrams/deciliter  metoclopramide Injectable 10 milliGRAM(s) IV Push once PRN Nausea and/or Vomiting  ondansetron Injectable 4 milliGRAM(s) IV Push every 6 hours PRN Nausea  senna 2 Tablet(s) Oral daily PRN Constipation

## 2019-02-14 NOTE — PROGRESS NOTE ADULT - PROBLEM SELECTOR PLAN 1
dysarthria and L sided weakness on 2/13 after L fem-pop bypass found to have significant R carotid stenosis now s/p R carotid stent by nsg.   - symptoms improved w only mild dysarthria currently  - repeat CT head on 2/14 showed stable infarcts from prior  - continue lipitor 80, brilinta 90 BID, ASA 81  - PT/OT recs  - f/u nsg recs  - BP goal 100-150 Patient with new-onset dysarthria and L sided weakness on 2/13 after L fem-pop.  Stroke code on 2/13: found to have significant R carotid stenosis on CTA, and CT perfusion showed significant perfusion & flow mismatch along with prior R sided infarction in MCA distribution.    now s/p R carotid stent by Neurosurgery.   - symptoms improved, currently with only mild dysarthria   - repeat CT head on 2/14 showed stable infarcts from prior  - continue lipitor 80, brilinta 90 BID, ASA 81  - PT/OT recs  - f/u Neurosurgery recs  for post-procedure management   - for carotid Duplex U/S 30 days post-procedure   - BP goal: keep -150 mm Hg

## 2019-02-14 NOTE — PROGRESS NOTE ADULT - SUBJECTIVE AND OBJECTIVE BOX
NEUROCRITICAL CARE PROGRESS NOTE    [facility]   MRN-[id number]  Summary:  Vancomycin Level, Trough: 14.6 ug/mL (02-14 @ 03:02)  /  HPI:  85 yo female with h/o DM and HLD s/p right leg bypass and R great toe amputation, now healed, presents with a nonhealing L great toe ulcer, which has been previously debrided. 11/13 Aniogram shows patent aorta and bilateral iliac vessels. Patent L CFA and profunda. L SFA occluded at the origin. Extensive collateralization throughout leg with no named vessels until DP reconstitutes distally. Patient notes that she has been having increased claudication symptoms in the left leg with walking. Denies CP, SOB. (11 Feb 2019 15:04)      S/Overnight events:  s/p thrombectomy, stent, on heparin gtt, ASA, plavix; D[place]abaos sent     P2Y12 Plt Response Test (02.13.19 @ 12:00)    P2Y12 Plt Reactivity: 327: Off-drug reference range is 194-418 PRU.  P2Y12 Reaction Units (PRU)  indicate the amount of ADP-mediated aggregation specific to the platelet  P2Y12.  Post drug results: Lower PRU levels are associated with expected  antiplatelet effect. Values may be below the reference range. Results may  be affected by improper sample collection/transport (platelet activation)  and patient's exposure to GPIIb/IIIa inhibitors.  Published studies indicate that contributing factors to patient  non-responsivenessto P2Y12 inhibitors include drug interactions (e.g.  proton pump inhibitors), genetic differences, preexising health  conditions (e.g. diabetes), or non-compliance. PRU        Vital Signs Last 24 Hrs  T(C): 37.8 (14 Feb 2019 05:15), Max: 37.8 (14 Feb 2019 05:15)  T(F): 100 (14 Feb 2019 05:15), Max: 100 (14 Feb 2019 05:15)  HR: 70 (14 Feb 2019 07:00) (64 - 84)  BP: 129/36 (14 Feb 2019 07:00) (84/48 - 137/71)  BP(mean): 87 (14 Feb 2019 07:00) (56 - 115)  RR: 16 (14 Feb 2019 07:00) (13 - 30)  SpO2: 96% (14 Feb 2019 07:00) (92% - 99%)        I&O's Detail    13 Feb 2019 07:01  -  14 Feb 2019 07:00  --------------------------------------------------------  IN:    heparin Infusion: 48 mL    heparin Infusion: 50 mL    heparin Infusion: 50 mL    IV PiggyBack: 800 mL    Sodium Chloride 0.9% IV Bolus: 500 mL    sodium chloride 0.9%.: 780 mL  Total IN: 2228 mL    OUT:    Bulb: 40 mL    Indwelling Catheter - Urethral: 874 mL  Total OUT: 914 mL    Total NET: 1314 mL      14 Feb 2019 07:01  -  14 Feb 2019 08:23  --------------------------------------------------------  IN:    heparin Infusion: 10 mL    sodium chloride 0.9%.: 65 mL  Total IN: 75 mL    OUT:  Total OUT: 0 mL    Total NET: 75 mL          LABS:                        8.1    8.67  )-----------( 123      ( 14 Feb 2019 03:06 )             25.3     02-14    140  |  106  |  10  ----------------------------<  114<H>  3.6   |  24  |  0.83    Ca    8.4      14 Feb 2019 03:02  Phos  3.4     02-14  Mg     1.9     02-14    TPro  5.7<L>  /  Alb  3.3  /  TBili  0.4  /  DBili  x   /  AST  14  /  ALT  16  /  AlkPhos  77  02-13    PTT - ( 14 Feb 2019 03:01 )  PTT:54.7 sec    Platelet Response Aspirin (02.13.19 @ 11:58)    Platelet Response Aspirin Result: 410.0: Aspirin Reaction Units (ARU) indicate the amount of thromboxane  A2-mediated  activation of GBIIb/IIIa receptors involved in platelet  aggregation.  ARU is calculated as a function of the rate and extent of platelet  aggregation. Expected values are in the range of 350-700 ARU. ARU values  less than 550 are consistent with a patient who is receiving the  therapeutic benefit of aspirin.  Results may be affected by improper sample collection/transport (platelet  activation), patients exposure to GPIIb/IIIa inhibitors, abnormal Hct or  platelet count. ARU    P2Y12 Plt Response Test . (02.13.19 @ 19:45)    P2Y12 Plt Reactivity: 317: Off-drug reference range is 194-418 PRU.  P2Y12 Reaction Units (PRU)  indicate the amount of ADP-mediated aggregation specific to the platelet  P2Y12.  Post drug results: Lower PRU levels are associated with expected  antiplatelet effect. Values may be below the reference range. Results may  be affected by improper sample collection/transport (platelet activation)  and patient's exposure to GPIIb/IIIa inhibitors.  Published studies indicate that contributing factors to patient  non-responsivenessto P2Y12 inhibitors include drug interactions (e.g.  proton pump inhibitors), genetic differences, preexising health  conditions (e.g. diabetes), or non-compliance. PRU        CAPILLARY BLOOD GLUCOSE  150 (13 Feb 2019 10:46)      POCT Blood Glucose.: 115 mg/dL (14 Feb 2019 07:10)  POCT Blood Glucose.: 109 mg/dL (13 Feb 2019 22:03)  POCT Blood Glucose.: 183 mg/dL (13 Feb 2019 17:46)  POCT Blood Glucose.: 150 mg/dL (13 Feb 2019 08:42)      Drug Levels: [] N/A  Vancomycin Level, Trough: 14.6 ug/mL (02-14 @ 03:02)    CSF Analysis: [] N/A      Allergies    Goldbond (Unknown)  penicillins (Anaphylaxis)    Intolerances      MEDICATIONS:  Antibiotics:  ciprofloxacin     Tablet 500 milliGRAM(s) Oral every 12 hours  vancomycin  IVPB 1250 milliGRAM(s) IV Intermittent every 12 hours    Neuro:  acetaminophen   Tablet .. 650 milliGRAM(s) Oral every 6 hours PRN  metoclopramide Injectable 10 milliGRAM(s) IV Push once PRN  ondansetron Injectable 4 milliGRAM(s) IV Push every 6 hours PRN    Anticoagulation:  aspirin enteric coated 81 milliGRAM(s) Oral daily  heparin  Infusion 1000 Unit(s)/Hr IV Continuous <Continuous>    OTHER:  dextrose 40% Gel 15 Gram(s) Oral once PRN  dextrose 50% Injectable 12.5 Gram(s) IV Push once  dextrose 50% Injectable 25 Gram(s) IV Push once  dextrose 50% Injectable 25 Gram(s) IV Push once  docusate sodium 100 milliGRAM(s) Oral three times a day  glucagon  Injectable 1 milliGRAM(s) IntraMuscular once PRN  insulin lispro (HumaLOG) corrective regimen sliding scale   SubCutaneous Before meals and at bedtime  senna 2 Tablet(s) Oral daily PRN    IVF:  dextrose 5%. 1000 milliLiter(s) IV Continuous <Continuous>  sodium chloride 0.9%. 1000 milliLiter(s) IV Continuous <Continuous>    CULTURES:  Culture Results:   No growth at 2 days. (02-11 @ 14:49)  Culture Results:   No growth at 2 days. (02-11 @ 14:49)        Physical exam  Awake, alert, oriented x 3, PERRL, EOMI  min L facial palsy symmetric; R eye L hemianopsia; L eye blind   Follows commands, speech clear  L UE weak 4/5  NIHSS 5  groin site: C/D/I

## 2019-02-14 NOTE — PROGRESS NOTE ADULT - SUBJECTIVE AND OBJECTIVE BOX
INTERVAL HISTORY:  Yesterday the patient had an acute episode of difficulty with speech and left-sided weakness. Her speech is improved today but she has difficulty finding words. Her left-sided weakness is improved.	  Chart, [facility] medications and allergies reviewed    MEDICATIONS:  MEDICATIONS  (STANDING):  aspirin enteric coated 81 milliGRAM(s) Oral daily  ciprofloxacin     Tablet 500 milliGRAM(s) Oral every 12 hours  dextrose 5%. 1000 milliLiter(s) (50 mL/Hr) IV Continuous <Continuous>  dextrose 50% Injectable 12.5 Gram(s) IV Push once  dextrose 50% Injectable 25 Gram(s) IV Push once  dextrose 50% Injectable 25 Gram(s) IV Push once  docusate sodium 100 milliGRAM(s) Oral three times a day  heparin  Infusion 1000 Unit(s)/Hr (10 mL/Hr) IV Continuous <Continuous>  insulin lispro (HumaLOG) corrective regimen sliding scale   SubCutaneous Before meals and at bedtime  sodium chloride 0.9%. 1000 milliLiter(s) (65 mL/Hr) IV Continuous <Continuous>  vancomycin  IVPB 1250 milliGRAM(s) IV Intermittent every 12 hours      REVIEW OF SYSTEMS:  CONSTITUTIONAL: No fever, no weight loss, no fatigue  PULMONARY: No cough, no wheezing, chills no hemoptysis; No Shortness of Breath  CARDIOVASCULAR: No chest pain, no palpitations, no syncope, dizziness, no leg swelling  GASTROINTESTINAL: No abdominal pain. No nausea, no  vomiting, no hematemesis; No diarrhea, no constipation. No melena, no hematochezia.  GENITOURINARY: No dysuria, no frequency, no hematuria, no incontinence  SKIN: No itching, no burning, no rashes, no lesions     PHYSICAL EXAM:  T(C): 37.8 (02-14-19 @ 05:15), Max: 37.8 (02-14-19 @ 05:15)  HR: 70 (02-14-19 @ 07:00) (64 - 84)  BP: 129/36 (02-14-19 @ 07:00) (84/48 - 137/71)  RR: 16 (02-14-19 @ 07:00) (13 - 30)  SpO2: 96% (02-14-19 @ 07:00) (92% - 99%)  Wt(kg): --  I&O's Summary    13 Feb 2019 07:01  -  14 Feb 2019 07:00  --------------------------------------------------------  IN: 2228 mL / OUT: 914 mL / NET: 1314 mL    14 Feb 2019 07:01  -  14 Feb 2019 08:25  --------------------------------------------------------  IN: 75 mL / OUT: 0 mL / NET: 75 mL        Appearance:  No deformities, normal appearance	  HEENT:   Normal oral mucosa, PERRL, EOMI	  Neck: No jvd , no masses  Lymphatic: No  lymphadenopathy  Pulmonary: Lungs clear to auscultation and percussion  Cardiovascular: Normal S1 S2, No JVD, No murmur, No edema	  Abdomen:  Soft, Non-tender, + BS  Skin: No rashes, No ecchymoses	  Extremities:  No clubbing or cyanosis  MSK: Normal range of motion, no joint swelling  Neuro; motor LUE 4/5, clear speech.    LABS:		  CARDIAC MARKERS:                         8.1    8.67  )-----------( 123      ( 14 Feb 2019 03:06 )             25.3   02-14    140  |  106  |  10  ----------------------------<  114<H>  3.6   |  24  |  0.83    Ca    8.4      14 Feb 2019 03:02  Phos  3.4     02-14  Mg     1.9     02-14    TPro  5.7<L>  /  Alb  3.3  /  TBili  0.4  /  DBili  x   /  AST  14  /  ALT  16  /  AlkPhos  77  02-13    proBNP:  Lipid Profile:  HgA1c: Hemoglobin A1C, Whole Blood: 6.4 % (02-14 @ 03:06)   TSH:  PTT - ( 14 Feb 2019 03:01 )  PTT:54.7 sec    Culture - Blood (collected 02-11-19 @ 14:49)  Source: .Blood Blood  Preliminary Report (02-13-19 @ 15:03):    No growth at 2 days.    Culture - Blood (collected 02-11-19 @ 14:49)  Source: .Blood Blood  Preliminary Report (02-13-19 @ 15:03):    No growth at 2 days.      TELEMETRY: 	NSR           ECG:  	            RADIOLOGY:   DIAGNOSTIC TESTING: [ ] Echocardiogram: [ ]  Catheterization: [ ] Stress Test:      ASSESSMENT/PLAN: 	    Acute CVA-the patient received a right carotid stent. Clinically she is much improved. She received Plavix and is on aspirin and heparin.    Coronary artery disease-The patient is chest pain free post procedure.  The patient received Plavix.    Peripheral vascular disease- S/P bypass. Her left lower extremity is warm.    Hypertension-blood pressure is labile and low at times. She is off antihypertensives.     Diabetes-follow blood glucose.

## 2019-02-14 NOTE — PROGRESS NOTE ADULT - ASSESSMENT
83 yo F with PMH of HTN, CAD (stent x7 in 2018), DVT (formerly on Eliquis), DM, HLD, prior R fem-pop bypass and R great toe amputation admitted for L fem-pop bypass on 2/12 experienced abrupt onset of L sided weakness and dysarthria with CT demonstrating R carotid artery stenosis and prior infarctions in R MCA distribution, s/p angiography and R ICA stent placement on 2/13, admitted for management of CVA as well as continued recovery from L fem-pop bypass (2/12). 83 yo F with PMH of HTN, CAD (stent x7 in 2018), DVT (formerly on Eliquis), DM, HLD, prior R fem-pop bypass and R great toe amputation admitted for L fem-pop bypass on 2/12 experienced abrupt onset of L sided weakness and dysarthria with CT demonstrating R carotid artery stenosis and prior infarctions in R MCA distribution, s/p angiography and R ICA stent placement on 2/13, admitted for management of CVA as well as continued recovery from L fem-pop bypass (2/12). on 7 lachman for further stroke workup. 85 yo female with f HTN, CAD (stent x7 in 2018), DVT (formerly on Eliquis), DM, HLD, PAD s/p R fem-pop bypass and R great toe amputation admitted on 2/11/19 for L fem-pop bypass on 2/12. 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 on 7 [facility] for further stroke workup.

## 2019-02-14 NOTE — PROGRESS NOTE ADULT - ASSESSMENT
Status post acute right carotid artery stenting due to stroke symptoms from hypoperfusion    Continue aspirin  DC Plavix and start Brilinta    Follow up carotid duplex in 30 days

## 2019-02-14 NOTE — MEDICAL STUDENT ADULT H&P (EDUCATION) - NS MD HP STUD PE NEURO FT
-Alert & oriented to person, place, and time (month and year)   -Language - slight dysarthria (slurring some words), follows commands (blink eyes, squeeze hands), fluent speech with naming intact (pen, watch, notepad), repetition intact (no ifs, ands, or buts)  -Memory - patient able to state month of birth and age   -CNII - PERRLA, visual fields intact b/l with no notable deficits  -CNIII, CNIV, CNVII- patient unable to track finger with eyes in downward motion (suggests CNIII vs CNIV), but all other EOM intact. Daughter at bedside states poor vision due to glaucoma   -CNV - sensation intact b/l in distribution of V1, V2, V3. Patient able to clench jaw   -CNVII - patient able to smile widely and raise eyebrows bilaterally with no noted facial droop or asymmetry   -CNVIII - hearing intact b/l   -CNIX, CNX - palatal elevation appreciated   -CNXI - able to resist head motion side-to-side. raises shoulders against light resistance b/l   -CNXII - tongue protrudes in midline and patient able to move tongue side to side  -Motor - adequate bulk, appropriate tone with no noted rigidity or spasticity. Strength - LUE 4/5 (able to raise arm above head, hold for ten seconds, and with light resistance), LLE 1/5 (note: side of surgical site), RUE and RLE 4/5 (able to raise arm and leg with light resistance).   -Sensory - intact sensation in all 4 extremities to light touch and pain (pinch) - patient able to state with eyes closed which side is being either touched or pinched  -Coordination - no dysmetria noted b/l with finger to nose testing. Slight intention tremor noted  bilaterally     -Gait- deferred  -NIHSS - 6

## 2019-02-14 NOTE — DIETITIAN INITIAL EVALUATION ADULT. - OTHER INFO
85 yo F w/ significant PMH including CAD (w/ 7 stents), DVT previously on Eliquis, HTN, DM, HLD, admitted to vascular for surgery and found to have L weakness, found to have significant R carotid stenosis and likely limited flow to R MCA from that. s/p fem angio and balloon assisted right ICA stent placement 2/13 after stroke code called for left side weakness and dysarthria. Post op pt much improved left side, still with some dysarthria. Repeat CT today 2/14 with no hemorrhage. Pt NPO but hungry and asking for food. Pt’s daughter at bedside to assist with pt’s permission. Pt previously in rehab with RD following pt per daughter. Pt recently made healthy changes to her diet. GI: WDL, last BM 2/11. Skin: surgical incision Left leg, non-healing L great toe ulcer. Pt denies pain, N,V. Pt with occasional coughing with liquids- made team aware. Plan to advance diet per team, recommend DASH/TLC, CST CHO diet. Educated pt and daughter on DASH/TLC, CST CHO diet, encouraged increased protein intake as pt with wound- appeared receptive. RD to closely monitor and f/u per nutrition dept protocol.

## 2019-02-14 NOTE — PROGRESS NOTE ADULT - SUBJECTIVE AND OBJECTIVE BOX
Neurology Stroke Progress Note    INTERVAL HPI/OVERNIGHT EVENTS:  Patient reports that she did not sleep well so is feeling tired this morning. She also reports a new onset of non-productive cough this morning. She denies headaches, blurry vision, N/V. She does endorse a burning sensation in her stomach and states that she is hungry and would like to eat.    Patient seen and examined.     MEDICATIONS  (STANDING):  aspirin enteric coated 81 milliGRAM(s) Oral daily  ciprofloxacin     Tablet 500 milliGRAM(s) Oral every 12 hours  dextrose 5%. 1000 milliLiter(s) (50 mL/Hr) IV Continuous <Continuous>  dextrose 50% Injectable 12.5 Gram(s) IV Push once  dextrose 50% Injectable 25 Gram(s) IV Push once  dextrose 50% Injectable 25 Gram(s) IV Push once  docusate sodium 100 milliGRAM(s) Oral three times a day  heparin  Infusion 1000 Unit(s)/Hr (10 mL/Hr) IV Continuous <Continuous>  insulin lispro (HumaLOG) corrective regimen sliding scale   SubCutaneous Before meals and at bedtime  pantoprazole    Tablet 40 milliGRAM(s) Oral before breakfast  ticagrelor 90 milliGRAM(s) Oral two times a day  vancomycin  IVPB 1250 milliGRAM(s) IV Intermittent every 12 hours    MEDICATIONS  (PRN):  acetaminophen   Tablet .. 650 milliGRAM(s) Oral every 6 hours PRN Mild Pain (1 - 3)  dextrose 40% Gel 15 Gram(s) Oral once PRN Blood Glucose LESS THAN 70 milliGRAM(s)/deciliter  glucagon  Injectable 1 milliGRAM(s) IntraMuscular once PRN Glucose LESS THAN 70 milligrams/deciliter  metoclopramide Injectable 10 milliGRAM(s) IV Push once PRN Nausea and/or Vomiting  ondansetron Injectable 4 milliGRAM(s) IV Push every 6 hours PRN Nausea  senna 2 Tablet(s) Oral daily PRN Constipation      Allergies    Goldbond (Unknown)  penicillins (Anaphylaxis)    Intolerances        ROS: As per HPI, otherwise negative    Vital Signs Last 24 Hrs  T(C): 37.3 (14 Feb 2019 09:57), Max: 37.8 (14 Feb 2019 05:15)  T(F): 99.1 (14 Feb 2019 09:57), Max: 100 (14 Feb 2019 05:15)  HR: 74 (14 Feb 2019 12:00) (64 - 84)  BP: 138/117 (14 Feb 2019 12:00) (84/48 - 138/117)  BP(mean): 121 (14 Feb 2019 12:00) (56 - 121)  RR: 22 (14 Feb 2019 12:00) (13 - 30)  SpO2: 99% (14 Feb 2019 12:00) (92% - 100%)    Physical exam:  General: awake and alert, sitting comfortably, no acute distress  CV: RRR, no murmurs  Pulm: CTA bilaterally  Neurologic:  Mental status: awake, alert, oriented to person, place, and time. +Dysarthria, no aphasia. Follows commands. Attention/concentration intact.  Cranial nerves:   II: visual fields are full to confrontation. pupils equally round and reactive to light,   III, IV, VI: EOMI without nystagmus  V:  V1-V3 sensation intact,   VII: no facial droop, facie is symmetric with normal eye closure and smile  VIII: hearing is intact to finger rub  IX, X: Uvula is midline and soft palate rises symmetrically  XI: head turning and shoulder shrug are intact.  XII: tongue midline  Motor: Normal bulk and tone, able to lift both arms up, with more difficulty of the left arm. Able to move left leg but unwilling to lift (leg of surgery). Right leg able to lift  Sensation: intact to light touch. No neglect.  Coordination: No dysmetria on finger-to-nose  Reflexes: 2+ in upper and lower extremities, downgoing toes bilaterally  Gait: deferred        LABS:                        8.1    8.67  )-----------( 123      ( 14 Feb 2019 03:06 )             25.3     02-14    140  |  106  |  10  ----------------------------<  114<H>  3.6   |  24  |  0.83    Ca    8.4      14 Feb 2019 03:02  Phos  3.4     02-14  Mg     1.9     02-14    TPro  5.7<L>  /  Alb  3.3  /  TBili  0.4  /  DBili  x   /  AST  14  /  ALT  16  /  AlkPhos  77  02-13    PTT - ( 14 Feb 2019 03:01 )  PTT:54.7 sec      RADIOLOGY & ADDITIONAL TESTS:  < from: CT Head No Cont (02.14.19 @ 11:26) >  IMPRESSION: Limited exam. No intracranial hemorrhage. Stable infarcts. No   intracranial hemorrhage.     < end of copied text >        Assessment and Plan  83 yo F w/ significant PMH including CAD (w/ 7 stents), DVT previously on Eliquis, HTN, DM, HLD, admitted to vascular for surgery and found to have L weakness, found to have significant R carotid stenosis and likely limited flow to R MCA from that. S/p R carotid stent done 2/13. Post op pt much improved left side, still with some dysarthria    1)Secondary stroke prevention  -ASA and brillinta (plavix switched because not therapeutic).  -Suggest Atorvastatin 80 if no contraindication    2) Stroke risk factors  -CAD  -DM  -PVD    3) Further workup   -post op care per neurosurgery and vascular surgery    DVT prophylaxis   -Heparin SQ TID and SCDs Neurology Stroke Progress Note    INTERVAL HPI/OVERNIGHT EVENTS:  Patient reports that she did not sleep well so is feeling tired this morning. She also reports a new onset of non-productive cough this morning. She denies headaches, blurry vision, N/V. She does endorse a burning sensation in her stomach and states that she is hungry and would like to eat.    Patient seen and examined.     MEDICATIONS  (STANDING):  aspirin enteric coated 81 milliGRAM(s) Oral daily  ciprofloxacin     Tablet 500 milliGRAM(s) Oral every 12 hours  dextrose 5%. 1000 milliLiter(s) (50 mL/Hr) IV Continuous <Continuous>  dextrose 50% Injectable 12.5 Gram(s) IV Push once  dextrose 50% Injectable 25 Gram(s) IV Push once  dextrose 50% Injectable 25 Gram(s) IV Push once  docusate sodium 100 milliGRAM(s) Oral three times a day  heparin  Infusion 1000 Unit(s)/Hr (10 mL/Hr) IV Continuous <Continuous>  insulin lispro (HumaLOG) corrective regimen sliding scale   SubCutaneous Before meals and at bedtime  pantoprazole    Tablet 40 milliGRAM(s) Oral before breakfast  ticagrelor 90 milliGRAM(s) Oral two times a day  vancomycin  IVPB 1250 milliGRAM(s) IV Intermittent every 12 hours    MEDICATIONS  (PRN):  acetaminophen   Tablet .. 650 milliGRAM(s) Oral every 6 hours PRN Mild Pain (1 - 3)  dextrose 40% Gel 15 Gram(s) Oral once PRN Blood Glucose LESS THAN 70 milliGRAM(s)/deciliter  glucagon  Injectable 1 milliGRAM(s) IntraMuscular once PRN Glucose LESS THAN 70 milligrams/deciliter  metoclopramide Injectable 10 milliGRAM(s) IV Push once PRN Nausea and/or Vomiting  ondansetron Injectable 4 milliGRAM(s) IV Push every 6 hours PRN Nausea  senna 2 Tablet(s) Oral daily PRN Constipation      Allergies    Goldbond (Unknown)  penicillins (Anaphylaxis)    Intolerances        ROS: As per HPI, otherwise negative    Vital Signs Last 24 Hrs  T(C): 37.3 (14 Feb 2019 09:57), Max: 37.8 (14 Feb 2019 05:15)  T(F): 99.1 (14 Feb 2019 09:57), Max: 100 (14 Feb 2019 05:15)  HR: 74 (14 Feb 2019 12:00) (64 - 84)  BP: 138/117 (14 Feb 2019 12:00) (84/48 - 138/117)  BP(mean): 121 (14 Feb 2019 12:00) (56 - 121)  RR: 22 (14 Feb 2019 12:00) (13 - 30)  SpO2: 99% (14 Feb 2019 12:00) (92% - 100%)    Physical exam:  General: awake and alert, sitting comfortably, no acute distress  CV: RRR, no murmurs  Pulm: CTA bilaterally  Neurologic:  Mental status: awake, alert, oriented to person, place, and time. +Dysarthria, no aphasia. Follows commands. Attention/concentration intact.  Cranial nerves:   II: visual fields are full to confrontation. pupils equally round and reactive to light,   III, IV, VI: EOMI without nystagmus  V:  V1-V3 sensation intact,   VII: no facial droop, facie is symmetric with normal eye closure and smile  VIII: hearing is intact to finger rub  IX, X: Uvula is midline and soft palate rises symmetrically  XI: head turning and shoulder shrug are intact.  XII: tongue midline  Motor: Normal bulk and tone, able to lift both arms up, with more difficulty of the left arm. Able to move left leg but unwilling to lift (leg of surgery). Right leg able to lift  Sensation: intact to light touch. No neglect.  Coordination: No dysmetria on finger-to-nose  Reflexes: 2+ in upper and lower extremities, downgoing toes bilaterally  Gait: deferred          LABS:                        8.1    8.67  )-----------( 123      ( 14 Feb 2019 03:06 )             25.3     02-14    140  |  106  |  10  ----------------------------<  114<H>  3.6   |  24  |  0.83    Ca    8.4      14 Feb 2019 03:02  Phos  3.4     02-14  Mg     1.9     02-14    TPro  5.7<L>  /  Alb  3.3  /  TBili  0.4  /  DBili  x   /  AST  14  /  ALT  16  /  AlkPhos  77  02-13    PTT - ( 14 Feb 2019 03:01 )  PTT:54.7 sec      RADIOLOGY & ADDITIONAL TESTS:  < from: CT Head No Cont (02.14.19 @ 11:26) >  IMPRESSION: Limited exam. No intracranial hemorrhage. Stable infarcts. No   intracranial hemorrhage.     < end of copied text >        Assessment and Plan  85 yo F w/ significant PMH including CAD (w/ 7 stents), DVT previously on Eliquis, HTN, DM, HLD, admitted to vascular for surgery and found to have L weakness, found to have significant R carotid stenosis and likely limited flow to R MCA from that. S/p R carotid stent done 2/13. Post op pt much improved left side, still with some dysarthria    1)Secondary stroke prevention  -ASA and brillinta (plavix switched because not therapeutic).  -Suggest Atorvastatin 80 if no contraindication    2) Stroke risk factors  -CAD  -DM  -PVD    3) Further workup   -post op care per neurosurgery and vascular surgery  -repeat CT today 2/14 with no hemorrhage  -suggest adding GI ppx    DVT prophylaxis   -Heparin SQ TID and SCDs Neurology Stroke Progress Note    INTERVAL HPI/OVERNIGHT EVENTS:  Patient reports that she did not sleep well so is feeling tired this morning. She also reports a new onset of non-productive cough this morning. She denies headaches, blurry vision, N/V. She does endorse a burning sensation in her stomach and states that she is hungry and would like to eat.    Patient seen and examined.     MEDICATIONS  (STANDING):  aspirin enteric coated 81 milliGRAM(s) Oral daily  ciprofloxacin     Tablet 500 milliGRAM(s) Oral every 12 hours  dextrose 5%. 1000 milliLiter(s) (50 mL/Hr) IV Continuous <Continuous>  dextrose 50% Injectable 12.5 Gram(s) IV Push once  dextrose 50% Injectable 25 Gram(s) IV Push once  dextrose 50% Injectable 25 Gram(s) IV Push once  docusate sodium 100 milliGRAM(s) Oral three times a day  heparin  Infusion 1000 Unit(s)/Hr (10 mL/Hr) IV Continuous <Continuous>  insulin lispro (HumaLOG) corrective regimen sliding scale   SubCutaneous Before meals and at bedtime  pantoprazole    Tablet 40 milliGRAM(s) Oral before breakfast  ticagrelor 90 milliGRAM(s) Oral two times a day  vancomycin  IVPB 1250 milliGRAM(s) IV Intermittent every 12 hours    MEDICATIONS  (PRN):  acetaminophen   Tablet .. 650 milliGRAM(s) Oral every 6 hours PRN Mild Pain (1 - 3)  dextrose 40% Gel 15 Gram(s) Oral once PRN Blood Glucose LESS THAN 70 milliGRAM(s)/deciliter  glucagon  Injectable 1 milliGRAM(s) IntraMuscular once PRN Glucose LESS THAN 70 milligrams/deciliter  metoclopramide Injectable 10 milliGRAM(s) IV Push once PRN Nausea and/or Vomiting  ondansetron Injectable 4 milliGRAM(s) IV Push every 6 hours PRN Nausea  senna 2 Tablet(s) Oral daily PRN Constipation      Allergies    Goldbond (Unknown)  penicillins (Anaphylaxis)    Intolerances        ROS: As per HPI, otherwise negative    Vital Signs Last 24 Hrs  T(C): 37.3 (14 Feb 2019 09:57), Max: 37.8 (14 Feb 2019 05:15)  T(F): 99.1 (14 Feb 2019 09:57), Max: 100 (14 Feb 2019 05:15)  HR: 74 (14 Feb 2019 12:00) (64 - 84)  BP: 138/117 (14 Feb 2019 12:00) (84/48 - 138/117)  BP(mean): 121 (14 Feb 2019 12:00) (56 - 121)  RR: 22 (14 Feb 2019 12:00) (13 - 30)  SpO2: 99% (14 Feb 2019 12:00) (92% - 100%)    Physical exam:  General: awake and alert, sitting comfortably, no acute distress  CV: RRR, no murmurs  Pulm: CTA bilaterally  Neurologic:  Mental status: awake, alert, oriented to person, place, and time. +Dysarthria, no aphasia. Follows commands. Attention/concentration intact.  Cranial nerves:   II: visual fields are full to confrontation. pupils equally round and reactive to light,   III, IV, VI: EOMI without nystagmus  V:  V1-V3 sensation intact,   VII: no facial droop, facie is symmetric with normal eye closure and smile  VIII: hearing is intact to finger rub  IX, X: Uvula is midline and soft palate rises symmetrically  XI: head turning and shoulder shrug are intact.  XII: tongue midline  Motor: Normal bulk and tone, able to lift both arms up, with more difficulty of the left arm. Able to move left leg but unwilling to lift (leg of surgery). Right leg able to lift  Sensation: intact to light touch. No neglect.  Coordination: No dysmetria on finger-to-nose  Reflexes: 2+ in upper and lower extremities, downgoing toes bilaterally  Gait: deferred          LABS:                        8.1    8.67  )-----------( 123      ( 14 Feb 2019 03:06 )             25.3     02-14    140  |  106  |  10  ----------------------------<  114<H>  3.6   |  24  |  0.83    Ca    8.4      14 Feb 2019 03:02  Phos  3.4     02-14  Mg     1.9     02-14    TPro  5.7<L>  /  Alb  3.3  /  TBili  0.4  /  DBili  x   /  AST  14  /  ALT  16  /  AlkPhos  77  02-13    PTT - ( 14 Feb 2019 03:01 )  PTT:54.7 sec      RADIOLOGY & ADDITIONAL TESTS:  < from: CT Head No Cont (02.14.19 @ 11:26) >  IMPRESSION: Limited exam. No intracranial hemorrhage. Stable infarcts. No   intracranial hemorrhage.     < end of copied text >        Assessment and Plan  85 yo F w/ significant PMH including CAD (w/ 7 stents), DVT previously on Eliquis, HTN, DM, HLD, admitted to vascular for surgery and found to have L weakness, found to have significant R carotid stenosis and likely limited flow to R MCA from that. S/p R carotid stent done 2/13. Post op pt much improved left side, still with some dysarthria    1)Secondary stroke prevention  -ASA and brillinta (plavix switched because not therapeutic).  -Suggest Atorvastatin 80 if no contraindication    2) Stroke risk factors  -CAD  -DM  -PVD    3) Further workup   -post op care per neurosurgery and vascular surgery  -repeat CT today 2/14 with no hemorrhage  -suggest adding GI ppx    DVT prophylaxis   -Heparin SQ TID and SCDs if cleared by primary teams

## 2019-02-14 NOTE — PROGRESS NOTE ADULT - PROBLEM SELECTOR PLAN 3
s/p L fem pop bypass by vascular on 2/12 due to L foot gangrene.  - f/u vascular recs  - continue vancomycin (trough 2/16 am) and ciprofloxacin s/p R leg bypass and right great toe amputation in 2018.   s/p L fem pop bypass by vascular surgery on 2/12 due to L foot gangrene.  No known post-op complications at this time  - f/u vascular recs for post-op management  - continue vancomycin (trough 2/16 am) and ciprofloxacin  - Hold home apixaban

## 2019-02-14 NOTE — PROGRESS NOTE ADULT - PROBLEM SELECTOR PLAN 2
- on metformin at home  continue ISS - HbA1c 6.4% on 2/14/19.   - on metformin at home  continue ISS  Carb consistent diet

## 2019-02-14 NOTE — DIETITIAN INITIAL EVALUATION ADULT. - ENERGY NEEDS
Ht (2/12): 167.6cm, Wt (2/14 per daughter): 84.5kg, IBW: 130# +/-10%, %IBW: 143%, BMI: 30   IBW used to calculate energy needs due to pt's current body weight exceeding 120% of IBW. Needs further adjusted for wound.

## 2019-02-14 NOTE — PROGRESS NOTE ADULT - SUBJECTIVE AND OBJECTIVE BOX
Neuro-Endovascular Surgery    Neuro exam significantly improved    CT head without large territorial infarction or hemorrhage

## 2019-02-14 NOTE — PROGRESS NOTE ADULT - SUBJECTIVE AND OBJECTIVE BOX
84y  s/p fem angio and balloon assisted right ICA stent placement after stroke code called for left side weakness and dysarthria. Patient tolerated procedure well without complications. Denies any pain, while recovering comfortably in  [place].     S/Overnight events:  laterally transferred to [facility]. bolus x 1 for low urine output, maintained sbp; Lowered heparin gtt to 8 for supratherapeutic level.     Hospital Course:   2/13:  s/p fem angio and balloon assisted right ICA stent placement after stroke code called for left side weakness and dysarthria. Patient tolerated procedure well without complications. Denies any pain, whil  2/14: laterally transferred to [facility]. bolus x 1 for low urine output, maintained sbp; Lowered heparin gtt to 8 for supratherapeutic level.     Vital Signs Last 24 Hrs  T(C): 37.7 (14 Feb 2019 01:41), Max: 37.7 (14 Feb 2019 01:41)  T(F): 99.9 (14 Feb 2019 01:41), Max: 99.9 (14 Feb 2019 01:41)  HR: 84 (14 Feb 2019 03:00) (64 - 84)  BP: 134/49 (14 Feb 2019 03:00) (84/48 - 143/65)  BP(mean): 72 (14 Feb 2019 03:00) (56 - 95)  RR: 22 (14 Feb 2019 03:00) (13 - 30)  SpO2: 95% (14 Feb 2019 03:00) (92% - 99%)    I&O's Detail    12 Feb 2019 07:01  -  13 Feb 2019 07:00  --------------------------------------------------------  IN:    Lactated Ringers IV Bolus: 2000 mL    Oral Fluid: 420 mL    sodium chloride 0.9%.: 910 mL  Total IN: 3330 mL    OUT:    Bulb: 60 mL    Estimated Blood Loss: 400 mL    Indwelling Catheter - Urethral: 255 mL  Total OUT: 715 mL    Total NET: 2615 mL      13 Feb 2019 07:01  -  14 Feb 2019 03:37  --------------------------------------------------------  IN:    heparin Infusion: 16 mL    heparin Infusion: 50 mL    IV PiggyBack: 800 mL    sodium chloride 0.9%.: 65 mL  Total IN: 931 mL    OUT:    Bulb: 40 mL    Indwelling Catheter - Urethral: 751 mL  Total OUT: 791 mL    Total NET: 140 mL        I&O's Summary    12 Feb 2019 07:01  -  13 Feb 2019 07:00  --------------------------------------------------------  IN: 3330 mL / OUT: 715 mL / NET: 2615 mL    13 Feb 2019 07:01  -  14 Feb 2019 03:37  --------------------------------------------------------  IN: 931 mL / OUT: 791 mL / NET: 140 mL        PHYSICAL EXAM:  Awake, alert, oriented x 3, PERRL, EOMI  face symmetric  Follows commands, speech clear  SARAH X4 with good strength   groin site: C/D/I    Incision/Wound: vascular incision site c/d/i     TUBES/LINES:  [] CVC  [] A-line  [] Lumbar Drain  [] Ventriculostomy  [x] Other: serrato    DIET:  [x] NPO  [] Mechanical  [] Tube feeds    LABS:                        8.1    8.67  )-----------( 123      ( 14 Feb 2019 03:06 )             25.3     02-13    138  |  103  |  14  ----------------------------<  159<H>  4.4   |  26  |  0.80    Ca    9.0      13 Feb 2019 09:08  Phos  4.3     02-13  Mg     1.6     02-13    TPro  5.7<L>  /  Alb  3.3  /  TBili  0.4  /  DBili  x   /  AST  14  /  ALT  16  /  AlkPhos  77  02-13    PTT - ( 14 Feb 2019 03:01 )  PTT:54.7 sec        CAPILLARY BLOOD GLUCOSE  150 (13 Feb 2019 10:46)      POCT Blood Glucose.: 109 mg/dL (13 Feb 2019 22:03)  POCT Blood Glucose.: 183 mg/dL (13 Feb 2019 17:46)  POCT Blood Glucose.: 150 mg/dL (13 Feb 2019 08:42)  POCT Blood Glucose.: 180 mg/dL (13 Feb 2019 06:44)      Drug Levels: [] N/A    CSF Analysis: [] N/A      Allergies    Goldbond (Unknown)  penicillins (Anaphylaxis)    Intolerances      MEDICATIONS:  Antibiotics:  ciprofloxacin     Tablet 500 milliGRAM(s) Oral every 12 hours  vancomycin  IVPB 1000 milliGRAM(s) IV Intermittent every 12 hours    Neuro:  acetaminophen   Tablet .. 650 milliGRAM(s) Oral every 6 hours PRN  metoclopramide Injectable 10 milliGRAM(s) IV Push once PRN  ondansetron Injectable 4 milliGRAM(s) IV Push every 6 hours PRN    Anticoagulation:  aspirin enteric coated 81 milliGRAM(s) Oral daily  heparin  Infusion 800 Unit(s)/Hr IV Continuous <Continuous>    OTHER:  dextrose 40% Gel 15 Gram(s) Oral once PRN  dextrose 50% Injectable 12.5 Gram(s) IV Push once  dextrose 50% Injectable 25 Gram(s) IV Push once  dextrose 50% Injectable 25 Gram(s) IV Push once  docusate sodium 100 milliGRAM(s) Oral three times a day  glucagon  Injectable 1 milliGRAM(s) IntraMuscular once PRN  insulin lispro (HumaLOG) corrective regimen sliding scale   SubCutaneous Before meals and at bedtime  senna 2 Tablet(s) Oral daily PRN    IVF:  dextrose 5%. 1000 milliLiter(s) IV Continuous <Continuous>  sodium chloride 0.9%. 1000 milliLiter(s) IV Continuous <Continuous>    CULTURES:  Culture Results:   No growth at 2 days. (02-11 @ 14:49)  Culture Results:   No growth at 2 days. (02-11 @ 14:49)    RADIOLOGY & ADDITIONAL TESTS:      ASSESSMENT:  84y Female s/p angio and right ICA stent placement successfully; POD # 1    ULCER OF TOE  No pertinent family history in first degree relatives  Handoff  MEWS Score  MRSA infection  Glaucoma  Falls  Depression  Pulmonary embolism  DVT (deep venous thrombosis)  MI (myocardial infarction)  Peripheral vascular disease  Ulcer of lower limb  Arthropathy  Ischemic heart disease  Hypertension  Hyperlipidemia  Diabetes mellitus  Carotid stenosis, right  PAD (peripheral artery disease)  Cerebrovascular accident (CVA) due to occlusion of right middle cerebral artery  PAD (peripheral artery disease)  Ulcer of toe  Cerebral angiography  Vascular surgery procedure  S/P amputation  History of surgery  History of surgery  FOOT PAIN  83      Plan:     Neuro:   - neurochecks q1   - vitals q1   - CTH in am   - Pavix one dose given, P2Y12 subtherapeutic  - Cont. Heparin gtt and asa  - Heparin supratherapeutic, lowered to 8   - pain control prn with Tylenol   - OOB/amb/PT    - vascular following   Cardio:   - SBP goal 100-130    Pulm:   - IS     Renal:   - IVF    GI:   - Diet  - Bowel regimen    Heme:   - H&H stable     ID:   - on Vanco for L toe infection,   - f/u trough   - cont. cipro per ID/vascular     Endo:   - ISS     DVT PROPHYLAXIS:  [x] Venodynes                                [] Heparin/Lovenox    FALL RISK:  [] Low Risk                                    [] Impulsive    DISPOSITION: ICU status   PT/OT   Full code   d/w Dr. Edwards

## 2019-02-14 NOTE — MEDICAL STUDENT ADULT H&P (EDUCATION) - NS MD HP STUD HX OF PRESENT ILLNESS FT
85 yo F with PMH of HTN, CAD (stent x7 in 2018), DVT (formerly on Eliquis), DM, HLD, prior R fem-pop bypass and R great toe amputation admitted for L fem-pop bypass on 2/12 experienced abrupt onset of L sided weakness and dysarthria on morning of 2/13 found to have stenosis of R carotid artery, significant perfusion & flow mismatch along with prior R sided infarction in MCA distribution, admitted for management of stroke s/p angiography and ICA stent placement on 2/13 and continued recovery from L fem-pop bypass.    Patient reports that she did not sleep well so is feeling tired this morning. She also reports a new onset of non-productive cough this morning. She denies headaches, blurry vision, N/V. She does endorse a burning sensation in her stomach and states that she is hungry and would like to eat.

## 2019-02-14 NOTE — PROGRESS NOTE ADULT - ASSESSMENT
84 F s/p R ICA stent placement for acute R ICA stroke syndrome. Now recovered and doing well.      POD 1 s/p angio and right ICA stent placement successfully today  cont neuro checks q 1   CTH in am  stop plavix, add Brilinta load 180, then 90 BID  stop hep drip in 3 hrs  NV and groin checks  ADAT  SCDs  transfer to stroke service

## 2019-02-14 NOTE — PROGRESS NOTE ADULT - PROBLEM SELECTOR PLAN 9
1) PCP Contacted on Admission: (Y/N) --> Name & Phone #:  2) Date of Contact with PCP:  3) PCP Contacted at Discharge: (Y/N)  4) Summary of Handoff Given to PCP:   5) Post-Discharge Appointment Date and Location: DVT: SCDs and SQH  GI: protonix

## 2019-02-14 NOTE — MEDICAL STUDENT ADULT H&P (EDUCATION) - NS MD HP STUD ASPLAN ASSES FT
85 yo F with PMH of HTN, CAD (stent x7 in 2018), DVT (formerly on Eliquis), DM, HLD, prior R fem-pop bypass and R great toe amputation admitted for L fem-pop bypass on 2/12 experienced abrupt onset of L sided weakness and dysarthria with CT demonstrating R carotid artery stenosis and prior infarctions in R MCA distribution, s/p angiography and R ICA stent placement on 2/13, admitted for management of CVA as well as continued recovery from L fem-pop bypass (2/12).     Localization - abrupt onset L sided weakness in LUE and LLE suggests R-sided insult    DDX - Exacerbation of old infarctions 2/2 vascular surgery vs. new ischemic event (internal carotid stenosis causing decreased perfusion or embolization; R MCA occlusion). Given imaging findings and response to the intervention of angiography and R ICA stent placement, likely attributable to the R ICA stenosis since patient has significant clinical improvement.

## 2019-02-14 NOTE — PROGRESS NOTE ADULT - PROBLEM SELECTOR PLAN 7
F: none  E: replete prn  N: dash/tlc consistent carb hx of CAD with 7 stents placed. No signs of ACS on this admission.   - continue aspirin 81 mg, ticagrelor 90 BID, Lipitor 80 mg  - start metoprolol 25 ER in am w holding parameters

## 2019-02-14 NOTE — PROGRESS NOTE ADULT - PROBLEM SELECTOR PLAN 4
currently normotensive, goal 100-150  - on amlodipine 10, hydralazine 25 QID, and losartan 100 at home currently normotensive,   on amlodipine 10, hydralazine 25 QID, losartan 100 at home, metoprolol 25 mg QD  -Re-start home metoprolol 25 mg ER w/ holding parameters  -SBP goal 100-150  -Re-start additional home BP meds as tolerated

## 2019-02-15 DIAGNOSIS — H40.9 UNSPECIFIED GLAUCOMA: ICD-10-CM

## 2019-02-15 LAB
ANION GAP SERPL CALC-SCNC: 9 MMOL/L — SIGNIFICANT CHANGE UP (ref 5–17)
BUN SERPL-MCNC: 14 MG/DL — SIGNIFICANT CHANGE UP (ref 7–23)
CALCIUM SERPL-MCNC: 8.7 MG/DL — SIGNIFICANT CHANGE UP (ref 8.4–10.5)
CHLORIDE SERPL-SCNC: 108 MMOL/L — SIGNIFICANT CHANGE UP (ref 96–108)
CO2 SERPL-SCNC: 23 MMOL/L — SIGNIFICANT CHANGE UP (ref 22–31)
CREAT SERPL-MCNC: 0.78 MG/DL — SIGNIFICANT CHANGE UP (ref 0.5–1.3)
GLUCOSE BLDC GLUCOMTR-MCNC: 120 MG/DL — HIGH (ref 70–99)
GLUCOSE BLDC GLUCOMTR-MCNC: 136 MG/DL — HIGH (ref 70–99)
GLUCOSE BLDC GLUCOMTR-MCNC: 138 MG/DL — HIGH (ref 70–99)
GLUCOSE BLDC GLUCOMTR-MCNC: 225 MG/DL — HIGH (ref 70–99)
GLUCOSE SERPL-MCNC: 126 MG/DL — HIGH (ref 70–99)
HCT VFR BLD CALC: 25.4 % — LOW (ref 34.5–45)
HGB BLD-MCNC: 8.1 G/DL — LOW (ref 11.5–15.5)
MAGNESIUM SERPL-MCNC: 2.1 MG/DL — SIGNIFICANT CHANGE UP (ref 1.6–2.6)
MCHC RBC-ENTMCNC: 29.9 PG — SIGNIFICANT CHANGE UP (ref 27–34)
MCHC RBC-ENTMCNC: 31.9 GM/DL — LOW (ref 32–36)
MCV RBC AUTO: 93.7 FL — SIGNIFICANT CHANGE UP (ref 80–100)
NRBC # BLD: 0 /100 WBCS — SIGNIFICANT CHANGE UP (ref 0–0)
PHOSPHATE SERPL-MCNC: 2.8 MG/DL — SIGNIFICANT CHANGE UP (ref 2.5–4.5)
PLATELET # BLD AUTO: 134 K/UL — LOW (ref 150–400)
POTASSIUM SERPL-MCNC: 4.4 MMOL/L — SIGNIFICANT CHANGE UP (ref 3.5–5.3)
POTASSIUM SERPL-SCNC: 4.4 MMOL/L — SIGNIFICANT CHANGE UP (ref 3.5–5.3)
RBC # BLD: 2.71 M/UL — LOW (ref 3.8–5.2)
RBC # FLD: 14.7 % — HIGH (ref 10.3–14.5)
SODIUM SERPL-SCNC: 140 MMOL/L — SIGNIFICANT CHANGE UP (ref 135–145)
SURGICAL PATHOLOGY STUDY: SIGNIFICANT CHANGE UP
VANCOMYCIN TROUGH SERPL-MCNC: 27.1 UG/ML — CRITICAL HIGH (ref 10–20)
WBC # BLD: 8.85 K/UL — SIGNIFICANT CHANGE UP (ref 3.8–10.5)
WBC # FLD AUTO: 8.85 K/UL — SIGNIFICANT CHANGE UP (ref 3.8–10.5)

## 2019-02-15 PROCEDURE — 99233 SBSQ HOSP IP/OBS HIGH 50: CPT

## 2019-02-15 PROCEDURE — 99232 SBSQ HOSP IP/OBS MODERATE 35: CPT

## 2019-02-15 RX ORDER — LATANOPROST 0.05 MG/ML
1 SOLUTION/ DROPS OPHTHALMIC; TOPICAL AT BEDTIME
Qty: 0 | Refills: 0 | Status: DISCONTINUED | OUTPATIENT
Start: 2019-02-15 | End: 2019-02-26

## 2019-02-15 RX ORDER — LATANOPROST 0.05 MG/ML
1 SOLUTION/ DROPS OPHTHALMIC; TOPICAL AT BEDTIME
Qty: 0 | Refills: 0 | Status: DISCONTINUED | OUTPATIENT
Start: 2019-02-15 | End: 2019-02-15

## 2019-02-15 RX ORDER — BRIMONIDINE TARTRATE 2 MG/MG
1 SOLUTION/ DROPS OPHTHALMIC THREE TIMES A DAY
Qty: 0 | Refills: 0 | Status: DISCONTINUED | OUTPATIENT
Start: 2019-02-15 | End: 2019-02-26

## 2019-02-15 RX ORDER — LANOLIN ALCOHOL/MO/W.PET/CERES
5 CREAM (GRAM) TOPICAL AT BEDTIME
Qty: 0 | Refills: 0 | Status: DISCONTINUED | OUTPATIENT
Start: 2019-02-15 | End: 2019-02-26

## 2019-02-15 RX ORDER — POLYETHYLENE GLYCOL 3350 17 G/17G
17 POWDER, FOR SOLUTION ORAL ONCE
Qty: 0 | Refills: 0 | Status: COMPLETED | OUTPATIENT
Start: 2019-02-15 | End: 2019-02-15

## 2019-02-15 RX ORDER — TIMOLOL 0.5 %
1 DROPS OPHTHALMIC (EYE)
Qty: 0 | Refills: 0 | Status: DISCONTINUED | OUTPATIENT
Start: 2019-02-15 | End: 2019-02-26

## 2019-02-15 RX ADMIN — Medication 650 MILLIGRAM(S): at 06:14

## 2019-02-15 RX ADMIN — Medication 4: at 11:43

## 2019-02-15 RX ADMIN — TICAGRELOR 90 MILLIGRAM(S): 90 TABLET ORAL at 06:13

## 2019-02-15 RX ADMIN — ATORVASTATIN CALCIUM 80 MILLIGRAM(S): 80 TABLET, FILM COATED ORAL at 22:21

## 2019-02-15 RX ADMIN — Medication 100 MILLIGRAM(S): at 13:42

## 2019-02-15 RX ADMIN — Medication 650 MILLIGRAM(S): at 11:24

## 2019-02-15 RX ADMIN — TICAGRELOR 90 MILLIGRAM(S): 90 TABLET ORAL at 17:48

## 2019-02-15 RX ADMIN — BRIMONIDINE TARTRATE 1 DROP(S): 2 SOLUTION/ DROPS OPHTHALMIC at 06:15

## 2019-02-15 RX ADMIN — Medication 1 DROP(S): at 17:47

## 2019-02-15 RX ADMIN — Medication 650 MILLIGRAM(S): at 00:30

## 2019-02-15 RX ADMIN — BRIMONIDINE TARTRATE 1 DROP(S): 2 SOLUTION/ DROPS OPHTHALMIC at 17:48

## 2019-02-15 RX ADMIN — BRIMONIDINE TARTRATE 1 DROP(S): 2 SOLUTION/ DROPS OPHTHALMIC at 22:22

## 2019-02-15 RX ADMIN — LATANOPROST 1 DROP(S): 0.05 SOLUTION/ DROPS OPHTHALMIC; TOPICAL at 22:35

## 2019-02-15 RX ADMIN — Medication 650 MILLIGRAM(S): at 17:48

## 2019-02-15 RX ADMIN — Medication 100 MILLIGRAM(S): at 06:14

## 2019-02-15 RX ADMIN — Medication 500 MILLIGRAM(S): at 06:14

## 2019-02-15 RX ADMIN — Medication 25 MILLIGRAM(S): at 06:15

## 2019-02-15 RX ADMIN — PANTOPRAZOLE SODIUM 40 MILLIGRAM(S): 20 TABLET, DELAYED RELEASE ORAL at 06:57

## 2019-02-15 RX ADMIN — Medication 81 MILLIGRAM(S): at 11:24

## 2019-02-15 RX ADMIN — SENNA PLUS 2 TABLET(S): 8.6 TABLET ORAL at 22:21

## 2019-02-15 RX ADMIN — Medication 100 MILLIGRAM(S): at 22:23

## 2019-02-15 RX ADMIN — POLYETHYLENE GLYCOL 3350 17 GRAM(S): 17 POWDER, FOR SOLUTION ORAL at 22:34

## 2019-02-15 RX ADMIN — HEPARIN SODIUM 5000 UNIT(S): 5000 INJECTION INTRAVENOUS; SUBCUTANEOUS at 06:14

## 2019-02-15 RX ADMIN — HEPARIN SODIUM 5000 UNIT(S): 5000 INJECTION INTRAVENOUS; SUBCUTANEOUS at 13:42

## 2019-02-15 RX ADMIN — Medication 650 MILLIGRAM(S): at 07:17

## 2019-02-15 RX ADMIN — Medication 166.67 MILLIGRAM(S): at 06:13

## 2019-02-15 RX ADMIN — Medication 500 MILLIGRAM(S): at 17:48

## 2019-02-15 RX ADMIN — HEPARIN SODIUM 5000 UNIT(S): 5000 INJECTION INTRAVENOUS; SUBCUTANEOUS at 22:22

## 2019-02-15 RX ADMIN — Medication 1 DROP(S): at 06:15

## 2019-02-15 NOTE — PROGRESS NOTE ADULT - ASSESSMENT
85 yo female with f HTN, CAD (stent x7 in 2018), DVT (formerly on Eliquis), DM, HLD, PAD s/p R fem-pop bypass and R great toe amputation admitted on 2/11/19 for L fem-pop bypass on 2/12. 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 on 7 [facility] for further stroke workup.

## 2019-02-15 NOTE — MEDICAL STUDENT ADULT H&P (EDUCATION) - NS MD HP STUD ASPLAN ASSES FT
85 yo F with PMH CAD (stentx7), DVT (formerly on Eliquis), HTN, DM, HLD s/p L fem-pop bypass on 2/12 (for L foot gangrene) found with L sided weakness, dysarthria on 2/13 found to have R carotid stenosis and R infarct in M2 distribution s/p R ICA stent (2/13) demonstrating continued clinical improvement since stent placement.

## 2019-02-15 NOTE — OCCUPATIONAL THERAPY INITIAL EVALUATION ADULT - GENERAL OBSERVATIONS, REHAB EVAL
Pt cleared for OT by BINA Jimenez (covering). Pt received semi-supine in bed in NAD, +tele, +heplock, +IV, +staples CDI on medial aspect of LLE, +staples on anterior aspect of L foot (C/D/I), +daughter and granddaughter present

## 2019-02-15 NOTE — OCCUPATIONAL THERAPY INITIAL EVALUATION ADULT - PLANNED THERAPY INTERVENTIONS, OT EVAL
balance training/IADL retraining/ROM/strengthening/ADL retraining/bed mobility training/fine motor coordination training/neuromuscular re-education/transfer training

## 2019-02-15 NOTE — PROGRESS NOTE ADULT - PROBLEM SELECTOR PLAN 7
hx of CAD with 7 stents placed. No signs of ACS on this admission.   - continue aspirin 81 mg, ticagrelor 90 BID, Lipitor 80 mg  - start metoprolol 25 ER in am w holding parameters hx of CAD with 7 stents placed. No signs of ACS on this admission.   - continue aspirin 81 mg, ticagrelor 90 BID, Lipitor 80 mg  - started metoprolol 25 ER in am w holding parameters

## 2019-02-15 NOTE — OCCUPATIONAL THERAPY INITIAL EVALUATION ADULT - COORDINATION ASSESSED, REHAB EVAL
finger to nose/no dysmetria noted, slow performance, min-mod verbal cues to perform no dysmetria noted, slow performance, min-mod verbal cues to perform/finger to nose

## 2019-02-15 NOTE — CONSULT NOTE ADULT - SUBJECTIVE AND OBJECTIVE BOX
Patient is a 84y old  Female who presents with a chief complaint of Left foot gangrene (14 Feb 2019 22:03)       HPI:  85 yo female with h/o DM and HLD s/p right leg bypass and R great toe amputation, now healed, presents with a nonhealing L great toe ulcer, which has been previously debrided. 11/13 Aniogram shows patent aorta and bilateral iliac vessels. Patent L CFA and profunda. L SFA occluded at the origin. Extensive collateralization throughout leg with no named vessels until DP reconstitutes distally. Patient notes that she has been having increased claudication symptoms in the left leg with walking. Denies CP, SOB. (11 Feb 2019 15:04)      PAST MEDICAL & SURGICAL HISTORY:  MRSA infection: right great toe ( amputated)  Glaucoma: b/l  Falls  Depression  Pulmonary embolism  DVT (deep venous thrombosis): Right lower extremity  MI (myocardial infarction): at age 65 y.o  Peripheral vascular disease  Ulcer of lower limb  Arthropathy  Ischemic heart disease  Hypertension  Hyperlipidemia  Diabetes mellitus: II  S/P amputation: may 2018; right great toe  History of surgery: x7 coronary artery stents  History of surgery: right leg bypass 5/2018 with amputation of right great toe      MEDICATIONS  (STANDING):  acetaminophen   Tablet .. 650 milliGRAM(s) Oral every 6 hours  aspirin enteric coated 81 milliGRAM(s) Oral daily  atorvastatin 80 milliGRAM(s) Oral at bedtime  brimonidine 0.2% Ophthalmic Solution 1 Drop(s) Both EYES three times a day  ciprofloxacin     Tablet 500 milliGRAM(s) Oral every 12 hours  dextrose 5%. 1000 milliLiter(s) (50 mL/Hr) IV Continuous <Continuous>  dextrose 50% Injectable 12.5 Gram(s) IV Push once  dextrose 50% Injectable 25 Gram(s) IV Push once  dextrose 50% Injectable 25 Gram(s) IV Push once  docusate sodium 100 milliGRAM(s) Oral three times a day  heparin  Injectable 5000 Unit(s) SubCutaneous every 8 hours  insulin lispro (HumaLOG) corrective regimen sliding scale   SubCutaneous Before meals and at bedtime  latanoprost 0.005% Ophthalmic Solution 1 Drop(s) Both EYES at bedtime  metoprolol succinate ER 25 milliGRAM(s) Oral daily  pantoprazole    Tablet 40 milliGRAM(s) Oral before breakfast  ticagrelor 90 milliGRAM(s) Oral two times a day  timolol 0.5% Solution 1 Drop(s) Both EYES two times a day  vancomycin  IVPB 1250 milliGRAM(s) IV Intermittent every 12 hours    MEDICATIONS  (PRN):  acetaminophen   Tablet .. 650 milliGRAM(s) Oral every 6 hours PRN Mild Pain (1 - 3)  dextrose 40% Gel 15 Gram(s) Oral once PRN Blood Glucose LESS THAN 70 milliGRAM(s)/deciliter  glucagon  Injectable 1 milliGRAM(s) IntraMuscular once PRN Glucose LESS THAN 70 milligrams/deciliter  metoclopramide Injectable 10 milliGRAM(s) IV Push once PRN Nausea and/or Vomiting  ondansetron Injectable 4 milliGRAM(s) IV Push every 6 hours PRN Nausea  senna 2 Tablet(s) Oral daily PRN Constipation      Social History: resident at [facility] since 6/2018, has a daughter    Functional Level Prior to Admission: partial assistance in ADL's, was walking with a walker in P.T.    FAMILY HISTORY:  No pertinent family history in first degree relatives      CBC Full  -  ( 15 Feb 2019 07:06 )  WBC Count : 8.85 K/uL  Hemoglobin : 8.1 g/dL  Hematocrit : 25.4 %  Platelet Count - Automated : 134 K/uL  Mean Cell Volume : 93.7 fl  Mean Cell Hemoglobin : 29.9 pg  Mean Cell Hemoglobin Concentration : 31.9 gm/dL  Auto Neutrophil # : x  Auto Lymphocyte # : x  Auto Monocyte # : x  Auto Eosinophil # : x  Auto Basophil # : x  Auto Neutrophil % : x  Auto Lymphocyte % : x  Auto Monocyte % : x  Auto Eosinophil % : x  Auto Basophil % : x      02-15    140  |  108  |  14  ----------------------------<  126<H>  4.4   |  23  |  0.78    Ca    8.7      15 Feb 2019 07:05  Phos  2.8     02-15  Mg     2.1     02-15    TPro  5.7<L>  /  Alb  3.3  /  TBili  0.4  /  DBili  x   /  AST  14  /  ALT  16  /  AlkPhos  77  02-13            Radiology:    < from: CT Head No Cont (02.14.19 @ 11:26) >  EXAM:  CT BRAIN                          PROCEDURE DATE:  02/14/2019          INTERPRETATION:  PROCEDURE: CT brain without intravenous contrast    INDICATION: CVA    TECHNIQUE: Multiple axial images were obtained at 5 mm intervals from the   skull base to the vertex utilizing a portable CT scanner. The images were   reviewed in brain and bone windows.    COMPARISON: CT brain 2/13/2019    FINDINGS: The CT examination limited by motion artifact and positioning.   There is no significant change inthe hypodensities within the right   anterior frontal and right frontoparietal lobes. There is additional   infarct extending from the right basal ganglia to the corona radiata with   ex vacuo dilatation of the right frontal horn. The ventricles are stable   in size. There is no intracranial hemorrhage. 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     The bony windows demonstrates no fractures. The lenses are absent which   may be secondary to prior cataract surgery. The visualized paranasal   sinuses are within normal limits. The mastoid air cells are well aerated.    IMPRESSION: Limited exam. No intracranial hemorrhage. Stable infarcts. No   intracranial hemorrhage.         < from: CT Perfusion w/ Maps w/ IV Cont (02.13.19 @ 10:01) >  EXAM:  CT PERFUSION W MAPS IC                          PROCEDURE DATE:  02/13/2019          INTERPRETATION:  PROCEDURE: CT Perfusion with intravenous contrast.    INDICATION: Altered mental status and slurred speech. Status post femoral   distal bypass postop day 1. Rule out CVA.    TECHNIQUE: Following the intravenous administration of 40 ml of  Optiray   350, serial axial images were obtained through the brain. The CT   perfusion data set was post [facility]   generating color maps of CBF, CBV, MTT, and Tmax.    COMPARISON: None    FINDINGS: The color maps from the CT perfusion study demonstrates a large   wedge shaped area of elevated MTT involving the right frontal, temporal   and parietal lobe. There is focal area of corresponding decrease in CBF   and CBV in the right parietal lobe. The calculated RAPID CBF volume < 30%   is 8 ml and a Tmax volume > 6 seconds is 52 ml. The mismatch volume is 44   ml and mismatch ratio is 6.5.    IMPRESSION: Abnormal perfusion with RAPID calculated mismatch volume of   44 ml and mismatch ratio is 6.5 within the right MCA territory.          < from: CT Angio Head w/ IV Cont (02.13.19 @ 10:02) >  EXAM:  CT ANGIO NECK (W)AW IC                          EXAM:  CT ANGIO BRAIN (W)AW IC                          PROCEDURE DATE:  02/13/2019          INTERPRETATION:  PROCEDURE: CTA brain with and without intravenous   contrast.    INDICATION: Altered mental status and slurred speech    TECHNIQUE: Multiple thin section axial images were obtained through the   [place] [facility] [place] following the intravenous injection of 75 ml of Optiray   350. Multiple 3-D reformatted images were generated from the axial cuts.      COMPARISON: None    FINDINGS: The CTA examination demonstrates a attenuated caliber to the   right internal carotid artery extending to the carotid terminus. The   right A1 segment is aplastic. The right middle cerebral artery is   asymmetrically smaller. There is a normal right MCA bifurcation.    The left internal carotid artery is normal in caliber. There is a normal   bifurcation into the left A1 and M1 segments. The anterior communicating   artery is patent and supplies the right A2 segment.    The right vertebral artery is underdeveloped. The vertebral arteries are   normal in caliber. The basilar artery is normal in caliber. There is a   normal bifurcation into the posterior cerebral arteries.     IMPRESSION: Attenuated caliber to the right internal carotid artery and   right middle cerebral artery which may be secondary to the proximal   severe carotid stenosis. No large vessel occlusion.    PROCEDURE: CTA neck with and without intravenous contrast.    INDICATION: Altered mental status and slurred speech    TECHNIQUE: Multiple axial thin section were obtained through the neck   following the intravenous injection of 75 ml of Optiray 350. Multiple 3-D   reformatted images were generated from the axial cuts.      COMPARISON: None    FINDINGS: The CTA examination demonstrates extensive calcification with   atherosclerotic plaque involving the distal right common carotid artery,   common carotid artery bifurcation and proximal right internal carotid   artery. There is resulting severe narrowing. The narrowest luminal   diameter is less than 1 mm and difficult to adequately measure. There is   an attenuated caliber to the distal right internal carotid artery   extending to the skull base. There is a retropharyngeal course to the   midportion of the right internal carotid artery.    The left common carotid artery is normal in caliber. There is a normal   bifurcation into the left internal and external carotid arteries. There   is no hemodynamically significant stenosis. There also is a   retropharyngeal course to the midportion of the left internal carotid   artery.     The right vertebral artery is underdeveloped. There is an ectatic course   to the proximal right vertebral artery. The vertebral arteries are normal   in caliber.    There is a common origin to the right innominate and left common carotid   artery. The aortic arch appears intact without narrowing of the origin of   the great vessels.    IMPRESSION: Severe right carotid stenosis.              Vital Signs Last 24 Hrs  T(C): 36.7 (15 Feb 2019 05:00), Max: 38.1 (14 Feb 2019 17:25)  T(F): 98 (15 Feb 2019 05:00), Max: 100.6 (14 Feb 2019 17:25)  HR: 60 (15 Feb 2019 08:32) (60 - 86)  BP: 110/56 (15 Feb 2019 08:32) (107/47 - 161/51)  BP(mean): 80 (15 Feb 2019 08:32) (54 - 121)  RR: 13 (15 Feb 2019 08:32) (13 - 26)  SpO2: 100% (15 Feb 2019 08:32) (98% - 100%)    REVIEW OF SYSTEMS:    CONSTITUTIONAL:  fatigue  EYES: No eye pain, visual disturbances, or discharge  ENMT:  No difficulty hearing, tinnitus, vertigo; No sinus or throat pain  NECK: No pain or stiffness  BREASTS: No pain, masses, or nipple discharge  RESPIRATORY: No cough, wheezing, chills or hemoptysis; No shortness of breath  CARDIOVASCULAR: No chest pain, palpitations, dizziness, or leg swelling  GASTROINTESTINAL: No abdominal or epigastric pain. No nausea, vomiting, or hematemesis; No diarrhea or constipation. No melena or hematochezia.  GENITOURINARY: No dysuria, frequency, hematuria, or incontinence  NEUROLOGICAL: No headaches, memory loss, loss of strength, numbness, or tremors  SKIN: No itching, burning, rashes, or lesions   LYMPH NODES: No enlarged glands  ENDOCRINE: No heat or cold intolerance; No hair loss  MUSCULOSKELETAL: No joint pain or swelling; No muscle, back, or extremity pain  PSYCHIATRIC: No depression, anxiety, mood swings, or difficulty sleeping  HEME/LYMPH: No easy bruising, or bleeding gums  ALLERGY AND IMMUNOLOGIC: No hives or eczema  VASCULAR: no swelling, erythema      Physical Exam: 85 yo AA woman lying in semi Armenta's position, c/o generalized weakness    Head: normocephalic, atraumatic    Eyes: PERRLA, EOMI, no nystagmus, sclera anicteric    ENT: nasal discharge, uvula midline, no oropharyngeal erythema/exudate    Neck: supple, negative JVD, negative carotid bruits, no thyromegaly, R EJ IV    Chest: CTA bilaterally, neg wheeze, rhonchi, rales, crackles, egophany    Cardiovascular: regular rate and rhythm, neg murmurs/rubs/gallops    Abdomen: soft, non distended, non tender, negative rebound/guarding, normal bowel sounds, neg hepatosplenomegaly    Extremities:  L LE dressing with ISHAN drain, R I toe amputation with dressing    :     Neurologic Exam:    Alert and oriented to person, place, date/year, speech fluent w/ dysarthria    Cranial Nerves:     II:                      left surgical pupil,  R round and reactive to light, visual fields intact   III/ IV/VI:            extraocular movements intact, neg nystagmus, ptosis  V:                       facial sensation intact, V1-3 normal  VII:                     face symmetric, no droop, normal eye closure and smile  VIII:                    hearing intact to finger rub bilaterally  IX/ X:                 soft palate rise symmetrical  XI:                      head turning, shoulder shrug normal  XII:                     tongue midline    Motor Exam:    Upper Extremities:     RIght:  > 3+/5    Left :    > 3+/5  Lower Extremities:                 Right:     > 3+/5    Left:       3-/5 sec to pain      Sensory:    intact to LT/PP in all UE/LE dermatomes    DTR:            = biceps/     triceps/     brachioradialis                      = patella/   medial hamstring/ankle                      neg clonus                      neg Babinski                      neg Hoffmans      Gait:  not tested        PM&R Impression:    1) s/p L fem-pop bypass on 2/12. Stroke code called on 2/13, s/p angiography and R ICA stent placement on 2/13, Stroke code on 2/13: found to have significant R carotid stenosis on CTA, and CT       perfusion showed significant perfusion & flow mismatch along with prior R sided infarction in MCA distribution.  now s/p R carotid stent by Neurosurgery.     Recommendations:    1) Physical therapy focusing on therapeutic exercises, bed mobility/transfer out of bed evaluation, progressive ambulation with assistive devices prn.    2) Anticipated Disposition Plan/Recs: return to [facility]

## 2019-02-15 NOTE — OCCUPATIONAL THERAPY INITIAL EVALUATION ADULT - MANUAL MUSCLE TESTING RESULTS, REHAB EVAL
RUE shoulder flex/ext 3-/5, elbow flex/ext 3+/5,  4/5 , LUE shoulder 3-/5, 2+/5 (2/2 to arthritis), elbow flex/ext 3/5,  3+/5, smile/cheekpuff/eyebrow symmetrical, tongue in midline

## 2019-02-15 NOTE — OCCUPATIONAL THERAPY INITIAL EVALUATION ADULT - RANGE OF MOTION EXAMINATION, UPPER EXTREMITY
Granddaughter reports pt is unable to achieve >70 degrees at baseline 2/2 to arthritis in LUE/Left UE Active ROM was WFL (within functional limits)

## 2019-02-15 NOTE — MEDICAL STUDENT ADULT H&P (EDUCATION) - NS MD HP STUD PE NEURO FT
Awake, alert, and oriented to person, place, time (month & year)   Language - slight dysarthria but speech is coherent and fluent. Repetition intact. Follows commands  Memory - patient states age and month of birth   CNII - PERRLA, visual fields intact b/l   CNIII, CNIV, CNVI - EOM intact b/l   CNV - Sensation intact b/l in distribution of V1-V3  CNVII - no noted facial droop or asymmetry. Patient able to smile widely and raise eyebrows b/l   CNVIII - hearing intact b/l   CNIX, CNX - palatal elevation intact   CNXI - patient able to resist side to side head motion, able to shrug shoulders b/l   CNXII - tongue protrudes in the midline and moves laterally   Motor - appropriate bulk and tone with no noted rigidity or spasticity. Strength on R is 4/5 in RUE and RLE, 2/5 on LLE (likely limited by pain from surgical site), 3/5 on LUE.   Coordination - no dysmetria noted b/l but slight intention tremor b/l   Gait -deferred  NIHSS - 6

## 2019-02-15 NOTE — PROGRESS NOTE ADULT - PROBLEM SELECTOR PLAN 1
Patient with new-onset dysarthria and L sided weakness on 2/13 after L fem-pop.  Stroke code on 2/13: found to have significant R carotid stenosis on CTA, and CT perfusion showed significant perfusion & flow mismatch along with prior R sided infarction in MCA distribution.    now s/p R carotid stent by Neurosurgery.   - symptoms improved, currently with only mild dysarthria   - repeat CT head on 2/14 showed stable infarcts from prior  - continue lipitor 80, brilinta 90 BID, ASA 81  - PT/OT recs  - f/u Neurosurgery recs  for post-procedure management   - for carotid Duplex U/S 30 days post-procedure   - BP goal: keep -150 mm Hg

## 2019-02-15 NOTE — PROGRESS NOTE ADULT - PROBLEM SELECTOR PLAN 3
s/p R leg bypass and right great toe amputation in 2018.   s/p L fem pop bypass by vascular surgery on 2/12 due to L foot gangrene.  No known post-op complications at this time  - f/u vascular recs for post-op management  - continue vancomycin (trough 2/16 am) and ciprofloxacin  - Hold home apixaban

## 2019-02-15 NOTE — CONSULT NOTE ADULT - ASSESSMENT
85 yo female with f HTN, CAD (stent x7 in 2018), DVT (formerly on Eliquis), DM, HLD, PAD s/p R fem-pop bypass and R great toe amputation admitted on 2/11/19 for L fem-pop bypass on 2/12. 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 on 7 [place] for further stroke workup.    Problem/Plan - 1:  ·  Problem: CVA (cerebral vascular accident).  Plan: Patient with new-onset dysarthria and L sided weakness on 2/13 after L fem-pop.  Stroke code on 2/13: found to have significant R carotid stenosis on CTA, and CT perfusion showed significant perfusion & flow mismatch along with prior R sided infarction in MCA distribution.    now s/p R carotid stent by Neurosurgery.   - symptoms improved, currently with only mild dysarthria   - repeat CT head on 2/14 showed stable infarcts from prior  - continue lipitor 80, brilinta 90 BID, ASA 81  - f/u Neurosurgery recs  for post-procedure management   - for carotid Duplex U/S 30 days post-procedure   - BP goal: keep -150 mm Hg.    Problem/Plan - 2:  ·  Problem: Diabetes mellitus.  Plan: - HbA1c 6.4% on 2/14/19.   - on metformin at home  continue ISS  Carb consistent diet.    Problem/Plan - 3:  ·  Problem: Peripheral vascular disease.  Plan: s/p R leg bypass and right great toe amputation in 2018.   s/p L fem pop bypass by vascular surgery on 2/12 due to L foot gangrene.  No known post-op complications at this time  - f/u vascular recs for post-op management  - continue vancomycin (trough 2/16 am) and ciprofloxacin  - Hold home apixaban.    Problem/Plan - 4:  ·  Problem: Hypertension.  Plan: currently normotensive,   on amlodipine 10, hydralazine 25 QID, losartan 100 at home, metoprolol 25 mg QD  -Re-start home metoprolol 25 mg ER w/ holding parameters  -SBP goal 100-150  -Re-start additional home BP meds as tolerated.    Problem/Plan - 5:  ·  Problem: Hyperlipidemia.  Plan: continue lipitor 80.     Problem/Plan - 6:  Problem: Glaucoma.Plan: Patient with left pupil unreactive, likely surgical pupil  -Continue brimonidine eye drops.    Problem/Plan - 7:  ·  Problem: Coronary artery disease. Plan: hx of CAD with 7 stents placed. No signs of ACS on this admission.   - continue aspirin 81 mg, ticagrelor 90 BID, Lipitor 80 mg  - start metoprolol 25 ER in am w holding parameters.

## 2019-02-15 NOTE — PROGRESS NOTE ADULT - PROBLEM SELECTOR PLAN 2
- HbA1c 6.4% on 2/14/19.   - on metformin at home  continue ISS  Carb consistent diet - HbA1c 6.4% on 2/14/19.   - on metformin at home  continue mISS  Carb consistent diet

## 2019-02-15 NOTE — PROGRESS NOTE ADULT - SUBJECTIVE AND OBJECTIVE BOX
INTERVAL HISTORY:  The patient has no chest pain or dyspnea. He continues to have slightly slowed speech and a cough. Her speech has improved.	  Chart, [facility] medications and allergies reviewed    MEDICATIONS:  MEDICATIONS  (STANDING):  acetaminophen   Tablet .. 650 milliGRAM(s) Oral every 6 hours  aspirin enteric coated 81 milliGRAM(s) Oral daily  atorvastatin 80 milliGRAM(s) Oral at bedtime  brimonidine 0.2% Ophthalmic Solution 1 Drop(s) Both EYES three times a day  ciprofloxacin     Tablet 500 milliGRAM(s) Oral every 12 hours  dextrose 5%. 1000 milliLiter(s) (50 mL/Hr) IV Continuous <Continuous>  dextrose 50% Injectable 12.5 Gram(s) IV Push once  dextrose 50% Injectable 25 Gram(s) IV Push once  dextrose 50% Injectable 25 Gram(s) IV Push once  docusate sodium 100 milliGRAM(s) Oral three times a day  heparin  Injectable 5000 Unit(s) SubCutaneous every 8 hours  insulin lispro (HumaLOG) corrective regimen sliding scale   SubCutaneous Before meals and at bedtime  latanoprost 0.005% Ophthalmic Solution 1 Drop(s) Both EYES at bedtime  metoprolol succinate ER 25 milliGRAM(s) Oral daily  pantoprazole    Tablet 40 milliGRAM(s) Oral before breakfast  ticagrelor 90 milliGRAM(s) Oral two times a day  timolol 0.5% Solution 1 Drop(s) Both EYES two times a day  vancomycin  IVPB 1250 milliGRAM(s) IV Intermittent every 12 hours      REVIEW OF SYSTEMS:  CONSTITUTIONAL: No fever, no weight loss, no fatigue  PULMONARY: +cough, no wheezing, chills no hemoptysis; No Shortness of Breath  CARDIOVASCULAR: No chest pain, no palpitations, no syncope, dizziness, no leg swelling  GASTROINTESTINAL: No abdominal pain. No nausea, no  vomiting, no hematemesis; No diarrhea, no constipation. No melena, no hematochezia.  GENITOURINARY: No dysuria, no frequency, no hematuria, no incontinence  SKIN: No itching, no burning, no rashes, no lesions     PHYSICAL EXAM:  T(C): 36.7 (02-15-19 @ 09:00), Max: 38.1 (02-14-19 @ 17:25)  HR: 60 (02-15-19 @ 08:32) (60 - 86)  BP: 110/56 (02-15-19 @ 08:32) (107/47 - 161/51)  RR: 13 (02-15-19 @ 08:32) (13 - 25)  SpO2: 100% (02-15-19 @ 08:32) (98% - 100%)  Wt(kg): --  I&O's Summary    14 Feb 2019 07:01  -  15 Feb 2019 07:00  --------------------------------------------------------  IN: 970 mL / OUT: 640 mL / NET: 330 mL        Appearance:  No deformities, normal appearance	  HEENT:   Normal oral mucosa, PERRL, EOMI	  Neck: No jvd , no masses  Lymphatic: No  lymphadenopathy  Pulmonary: Lungs clear to auscultation and percussion  Cardiovascular: Normal S1 S2, No JVD, No murmur, No edema	  Abdomen:  Soft, Non-tender, + BS  Skin: No rashes, No ecchymoses	  Extremities:  No clubbing or cyanosis  MSK: Normal range of motion, no joint swelling    LABS:		  CARDIAC MARKERS:                         8.1    8.85  )-----------( 134      ( 15 Feb 2019 07:06 )             25.4   02-15    140  |  108  |  14  ----------------------------<  126<H>  4.4   |  23  |  0.78    Ca    8.7      15 Feb 2019 07:05  Phos  2.8     02-15  Mg     2.1     02-15      proBNP:  Lipid Profile:  HgA1c:  TSH:  PTT - ( 14 Feb 2019 03:01 )  PTT:54.7 sec    Culture - Blood (collected 02-11-19 @ 14:49)  Source: .Blood Blood  Preliminary Report (02-14-19 @ 15:00):    No growth at 3 days.    Culture - Blood (collected 02-11-19 @ 14:49)  Source: .Blood Blood  Preliminary Report (02-14-19 @ 15:00):    No growth at 3 days.      TELEMETRY: 	           ECG:  	            RADIOLOGY:   DIAGNOSTIC TESTING: [ ] Echocardiogram: [ ]  Catheterization: [ ] Stress Test:      ASSESSMENT/PLAN: INTERVAL HISTORY:  The patient has no chest pain or dyspnea. He continues to have slightly slowed speech and a cough. Her speech has improved.	  Chart, [facility] medications and allergies reviewed    MEDICATIONS:  MEDICATIONS  (STANDING):  acetaminophen   Tablet .. 650 milliGRAM(s) Oral every 6 hours  aspirin enteric coated 81 milliGRAM(s) Oral daily  atorvastatin 80 milliGRAM(s) Oral at bedtime  brimonidine 0.2% Ophthalmic Solution 1 Drop(s) Both EYES three times a day  ciprofloxacin     Tablet 500 milliGRAM(s) Oral every 12 hours  dextrose 5%. 1000 milliLiter(s) (50 mL/Hr) IV Continuous <Continuous>  dextrose 50% Injectable 12.5 Gram(s) IV Push once  dextrose 50% Injectable 25 Gram(s) IV Push once  dextrose 50% Injectable 25 Gram(s) IV Push once  docusate sodium 100 milliGRAM(s) Oral three times a day  heparin  Injectable 5000 Unit(s) SubCutaneous every 8 hours  insulin lispro (HumaLOG) corrective regimen sliding scale   SubCutaneous Before meals and at bedtime  latanoprost 0.005% Ophthalmic Solution 1 Drop(s) Both EYES at bedtime  metoprolol succinate ER 25 milliGRAM(s) Oral daily  pantoprazole    Tablet 40 milliGRAM(s) Oral before breakfast  ticagrelor 90 milliGRAM(s) Oral two times a day  timolol 0.5% Solution 1 Drop(s) Both EYES two times a day  vancomycin  IVPB 1250 milliGRAM(s) IV Intermittent every 12 hours      REVIEW OF SYSTEMS:  CONSTITUTIONAL: No fever, no weight loss, no fatigue  PULMONARY: +cough, no wheezing, chills no hemoptysis; No Shortness of Breath  CARDIOVASCULAR: No chest pain, no palpitations, no syncope, dizziness, no leg swelling  GASTROINTESTINAL: No abdominal pain. No nausea, no  vomiting, no hematemesis; No diarrhea, no constipation. No melena, no hematochezia.  GENITOURINARY: No dysuria, no frequency, no hematuria, no incontinence  SKIN: No itching, no burning, no rashes, no lesions     PHYSICAL EXAM:  T(C): 36.7 (02-15-19 @ 09:00), Max: 38.1 (02-14-19 @ 17:25)  HR: 60 (02-15-19 @ 08:32) (60 - 86)  BP: 110/56 (02-15-19 @ 08:32) (107/47 - 161/51)  RR: 13 (02-15-19 @ 08:32) (13 - 25)  SpO2: 100% (02-15-19 @ 08:32) (98% - 100%)  Wt(kg): --  I&O's Summary    14 Feb 2019 07:01  -  15 Feb 2019 07:00  --------------------------------------------------------  IN: 970 mL / OUT: 640 mL / NET: 330 mL        Appearance:  No deformities, normal appearance	  HEENT:   Normal oral mucosa, PERRL, EOMI	  Neck: No jvd , no masses  Lymphatic: No  lymphadenopathy  Pulmonary: Lungs clear to auscultation and percussion  Cardiovascular: Normal S1 S2, No JVD, No murmur, No edema	  Abdomen:  Soft, Non-tender, + BS  Skin: No rashes, No ecchymoses	  Extremities:  No clubbing or cyanosis. L leg bandaged tr edema, bilateral toe amputations  MSK: Normal range of motion, no joint swelling    LABS:		  CARDIAC MARKERS:                         8.1    8.85  )-----------( 134      ( 15 Feb 2019 07:06 )             25.4   02-15    140  |  108  |  14  ----------------------------<  126<H>  4.4   |  23  |  0.78    Ca    8.7      15 Feb 2019 07:05  Phos  2.8     02-15  Mg     2.1     02-15      proBNP:  Lipid Profile:  HgA1c:  TSH:  PTT - ( 14 Feb 2019 03:01 )  PTT:54.7 sec    Culture - Blood (collected 02-11-19 @ 14:49)  Source: .Blood Blood  Preliminary Report (02-14-19 @ 15:00):    No growth at 3 days.    Culture - Blood (collected 02-11-19 @ 14:49)  Source: .Blood Blood  Preliminary Report (02-14-19 @ 15:00):    No growth at 3 days.      TELEMETRY: 	NSR           ECG:  	            RADIOLOGY:   DIAGNOSTIC TESTING: [ ] Echocardiogram: [ ]  Catheterization: [ ] Stress Test:      ASSESSMENT/PLAN: 	  CVA-the patient received a right carotid stent. Clinically she is much improved. She  is on aspirin and ticagrelor.    Coronary artery disease-The patient is chest pain free post procedure.  The patient is on dapt.    Peripheral vascular disease- S/P bypass. Her left lower extremity is warm.    Hypertension-blood pressure is labile and low at times. She is off antihypertensives.     Diabetes-follow blood glucose.

## 2019-02-15 NOTE — PROGRESS NOTE ADULT - SUBJECTIVE AND OBJECTIVE BOX
TRANSFER [facility] TO VASCULAR:   HOSPITAL COURSE:   84YOF with PMH of HTN, CAD (stent x7 in 2018), DVT (formerly on Eliquis), DM, HLD, prior R fem-pop bypass and R great toe amputation who presented to ED for LLE pain, admitted on 2/11/19 for L fem-pop bypass, which was performed without complication on 2/12. Patient experienced abrupt onset of L sided weakness and dysarthria on morning of 2/13. Stroke code called and CTA showed stenosis of R carotid artery, and CT perfusion showed significant perfusion & flow mismatch along with prior R sided infarction in MCA distribution. Patient s/p angiography and R ICA stent placement on 2/13 and continued recovery from L fem-pop bypass. Transferred to [facility] for further CVA workup on 2/14. Patient remained with mild dysarthria, and continued on brillinta, asa, lipitor for stroke as well as Cipro/vanco for L foot gangrene as per vascular team. 2/15 decision was made to place patient back on vascular team.     SUBJECTIVE / INTERVAL HPI: Patient seen and examined at bedside.     VITAL SIGNS:  Vital Signs Last 24 Hrs  T(C): 36.7 (15 Feb 2019 05:00), Max: 38.1 (14 Feb 2019 17:25)  T(F): 98 (15 Feb 2019 05:00), Max: 100.6 (14 Feb 2019 17:25)  HR: 60 (15 Feb 2019 08:32) (60 - 86)  BP: 110/56 (15 Feb 2019 08:32) (107/47 - 161/51)  BP(mean): 80 (15 Feb 2019 08:32) (54 - 121)  RR: 13 (15 Feb 2019 08:32) (13 - 26)  SpO2: 100% (15 Feb 2019 08:32) (98% - 100%)    PHYSICAL EXAM:  Constitutional: Elderly  female in NAD   HEENT: NC/AT, left pupil unreactive (surgical pupil), R-ERL, EOMI, MMM  Neck: Supple, no JVD, right EJ IV    Respiratory: CTA b/l   Cardiovascular: RRR, normal S1 and S2  Gastrointestinal: soft NTND bowel sounds present   Extremities: wwp; R 1st toe amputation, L 1st toe toe with open wound, no drainage. L leg w ISHAN drain with small amount of serosanguious output, and CDI dressings up the medial L leg; right leg with large vertical healed surgical scar   Vascular: Pulses equal and strong throughout, including 2+ bilateral posterior tibial pulses   Neurological: AAOx3,CNs II-XII intact, strength and sensation intact throughout, except for L LE which is limited by pain. Moderate dysarthria.   Skin: No gross skin abnormalities or     MEDICATIONS:  MEDICATIONS  (STANDING):  acetaminophen   Tablet .. 650 milliGRAM(s) Oral every 6 hours  aspirin enteric coated 81 milliGRAM(s) Oral daily  atorvastatin 80 milliGRAM(s) Oral at bedtime  brimonidine 0.2% Ophthalmic Solution 1 Drop(s) Both EYES three times a day  ciprofloxacin     Tablet 500 milliGRAM(s) Oral every 12 hours  dextrose 5%. 1000 milliLiter(s) (50 mL/Hr) IV Continuous <Continuous>  dextrose 50% Injectable 12.5 Gram(s) IV Push once  dextrose 50% Injectable 25 Gram(s) IV Push once  dextrose 50% Injectable 25 Gram(s) IV Push once  docusate sodium 100 milliGRAM(s) Oral three times a day  heparin  Injectable 5000 Unit(s) SubCutaneous every 8 hours  insulin lispro (HumaLOG) corrective regimen sliding scale   SubCutaneous Before meals and at bedtime  latanoprost 0.005% Ophthalmic Solution 1 Drop(s) Both EYES at bedtime  metoprolol succinate ER 25 milliGRAM(s) Oral daily  pantoprazole    Tablet 40 milliGRAM(s) Oral before breakfast  ticagrelor 90 milliGRAM(s) Oral two times a day  timolol 0.5% Solution 1 Drop(s) Both EYES two times a day  vancomycin  IVPB 1250 milliGRAM(s) IV Intermittent every 12 hours    MEDICATIONS  (PRN):  acetaminophen   Tablet .. 650 milliGRAM(s) Oral every 6 hours PRN Mild Pain (1 - 3)  dextrose 40% Gel 15 Gram(s) Oral once PRN Blood Glucose LESS THAN 70 milliGRAM(s)/deciliter  glucagon  Injectable 1 milliGRAM(s) IntraMuscular once PRN Glucose LESS THAN 70 milligrams/deciliter  metoclopramide Injectable 10 milliGRAM(s) IV Push once PRN Nausea and/or Vomiting  ondansetron Injectable 4 milliGRAM(s) IV Push every 6 hours PRN Nausea  senna 2 Tablet(s) Oral daily PRN Constipation      ALLERGIES:  Allergies    Goldbond (Unknown)  penicillins (Anaphylaxis)    Intolerances        LABS:                        8.1    8.85  )-----------( 134      ( 15 Feb 2019 07:06 )             25.4     02-15    140  |  108  |  14  ----------------------------<  126<H>  4.4   |  23  |  0.78    Ca    8.7      15 Feb 2019 07:05  Phos  2.8     02-15  Mg     2.1     02-15      PTT - ( 14 Feb 2019 03:01 )  PTT:54.7 sec    CAPILLARY BLOOD GLUCOSE  150 (13 Feb 2019 10:46)      POCT Blood Glucose.: 136 mg/dL (15 Feb 2019 07:02)      RADIOLOGY & ADDITIONAL TESTS: Reviewed. TRANSFER [facility] TO VASCULAR:   HOSPITAL COURSE:   84YOF with PMH of HTN, CAD (stent x7 in 2018), DVT (formerly on Eliquis), DM, HLD, prior R fem-pop bypass and R great toe amputation who presented to ED for LLE pain, admitted on 2/11/19 for L fem-pop bypass, which was performed without complication on 2/12. Patient experienced abrupt onset of L sided weakness and dysarthria on morning of 2/13. Stroke code called and CTA showed stenosis of R carotid artery, and CT perfusion showed significant perfusion & flow mismatch along with prior R sided infarction in MCA distribution. Patient s/p angiography and R ICA stent placement on 2/13 and continued recovery from L fem-pop bypass. Transferred to [facility] for further CVA workup on 2/14. Patient remained with mild dysarthria, and continued on brillinta, asa, lipitor for stroke as well as Cipro/vanco for L foot gangrene as per vascular team. 2/15 decision was made to place patient back on vascular team.     SUBJECTIVE / INTERVAL HPI: Patient seen and examined at bedside.     VITAL SIGNS:  Vital Signs Last 24 Hrs  T(C): 36.7 (15 Feb 2019 05:00), Max: 38.1 (14 Feb 2019 17:25)  T(F): 98 (15 Feb 2019 05:00), Max: 100.6 (14 Feb 2019 17:25)  HR: 60 (15 Feb 2019 08:32) (60 - 86)  BP: 110/56 (15 Feb 2019 08:32) (107/47 - 161/51)  BP(mean): 80 (15 Feb 2019 08:32) (54 - 121)  RR: 13 (15 Feb 2019 08:32) (13 - 26)  SpO2: 100% (15 Feb 2019 08:32) (98% - 100%)    PHYSICAL EXAM:  Constitutional: Elderly  female in NAD   HEENT: NC/AT, left pupil unreactive (surgical pupil), R-ERL, EOMI, MMM  Neck: Supple, no JVD, right EJ IV    Respiratory: CTA b/l   Cardiovascular: RRR, normal S1 and S2  Gastrointestinal: soft NTND bowel sounds present   Extremities: wwp; R 1st toe amputation, L 1st toe toe with open wound, no drainage. L leg w ISHAN drain with small amount of serosanguious output, and CDI dressings up the medial L leg; right leg with large vertical healed surgical scar   Vascular: Pulses equal and strong throughout, including 2+ bilateral posterior tibial pulses   Neurological: AAOx3,CNs II-XII intact, strength and sensation intact throughout, except for L LE which is limited by pain. Moderate dysarthria.   Skin: No gross skin abnormalities or     MEDICATIONS:  MEDICATIONS  (STANDING):  acetaminophen   Tablet .. 650 milliGRAM(s) Oral every 6 hours  aspirin enteric coated 81 milliGRAM(s) Oral daily  atorvastatin 80 milliGRAM(s) Oral at bedtime  brimonidine 0.2% Ophthalmic Solution 1 Drop(s) Both EYES three times a day  ciprofloxacin     Tablet 500 milliGRAM(s) Oral every 12 hours  dextrose 5%. 1000 milliLiter(s) (50 mL/Hr) IV Continuous <Continuous>  dextrose 50% Injectable 12.5 Gram(s) IV Push once  dextrose 50% Injectable 25 Gram(s) IV Push once  dextrose 50% Injectable 25 Gram(s) IV Push once  docusate sodium 100 milliGRAM(s) Oral three times a day  heparin  Injectable 5000 Unit(s) SubCutaneous every 8 hours  insulin lispro (HumaLOG) corrective regimen sliding scale   SubCutaneous Before meals and at bedtime  latanoprost 0.005% Ophthalmic Solution 1 Drop(s) Both EYES at bedtime  metoprolol succinate ER 25 milliGRAM(s) Oral daily  pantoprazole    Tablet 40 milliGRAM(s) Oral before breakfast  ticagrelor 90 milliGRAM(s) Oral two times a day  timolol 0.5% Solution 1 Drop(s) Both EYES two times a day  vancomycin  IVPB 1250 milliGRAM(s) IV Intermittent every 12 hours    MEDICATIONS  (PRN):  acetaminophen   Tablet .. 650 milliGRAM(s) Oral every 6 hours PRN Mild Pain (1 - 3)  dextrose 40% Gel 15 Gram(s) Oral once PRN Blood Glucose LESS THAN 70 milliGRAM(s)/deciliter  glucagon  Injectable 1 milliGRAM(s) IntraMuscular once PRN Glucose LESS THAN 70 milligrams/deciliter  metoclopramide Injectable 10 milliGRAM(s) IV Push once PRN Nausea and/or Vomiting  ondansetron Injectable 4 milliGRAM(s) IV Push every 6 hours PRN Nausea  senna 2 Tablet(s) Oral daily PRN Constipation      ALLERGIES:  Allergies    Goldbond (Unknown)  penicillins (Anaphylaxis)    Intolerances        LABS:                        8.1    8.85  )-----------( 134      ( 15 Feb 2019 07:06 )             25.4     02-15    140  |  108  |  14  ----------------------------<  126<H>  4.4   |  23  |  0.78    Ca    8.7      15 Feb 2019 07:05  Phos  2.8     02-15  Mg     2.1     02-15      PTT - ( 14 Feb 2019 03:01 )  PTT:54.7 sec    CAPILLARY BLOOD GLUCOSE  150 (13 Feb 2019 10:46)      POCT Blood Glucose.: 136 mg/dL (15 Feb 2019 07:02)      RADIOLOGY & ADDITIONAL TESTS: Reviewed.

## 2019-02-15 NOTE — OCCUPATIONAL THERAPY INITIAL EVALUATION ADULT - PERTINENT HX OF CURRENT PROBLEM, REHAB EVAL
85 yo female with h/o DM and HLD s/p right leg bypass and R great toe amputation, now healed, presents with a nonhealing L great toe ulcer, which has been previously debrided. 11/13 Aniogram shows patent aorta and bilateral iliac vessels. Patent L CFA and profunda. L SFA occluded at the origin.

## 2019-02-15 NOTE — PROGRESS NOTE ADULT - PROBLEM SELECTOR PLAN 4
currently normotensive,   on amlodipine 10, hydralazine 25 QID, losartan 100 at home, metoprolol 25 mg QD  -Re-start home metoprolol 25 mg ER w/ holding parameters  -SBP goal 100-150  -Re-start additional home BP meds as tolerated currently normotensive,   on amlodipine 10, hydralazine 25 QID, losartan 100 at home, metoprolol 25 mg QD  -On metoprolol 25 mg ER w/ holding parameters  -SBP goal 100-150  -Re-start additional home BP meds as tolerated

## 2019-02-15 NOTE — MEDICAL STUDENT ADULT H&P (EDUCATION) - NS MD HP STUD ASPLAN PLAN FT
1. Neuro - CVA s/p R ICA stent   -continue with lipitor, ASA, brilinta   -BP goal -150   -F/u Neurosurgery as outpatient after discharge   -PT/OT today   -Plan to discharge to either [facility] or acute rehab     2. Vascular -PVD; L foot gangrene s/p L fem-pop bypass 2/12  -Vanc trough elevated to 27.1 today - dose adjusted by team to goal of 15-20   -continue with ciprofloxacin and vancomycin (re-check trough) for treatment of L foot gangrene     3. Cardiac - CAD (stent x7), HLD  -CAD: continue with ASA, brilinta  -HLD: continue with high-dose lipitor (80mg)     4. Pulm - cough   -cough from yesterday resolved. CXR demonstrating pulmonary venous congestion but no areas of consolidation    5. GI - gastric reflux/abdominal pain   -abdominal pain described as "burning" in quality resolved since yesterday. Patient able to eat and tolerate dinner  -continue with protonix     6. DM   -last serum glucose 126  -Continue with POCT glucose and insulin sliding scale

## 2019-02-15 NOTE — MEDICAL STUDENT ADULT H&P (EDUCATION) - NS MD HP STUD HX OF PRESENT ILLNESS FT
85 yo F with PMH CAD (stentx7), DVT (formerly on Eliquis), HTN, DM, HLD s/p L fem-pop bypass on 2/12 (for L foot gangrene) found with L sided weakness, dysarthria on 2/13 found to have R carotid stenosis and R infarct in M2 distribution s/p R ICA stent.     VALERIE overnight. Patient states she was able to eat dinner and get some sleep last night. Belly pain from yesterday has resolved (patient attributes this to being able to eat yesterday). Cough from yesterday has also resolved.

## 2019-02-16 LAB
ANION GAP SERPL CALC-SCNC: 8 MMOL/L — SIGNIFICANT CHANGE UP (ref 5–17)
BUN SERPL-MCNC: 15 MG/DL — SIGNIFICANT CHANGE UP (ref 7–23)
CALCIUM SERPL-MCNC: 8.7 MG/DL — SIGNIFICANT CHANGE UP (ref 8.4–10.5)
CHLORIDE SERPL-SCNC: 107 MMOL/L — SIGNIFICANT CHANGE UP (ref 96–108)
CO2 SERPL-SCNC: 23 MMOL/L — SIGNIFICANT CHANGE UP (ref 22–31)
CREAT SERPL-MCNC: 0.75 MG/DL — SIGNIFICANT CHANGE UP (ref 0.5–1.3)
CULTURE RESULTS: SIGNIFICANT CHANGE UP
CULTURE RESULTS: SIGNIFICANT CHANGE UP
GLUCOSE BLDC GLUCOMTR-MCNC: 109 MG/DL — HIGH (ref 70–99)
GLUCOSE BLDC GLUCOMTR-MCNC: 144 MG/DL — HIGH (ref 70–99)
GLUCOSE BLDC GLUCOMTR-MCNC: 183 MG/DL — HIGH (ref 70–99)
GLUCOSE BLDC GLUCOMTR-MCNC: 199 MG/DL — HIGH (ref 70–99)
GLUCOSE SERPL-MCNC: 133 MG/DL — HIGH (ref 70–99)
HCT VFR BLD CALC: 24.2 % — LOW (ref 34.5–45)
HCT VFR BLD CALC: 24.9 % — LOW (ref 34.5–45)
HGB BLD-MCNC: 7.6 G/DL — LOW (ref 11.5–15.5)
HGB BLD-MCNC: 7.8 G/DL — LOW (ref 11.5–15.5)
MAGNESIUM SERPL-MCNC: 1.9 MG/DL — SIGNIFICANT CHANGE UP (ref 1.6–2.6)
MCHC RBC-ENTMCNC: 29.5 PG — SIGNIFICANT CHANGE UP (ref 27–34)
MCHC RBC-ENTMCNC: 29.6 PG — SIGNIFICANT CHANGE UP (ref 27–34)
MCHC RBC-ENTMCNC: 31.3 GM/DL — LOW (ref 32–36)
MCHC RBC-ENTMCNC: 31.4 GM/DL — LOW (ref 32–36)
MCV RBC AUTO: 94.2 FL — SIGNIFICANT CHANGE UP (ref 80–100)
MCV RBC AUTO: 94.3 FL — SIGNIFICANT CHANGE UP (ref 80–100)
NRBC # BLD: 0 /100 WBCS — SIGNIFICANT CHANGE UP (ref 0–0)
NRBC # BLD: 0 /100 WBCS — SIGNIFICANT CHANGE UP (ref 0–0)
PHOSPHATE SERPL-MCNC: 2.8 MG/DL — SIGNIFICANT CHANGE UP (ref 2.5–4.5)
PLATELET # BLD AUTO: 167 K/UL — SIGNIFICANT CHANGE UP (ref 150–400)
PLATELET # BLD AUTO: 171 K/UL — SIGNIFICANT CHANGE UP (ref 150–400)
POTASSIUM SERPL-MCNC: 4 MMOL/L — SIGNIFICANT CHANGE UP (ref 3.5–5.3)
POTASSIUM SERPL-SCNC: 4 MMOL/L — SIGNIFICANT CHANGE UP (ref 3.5–5.3)
RBC # BLD: 2.57 M/UL — LOW (ref 3.8–5.2)
RBC # BLD: 2.64 M/UL — LOW (ref 3.8–5.2)
RBC # FLD: 14.6 % — HIGH (ref 10.3–14.5)
RBC # FLD: 14.8 % — HIGH (ref 10.3–14.5)
SODIUM SERPL-SCNC: 138 MMOL/L — SIGNIFICANT CHANGE UP (ref 135–145)
SPECIMEN SOURCE: SIGNIFICANT CHANGE UP
SPECIMEN SOURCE: SIGNIFICANT CHANGE UP
VANCOMYCIN TROUGH SERPL-MCNC: 11.8 UG/ML — SIGNIFICANT CHANGE UP (ref 10–20)
WBC # BLD: 7.9 K/UL — SIGNIFICANT CHANGE UP (ref 3.8–10.5)
WBC # BLD: 8.88 K/UL — SIGNIFICANT CHANGE UP (ref 3.8–10.5)
WBC # FLD AUTO: 7.9 K/UL — SIGNIFICANT CHANGE UP (ref 3.8–10.5)
WBC # FLD AUTO: 8.88 K/UL — SIGNIFICANT CHANGE UP (ref 3.8–10.5)

## 2019-02-16 RX ORDER — HYDROMORPHONE HYDROCHLORIDE 2 MG/ML
0.5 INJECTION INTRAMUSCULAR; INTRAVENOUS; SUBCUTANEOUS ONCE
Qty: 0 | Refills: 0 | Status: DISCONTINUED | OUTPATIENT
Start: 2019-02-16 | End: 2019-02-16

## 2019-02-16 RX ORDER — VANCOMYCIN HCL 1 G
1000 VIAL (EA) INTRAVENOUS EVERY 12 HOURS
Qty: 0 | Refills: 0 | Status: DISCONTINUED | OUTPATIENT
Start: 2019-02-16 | End: 2019-02-18

## 2019-02-16 RX ADMIN — Medication 100 MILLIGRAM(S): at 06:11

## 2019-02-16 RX ADMIN — Medication 5 MILLIGRAM(S): at 21:34

## 2019-02-16 RX ADMIN — Medication 1 DROP(S): at 17:34

## 2019-02-16 RX ADMIN — LATANOPROST 1 DROP(S): 0.05 SOLUTION/ DROPS OPHTHALMIC; TOPICAL at 21:32

## 2019-02-16 RX ADMIN — Medication 650 MILLIGRAM(S): at 13:45

## 2019-02-16 RX ADMIN — Medication 1 DROP(S): at 06:12

## 2019-02-16 RX ADMIN — HYDROMORPHONE HYDROCHLORIDE 0.5 MILLIGRAM(S): 2 INJECTION INTRAMUSCULAR; INTRAVENOUS; SUBCUTANEOUS at 15:27

## 2019-02-16 RX ADMIN — TICAGRELOR 90 MILLIGRAM(S): 90 TABLET ORAL at 06:11

## 2019-02-16 RX ADMIN — TICAGRELOR 90 MILLIGRAM(S): 90 TABLET ORAL at 17:33

## 2019-02-16 RX ADMIN — Medication 100 MILLIGRAM(S): at 13:01

## 2019-02-16 RX ADMIN — HEPARIN SODIUM 5000 UNIT(S): 5000 INJECTION INTRAVENOUS; SUBCUTANEOUS at 21:32

## 2019-02-16 RX ADMIN — ATORVASTATIN CALCIUM 80 MILLIGRAM(S): 80 TABLET, FILM COATED ORAL at 21:32

## 2019-02-16 RX ADMIN — Medication 2: at 16:46

## 2019-02-16 RX ADMIN — Medication 500 MILLIGRAM(S): at 06:11

## 2019-02-16 RX ADMIN — BRIMONIDINE TARTRATE 1 DROP(S): 2 SOLUTION/ DROPS OPHTHALMIC at 06:12

## 2019-02-16 RX ADMIN — PANTOPRAZOLE SODIUM 40 MILLIGRAM(S): 20 TABLET, DELAYED RELEASE ORAL at 06:11

## 2019-02-16 RX ADMIN — HEPARIN SODIUM 5000 UNIT(S): 5000 INJECTION INTRAVENOUS; SUBCUTANEOUS at 13:02

## 2019-02-16 RX ADMIN — Medication 81 MILLIGRAM(S): at 12:55

## 2019-02-16 RX ADMIN — BRIMONIDINE TARTRATE 1 DROP(S): 2 SOLUTION/ DROPS OPHTHALMIC at 13:01

## 2019-02-16 RX ADMIN — BRIMONIDINE TARTRATE 1 DROP(S): 2 SOLUTION/ DROPS OPHTHALMIC at 21:33

## 2019-02-16 RX ADMIN — Medication 25 MILLIGRAM(S): at 06:11

## 2019-02-16 RX ADMIN — HEPARIN SODIUM 5000 UNIT(S): 5000 INJECTION INTRAVENOUS; SUBCUTANEOUS at 06:08

## 2019-02-16 RX ADMIN — Medication 650 MILLIGRAM(S): at 12:59

## 2019-02-16 RX ADMIN — Medication 650 MILLIGRAM(S): at 07:15

## 2019-02-16 RX ADMIN — Medication 250 MILLIGRAM(S): at 14:47

## 2019-02-16 RX ADMIN — Medication 500 MILLIGRAM(S): at 17:33

## 2019-02-16 RX ADMIN — Medication 100 MILLIGRAM(S): at 21:32

## 2019-02-16 RX ADMIN — HYDROMORPHONE HYDROCHLORIDE 0.5 MILLIGRAM(S): 2 INJECTION INTRAMUSCULAR; INTRAVENOUS; SUBCUTANEOUS at 16:25

## 2019-02-16 RX ADMIN — Medication 650 MILLIGRAM(S): at 06:12

## 2019-02-16 NOTE — PHYSICAL THERAPY INITIAL EVALUATION ADULT - IMPAIRMENTS FOUND, PT EVAL
cognitive impairment/muscle strength/gross motor/aerobic capacity/endurance/gait, locomotion, and balance/posture/ROM

## 2019-02-16 NOTE — PROGRESS NOTE ADULT - SUBJECTIVE AND OBJECTIVE BOX
O/N: VALERIE, VSS                              83 yo female with f HTN, CAD (stent x7 in 2018), DVT (formerly on Eliquis), DM, HLD, PAD s/p R fem-pop bypass and R great toe amputation admitted on 2/11/19 for L fem-pop bypass on 2/12. 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 on 7 [facility] for further stroke workup.        -am labs  -PT   -Cipro  -reg diet  -Brilinta O/N: VALERIE, VSS    SUBJECTIVE: Pt seen and examined at bedside. Pain is well controlled. Denies chest pain, SOB. Denies nausea, emesis. Denies headache, visual changes. Passing flatus and having bowel movements. Making appropriate UO.      aspirin enteric coated 81 milliGRAM(s) Oral daily  ciprofloxacin     Tablet 500 milliGRAM(s) Oral every 12 hours  heparin  Injectable 5000 Unit(s) SubCutaneous every 8 hours  metoprolol succinate ER 25 milliGRAM(s) Oral daily  ticagrelor 90 milliGRAM(s) Oral two times a day  vancomycin  IVPB 1000 milliGRAM(s) IV Intermittent every 12 hours      Vital Signs Last 24 Hrs  T(C): 36.7 (16 Feb 2019 13:16), Max: 37.3 (15 Feb 2019 22:00)  T(F): 98.1 (16 Feb 2019 13:16), Max: 99.2 (15 Feb 2019 22:00)  HR: 80 (16 Feb 2019 10:08) (65 - 80)  BP: 142/66 (16 Feb 2019 10:08) (113/49 - 157/68)  BP(mean): --  RR: 16 (16 Feb 2019 10:08) (16 - 16)  SpO2: 99% (16 Feb 2019 10:08) (99% - 100%)    General: NAD, resting comfortably in bed  Pulm: Nonlabored breathing, no respiratory distress  Abd: soft, NT/ND.  Extrem: WWP, no edema, SCDs in place      LABS:                        7.6    7.90  )-----------( 167      ( 16 Feb 2019 08:23 )             24.2     02-16    138  |  107  |  15  ----------------------------<  133<H>  4.0   |  23  |  0.75    Ca    8.7      16 Feb 2019 08:23  Phos  2.8     02-16  Mg     1.9     02-16                                        85 yo female with f HTN, CAD (stent x7 in 2018), DVT (formerly on Eliquis), DM, HLD, PAD s/p R fem-pop bypass and R great toe amputation admitted on 2/11/19 for L fem-pop bypass on 2/12. 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 on 7 [facility] for further stroke workup.        -am labs  -PT   -Cipro  -reg diet  -Brilinta O/N: VALERIE, VSS    SUBJECTIVE: Pt seen and examined at bedside. Pain is well controlled. Denies chest pain, SOB. Denies nausea, emesis. Denies headache, visual changes. Passing flatus and having bowel movements. Speech is slow but improved and understandable.  Making appropriate UO.      aspirin enteric coated 81 milliGRAM(s) Oral daily  ciprofloxacin     Tablet 500 milliGRAM(s) Oral every 12 hours  heparin  Injectable 5000 Unit(s) SubCutaneous every 8 hours  metoprolol succinate ER 25 milliGRAM(s) Oral daily  ticagrelor 90 milliGRAM(s) Oral two times a day  vancomycin  IVPB 1000 milliGRAM(s) IV Intermittent every 12 hours      Vital Signs Last 24 Hrs  T(C): 36.7 (16 Feb 2019 13:16), Max: 37.3 (15 Feb 2019 22:00)  T(F): 98.1 (16 Feb 2019 13:16), Max: 99.2 (15 Feb 2019 22:00)  HR: 80 (16 Feb 2019 10:08) (65 - 80)  BP: 142/66 (16 Feb 2019 10:08) (113/49 - 157/68)  BP(mean): --  RR: 16 (16 Feb 2019 10:08) (16 - 16)  SpO2: 99% (16 Feb 2019 10:08) (99% - 100%)    General: NAD, resting comfortably in bed  Neurological: AAOx3,CNs II-XII intact, strength and sensation intact throughout, decrease strength in left lower extremity 2/2 pain. Moderate dysarthria, improved  Chest: NSR  Pulm: Nonlabored breathing, no respiratory distress  Abd: soft, NT/ND.  Extrem:  Left leg wounds c/d/i left  groin to lower leg incisions cdi except for light purulent exudate expressed from Left toe amp site. No erythema. ISHAN in place, draining SS.  Palpable R DP and PT pulse. Biphasic DP pulse      LABS:                        7.6    7.90  )-----------( 167      ( 16 Feb 2019 08:23 )             24.2     02-16    138  |  107  |  15  ----------------------------<  133<H>  4.0   |  23  |  0.75    Ca    8.7      16 Feb 2019 08:23  Phos  2.8     02-16  Mg     1.9     02-16                                85 yo female with f HTN, CAD (stent x7 in 2018), DVT (formerly on Eliquis), DM, HLD, PAD s/p R fem-pop bypass and R great toe amputation admitted on 2/11/19 for L fem-pop bypass on 2/12. 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      -Pain/nausea control, appreciate Neurology recommendations  -f/u Vanc trough, restart Vancomycin pending trough  -PT consult  -Cipro/Vancomycin  -reg diet  -Brilinta

## 2019-02-16 NOTE — PHYSICAL THERAPY INITIAL EVALUATION ADULT - DID THE PATIENT HAVE SURGERY?
left femoral endarterectomy with patch angioplasty, SFA-DP bypass with rGSV and completion angio, 2/13/19 cerebral angiography  With right carotid stent placement and balloon angioplasty/yes

## 2019-02-16 NOTE — PHYSICAL THERAPY INITIAL EVALUATION ADULT - ACTIVE RANGE OF MOTION EXAMINATION, REHAB EVAL
left hip 1/2 range flexion, knee 3/4 range upon dangle flexion, left ankle WFL, left shoulder 1/2 range shoulder flexion/ABD due to arthritic changes,/Right LE Active ROM was WFL (within functional limits)/Right UE Active ROM was WFL (within functional limits)

## 2019-02-16 NOTE — PHYSICAL THERAPY INITIAL EVALUATION ADULT - PLANNED THERAPY INTERVENTIONS, PT EVAL
neuromuscular re-education/postural re-education/gait training/strengthening/transfer training/balance training/bed mobility training/ROM

## 2019-02-16 NOTE — PHYSICAL THERAPY INITIAL EVALUATION ADULT - ADDITIONAL COMMENTS
Patient previously ambulatory to the bathroom with min assist and rolling walker as per Daughter providing social history. Patient has been in Subacute Rehab since last June following vascular procedures in April with Dr. Castellanos, Daughter stated she is still deciding if patient will transition to long term nursing home residence following [facility].

## 2019-02-16 NOTE — PHYSICAL THERAPY INITIAL EVALUATION ADULT - GENERAL OBSERVATIONS, REHAB EVAL
Patient received semi fowlers (+) EKG (+) heplock (+) LLE posterior LE, Patient able to participate with EOB activity and transfer bed-chair with two person assist. Continue skilled PT to progress functional training, MRS 4.

## 2019-02-16 NOTE — PHYSICAL THERAPY INITIAL EVALUATION ADULT - MODALITIES TREATMENT COMMENTS
Left upper and lower quadrant impaired for visual fields exam of left eye, no noted nystagmus blurred vision or double vision, face symmetrical, tongue midline and moves laterally, sensation intact through V1-3

## 2019-02-16 NOTE — PHYSICAL THERAPY INITIAL EVALUATION ADULT - CRITERIA FOR SKILLED THERAPEUTIC INTERVENTIONS
anticipated discharge recommendation/therapy frequency/risk reduction/prevention/rehab potential/functional limitations in following categories

## 2019-02-16 NOTE — PHYSICAL THERAPY INITIAL EVALUATION ADULT - PERTINENT HX OF CURRENT PROBLEM, REHAB EVAL
85 yo female presents from rehab with PAD and increased claudication symptoms in the left leg with walking, s/p aforementioned procedure seen for PT eval hospital day #6

## 2019-02-16 NOTE — PHYSICAL THERAPY INITIAL EVALUATION ADULT - MANUAL MUSCLE TESTING RESULTS, REHAB EVAL
RUE grossly 5/5 grade strength with 5/5  strength left arm 3-/5  strength and functionally assessed at 3+/5 tricep/bicep, bilateral LE >/=3+/5 as seen through functional assessment, LLE not formally assessed due to limited ROM and pain

## 2019-02-17 LAB
ANION GAP SERPL CALC-SCNC: 10 MMOL/L — SIGNIFICANT CHANGE UP (ref 5–17)
BLD GP AB SCN SERPL QL: NEGATIVE — SIGNIFICANT CHANGE UP
BUN SERPL-MCNC: 15 MG/DL — SIGNIFICANT CHANGE UP (ref 7–23)
CALCIUM SERPL-MCNC: 8.8 MG/DL — SIGNIFICANT CHANGE UP (ref 8.4–10.5)
CHLORIDE SERPL-SCNC: 105 MMOL/L — SIGNIFICANT CHANGE UP (ref 96–108)
CO2 SERPL-SCNC: 24 MMOL/L — SIGNIFICANT CHANGE UP (ref 22–31)
CREAT SERPL-MCNC: 0.78 MG/DL — SIGNIFICANT CHANGE UP (ref 0.5–1.3)
GLUCOSE BLDC GLUCOMTR-MCNC: 114 MG/DL — HIGH (ref 70–99)
GLUCOSE BLDC GLUCOMTR-MCNC: 144 MG/DL — HIGH (ref 70–99)
GLUCOSE BLDC GLUCOMTR-MCNC: 164 MG/DL — HIGH (ref 70–99)
GLUCOSE BLDC GLUCOMTR-MCNC: 167 MG/DL — HIGH (ref 70–99)
GLUCOSE SERPL-MCNC: 142 MG/DL — HIGH (ref 70–99)
GRAM STN FLD: SIGNIFICANT CHANGE UP
GRAM STN FLD: SIGNIFICANT CHANGE UP
HCT VFR BLD CALC: 24.7 % — LOW (ref 34.5–45)
HGB BLD-MCNC: 7.8 G/DL — LOW (ref 11.5–15.5)
MAGNESIUM SERPL-MCNC: 2 MG/DL — SIGNIFICANT CHANGE UP (ref 1.6–2.6)
MCHC RBC-ENTMCNC: 30 PG — SIGNIFICANT CHANGE UP (ref 27–34)
MCHC RBC-ENTMCNC: 31.6 GM/DL — LOW (ref 32–36)
MCV RBC AUTO: 95 FL — SIGNIFICANT CHANGE UP (ref 80–100)
NRBC # BLD: 0 /100 WBCS — SIGNIFICANT CHANGE UP (ref 0–0)
PHOSPHATE SERPL-MCNC: 3.3 MG/DL — SIGNIFICANT CHANGE UP (ref 2.5–4.5)
PLATELET # BLD AUTO: 193 K/UL — SIGNIFICANT CHANGE UP (ref 150–400)
POTASSIUM SERPL-MCNC: 3.9 MMOL/L — SIGNIFICANT CHANGE UP (ref 3.5–5.3)
POTASSIUM SERPL-SCNC: 3.9 MMOL/L — SIGNIFICANT CHANGE UP (ref 3.5–5.3)
RBC # BLD: 2.6 M/UL — LOW (ref 3.8–5.2)
RBC # FLD: 14.7 % — HIGH (ref 10.3–14.5)
RH IG SCN BLD-IMP: POSITIVE — SIGNIFICANT CHANGE UP
SODIUM SERPL-SCNC: 139 MMOL/L — SIGNIFICANT CHANGE UP (ref 135–145)
SPECIMEN SOURCE: SIGNIFICANT CHANGE UP
WBC # BLD: 7.64 K/UL — SIGNIFICANT CHANGE UP (ref 3.8–10.5)
WBC # FLD AUTO: 7.64 K/UL — SIGNIFICANT CHANGE UP (ref 3.8–10.5)

## 2019-02-17 RX ADMIN — ATORVASTATIN CALCIUM 80 MILLIGRAM(S): 80 TABLET, FILM COATED ORAL at 21:46

## 2019-02-17 RX ADMIN — Medication 25 MILLIGRAM(S): at 06:02

## 2019-02-17 RX ADMIN — BRIMONIDINE TARTRATE 1 DROP(S): 2 SOLUTION/ DROPS OPHTHALMIC at 13:41

## 2019-02-17 RX ADMIN — Medication 250 MILLIGRAM(S): at 02:29

## 2019-02-17 RX ADMIN — TICAGRELOR 90 MILLIGRAM(S): 90 TABLET ORAL at 06:01

## 2019-02-17 RX ADMIN — Medication 500 MILLIGRAM(S): at 18:23

## 2019-02-17 RX ADMIN — Medication 500 MILLIGRAM(S): at 06:02

## 2019-02-17 RX ADMIN — Medication 2: at 11:34

## 2019-02-17 RX ADMIN — HEPARIN SODIUM 5000 UNIT(S): 5000 INJECTION INTRAVENOUS; SUBCUTANEOUS at 13:41

## 2019-02-17 RX ADMIN — Medication 100 MILLIGRAM(S): at 21:46

## 2019-02-17 RX ADMIN — Medication 5 MILLIGRAM(S): at 21:46

## 2019-02-17 RX ADMIN — Medication 2: at 21:45

## 2019-02-17 RX ADMIN — Medication 81 MILLIGRAM(S): at 11:34

## 2019-02-17 RX ADMIN — Medication 650 MILLIGRAM(S): at 22:14

## 2019-02-17 RX ADMIN — Medication 100 MILLIGRAM(S): at 06:02

## 2019-02-17 RX ADMIN — LATANOPROST 1 DROP(S): 0.05 SOLUTION/ DROPS OPHTHALMIC; TOPICAL at 21:56

## 2019-02-17 RX ADMIN — BRIMONIDINE TARTRATE 1 DROP(S): 2 SOLUTION/ DROPS OPHTHALMIC at 21:56

## 2019-02-17 RX ADMIN — PANTOPRAZOLE SODIUM 40 MILLIGRAM(S): 20 TABLET, DELAYED RELEASE ORAL at 06:02

## 2019-02-17 RX ADMIN — HEPARIN SODIUM 5000 UNIT(S): 5000 INJECTION INTRAVENOUS; SUBCUTANEOUS at 21:46

## 2019-02-17 RX ADMIN — Medication 250 MILLIGRAM(S): at 13:41

## 2019-02-17 RX ADMIN — BRIMONIDINE TARTRATE 1 DROP(S): 2 SOLUTION/ DROPS OPHTHALMIC at 06:01

## 2019-02-17 RX ADMIN — Medication 650 MILLIGRAM(S): at 21:46

## 2019-02-17 RX ADMIN — Medication 1 DROP(S): at 06:01

## 2019-02-17 RX ADMIN — TICAGRELOR 90 MILLIGRAM(S): 90 TABLET ORAL at 18:23

## 2019-02-17 RX ADMIN — HEPARIN SODIUM 5000 UNIT(S): 5000 INJECTION INTRAVENOUS; SUBCUTANEOUS at 06:02

## 2019-02-17 RX ADMIN — Medication 1 DROP(S): at 18:22

## 2019-02-17 NOTE — PROGRESS NOTE ADULT - ASSESSMENT
84 F hx HTN, CAD (stent x7 in 2018), DVT (formerly on Eliquis), DM, HLD, PAD s/p R fem-pop bypass and R great toe amputation admitted (2/11) for L fem-pop bypass ( 2/12) c/b CVA s/p angiography and R ICA stent placement (2/13)      -Pain/nausea control  -Follow up  Neurology recommendations  -Cipro/Vancomycin, next Vanc trough 1 am 12/18  -reg diet  -Brilinta. HSQ, ASA  -Dispo: PT recommends [facility] 84 F hx HTN, CAD (stent x7 in 2018), DVT (formerly on Eliquis), DM, HLD, PAD s/p R fem-pop bypass and R great toe amputation admitted (2/11) for L fem-pop bypass ( 2/12) c/b CVA s/p angiography and R ICA stent placement (2/13)    -Pain/nausea control  -Follow up  Neurology recommendations  -Cipro/Vancomycin, next Vanc trough 1 am 12/18  -reg diet  -Brilinta. HSQ, ASA  -Dispo: PT recommends [facility]

## 2019-02-17 NOTE — PROVIDER CONTACT NOTE (CRITICAL VALUE NOTIFICATION) - ASSESSMENT
Pt c/o heartburn, pt denies any cardiac c/o. VSS. pt states no regular BM pattern at home and didn't have BM in couple of days. pt states takes Tums at home for heartburn.

## 2019-02-17 NOTE — PROGRESS NOTE ADULT - SUBJECTIVE AND OBJECTIVE BOX
O/N: VALERIE, VSS  2/16 DAY: PT evaluation recommending [facility], Vancomycin reordered, light debridement of toe 24 hr events  O/N: VALERIE, VSS  2/16 DAY: PT evaluation recommending [facility], Vancomycin reordered, light debridement of toe    Subjective:  Reports some heaviness in L foot. Pain controlled. Denies CP/SOB. Denies nausea/vomiting.    Vital Signs Last 24 Hrs  T(C): 36.4 (17 Feb 2019 10:17), Max: 37.5 (17 Feb 2019 05:00)  T(F): 97.6 (17 Feb 2019 10:17), Max: 99.5 (17 Feb 2019 05:00)  HR: 65 (17 Feb 2019 08:53) (58 - 72)  BP: 123/56 (17 Feb 2019 08:53) (106/46 - 155/70)  BP(mean): --  RR: 17 (17 Feb 2019 08:53) (16 - 18)  SpO2: 100% (17 Feb 2019 08:53) (97% - 100%)    I&O's Summary  16 Feb 2019 07:01  -  17 Feb 2019 07:00  --------------------------------------------------------  IN: 1030 mL / OUT: 1630 mL / NET: -600 mL    17 Feb 2019 07:01  -  17 Feb 2019 14:14  --------------------------------------------------------  IN: 430 mL / OUT: 0 mL / NET: 430 mL    Physical Exam:  General: NAD, resting comfortably  Pulmonary: normal resp effort  Extremities: LLE incision c/d/i; ISHAN in place  Neuro: A/O x 3  Pulses:  LLE distal graft 2+; DP triphasic    LABS:                      7.8    7.64  )-----------( 193      ( 17 Feb 2019 08:37 )             24.7     02-17  139  |  105  |  15  ----------------------------<  142<H>  3.9   |  24  |  0.78    Ca    8.8      17 Feb 2019 08:37  Phos  3.3     02-17  Mg     2.0     02-17            CAPILLARY BLOOD GLUCOSE      POCT Blood Glucose.: 164 mg/dL (17 Feb 2019 11:05)  POCT Blood Glucose.: 114 mg/dL (17 Feb 2019 07:07)  POCT Blood Glucose.: 144 mg/dL (16 Feb 2019 21:13)  POCT Blood Glucose.: 183 mg/dL (16 Feb 2019 16:15)      RADIOLOGY & ADDITIONAL TESTS: 24 hr events  O/N: VALERIE, VSS  2/16 DAY: PT evaluation recommending [facility], Vancomycin reordered, light debridement of toe    Subjective:  Reports some heaviness in L foot. Pain controlled. Denies CP/SOB. Denies nausea/vomiting.    Vital Signs Last 24 Hrs  T(C): 36.4 (17 Feb 2019 10:17), Max: 37.5 (17 Feb 2019 05:00)  T(F): 97.6 (17 Feb 2019 10:17), Max: 99.5 (17 Feb 2019 05:00)  HR: 65 (17 Feb 2019 08:53) (58 - 72)  BP: 123/56 (17 Feb 2019 08:53) (106/46 - 155/70)  BP(mean): --  RR: 17 (17 Feb 2019 08:53) (16 - 18)  SpO2: 100% (17 Feb 2019 08:53) (97% - 100%)    I&O's Summary  16 Feb 2019 07:01  -  17 Feb 2019 07:00  --------------------------------------------------------  IN: 1030 mL / OUT: 1630 mL / NET: -600 mL    17 Feb 2019 07:01  -  17 Feb 2019 14:14  --------------------------------------------------------  IN: 430 mL / OUT: 0 mL / NET: 430 mL    Physical Exam:  General: NAD, resting comfortably  Pulmonary: normal resp effort  Extremities: LLE incision c/d/i; ISHAN in place  Neuro: A/O x 3; strength/sensation in LLE grossly intact; no focal deficits  Pulses:  LLE distal graft 2+; DP triphasic    LABS:                      7.8    7.64  )-----------( 193      ( 17 Feb 2019 08:37 )             24.7     02-17  139  |  105  |  15  ----------------------------<  142<H>  3.9   |  24  |  0.78    Ca    8.8      17 Feb 2019 08:37  Phos  3.3     02-17  Mg     2.0     02-17            CAPILLARY BLOOD GLUCOSE      POCT Blood Glucose.: 164 mg/dL (17 Feb 2019 11:05)  POCT Blood Glucose.: 114 mg/dL (17 Feb 2019 07:07)  POCT Blood Glucose.: 144 mg/dL (16 Feb 2019 21:13)  POCT Blood Glucose.: 183 mg/dL (16 Feb 2019 16:15)      RADIOLOGY & ADDITIONAL TESTS:

## 2019-02-17 NOTE — PROVIDER CONTACT NOTE (CRITICAL VALUE NOTIFICATION) - ACTION/TREATMENT ORDERED:
Dr Brunson made aware, pt is placed on contact isolation. Dr Estrella made aware, pt is placed on contact isolation.

## 2019-02-18 LAB
-  CEFAZOLIN: SIGNIFICANT CHANGE UP
-  CLINDAMYCIN: SIGNIFICANT CHANGE UP
-  DAPTOMYCIN: SIGNIFICANT CHANGE UP
-  ERYTHROMYCIN: SIGNIFICANT CHANGE UP
-  LINEZOLID: SIGNIFICANT CHANGE UP
-  OXACILLIN: SIGNIFICANT CHANGE UP
-  PENICILLIN: SIGNIFICANT CHANGE UP
-  RIFAMPIN: SIGNIFICANT CHANGE UP
-  TETRACYCLINE: SIGNIFICANT CHANGE UP
-  TRIMETHOPRIM/SULFAMETHOXAZOLE: SIGNIFICANT CHANGE UP
-  VANCOMYCIN: SIGNIFICANT CHANGE UP
ANION GAP SERPL CALC-SCNC: 9 MMOL/L — SIGNIFICANT CHANGE UP (ref 5–17)
BUN SERPL-MCNC: 20 MG/DL — SIGNIFICANT CHANGE UP (ref 7–23)
CALCIUM SERPL-MCNC: 8.7 MG/DL — SIGNIFICANT CHANGE UP (ref 8.4–10.5)
CHLORIDE SERPL-SCNC: 103 MMOL/L — SIGNIFICANT CHANGE UP (ref 96–108)
CO2 SERPL-SCNC: 24 MMOL/L — SIGNIFICANT CHANGE UP (ref 22–31)
CREAT SERPL-MCNC: 0.85 MG/DL — SIGNIFICANT CHANGE UP (ref 0.5–1.3)
CULTURE RESULTS: SIGNIFICANT CHANGE UP
GLUCOSE BLDC GLUCOMTR-MCNC: 135 MG/DL — HIGH (ref 70–99)
GLUCOSE BLDC GLUCOMTR-MCNC: 138 MG/DL — HIGH (ref 70–99)
GLUCOSE BLDC GLUCOMTR-MCNC: 144 MG/DL — HIGH (ref 70–99)
GLUCOSE BLDC GLUCOMTR-MCNC: 176 MG/DL — HIGH (ref 70–99)
GLUCOSE SERPL-MCNC: 144 MG/DL — HIGH (ref 70–99)
HCT VFR BLD CALC: 22.6 % — LOW (ref 34.5–45)
HGB BLD-MCNC: 7.3 G/DL — LOW (ref 11.5–15.5)
MAGNESIUM SERPL-MCNC: 1.9 MG/DL — SIGNIFICANT CHANGE UP (ref 1.6–2.6)
MCHC RBC-ENTMCNC: 29.7 PG — SIGNIFICANT CHANGE UP (ref 27–34)
MCHC RBC-ENTMCNC: 32.3 GM/DL — SIGNIFICANT CHANGE UP (ref 32–36)
MCV RBC AUTO: 91.9 FL — SIGNIFICANT CHANGE UP (ref 80–100)
METHOD TYPE: SIGNIFICANT CHANGE UP
METHOD TYPE: SIGNIFICANT CHANGE UP
NRBC # BLD: 0 /100 WBCS — SIGNIFICANT CHANGE UP (ref 0–0)
ORGANISM # SPEC MICROSCOPIC CNT: SIGNIFICANT CHANGE UP
PHOSPHATE SERPL-MCNC: 3.3 MG/DL — SIGNIFICANT CHANGE UP (ref 2.5–4.5)
PLATELET # BLD AUTO: 196 K/UL — SIGNIFICANT CHANGE UP (ref 150–400)
POTASSIUM SERPL-MCNC: 3.7 MMOL/L — SIGNIFICANT CHANGE UP (ref 3.5–5.3)
POTASSIUM SERPL-SCNC: 3.7 MMOL/L — SIGNIFICANT CHANGE UP (ref 3.5–5.3)
RBC # BLD: 2.46 M/UL — LOW (ref 3.8–5.2)
RBC # FLD: 14.4 % — SIGNIFICANT CHANGE UP (ref 10.3–14.5)
SODIUM SERPL-SCNC: 136 MMOL/L — SIGNIFICANT CHANGE UP (ref 135–145)
SPECIMEN SOURCE: SIGNIFICANT CHANGE UP
VANCOMYCIN TROUGH SERPL-MCNC: 19.2 UG/ML — SIGNIFICANT CHANGE UP (ref 10–20)
WBC # BLD: 7.91 K/UL — SIGNIFICANT CHANGE UP (ref 3.8–10.5)
WBC # FLD AUTO: 7.91 K/UL — SIGNIFICANT CHANGE UP (ref 3.8–10.5)

## 2019-02-18 RX ORDER — MAGNESIUM SULFATE 500 MG/ML
1 VIAL (ML) INJECTION ONCE
Qty: 0 | Refills: 0 | Status: COMPLETED | OUTPATIENT
Start: 2019-02-18 | End: 2019-02-18

## 2019-02-18 RX ORDER — POTASSIUM CHLORIDE 20 MEQ
40 PACKET (EA) ORAL ONCE
Qty: 0 | Refills: 0 | Status: DISCONTINUED | OUTPATIENT
Start: 2019-02-18 | End: 2019-02-18

## 2019-02-18 RX ORDER — MAGNESIUM OXIDE 400 MG ORAL TABLET 241.3 MG
400 TABLET ORAL ONCE
Qty: 0 | Refills: 0 | Status: DISCONTINUED | OUTPATIENT
Start: 2019-02-18 | End: 2019-02-18

## 2019-02-18 RX ORDER — VANCOMYCIN HCL 1 G
750 VIAL (EA) INTRAVENOUS EVERY 12 HOURS
Qty: 0 | Refills: 0 | Status: DISCONTINUED | OUTPATIENT
Start: 2019-02-18 | End: 2019-02-18

## 2019-02-18 RX ORDER — SODIUM CHLORIDE 9 MG/ML
1000 INJECTION INTRAMUSCULAR; INTRAVENOUS; SUBCUTANEOUS
Qty: 0 | Refills: 0 | Status: DISCONTINUED | OUTPATIENT
Start: 2019-02-19 | End: 2019-02-19

## 2019-02-18 RX ORDER — DAPTOMYCIN 500 MG/10ML
330 INJECTION, POWDER, LYOPHILIZED, FOR SOLUTION INTRAVENOUS EVERY 24 HOURS
Qty: 0 | Refills: 0 | Status: DISCONTINUED | OUTPATIENT
Start: 2019-02-19 | End: 2019-02-19

## 2019-02-18 RX ORDER — POTASSIUM CHLORIDE 20 MEQ
40 PACKET (EA) ORAL ONCE
Qty: 0 | Refills: 0 | Status: COMPLETED | OUTPATIENT
Start: 2019-02-18 | End: 2019-02-18

## 2019-02-18 RX ORDER — DAPTOMYCIN 500 MG/10ML
330 INJECTION, POWDER, LYOPHILIZED, FOR SOLUTION INTRAVENOUS ONCE
Qty: 0 | Refills: 0 | Status: COMPLETED | OUTPATIENT
Start: 2019-02-18 | End: 2019-02-18

## 2019-02-18 RX ADMIN — ATORVASTATIN CALCIUM 80 MILLIGRAM(S): 80 TABLET, FILM COATED ORAL at 21:44

## 2019-02-18 RX ADMIN — Medication 100 MILLIGRAM(S): at 14:28

## 2019-02-18 RX ADMIN — HEPARIN SODIUM 5000 UNIT(S): 5000 INJECTION INTRAVENOUS; SUBCUTANEOUS at 14:28

## 2019-02-18 RX ADMIN — Medication 500 MILLIGRAM(S): at 06:12

## 2019-02-18 RX ADMIN — TICAGRELOR 90 MILLIGRAM(S): 90 TABLET ORAL at 06:12

## 2019-02-18 RX ADMIN — HEPARIN SODIUM 5000 UNIT(S): 5000 INJECTION INTRAVENOUS; SUBCUTANEOUS at 06:13

## 2019-02-18 RX ADMIN — BRIMONIDINE TARTRATE 1 DROP(S): 2 SOLUTION/ DROPS OPHTHALMIC at 14:29

## 2019-02-18 RX ADMIN — Medication 650 MILLIGRAM(S): at 23:34

## 2019-02-18 RX ADMIN — BRIMONIDINE TARTRATE 1 DROP(S): 2 SOLUTION/ DROPS OPHTHALMIC at 21:42

## 2019-02-18 RX ADMIN — TICAGRELOR 90 MILLIGRAM(S): 90 TABLET ORAL at 19:26

## 2019-02-18 RX ADMIN — Medication 100 GRAM(S): at 06:13

## 2019-02-18 RX ADMIN — Medication 650 MILLIGRAM(S): at 12:06

## 2019-02-18 RX ADMIN — PANTOPRAZOLE SODIUM 40 MILLIGRAM(S): 20 TABLET, DELAYED RELEASE ORAL at 06:12

## 2019-02-18 RX ADMIN — Medication 100 MILLIGRAM(S): at 21:44

## 2019-02-18 RX ADMIN — Medication 250 MILLIGRAM(S): at 14:28

## 2019-02-18 RX ADMIN — LATANOPROST 1 DROP(S): 0.05 SOLUTION/ DROPS OPHTHALMIC; TOPICAL at 21:43

## 2019-02-18 RX ADMIN — Medication 1 DROP(S): at 19:26

## 2019-02-18 RX ADMIN — BRIMONIDINE TARTRATE 1 DROP(S): 2 SOLUTION/ DROPS OPHTHALMIC at 06:14

## 2019-02-18 RX ADMIN — Medication 250 MILLIGRAM(S): at 02:42

## 2019-02-18 RX ADMIN — Medication 500 MILLIGRAM(S): at 19:26

## 2019-02-18 RX ADMIN — Medication 40 MILLIEQUIVALENT(S): at 06:12

## 2019-02-18 RX ADMIN — DAPTOMYCIN 113.2 MILLIGRAM(S): 500 INJECTION, POWDER, LYOPHILIZED, FOR SOLUTION INTRAVENOUS at 23:34

## 2019-02-18 RX ADMIN — HEPARIN SODIUM 5000 UNIT(S): 5000 INJECTION INTRAVENOUS; SUBCUTANEOUS at 21:44

## 2019-02-18 RX ADMIN — Medication 2: at 07:02

## 2019-02-18 RX ADMIN — Medication 100 MILLIGRAM(S): at 06:13

## 2019-02-18 RX ADMIN — Medication 25 MILLIGRAM(S): at 06:12

## 2019-02-18 RX ADMIN — Medication 5 MILLIGRAM(S): at 21:44

## 2019-02-18 RX ADMIN — Medication 1 DROP(S): at 06:14

## 2019-02-18 RX ADMIN — Medication 81 MILLIGRAM(S): at 14:28

## 2019-02-18 NOTE — PROGRESS NOTE ADULT - SUBJECTIVE AND OBJECTIVE BOX
Physical Medicine and Rehabilitation Progress Note:    Patient is a 84y old  Female who presents with a chief complaint of Left foot gangrene (18 Feb 2019 05:22)      HPI:  83 yo female with h/o DM and HLD s/p right leg bypass and R great toe amputation, now healed, presents with a nonhealing L great toe ulcer, which has been previously debrided. 11/13 Aniogram shows patent aorta and bilateral iliac vessels. Patent L CFA and profunda. L SFA occluded at the origin. Extensive collateralization throughout leg with no named vessels until DP reconstitutes distally. Patient notes that she has been having increased claudication symptoms in the left leg with walking. Denies CP, SOB. (11 Feb 2019 15:04)                            7.3    7.91  )-----------( 196      ( 18 Feb 2019 00:52 )             22.6       02-18    136  |  103  |  20  ----------------------------<  144<H>  3.7   |  24  |  0.85    Ca    8.7      18 Feb 2019 00:51  Phos  3.3     02-18  Mg     1.9     02-18      Vital Signs Last 24 Hrs  T(C): 36.2 (18 Feb 2019 10:03), Max: 37.6 (17 Feb 2019 19:44)  T(F): 97.1 (18 Feb 2019 10:03), Max: 99.6 (17 Feb 2019 19:44)  HR: 60 (18 Feb 2019 08:52) (58 - 74)  BP: 132/60 (18 Feb 2019 08:52) (119/56 - 171/83)  BP(mean): 96 (18 Feb 2019 06:09) (83 - 116)  RR: 16 (18 Feb 2019 08:52) (16 - 18)  SpO2: 98% (18 Feb 2019 08:52) (98% - 100%)    MEDICATIONS  (STANDING):  aspirin enteric coated 81 milliGRAM(s) Oral daily  atorvastatin 80 milliGRAM(s) Oral at bedtime  brimonidine 0.2% Ophthalmic Solution 1 Drop(s) Both EYES three times a day  ciprofloxacin     Tablet 500 milliGRAM(s) Oral every 12 hours  dextrose 5%. 1000 milliLiter(s) (50 mL/Hr) IV Continuous <Continuous>  dextrose 50% Injectable 12.5 Gram(s) IV Push once  dextrose 50% Injectable 25 Gram(s) IV Push once  dextrose 50% Injectable 25 Gram(s) IV Push once  docusate sodium 100 milliGRAM(s) Oral three times a day  heparin  Injectable 5000 Unit(s) SubCutaneous every 8 hours  insulin lispro (HumaLOG) corrective regimen sliding scale   SubCutaneous Before meals and at bedtime  latanoprost 0.005% Ophthalmic Solution 1 Drop(s) Both EYES at bedtime  magnesium oxide 400 milliGRAM(s) Oral once  melatonin 5 milliGRAM(s) Oral at bedtime  metoprolol succinate ER 25 milliGRAM(s) Oral daily  pantoprazole    Tablet 40 milliGRAM(s) Oral before breakfast  potassium chloride    Tablet ER 40 milliEquivalent(s) Oral once  ticagrelor 90 milliGRAM(s) Oral two times a day  timolol 0.5% Solution 1 Drop(s) Both EYES two times a day  vancomycin  IVPB 750 milliGRAM(s) IV Intermittent every 12 hours    MEDICATIONS  (PRN):  acetaminophen   Tablet .. 650 milliGRAM(s) Oral every 6 hours PRN Mild Pain (1 - 3)  dextrose 40% Gel 15 Gram(s) Oral once PRN Blood Glucose LESS THAN 70 milliGRAM(s)/deciliter  glucagon  Injectable 1 milliGRAM(s) IntraMuscular once PRN Glucose LESS THAN 70 milligrams/deciliter  metoclopramide Injectable 10 milliGRAM(s) IV Push once PRN Nausea and/or Vomiting  ondansetron Injectable 4 milliGRAM(s) IV Push every 6 hours PRN Nausea  senna 2 Tablet(s) Oral daily PRN Constipation    Currently Undergoing Physical Therapy at bedside.    Functional Status Assessment:      Previous Level of Function:     · Ambulation Skills	needs device and assist	  · Transfer Skills	needs device and assist	  · ADL Skills	needs device and assist	  · Work/Leisure Activity	needs device and assist	  · Additional Comments	Patient previously ambulatory to the bathroom with min assist and rolling walker as per Daughter providing social history. Patient has been in Subacute Rehab since last June following vascular procedures in April with Dr. Castellanos, Daughter stated she is still deciding if patient will transition to long term nursing home residence following [facility].	    Cognitive Status Examination:   · Orientation	oriented to person, place, time and situation; place; situation; person; time	  · Level of Consciousness	confused; alert	  · Follows Commands and Answers Questions	80% of the time; able to follow single-step instructions	  · Personal Safety and Judgment	intact	  · Short Term Memory	intact  impaired 3 word recall at one minute 1/3 with categorical cues	  · Long Term Memory	recalls birthdate	    Peripheral Neurovascular:     · Edema 1+ (Trace)	left leg	      Neurovascular Assessment:   · LLE	warm; no discoloration; no numbness; no tingling	    Skin:   Skin:  · Skin Integrity	left medial leg incision from lower leg to groin intact, ISHAN intact and pinned for mobilization	    Range of Motion Exam:   · Active Range of Motion Examination	Right UE Active ROM was WFL (within functional limits); Right LE Active ROM was WFL (within functional limits); left hip 1/2 range flexion, knee 3/4 range upon dangle flexion, left ankle WFL, left shoulder 1/2 range shoulder flexion/ABD due to arthritic changes,	    Manual Muscle Testing:   · Manual Muscle Testing Results	RUE grossly 5/5 grade strength with 5/5  strength left arm 3-/5  strength and functionally assessed at 3+/5 tricep/bicep, bilateral LE >/=3+/5 as seen through functional assessment, LLE not formally assessed due to limited ROM and pain	    Muscle Tone Assessment:   · Muscle Tone Assessment	bilateral upper extremities; bilateral lower extremities; normal	    Bed Mobility: Rolling/Turning:     · Level of Boyle	moderate assist (50% patients effort)	  · Physical Assist/Nonphysical Assist	2 person assist	    Bed Mobility: Scooting/Bridging:     · Level of Boyle	moderate assist (50% patients effort)	  · Physical Assist/Nonphysical Assist	2 person assist	    Bed Mobility: Supine to Sit:     · Level of Boyle	moderate assist (50% patients effort); maximum assist (25% patients effort)	  · Physical Assist/Nonphysical Assist	2 person assist	    Bed Mobility Analysis:     · Bed Mobility Limitations	decreased ability to use legs for bridging/pushing; decreased ability to use arms for pushing/pulling	  · Impairments Contributing to Impaired Bed Mobility	impaired balance; decreased strength; pain	    Transfer: Sit to Stand:     · Level of Boyle	minimum assist (75% patients effort); moderate assist (50% patients effort); Mod on left min on the right	  · Physical Assist/Nonphysical Assist	2 person assist	  · Weight-Bearing Restrictions	weight-bearing as tolerated	  · Assistive Device	rolling walker	    Transfer: Stand to Sit:     · Level of Boyle	moderate assist (50% patients effort)	  · Physical Assist/Nonphysical Assist	2 person assist	  · Weight-Bearing Restrictions	weight-bearing as tolerated	  · Assistive Device	rolling walker	    Sit/Stand Transfer Safety Analysis:     · Transfer Safety Concerns Noted	decreased balance during turns; patient apprehensive for descend	  · Impairments Contributing to Impaired Transfers	impaired balance; decreased strength; decreased ROM; pain	    Gait Skills:     · Level of Boyle	minimum assist (75% patients effort); moderate assist (50% patients effort)	  · Physical Assist/Nonphysical Assist	2 person assist	  · Weight-Bearing Restrictions	weight-bearing as tolerated	  · Assistive Device	rolling walker	  · Gait Distance	bed to chair; 3 steps	    Gait Analysis:     · Gait Pattern Used	3-point gait	  · Gait Deviations Noted	decreased alvaro; decreased step length; decreased stride length	  · Impairments Contributing to Gait Deviations	impaired balance; decreased strength; decreased ROM	    Balance Skills Assessment:     · Sitting Balance: Static	fair balance	  · Sitting Balance: Dynamic	fair balance	  · Sit-to-Stand Balance	fair balance	  · Standing Balance: Static	fair balance	  · Standing Balance: Dynamic	fair minus	  · Systems Impairment Contributing to Balance Disturbance	musculoskeletal	  · Identified Impairments Contributing to Balance Disturbance	pain; decreased ROM; decreased strength	    Sensory Examination:   Sensory Examination:    Grossly Intact:   · Gross Sensory Examination	Grossly Intact; Left UE; Right UE; Left LE; Right LE	      Light Touch Sensation:   · Left UE	within normal limits	  · Right UE	within normal limits	  · Left LE	within normal limits	  · Right LE	within normal limits	    · Coordination Assessed	finger to nose; WNL, decreased accuracy LUE may be 2/2 to arthiric changes LUE, HTS not tested	      Proprioception:   · Coordination Assessed	finger to nose; WNL, decreased accuracy LUE may be 2/2 to arthiric changes LUE, HTS not tested	      Fine Motor Coordination:   Fine Motor Coordination Examination:    Fine Motor Coordination:   · Left Hand, Finger to Nose	mild impairment	  · Right Hand, Finger to Nose	normal performance	  · Left Hand Thumb/Finger Opposition Skills	mild impairment  poor accuracy bilateral	  · Right Hand Thumb/Finger Opposition Skills	mild impairment  poor accuracy bilateral	  · Right Hand, Graphomotor Skills	not tested	  · Left Hand, Diadochokinesis Skills	not tested	  · Right Hand, Diadochokinesis Skills	not tested	    Treatment Location:   · Comments	Left upper and lower quadrant impaired for visual fields exam of left eye, no noted nystagmus blurred vision or double vision, face symmetrical, tongue midline and moves laterally, sensation intact through V1-3	    Clinical Impressions:   · Criteria for Skilled Therapeutic Interventions	rehab potential; anticipated discharge recommendation; functional limitations in following categories; risk reduction/prevention; therapy frequency	  · Impairments Found (describe specific impairments)	aerobic capacity/endurance; gait, locomotion, and balance; gross motor; ROM; posture; muscle strength; cognitive impairment	  · Functional Limitations in Following Categories (describe specific limitations)	self-care; home management; work; community/leisure	  · Risk Reduction/Prevention (Describe Specific Areas of risk reduction/prevention)	risk factors	  · Risk Areas	fall; impaired judgment	  · Rehab Potential	good, to achieve stated therapy goals	  · Therapy Frequency	2-3x/week	        PM&R Impression: as above    Disposition Plan Recommendations:  return to [facility]

## 2019-02-18 NOTE — PROGRESS NOTE ADULT - SUBJECTIVE AND OBJECTIVE BOX
24hr Events:  O/N:Vanc trough 19.2, reduced dose from 1000mg BID to 750mg BID, Hgb 7.3 from 7.8, repleated K+ and mg, VSS  2/17: Cx growing MRSA, placed on contact          Assessment/Plan;  84 F hx HTN, CAD (stent x7 in 2018), DVT (formerly on Eliquis), DM, HLD, PAD s/p R fem-pop bypass and R great toe amputation admitted (2/11) for L fem-pop bypass ( 2/12) c/b CVA s/p angiography and R ICA stent placement (2/13)    -Pain/nausea control  -Follow up  Neurology recommendations  -Cipro/Vancomycin  -reg diet  -Brilinta. HSQ, ASA  -Dispo: PT recommends [facility] 24hr Events:  O/N:Vanc trough 19.2, reduced dose from 1000mg BID to 750mg BID, Hgb 7.3 from 7.8, repleated K+ and mg, VSS  2/17: Cx growing MRSA, placed on contact    ciprofloxacin     Tablet 500  vancomycin  IVPB 750  aspirin enteric coated 81  ciprofloxacin     Tablet 500  heparin  Injectable 5000  metoprolol succinate ER 25  ticagrelor 90  vancomycin  IVPB 750        Vital Signs Last 24 Hrs  T(C): 36.4 (18 Feb 2019 06:05), Max: 37.6 (17 Feb 2019 19:44)  T(F): 97.6 (18 Feb 2019 06:05), Max: 99.6 (17 Feb 2019 19:44)  HR: 58 (18 Feb 2019 06:09) (58 - 74)  BP: 155/67 (18 Feb 2019 06:09) (119/56 - 171/83)  BP(mean): 96 (18 Feb 2019 06:09) (83 - 116)  RR: 18 (18 Feb 2019 06:09) (17 - 18)  SpO2: 100% (18 Feb 2019 06:09) (98% - 100%)  I&O's Summary    17 Feb 2019 07:01  -  18 Feb 2019 07:00  --------------------------------------------------------  IN: 1267 mL / OUT: 1675 mL / NET: -408 mL        Physical Exam:  General: NAD, resting comfortably in bed  Pulmonary: Nonlabored breathing, no respiratory distress  Extremities: LLE incision c/d/i; ISHAN in place  Neuro: A/O x 3; strength/sensation in LLE grossly intact; no focal deficits  Pulses:  LLE distal graft 2+; DP triphasic      LABS:                        7.3    7.91  )-----------( 196      ( 18 Feb 2019 00:52 )             22.6     02-18    136  |  103  |  20  ----------------------------<  144<H>  3.7   |  24  |  0.85    Ca    8.7      18 Feb 2019 00:51  Phos  3.3     02-18  Mg     1.9     02-18        Assessment/Plan;  84 F hx HTN, CAD (stent x7 in 2018), DVT (formerly on Eliquis), DM, HLD, PAD s/p R fem-pop bypass and R great toe amputation admitted (2/11) for L fem-pop bypass ( 2/12) c/b CVA s/p angiography and R ICA stent placement (2/13)    - Pain/nausea control  - Follow up  Neurology recommendations  - Cipro/Vancomycin  - DASH diet  - ISS  -Brilinta. HSQ, ASA  -Dispo: PT recommends [facility]

## 2019-02-18 NOTE — PROGRESS NOTE ADULT - ASSESSMENT
83 yo female with f HTN, CAD (stent x7 in 2018), DVT (formerly on Eliquis), DM, HLD, PAD s/p R fem-pop bypass and R great toe amputation admitted on 2/11/19 for L fem-pop bypass on 2/12. 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 on 7 [facility] for further stroke workup.    Problem/Plan - 1:  ·  Problem: CVA (cerebral vascular accident).  Plan: Patient with new-onset dysarthria and L sided weakness on 2/13 after L fem-pop.  Stroke code on 2/13: found to have significant R carotid stenosis on CTA, and CT perfusion showed significant perfusion & flow mismatch along with prior R sided infarction in MCA distribution.    now s/p R carotid stent by Neurosurgery.   - symptoms improved, currently with only mild dysarthria   - repeat CT head on 2/14 showed stable infarcts from prior  - continue lipitor 80, brilinta 90 BID, ASA 81  - PT/OT recs  - f/u Neurosurgery recs  for post-procedure management   - for carotid Duplex U/S 30 days post-procedure   - BP goal: keep -150 mm Hg.     Problem/Plan - 2:  ·  Problem: Diabetes mellitus.  Plan: - HbA1c 6.4% on 2/14/19.   - on metformin at home  continue mISS  Carb consistent diet.    Problem/Plan - 3:  ·  Problem: Peripheral vascular disease.  Plan: s/p R leg bypass and right great toe amputation in 2018.   s/p L fem pop bypass by vascular surgery on 2/12 due to L foot gangrene.  No known post-op complications at this time  - f/u vascular recs for post-op management  - continue vancomycin (trough 2/16 am) and ciprofloxacin  - Hold home apixaban.     Problem/Plan - 4:  ·  Problem: Hypertension.  Plan: currently normotensive,   on amlodipine 10, hydralazine 25 QID, losartan 100 at home, metoprolol 25 mg QD  -On metoprolol 25 mg ER w/ holding parameters  -SBP goal 100-150  -Re-start additional home BP meds as tolerated.

## 2019-02-19 ENCOUNTER — RESULT REVIEW (OUTPATIENT)
Age: 84
End: 2019-02-19

## 2019-02-19 LAB
ANION GAP SERPL CALC-SCNC: 8 MMOL/L — SIGNIFICANT CHANGE UP (ref 5–17)
APTT BLD: 29.9 SEC — SIGNIFICANT CHANGE UP (ref 27.5–36.3)
BUN SERPL-MCNC: 16 MG/DL — SIGNIFICANT CHANGE UP (ref 7–23)
CALCIUM SERPL-MCNC: 9.3 MG/DL — SIGNIFICANT CHANGE UP (ref 8.4–10.5)
CHLORIDE SERPL-SCNC: 108 MMOL/L — SIGNIFICANT CHANGE UP (ref 96–108)
CO2 SERPL-SCNC: 24 MMOL/L — SIGNIFICANT CHANGE UP (ref 22–31)
CREAT SERPL-MCNC: 0.77 MG/DL — SIGNIFICANT CHANGE UP (ref 0.5–1.3)
GLUCOSE BLDC GLUCOMTR-MCNC: 107 MG/DL — HIGH (ref 70–99)
GLUCOSE BLDC GLUCOMTR-MCNC: 118 MG/DL — HIGH (ref 70–99)
GLUCOSE BLDC GLUCOMTR-MCNC: 123 MG/DL — HIGH (ref 70–99)
GLUCOSE BLDC GLUCOMTR-MCNC: 161 MG/DL — HIGH (ref 70–99)
GLUCOSE SERPL-MCNC: 111 MG/DL — HIGH (ref 70–99)
HCT VFR BLD CALC: 26.3 % — LOW (ref 34.5–45)
HGB BLD-MCNC: 8.2 G/DL — LOW (ref 11.5–15.5)
INR BLD: 1 — SIGNIFICANT CHANGE UP (ref 0.88–1.16)
MAGNESIUM SERPL-MCNC: 2 MG/DL — SIGNIFICANT CHANGE UP (ref 1.6–2.6)
MCHC RBC-ENTMCNC: 29.5 PG — SIGNIFICANT CHANGE UP (ref 27–34)
MCHC RBC-ENTMCNC: 31.2 GM/DL — LOW (ref 32–36)
MCV RBC AUTO: 94.6 FL — SIGNIFICANT CHANGE UP (ref 80–100)
NRBC # BLD: 0 /100 WBCS — SIGNIFICANT CHANGE UP (ref 0–0)
PHOSPHATE SERPL-MCNC: 4.1 MG/DL — SIGNIFICANT CHANGE UP (ref 2.5–4.5)
PLATELET # BLD AUTO: 242 K/UL — SIGNIFICANT CHANGE UP (ref 150–400)
POTASSIUM SERPL-MCNC: 4 MMOL/L — SIGNIFICANT CHANGE UP (ref 3.5–5.3)
POTASSIUM SERPL-SCNC: 4 MMOL/L — SIGNIFICANT CHANGE UP (ref 3.5–5.3)
PROTHROM AB SERPL-ACNC: 11.3 SEC — SIGNIFICANT CHANGE UP (ref 10–12.9)
RBC # BLD: 2.78 M/UL — LOW (ref 3.8–5.2)
RBC # FLD: 14.8 % — HIGH (ref 10.3–14.5)
SODIUM SERPL-SCNC: 140 MMOL/L — SIGNIFICANT CHANGE UP (ref 135–145)
WBC # BLD: 7.92 K/UL — SIGNIFICANT CHANGE UP (ref 3.8–10.5)
WBC # FLD AUTO: 7.92 K/UL — SIGNIFICANT CHANGE UP (ref 3.8–10.5)

## 2019-02-19 PROCEDURE — 28810 AMPUTATION TOE & METATARSAL: CPT | Mod: 79,TA

## 2019-02-19 PROCEDURE — 99232 SBSQ HOSP IP/OBS MODERATE 35: CPT

## 2019-02-19 PROCEDURE — 99233 SBSQ HOSP IP/OBS HIGH 50: CPT

## 2019-02-19 RX ORDER — ONDANSETRON 8 MG/1
4 TABLET, FILM COATED ORAL ONCE
Qty: 0 | Refills: 0 | Status: DISCONTINUED | OUTPATIENT
Start: 2019-02-19 | End: 2019-02-26

## 2019-02-19 RX ORDER — HYDROMORPHONE HYDROCHLORIDE 2 MG/ML
0.5 INJECTION INTRAMUSCULAR; INTRAVENOUS; SUBCUTANEOUS
Qty: 0 | Refills: 0 | Status: DISCONTINUED | OUTPATIENT
Start: 2019-02-19 | End: 2019-02-20

## 2019-02-19 RX ORDER — OXYCODONE AND ACETAMINOPHEN 5; 325 MG/1; MG/1
1 TABLET ORAL ONCE
Qty: 0 | Refills: 0 | Status: DISCONTINUED | OUTPATIENT
Start: 2019-02-19 | End: 2019-02-19

## 2019-02-19 RX ADMIN — OXYCODONE AND ACETAMINOPHEN 1 TABLET(S): 5; 325 TABLET ORAL at 22:44

## 2019-02-19 RX ADMIN — TICAGRELOR 90 MILLIGRAM(S): 90 TABLET ORAL at 20:14

## 2019-02-19 RX ADMIN — OXYCODONE AND ACETAMINOPHEN 1 TABLET(S): 5; 325 TABLET ORAL at 21:44

## 2019-02-19 RX ADMIN — Medication 1 DROP(S): at 07:15

## 2019-02-19 RX ADMIN — TICAGRELOR 90 MILLIGRAM(S): 90 TABLET ORAL at 07:14

## 2019-02-19 RX ADMIN — Medication 5 MILLIGRAM(S): at 21:44

## 2019-02-19 RX ADMIN — Medication 2: at 21:58

## 2019-02-19 RX ADMIN — ATORVASTATIN CALCIUM 80 MILLIGRAM(S): 80 TABLET, FILM COATED ORAL at 21:45

## 2019-02-19 RX ADMIN — Medication 1 DROP(S): at 19:37

## 2019-02-19 RX ADMIN — Medication 500 MILLIGRAM(S): at 19:36

## 2019-02-19 RX ADMIN — LATANOPROST 1 DROP(S): 0.05 SOLUTION/ DROPS OPHTHALMIC; TOPICAL at 21:55

## 2019-02-19 RX ADMIN — HEPARIN SODIUM 5000 UNIT(S): 5000 INJECTION INTRAVENOUS; SUBCUTANEOUS at 21:45

## 2019-02-19 RX ADMIN — HEPARIN SODIUM 5000 UNIT(S): 5000 INJECTION INTRAVENOUS; SUBCUTANEOUS at 07:14

## 2019-02-19 RX ADMIN — Medication 650 MILLIGRAM(S): at 23:53

## 2019-02-19 RX ADMIN — SODIUM CHLORIDE 65 MILLILITER(S): 9 INJECTION INTRAMUSCULAR; INTRAVENOUS; SUBCUTANEOUS at 05:18

## 2019-02-19 RX ADMIN — BRIMONIDINE TARTRATE 1 DROP(S): 2 SOLUTION/ DROPS OPHTHALMIC at 21:55

## 2019-02-19 RX ADMIN — Medication 500 MILLIGRAM(S): at 07:14

## 2019-02-19 RX ADMIN — Medication 25 MILLIGRAM(S): at 07:14

## 2019-02-19 RX ADMIN — BRIMONIDINE TARTRATE 1 DROP(S): 2 SOLUTION/ DROPS OPHTHALMIC at 07:14

## 2019-02-19 RX ADMIN — PANTOPRAZOLE SODIUM 40 MILLIGRAM(S): 20 TABLET, DELAYED RELEASE ORAL at 07:14

## 2019-02-19 RX ADMIN — Medication 100 MILLIGRAM(S): at 21:44

## 2019-02-19 NOTE — PACU DISCHARGE NOTE - COMMENTS
pt aao x3.  VSS.  left leg suture line intact; ISHAN x1 to ss; left foot kerlix and ace wrap dsg intact; small amt bloody drainage noted from top of dsg.  denies c/o pain.  report given to RN on 5 uris.  pt to go to [id number] via bed off monitor as per MD order

## 2019-02-19 NOTE — CHART NOTE - NSCHARTNOTEFT_GEN_A_CORE
Admitting Diagnosis:   Patient is a 84y old  Female who presents with a chief complaint of Left foot gangrene (2019 07:56)      PAST MEDICAL & SURGICAL HISTORY:  MRSA infection: right great toe ( amputated)  Glaucoma: b/l  Falls  Depression  Pulmonary embolism  DVT (deep venous thrombosis): Right lower extremity  MI (myocardial infarction): at age 65 y.o  Peripheral vascular disease  Ulcer of lower limb  Arthropathy  Ischemic heart disease  Hypertension  Hyperlipidemia  Diabetes mellitus: II  S/P amputation: may 2018; right great toe  History of surgery: x7 coronary artery stents  History of surgery: right leg bypass 2018 with amputation of right great toe      Current Nutrition Order: DASH/TLC, CSTCHOSN diet  NPOpMN  for debridement     PO Intake: Good (%) [   ]  Fair (50-75%) [   ] Poor (<25%) [   ]    GI Issues:   +BM , (Colace, Senna)  Zofran PRN for nausea/vomiting    Pain:    Skin Integrity: surgical incision Left leg, s/p L great toe amputation     Labs:       140  |  108  |  16  ----------------------------<  111<H>  4.0   |  24  |  0.77    Ca    9.3      2019 06:00  Phos  4.1       Mg     2.0           CAPILLARY BLOOD GLUCOSE      POCT Blood Glucose.: 107 mg/dL (2019 06:35)  POCT Blood Glucose.: 144 mg/dL (2019 21:29)  POCT Blood Glucose.: 138 mg/dL (2019 16:16)      Medications:  MEDICATIONS  (STANDING):  aspirin enteric coated 81 milliGRAM(s) Oral daily  atorvastatin 80 milliGRAM(s) Oral at bedtime  brimonidine 0.2% Ophthalmic Solution 1 Drop(s) Both EYES three times a day  ciprofloxacin     Tablet 500 milliGRAM(s) Oral every 12 hours  DAPTOmycin IVPB 330 milliGRAM(s) IV Intermittent every 24 hours  dextrose 5%. 1000 milliLiter(s) (50 mL/Hr) IV Continuous <Continuous>  dextrose 50% Injectable 12.5 Gram(s) IV Push once  dextrose 50% Injectable 25 Gram(s) IV Push once  dextrose 50% Injectable 25 Gram(s) IV Push once  docusate sodium 100 milliGRAM(s) Oral three times a day  heparin  Injectable 5000 Unit(s) SubCutaneous every 8 hours  insulin lispro (HumaLOG) corrective regimen sliding scale   SubCutaneous Before meals and at bedtime  latanoprost 0.005% Ophthalmic Solution 1 Drop(s) Both EYES at bedtime  melatonin 5 milliGRAM(s) Oral at bedtime  metoprolol succinate ER 25 milliGRAM(s) Oral daily  pantoprazole    Tablet 40 milliGRAM(s) Oral before breakfast  sodium chloride 0.9%. 1000 milliLiter(s) (65 mL/Hr) IV Continuous <Continuous>  ticagrelor 90 milliGRAM(s) Oral two times a day  timolol 0.5% Solution 1 Drop(s) Both EYES two times a day    MEDICATIONS  (PRN):  acetaminophen   Tablet .. 650 milliGRAM(s) Oral every 6 hours PRN Mild Pain (1 - 3)  dextrose 40% Gel 15 Gram(s) Oral once PRN Blood Glucose LESS THAN 70 milliGRAM(s)/deciliter  glucagon  Injectable 1 milliGRAM(s) IntraMuscular once PRN Glucose LESS THAN 70 milligrams/deciliter  metoclopramide Injectable 10 milliGRAM(s) IV Push once PRN Nausea and/or Vomiting  ondansetron Injectable 4 milliGRAM(s) IV Push every 6 hours PRN Nausea  senna 2 Tablet(s) Oral daily PRN Constipation      Weight:  179.8 lbs ()   Daily     Weight Change: No new weights recorded to assess at this time     Ht (): 167.6cm, Wt ( per daughter): 84.5kg, IBW: 130# +/-10%, %IBW: 143%, BMI: 30   Estimated energy needs:   IBW used to calculate energy needs due to pt's current body weight exceeding 120% of IBW. Needs further adjusted for wound healing  25-30 kcal/k0926-5935 kcal  1.2-1.4 gm/k-82 gm protein  30-35 mL/k9019-6677 mL fluids     Subjective:   85 yo F w/ significant PMH including CAD (w/ 7 stents), DVT previously on Eliquis, HTN, DM, HLD, admitted to vascular for surgery and found to have L weakness, found to have significant R carotid stenosis and likely limited flow to R MCA from that. s/p fem angio and balloon assisted right ICA stent placement . Continues to receive abx. NPO today for debridement of wound.         Previous Nutrition Diagnosis:  Increased nutrient needs (specify); protein RT increased nutrient demand for healing AEB pt s/p amputation     Active [ X ]  Resolved [   ]    If resolved, new PES:     Goal:  Meet >75% EER from PO to promote wound healing  Nutrition related labs WNL     Recommendations:  1. Resume DASH/TLC, CSTCHOSN diet when feasible  2.     Education:     Risk Level: High [   ] Moderate [   ] Low [   ] Admitting Diagnosis:   Patient is a 84y old  Female who presents with a chief complaint of Left foot gangrene (2019 07:56)      PAST MEDICAL & SURGICAL HISTORY:  MRSA infection: right great toe ( amputated)  Glaucoma: b/l  Falls  Depression  Pulmonary embolism  DVT (deep venous thrombosis): Right lower extremity  MI (myocardial infarction): at age 65 y.o  Peripheral vascular disease  Ulcer of lower limb  Arthropathy  Ischemic heart disease  Hypertension  Hyperlipidemia  Diabetes mellitus: II  S/P amputation: may 2018; right great toe  History of surgery: x7 coronary artery stents  History of surgery: right leg bypass 2018 with amputation of right great toe      Current Nutrition Order: NPOpMN  for debridement   DASH/TLC, CSTCHOSN diet    PO Intake: Good (%) [ X ]  Fair (50-75%) [   ] Poor (<25%) [   ]     GI Issues: Denies N/V/D/C   +BM , (Colace, Senna)  Zofran PRN for nausea/vomiting    Pain: Denies pain/discomfort     Skin Integrity: surgical incision Left leg, s/p L great toe amputation     Labs:       140  |  108  |  16  ----------------------------<  111<H>  4.0   |  24  |  0.77    Ca    9.3      2019 06:00  Phos  4.1       Mg     2.0           CAPILLARY BLOOD GLUCOSE      POCT Blood Glucose.: 107 mg/dL (2019 06:35)  POCT Blood Glucose.: 144 mg/dL (2019 21:29)  POCT Blood Glucose.: 138 mg/dL (2019 16:16)      Medications:  MEDICATIONS  (STANDING):  aspirin enteric coated 81 milliGRAM(s) Oral daily  atorvastatin 80 milliGRAM(s) Oral at bedtime  brimonidine 0.2% Ophthalmic Solution 1 Drop(s) Both EYES three times a day  ciprofloxacin     Tablet 500 milliGRAM(s) Oral every 12 hours  DAPTOmycin IVPB 330 milliGRAM(s) IV Intermittent every 24 hours  dextrose 5%. 1000 milliLiter(s) (50 mL/Hr) IV Continuous <Continuous>  dextrose 50% Injectable 12.5 Gram(s) IV Push once  dextrose 50% Injectable 25 Gram(s) IV Push once  dextrose 50% Injectable 25 Gram(s) IV Push once  docusate sodium 100 milliGRAM(s) Oral three times a day  heparin  Injectable 5000 Unit(s) SubCutaneous every 8 hours  insulin lispro (HumaLOG) corrective regimen sliding scale   SubCutaneous Before meals and at bedtime  latanoprost 0.005% Ophthalmic Solution 1 Drop(s) Both EYES at bedtime  melatonin 5 milliGRAM(s) Oral at bedtime  metoprolol succinate ER 25 milliGRAM(s) Oral daily  pantoprazole    Tablet 40 milliGRAM(s) Oral before breakfast  sodium chloride 0.9%. 1000 milliLiter(s) (65 mL/Hr) IV Continuous <Continuous>  ticagrelor 90 milliGRAM(s) Oral two times a day  timolol 0.5% Solution 1 Drop(s) Both EYES two times a day    MEDICATIONS  (PRN):  acetaminophen   Tablet .. 650 milliGRAM(s) Oral every 6 hours PRN Mild Pain (1 - 3)  dextrose 40% Gel 15 Gram(s) Oral once PRN Blood Glucose LESS THAN 70 milliGRAM(s)/deciliter  glucagon  Injectable 1 milliGRAM(s) IntraMuscular once PRN Glucose LESS THAN 70 milligrams/deciliter  metoclopramide Injectable 10 milliGRAM(s) IV Push once PRN Nausea and/or Vomiting  ondansetron Injectable 4 milliGRAM(s) IV Push every 6 hours PRN Nausea  senna 2 Tablet(s) Oral daily PRN Constipation      Weight:  179.8 lbs ()   Daily     Weight Change: No new weights recorded to assess at this time     Ht (): 167.6cm, Wt ( per daughter): 84.5kg, IBW: 130# +/-10%, %IBW: 143%, BMI: 30   Estimated energy needs:   IBW used to calculate energy needs due to pt's current body weight exceeding 120% of IBW. Needs further adjusted for wound healing  25-30 kcal/k6415-1330 kcal  1.2-1.4 gm/k-82 gm protein  30-35 mL/k3956-0210 mL fluids     Subjective:   85 yo F w/ significant PMH including CAD (w/ 7 stents), DVT previously on Eliquis, HTN, DM, HLD, admitted to vascular for surgery and found to have L weakness, found to have significant R carotid stenosis and likely limited flow to R MCA from that. s/p fem angio and balloon assisted right ICA stent placement . Continues to receive abx. NPO today for debridement of wound. Seen pt in room, awaiting to go for procedure. Pt reports eating well prior to NPO, endorses good appetite, enjoys eating her meals and usually completes her tray. Monitor lytes and correct PRN. RD will f/u per moderate risk protocol.     Previous Nutrition Diagnosis:  Increased nutrient needs (specify); protein RT increased nutrient demand for healing AEB pt s/p amputation     Active [   ]  Resolved [ X ]    If resolved, new PES:   no new nutrition problems identified at this time     Goal:  Meet >75% EER from PO to promote wound healing  Nutrition related labs WNL     Recommendations:  1. Resume DASH/TLC, CSTCHOSN diet when feasible  2. Monitor BMP, lytes and correct PRN     Education: Reviewed nutritional status for wound healing.     Risk Level: High [   ] Moderate [ X ] Low [   ]

## 2019-02-19 NOTE — PROGRESS NOTE ADULT - SUBJECTIVE AND OBJECTIVE BOX
24hr Events:  O/N:NPO/IVF, does not want 1u PRBC until discussed with daughter, attempted to multiple times but unable to reach daughter, d/cd Vanc, started Daptomycin, wound cx with MRSA sensitive to Daptomycin  2/18: Transfused 1u PRBC for Hgb 7.3 from 7.8.         Assessment/Plan;  84 F hx HTN, CAD (stent x7 in 2018), DVT (formerly on Eliquis), DM, HLD, PAD s/p R fem-pop bypass and R great toe amputation admitted (2/11) for L fem-pop bypass ( 2/12) c/b CVA s/p angiography and R ICA stent placement (2/13)    - Pain/nausea control  - Follow up  Neurology recommendations  - Cipro/Daptomycin  -NPO/IVF  - ISS  -Brilinta. HSQ, ASA  -Dispo: PT recommends [facility] 24hr Events:  O/N:NPO/IVF, does not want 1u PRBC until discussed with daughter, attempted to multiple times but unable to reach daughter, d/cd Vanc, started Daptomycin, wound cx with MRSA sensitive to Daptomycin  2/18: Transfused 1u PRBC for Hgb 7.3 from 7.8.     ciprofloxacin     Tablet 500  DAPTOmycin IVPB 330  aspirin enteric coated 81  ciprofloxacin     Tablet 500  DAPTOmycin IVPB 330  heparin  Injectable 5000  metoprolol succinate ER 25  ticagrelor 90        Vital Signs Last 24 Hrs  T(C): 36.6 (19 Feb 2019 05:12), Max: 37.4 (18 Feb 2019 21:49)  T(F): 97.9 (19 Feb 2019 05:12), Max: 99.3 (18 Feb 2019 21:49)  HR: 50 (19 Feb 2019 05:20) (50 - 80)  BP: 138/65 (19 Feb 2019 05:20) (107/48 - 152/65)  BP(mean): 93 (19 Feb 2019 05:20) (69 - 94)  RR: 18 (19 Feb 2019 05:20) (16 - 18)  SpO2: 100% (19 Feb 2019 05:20) (98% - 100%)  I&O's Summary    18 Feb 2019 07:01  -  19 Feb 2019 07:00  --------------------------------------------------------  IN: 760 mL / OUT: 3425 mL / NET: -2665 mL        Physical Exam:  General: NAD, resting comfortably in bed  Pulmonary: Nonlabored breathing, no respiratory distress  Extremities: LLE incision c/d/i; ISHAN in place  Neuro: A/O x 3; strength/sensation in LLE grossly intact; no focal deficits  Pulses:  LLE distal graft 2+; DP triphasic    LABS:                        8.2    7.92  )-----------( 242      ( 19 Feb 2019 06:00 )             26.3     02-19    140  |  108  |  16  ----------------------------<  111<H>  4.0   |  24  |  0.77    Ca    9.3      19 Feb 2019 06:00  Phos  4.1     02-19  Mg     2.0     02-19      PT/INR - ( 19 Feb 2019 06:00 )   PT: 11.3 sec;   INR: 1.00          PTT - ( 19 Feb 2019 06:00 )  PTT:29.9 sec       Assessment/Plan;  84 F hx HTN, CAD (stent x7 in 2018), DVT (formerly on Eliquis), DM, HLD, PAD s/p R fem-pop bypass and R great toe amputation admitted (2/11) for L fem-pop bypass ( 2/12) c/b CVA s/p angiography and R ICA stent placement (2/13)    - Pain/nausea control  - Follow up  Neurology recommendations  - Cipro/Daptomycin  - NPO/IVF  - ISS  -Brilinta. HSQ, ASA  -Dispo: PT recommends [facility]

## 2019-02-19 NOTE — BRIEF OPERATIVE NOTE - POST-OP DX
Carotid stenosis, right  02/13/2019  with symptoms of hypoperfusion  Active  Tray Lund  Infection of left great toe due to methicillin resistant Staphylococcus aureus (MRSA)  02/19/2019    Active  Ara Hurley  PAD (peripheral artery disease)  02/12/2019    Active  Tayla Orr
Carotid stenosis, right  02/13/2019  with symptoms of hypoperfusion  Active  Tray Lund  PAD (peripheral artery disease)  02/12/2019    Active  Tayla Orr
PAD (peripheral artery disease)  02/12/2019    Active  Tayla Orr

## 2019-02-19 NOTE — BRIEF OPERATIVE NOTE - PROCEDURE
<<-----Click on this checkbox to enter Procedure Amputation of left great toe  02/19/2019  partial 1st ray amputation  Active  [email or web address]

## 2019-02-19 NOTE — PROGRESS NOTE ADULT - SUBJECTIVE AND OBJECTIVE BOX
Procedure: Left partial 1st ray amputation   Surgeon: Emanuel    S: Pt has no complaints. Denies CP, SOB, dizziness, nausea or vomiting. Pain controlled with medication.    O:  T(C): 36.2 (02-20-19 @ 05:32), Max: 36.2 (02-20-19 @ 05:32)  T(F): 97.1 (02-20-19 @ 05:32), Max: 97.1 (02-20-19 @ 05:32)  HR: 63 (02-20-19 @ 05:10) (63 - 63)  BP: 145/63 (02-20-19 @ 05:10) (145/63 - 145/63)  RR: 18 (02-20-19 @ 05:10) (18 - 18)  SpO2: 99% (02-20-19 @ 05:10) (99% - 99%)  Wt(kg): --                        7.6    7.92  )-----------( 234      ( 20 Feb 2019 06:53 )             24.6     02-19    140  |  108  |  16  ----------------------------<  111<H>  4.0   |  24  |  0.77    Ca    9.3      19 Feb 2019 06:00  Phos  4.1     02-19  Mg     2.0     02-19        Gen: NAD, resting comfortably in bed  C/V: NSR  Pulm: Nonlabored breathing, no respiratory distress  Extrem: Left foot dressing intact with bloody saturation through kirlex but not through ACE bandage      A/P: 84yFemale s/p above procedure  Diet: DASH diet  Pain/nausea control  Continue Brilinta  DVT ppx: SQH  Vancomycin  Home medication as appropriate

## 2019-02-19 NOTE — PROGRESS NOTE ADULT - SUBJECTIVE AND OBJECTIVE BOX
Neurology Stroke Progress Note    INTERVAL HPI/OVERNIGHT EVENTS:  Patient seen and examined.   States she is very hungry but can't eat because of the procedure today  Denies any weakness or headache    MEDICATIONS  (STANDING):  aspirin enteric coated 81 milliGRAM(s) Oral daily  atorvastatin 80 milliGRAM(s) Oral at bedtime  brimonidine 0.2% Ophthalmic Solution 1 Drop(s) Both EYES three times a day  ciprofloxacin     Tablet 500 milliGRAM(s) Oral every 12 hours  DAPTOmycin IVPB 330 milliGRAM(s) IV Intermittent every 24 hours  dextrose 5%. 1000 milliLiter(s) (50 mL/Hr) IV Continuous <Continuous>  dextrose 50% Injectable 12.5 Gram(s) IV Push once  dextrose 50% Injectable 25 Gram(s) IV Push once  dextrose 50% Injectable 25 Gram(s) IV Push once  docusate sodium 100 milliGRAM(s) Oral three times a day  heparin  Injectable 5000 Unit(s) SubCutaneous every 8 hours  insulin lispro (HumaLOG) corrective regimen sliding scale   SubCutaneous Before meals and at bedtime  latanoprost 0.005% Ophthalmic Solution 1 Drop(s) Both EYES at bedtime  melatonin 5 milliGRAM(s) Oral at bedtime  metoprolol succinate ER 25 milliGRAM(s) Oral daily  pantoprazole    Tablet 40 milliGRAM(s) Oral before breakfast  sodium chloride 0.9%. 1000 milliLiter(s) (65 mL/Hr) IV Continuous <Continuous>  ticagrelor 90 milliGRAM(s) Oral two times a day  timolol 0.5% Solution 1 Drop(s) Both EYES two times a day    MEDICATIONS  (PRN):  acetaminophen   Tablet .. 650 milliGRAM(s) Oral every 6 hours PRN Mild Pain (1 - 3)  dextrose 40% Gel 15 Gram(s) Oral once PRN Blood Glucose LESS THAN 70 milliGRAM(s)/deciliter  glucagon  Injectable 1 milliGRAM(s) IntraMuscular once PRN Glucose LESS THAN 70 milligrams/deciliter  metoclopramide Injectable 10 milliGRAM(s) IV Push once PRN Nausea and/or Vomiting  ondansetron Injectable 4 milliGRAM(s) IV Push every 6 hours PRN Nausea  senna 2 Tablet(s) Oral daily PRN Constipation      Allergies    Goldbond (Unknown)  penicillins (Anaphylaxis)    Intolerances        ROS: As per HPI, otherwise negative    Vital Signs Last 24 Hrs  T(C): 36.6 (19 Feb 2019 05:12), Max: 37.4 (18 Feb 2019 21:49)  T(F): 97.9 (19 Feb 2019 05:12), Max: 99.3 (18 Feb 2019 21:49)  HR: 54 (19 Feb 2019 08:30) (50 - 80)  BP: 136/63 (19 Feb 2019 08:30) (107/48 - 152/65)  BP(mean): 93 (19 Feb 2019 05:20) (69 - 94)  RR: 18 (19 Feb 2019 08:30) (16 - 18)  SpO2: 100% (19 Feb 2019 08:30) (99% - 100%)    Physical exam:  General: awake and alert, sitting comfortably, no acute distress  CV: RRR, no murmurs  Pulm: CTA bilaterally  Neurologic:  Mental status: awake, alert, oriented to person, place, and time. +Dysarthria, no aphasia. Follows commands. Attention/concentration intact.  Cranial nerves:   II: visual fields are full to confrontation. pupils equally round and reactive to light,   III, IV, VI: EOMI without nystagmus  V:  V1-V3 sensation intact,   VII: no facial droop, facie is symmetric with normal eye closure and smile  VIII: hearing is intact to finger rub  IX, X: Uvula is midline and soft palate rises symmetrically  XI: head turning and shoulder shrug are intact.  XII: tongue midline  Motor: Normal bulk and tone, able to lift both arms up, with more difficulty of the left arm. Slight left pronator drift Able to move left leg and lift slightly, but unwilling to lift more than a few inches (leg of surgery). Right leg able to lift and hold up  Sensation: intact to light touch. No neglect.  Coordination: No dysmetria on finger-to-nose  Reflexes: 2+ in upper and lower extremities, downgoing toes bilaterally  Gait: deferred      LABS:                        8.2    7.92  )-----------( 242      ( 19 Feb 2019 06:00 )             26.3     02-19    140  |  108  |  16  ----------------------------<  111<H>  4.0   |  24  |  0.77    Ca    9.3      19 Feb 2019 06:00  Phos  4.1     02-19  Mg     2.0     02-19      PT/INR - ( 19 Feb 2019 06:00 )   PT: 11.3 sec;   INR: 1.00          PTT - ( 19 Feb 2019 06:00 )  PTT:29.9 sec      RADIOLOGY & ADDITIONAL TESTS:      Assessment and Plan  85 yo F w/ significant PMH including CAD (w/ 7 stents), DVT previously on Eliquis, HTN, DM, HLD, admitted to vascular for surgery and found to have L weakness, found to have significant R carotid stenosis and likely limited flow to R MCA from that. S/p R carotid stent done 2/13. Post op pt much improved left side, still with some dysarthria    1)Secondary stroke prevention  -ASA and brillinta (plavix switched because not therapeutic).  -Atorvastatin 80    2) Stroke risk factors  -CAD  -DM  -PVD    3) Further workup   -post op care per neurosurgery and vascular surgery  -repeat CT 2/14 with no hemorrhage  -Stable for discharge from Neurology/stroke perspective  -Follow up with Dr. Cheng's NP as an outpatient:  March 7, 2019 at 11:30 am  [address]  Tel: [phone] option 4  Fax: [phone]    DVT prophylaxis   -Heparin SQ TID and SCDs if cleared by primary teams

## 2019-02-19 NOTE — BRIEF OPERATIVE NOTE - OPERATION/FINDINGS
Removal of necrotic and non-vital tissue of the left great toe. Upon removal about 5cc of purulence. Partial first ray amputation down to viable tissue. Washed with joint solution. Packed wound and applied dsd and ace bandage.
Under MAC sedation, using a 8 fr sheath right CFA, one vessel cerebral angiogram was performed.  Findings: No evidence of vessel occlusion, isolated right hemisphere with delayed flow to intracranial vessels.  Critical right ICA origin stenosis.    Findings d/w Vascular surgeon and stroke neurologist and decision for acute/emergency carotid stent placement was made.  Patient received plavix and aspirin,  using filter for distal protection, a carotid wall stent was placed (6x 22mm) with good wall apposition, balloon angioplasty was performed.  Full report to follow.  Patient tolerated procedure well, no new neurological deficit, she exhibited neurological improvement, hemodynamically stable.  Right groin/vasc access site: Direct manual compression applied x 35 min hemostasis achieved, no hematoma.  Patient was transferred to MICU.  Above d/w Dr. Edwards
end-to end SFA anastomosis int he groin.   NO anastomotic defects

## 2019-02-19 NOTE — PROGRESS NOTE ADULT - SUBJECTIVE AND OBJECTIVE BOX
INTERVAL HISTORY:  The patient has left leg soreness. No chest pain or dyspnea. she has sat out of bed. 	  Chart, [facility] medications and allergies reviewed    MEDICATIONS:  MEDICATIONS  (STANDING):  aspirin enteric coated 81 milliGRAM(s) Oral daily  atorvastatin 80 milliGRAM(s) Oral at bedtime  brimonidine 0.2% Ophthalmic Solution 1 Drop(s) Both EYES three times a day  ciprofloxacin     Tablet 500 milliGRAM(s) Oral every 12 hours  DAPTOmycin IVPB 330 milliGRAM(s) IV Intermittent every 24 hours  dextrose 5%. 1000 milliLiter(s) (50 mL/Hr) IV Continuous <Continuous>  dextrose 50% Injectable 12.5 Gram(s) IV Push once  dextrose 50% Injectable 25 Gram(s) IV Push once  dextrose 50% Injectable 25 Gram(s) IV Push once  docusate sodium 100 milliGRAM(s) Oral three times a day  heparin  Injectable 5000 Unit(s) SubCutaneous every 8 hours  insulin lispro (HumaLOG) corrective regimen sliding scale   SubCutaneous Before meals and at bedtime  latanoprost 0.005% Ophthalmic Solution 1 Drop(s) Both EYES at bedtime  melatonin 5 milliGRAM(s) Oral at bedtime  metoprolol succinate ER 25 milliGRAM(s) Oral daily  pantoprazole    Tablet 40 milliGRAM(s) Oral before breakfast  sodium chloride 0.9%. 1000 milliLiter(s) (65 mL/Hr) IV Continuous <Continuous>  ticagrelor 90 milliGRAM(s) Oral two times a day  timolol 0.5% Solution 1 Drop(s) Both EYES two times a day      REVIEW OF SYSTEMS:  CONSTITUTIONAL: No fever, no weight loss, no fatigue  PULMONARY: No cough, no wheezing, chills no hemoptysis; No Shortness of Breath  CARDIOVASCULAR: No chest pain, no palpitations, no syncope, dizziness, no leg swelling  GASTROINTESTINAL: No abdominal pain. No nausea, no  vomiting, no hematemesis; No diarrhea, no constipation. No melena, no hematochezia.  GENITOURINARY: No dysuria, no frequency, no hematuria, no incontinence  SKIN: No itching, no burning, no rashes, no lesions     PHYSICAL EXAM:  T(C): 36.6 (02-19-19 @ 05:12), Max: 37.4 (02-18-19 @ 21:49)  HR: 50 (02-19-19 @ 05:20) (50 - 80)  BP: 138/65 (02-19-19 @ 05:20) (107/48 - 152/65)  RR: 18 (02-19-19 @ 05:20) (16 - 18)  SpO2: 100% (02-19-19 @ 05:20) (98% - 100%)  Wt(kg): --  I&O's Summary    18 Feb 2019 07:01  -  19 Feb 2019 07:00  --------------------------------------------------------  IN: 760 mL / OUT: 3425 mL / NET: -2665 mL        Appearance:  No deformities, normal appearance	  HEENT:   Normal oral mucosa, PERRL, EOMI	  Neck: No jvd , no masses  Lymphatic: No  lymphadenopathy  Pulmonary: Lungs clear to auscultation and percussion  Cardiovascular: Normal S1 S2, No JVD, No murmur, No edema	  Abdomen:  Soft, Non-tender, + BS  Skin: No rashes, No ecchymoses	  Extremities:  No clubbing or cyanosis, left foot bandaged. left leg incision mild erythema  MSK: Normal range of motion, no joint swelling    LABS:		  CARDIAC MARKERS:                         8.2    7.92  )-----------( 242      ( 19 Feb 2019 06:00 )             26.3   02-19    140  |  108  |  16  ----------------------------<  111<H>  4.0   |  24  |  0.77    Ca    9.3      19 Feb 2019 06:00  Phos  4.1     02-19  Mg     2.0     02-19      proBNP:  Lipid Profile:  HgA1c:  TSH:  PT/INR - ( 19 Feb 2019 06:00 )   PT: 11.3 sec;   INR: 1.00          PTT - ( 19 Feb 2019 06:00 )  PTT:29.9 sec    Culture - Surgical Swab (collected 02-16-19 @ 18:38)  Source: .Surgical Swab left first toe wound  Gram Stain (02-17-19 @ 08:43):    **Please Note**: This is a Corrected Report**    Few-moderate Gram Positive Cocci in Clusters    Rare WBC's    Previously reported as:    No organisms seen    Rare WBC's  Final Report (02-18-19 @ 10:14):    Moderate Methicillin resistant Staphylococcus aureus    Result called to and read back byBakari Hartman RN  02/17/2019    10:09:06  Organism: Methicillin resistant Staphylococcus aureus  Methicillin resistant Staphylococcus aureus (02-18-19 @ 10:14)  Organism: Methicillin resistant Staphylococcus aureus (02-18-19 @ 10:14)      -  Cefazolin: R 16      -  Clindamycin: R >4      -  Daptomycin: S 0.5      -  Erythromycin: R >4      -  Linezolid: S 2      -  Oxacillin: R >2      -  Penicillin: R >8      -  RIF- Rifampin: S <=1 Should not be used as monotherapy      -  Tetra/Doxy: R >8      -  Trimethoprim/Sulfamethoxazole: R >2/38      Method Type: FELIPE  Organism: Methicillin resistant Staphylococcus aureus (02-18-19 @ 10:14)      -  Vancomycin: S 1      Method Type: ETEST      TELEMETRY: 5 beat SVT on Feb 17, 4 beat VT on Feb 18	           ECG:  	            RADIOLOGY:   DIAGNOSTIC TESTING: [ ] Echocardiogram: [ ]  Catheterization: [ ] Stress Test:      ASSESSMENT/PLAN: 	  CVA-the patient received a right carotid stent. Clinically she is much improved. She  is on aspirin and ticagrelor.    Coronary artery disease-The patient is chest pain free post procedure.  The patient is on dapt.    Peripheral vascular disease- S/P bypass. Her left lower extremity is warm.    H/o DVT- the patient is off anticoagulation.    Hypertension-blood pressure is labile but acceptable. She is on low dose metoprolol.     Diabetes-follow blood glucose.

## 2019-02-19 NOTE — BRIEF OPERATIVE NOTE - PRE-OP DX
Cerebrovascular accident (CVA) due to occlusion of right middle cerebral artery  02/13/2019  By symptoms  Active  Tray Lund  Infection of left great toe due to methicillin resistant Staphylococcus aureus (MRSA)  02/19/2019    Active  Ara Hurley  PAD (peripheral artery disease)  02/12/2019    Active  Tayla Orr
Cerebrovascular accident (CVA) due to occlusion of right middle cerebral artery  02/13/2019  By symptoms  Active  Tray Lund  PAD (peripheral artery disease)  02/12/2019    Active  Tayla Orr
PAD (peripheral artery disease)  02/12/2019    Active  Tayla Orr

## 2019-02-20 LAB
ANION GAP SERPL CALC-SCNC: 10 MMOL/L — SIGNIFICANT CHANGE UP (ref 5–17)
BUN SERPL-MCNC: 17 MG/DL — SIGNIFICANT CHANGE UP (ref 7–23)
CALCIUM SERPL-MCNC: 8.8 MG/DL — SIGNIFICANT CHANGE UP (ref 8.4–10.5)
CHLORIDE SERPL-SCNC: 101 MMOL/L — SIGNIFICANT CHANGE UP (ref 96–108)
CO2 SERPL-SCNC: 24 MMOL/L — SIGNIFICANT CHANGE UP (ref 22–31)
CREAT SERPL-MCNC: 0.8 MG/DL — SIGNIFICANT CHANGE UP (ref 0.5–1.3)
GLUCOSE BLDC GLUCOMTR-MCNC: 117 MG/DL — HIGH (ref 70–99)
GLUCOSE BLDC GLUCOMTR-MCNC: 136 MG/DL — HIGH (ref 70–99)
GLUCOSE BLDC GLUCOMTR-MCNC: 138 MG/DL — HIGH (ref 70–99)
GLUCOSE BLDC GLUCOMTR-MCNC: 181 MG/DL — HIGH (ref 70–99)
GLUCOSE SERPL-MCNC: 137 MG/DL — HIGH (ref 70–99)
HAV IGM SER-ACNC: SIGNIFICANT CHANGE UP
HBV CORE AB SER-ACNC: SIGNIFICANT CHANGE UP
HBV CORE IGM SER-ACNC: SIGNIFICANT CHANGE UP
HBV SURFACE AG SER-ACNC: SIGNIFICANT CHANGE UP
HCT VFR BLD CALC: 24.6 % — LOW (ref 34.5–45)
HCT VFR BLD CALC: 31.5 % — LOW (ref 34.5–45)
HCV AB S/CO SERPL IA: 0.05 S/CO — SIGNIFICANT CHANGE UP
HCV AB SERPL-IMP: SIGNIFICANT CHANGE UP
HGB BLD-MCNC: 10 G/DL — LOW (ref 11.5–15.5)
HGB BLD-MCNC: 7.6 G/DL — LOW (ref 11.5–15.5)
HIV 1+2 AB+HIV1 P24 AG SERPL QL IA: SIGNIFICANT CHANGE UP
MAGNESIUM SERPL-MCNC: 2 MG/DL — SIGNIFICANT CHANGE UP (ref 1.6–2.6)
MCHC RBC-ENTMCNC: 29.5 PG — SIGNIFICANT CHANGE UP (ref 27–34)
MCHC RBC-ENTMCNC: 29.8 PG — SIGNIFICANT CHANGE UP (ref 27–34)
MCHC RBC-ENTMCNC: 30.9 GM/DL — LOW (ref 32–36)
MCHC RBC-ENTMCNC: 31.7 GM/DL — LOW (ref 32–36)
MCV RBC AUTO: 93.8 FL — SIGNIFICANT CHANGE UP (ref 80–100)
MCV RBC AUTO: 95.3 FL — SIGNIFICANT CHANGE UP (ref 80–100)
NRBC # BLD: 0 /100 WBCS — SIGNIFICANT CHANGE UP (ref 0–0)
NRBC # BLD: 0 /100 WBCS — SIGNIFICANT CHANGE UP (ref 0–0)
PHOSPHATE SERPL-MCNC: 4.3 MG/DL — SIGNIFICANT CHANGE UP (ref 2.5–4.5)
PLATELET # BLD AUTO: 233 K/UL — SIGNIFICANT CHANGE UP (ref 150–400)
PLATELET # BLD AUTO: 234 K/UL — SIGNIFICANT CHANGE UP (ref 150–400)
POTASSIUM SERPL-MCNC: 4.4 MMOL/L — SIGNIFICANT CHANGE UP (ref 3.5–5.3)
POTASSIUM SERPL-SCNC: 4.4 MMOL/L — SIGNIFICANT CHANGE UP (ref 3.5–5.3)
RBC # BLD: 2.58 M/UL — LOW (ref 3.8–5.2)
RBC # BLD: 3.36 M/UL — LOW (ref 3.8–5.2)
RBC # FLD: 14.6 % — HIGH (ref 10.3–14.5)
RBC # FLD: 15.2 % — HIGH (ref 10.3–14.5)
SODIUM SERPL-SCNC: 135 MMOL/L — SIGNIFICANT CHANGE UP (ref 135–145)
WBC # BLD: 11.53 K/UL — HIGH (ref 3.8–10.5)
WBC # BLD: 7.92 K/UL — SIGNIFICANT CHANGE UP (ref 3.8–10.5)
WBC # FLD AUTO: 11.53 K/UL — HIGH (ref 3.8–10.5)
WBC # FLD AUTO: 7.92 K/UL — SIGNIFICANT CHANGE UP (ref 3.8–10.5)

## 2019-02-20 PROCEDURE — 99232 SBSQ HOSP IP/OBS MODERATE 35: CPT

## 2019-02-20 RX ORDER — OXYCODONE AND ACETAMINOPHEN 5; 325 MG/1; MG/1
1 TABLET ORAL EVERY 4 HOURS
Qty: 0 | Refills: 0 | Status: DISCONTINUED | OUTPATIENT
Start: 2019-02-20 | End: 2019-02-26

## 2019-02-20 RX ORDER — VANCOMYCIN HCL 1 G
750 VIAL (EA) INTRAVENOUS EVERY 12 HOURS
Qty: 0 | Refills: 0 | Status: DISCONTINUED | OUTPATIENT
Start: 2019-02-20 | End: 2019-02-26

## 2019-02-20 RX ORDER — HYDROMORPHONE HYDROCHLORIDE 2 MG/ML
0.5 INJECTION INTRAMUSCULAR; INTRAVENOUS; SUBCUTANEOUS EVERY 4 HOURS
Qty: 0 | Refills: 0 | Status: DISCONTINUED | OUTPATIENT
Start: 2019-02-20 | End: 2019-02-20

## 2019-02-20 RX ADMIN — Medication 100 MILLIGRAM(S): at 05:49

## 2019-02-20 RX ADMIN — LATANOPROST 1 DROP(S): 0.05 SOLUTION/ DROPS OPHTHALMIC; TOPICAL at 21:13

## 2019-02-20 RX ADMIN — Medication 5 MILLIGRAM(S): at 21:10

## 2019-02-20 RX ADMIN — HYDROMORPHONE HYDROCHLORIDE 0.5 MILLIGRAM(S): 2 INJECTION INTRAMUSCULAR; INTRAVENOUS; SUBCUTANEOUS at 01:00

## 2019-02-20 RX ADMIN — ATORVASTATIN CALCIUM 80 MILLIGRAM(S): 80 TABLET, FILM COATED ORAL at 21:08

## 2019-02-20 RX ADMIN — HEPARIN SODIUM 5000 UNIT(S): 5000 INJECTION INTRAVENOUS; SUBCUTANEOUS at 15:16

## 2019-02-20 RX ADMIN — HEPARIN SODIUM 5000 UNIT(S): 5000 INJECTION INTRAVENOUS; SUBCUTANEOUS at 21:07

## 2019-02-20 RX ADMIN — Medication 250 MILLIGRAM(S): at 18:18

## 2019-02-20 RX ADMIN — HYDROMORPHONE HYDROCHLORIDE 0.5 MILLIGRAM(S): 2 INJECTION INTRAMUSCULAR; INTRAVENOUS; SUBCUTANEOUS at 05:49

## 2019-02-20 RX ADMIN — HYDROMORPHONE HYDROCHLORIDE 0.5 MILLIGRAM(S): 2 INJECTION INTRAMUSCULAR; INTRAVENOUS; SUBCUTANEOUS at 08:30

## 2019-02-20 RX ADMIN — Medication 100 MILLIGRAM(S): at 21:08

## 2019-02-20 RX ADMIN — Medication 1 DROP(S): at 18:19

## 2019-02-20 RX ADMIN — BRIMONIDINE TARTRATE 1 DROP(S): 2 SOLUTION/ DROPS OPHTHALMIC at 21:13

## 2019-02-20 RX ADMIN — Medication 250 MILLIGRAM(S): at 02:06

## 2019-02-20 RX ADMIN — HEPARIN SODIUM 5000 UNIT(S): 5000 INJECTION INTRAVENOUS; SUBCUTANEOUS at 05:49

## 2019-02-20 RX ADMIN — Medication 100 MILLIGRAM(S): at 15:16

## 2019-02-20 RX ADMIN — Medication 650 MILLIGRAM(S): at 16:27

## 2019-02-20 RX ADMIN — PANTOPRAZOLE SODIUM 40 MILLIGRAM(S): 20 TABLET, DELAYED RELEASE ORAL at 07:20

## 2019-02-20 RX ADMIN — OXYCODONE AND ACETAMINOPHEN 1 TABLET(S): 5; 325 TABLET ORAL at 21:08

## 2019-02-20 RX ADMIN — OXYCODONE AND ACETAMINOPHEN 1 TABLET(S): 5; 325 TABLET ORAL at 22:00

## 2019-02-20 RX ADMIN — TICAGRELOR 90 MILLIGRAM(S): 90 TABLET ORAL at 18:18

## 2019-02-20 RX ADMIN — Medication 2: at 21:38

## 2019-02-20 RX ADMIN — Medication 650 MILLIGRAM(S): at 17:27

## 2019-02-20 RX ADMIN — HYDROMORPHONE HYDROCHLORIDE 0.5 MILLIGRAM(S): 2 INJECTION INTRAMUSCULAR; INTRAVENOUS; SUBCUTANEOUS at 06:00

## 2019-02-20 RX ADMIN — Medication 1 DROP(S): at 05:56

## 2019-02-20 RX ADMIN — BRIMONIDINE TARTRATE 1 DROP(S): 2 SOLUTION/ DROPS OPHTHALMIC at 15:19

## 2019-02-20 RX ADMIN — Medication 650 MILLIGRAM(S): at 01:00

## 2019-02-20 RX ADMIN — TICAGRELOR 90 MILLIGRAM(S): 90 TABLET ORAL at 05:49

## 2019-02-20 RX ADMIN — Medication 25 MILLIGRAM(S): at 07:20

## 2019-02-20 RX ADMIN — HYDROMORPHONE HYDROCHLORIDE 0.5 MILLIGRAM(S): 2 INJECTION INTRAMUSCULAR; INTRAVENOUS; SUBCUTANEOUS at 07:50

## 2019-02-20 RX ADMIN — Medication 81 MILLIGRAM(S): at 15:16

## 2019-02-20 RX ADMIN — BRIMONIDINE TARTRATE 1 DROP(S): 2 SOLUTION/ DROPS OPHTHALMIC at 05:56

## 2019-02-20 RX ADMIN — HYDROMORPHONE HYDROCHLORIDE 0.5 MILLIGRAM(S): 2 INJECTION INTRAMUSCULAR; INTRAVENOUS; SUBCUTANEOUS at 01:20

## 2019-02-20 NOTE — PROGRESS NOTE ADULT - SUBJECTIVE AND OBJECTIVE BOX
Physical Medicine and Rehabilitation Progress Note:    Patient is a 84y old  Female who presents with a chief complaint of Left foot gangrene (20 Feb 2019 07:17)      HPI:  85 yo female with h/o DM and HLD s/p right leg bypass and R great toe amputation, now healed, presents with a nonhealing L great toe ulcer, which has been previously debrided. 11/13 Aniogram shows patent aorta and bilateral iliac vessels. Patent L CFA and profunda. L SFA occluded at the origin. Extensive collateralization throughout leg with no named vessels until DP reconstitutes distally. Patient notes that she has been having increased claudication symptoms in the left leg with walking. Denies CP, SOB. (11 Feb 2019 15:04)                            7.6    7.92  )-----------( 234      ( 20 Feb 2019 06:53 )             24.6       02-20    135  |  101  |  17  ----------------------------<  137<H>  4.4   |  24  |  0.80    Ca    8.8      20 Feb 2019 06:53  Phos  4.3     02-20  Mg     2.0     02-20      Vital Signs Last 24 Hrs  T(C): 36.8 (20 Feb 2019 14:14), Max: 36.8 (19 Feb 2019 16:40)  T(F): 98.2 (20 Feb 2019 14:14), Max: 98.3 (19 Feb 2019 16:40)  HR: 62 (20 Feb 2019 09:42) (52 - 63)  BP: 103/51 (20 Feb 2019 09:42) (103/51 - 153/82)  BP(mean): 90 (19 Feb 2019 17:45) (77 - 102)  RR: 16 (20 Feb 2019 09:42) (11 - 19)  SpO2: 98% (20 Feb 2019 09:42) (98% - 100%)    MEDICATIONS  (STANDING):  aspirin enteric coated 81 milliGRAM(s) Oral daily  atorvastatin 80 milliGRAM(s) Oral at bedtime  brimonidine 0.2% Ophthalmic Solution 1 Drop(s) Both EYES three times a day  dextrose 5%. 1000 milliLiter(s) (50 mL/Hr) IV Continuous <Continuous>  dextrose 50% Injectable 12.5 Gram(s) IV Push once  dextrose 50% Injectable 25 Gram(s) IV Push once  dextrose 50% Injectable 25 Gram(s) IV Push once  docusate sodium 100 milliGRAM(s) Oral three times a day  heparin  Injectable 5000 Unit(s) SubCutaneous every 8 hours  insulin lispro (HumaLOG) corrective regimen sliding scale   SubCutaneous Before meals and at bedtime  latanoprost 0.005% Ophthalmic Solution 1 Drop(s) Both EYES at bedtime  melatonin 5 milliGRAM(s) Oral at bedtime  metoprolol succinate ER 25 milliGRAM(s) Oral daily  ondansetron Injectable 4 milliGRAM(s) IV Push once  pantoprazole    Tablet 40 milliGRAM(s) Oral before breakfast  ticagrelor 90 milliGRAM(s) Oral two times a day  timolol 0.5% Solution 1 Drop(s) Both EYES two times a day  vancomycin  IVPB 750 milliGRAM(s) IV Intermittent every 12 hours    MEDICATIONS  (PRN):  acetaminophen   Tablet .. 650 milliGRAM(s) Oral every 6 hours PRN Mild Pain (1 - 3)  dextrose 40% Gel 15 Gram(s) Oral once PRN Blood Glucose LESS THAN 70 milliGRAM(s)/deciliter  glucagon  Injectable 1 milliGRAM(s) IntraMuscular once PRN Glucose LESS THAN 70 milligrams/deciliter  metoclopramide Injectable 10 milliGRAM(s) IV Push once PRN Nausea and/or Vomiting  ondansetron Injectable 4 milliGRAM(s) IV Push every 6 hours PRN Nausea  senna 2 Tablet(s) Oral daily PRN Constipation    Currently Undergoing Physical Therapy at bedside.    Functional Status Assessment: on 2/19/2019      Therapeutic Interventions      Bed Mobility  Bed Mobility Training Sit-to-Supine: Not assessed, pt returned seated in the chair   Bed Mobility Training Supine-to-Sit: moderate assist (50% patient effort);  verbal cues;  2 person assist;  bed rails  Bed Mobility Training Limitations: decreased ability to use arms for pushing/pulling;  decreased ability to use legs for bridging/pushing;  impaired ability to control trunk for mobility;  decreased strength;  impaired balance;  impaired postural control;  pain;  decreased aerobic capacity/endurance     Sit-Stand Transfer Training  Transfer Training Sit-to-Stand Transfer: moderate assist (50% patient effort);  2 person assist;  verbal cues;  rolling walker  Transfer Training Stand-to-Sit Transfer: contact guard;  minimum assist (75% patient effort);  2 person assist;  verbal cues;  rolling walker  Sit-to-Stand Transfer Training Transfer Safety Analysis: decreased balance;  decreased sequencing ability;  decreased step length;  decreased weight-shifting ability;  decreased strength;  impaired balance;  impaired postural control;  pain;  decreased aerobic capacity/endurance ;  rolling walker    Gait Training  Gait Training: minimum assist (75% patient effort);  2 person assist;  verbal cues;  rolling walker;  bed to chair  Gait Analysis: 3-point gait   decreased alvaro;  decreased step length;  decreased stride length;  decreased weight-shifting ability;  decreased strength;  impaired balance;  impaired postural control;  pain;  decreased aerobic capacity/endurance ;  Bed to Chair;  rolling walker              PM&R Impression: as above    Disposition Plan Recommendations:  subacute rehab placement

## 2019-02-20 NOTE — PROGRESS NOTE ADULT - SUBJECTIVE AND OBJECTIVE BOX
24hr Events:  O/N: POC WNL, started vancomycin, d/cd Cipro, Ordered for PICC line  2/19: OR for debridement of toe, Neurology made final recs, per ID, no Dapto can restart Vanc        Assessment/Plan:  84 F hx HTN, CAD (stent x7 in 2018), DVT (formerly on Eliquis), DM, HLD, PAD s/p R fem-pop bypass and R great toe amputation admitted (2/11) for L fem-pop bypass ( 2/12) c/b CVA s/p angiography and R ICA stent placement (2/13)    - Pain/nausea control  - Follow up  Neurology recommendations  - Vancomycin  - DASH diet  - ISS  -Brilinta. HSQ, ASA  -Dispo: PT recommends [facility] 24hr Events:  O/N: POC WNL, started vancomycin, d/cd Cipro, Ordered for PICC line  2/19: OR for debridement of toe, Neurology made final recs, per ID, no Dapto can restart Vanc    vancomycin  IVPB 750  aspirin enteric coated 81  heparin  Injectable 5000  metoprolol succinate ER 25  ticagrelor 90  vancomycin  IVPB 750        Vital Signs Last 24 Hrs  T(C): 36.2 (20 Feb 2019 05:32), Max: 36.8 (19 Feb 2019 16:40)  T(F): 97.1 (20 Feb 2019 05:32), Max: 98.3 (19 Feb 2019 16:40)  HR: 63 (20 Feb 2019 05:10) (52 - 63)  BP: 145/63 (20 Feb 2019 05:10) (119/49 - 153/82)  BP(mean): 90 (19 Feb 2019 17:45) (77 - 102)  RR: 18 (20 Feb 2019 05:10) (11 - 19)  SpO2: 99% (20 Feb 2019 05:10) (98% - 100%)  I&O's Summary    19 Feb 2019 07:01  -  20 Feb 2019 07:00  --------------------------------------------------------  IN: 240 mL / OUT: 610 mL / NET: -370 mL        Physical Exam:  General: NAD, resting comfortably in bed  Pulmonary: Nonlabored breathing, no respiratory distress  Extremities: LLE incision c/d/i; Lt foot drsg- sat with jordan blood; ISHAN in place  Neuro: A/O x 3; strength/sensation in LLE grossly intact; no focal deficits  Pulses:  LLE distal graft 2+; DP triphasic      LABS:                        8.2    7.92  )-----------( 242      ( 19 Feb 2019 06:00 )             26.3     02-19    140  |  108  |  16  ----------------------------<  111<H>  4.0   |  24  |  0.77    Ca    9.3      19 Feb 2019 06:00  Phos  4.1     02-19  Mg     2.0     02-19      PT/INR - ( 19 Feb 2019 06:00 )   PT: 11.3 sec;   INR: 1.00          PTT - ( 19 Feb 2019 06:00 )  PTT:29.9 sec      Assessment/Plan:  84 F hx HTN, CAD (stent x7 in 2018), DVT (formerly on Eliquis), DM, HLD, PAD s/p R fem-pop bypass and R great toe amputation admitted (2/11) for L fem-pop bypass ( 2/12) c/b CVA s/p angiography and R ICA stent placement (2/13) s/p LLE great toe partial 1st ray amputation 2/19    - Pain/nausea control  - Follow up  Neurology recommendations  - Vancomycin  - DASH diet  - ISS  -Brilinta. HSQ, ASA  -Dispo: PT recommends [facility]

## 2019-02-20 NOTE — PROGRESS NOTE ADULT - ASSESSMENT
85 yo F w/ significant PMH including CAD (w/ 7 stents), DVT previously on Eliquis, HTN, DM, HLD, admitted to vascular for surgery and found to have L weakness, found to have significant R carotid stenosis and likely limited flow to R MCA from that. S/p R carotid stent done 2/13. Post op pt much improved left side, still with some dysarthria    1)Secondary stroke prevention  -ASA and brillinta (plavix switched because not therapeutic).  -Atorvastatin 80    2) Stroke risk factors  -CAD  -DM  -PVD    3) Further workup   -post op care per neurosurgery and vascular surgery  -repeat CT 2/14 with no hemorrhage  -Stable for discharge from Neurology/stroke perspective

## 2019-02-21 LAB
ANION GAP SERPL CALC-SCNC: 10 MMOL/L — SIGNIFICANT CHANGE UP (ref 5–17)
BUN SERPL-MCNC: 18 MG/DL — SIGNIFICANT CHANGE UP (ref 7–23)
CALCIUM SERPL-MCNC: 8.5 MG/DL — SIGNIFICANT CHANGE UP (ref 8.4–10.5)
CHLORIDE SERPL-SCNC: 104 MMOL/L — SIGNIFICANT CHANGE UP (ref 96–108)
CO2 SERPL-SCNC: 24 MMOL/L — SIGNIFICANT CHANGE UP (ref 22–31)
CREAT SERPL-MCNC: 0.89 MG/DL — SIGNIFICANT CHANGE UP (ref 0.5–1.3)
GLUCOSE BLDC GLUCOMTR-MCNC: 132 MG/DL — HIGH (ref 70–99)
GLUCOSE BLDC GLUCOMTR-MCNC: 135 MG/DL — HIGH (ref 70–99)
GLUCOSE BLDC GLUCOMTR-MCNC: 155 MG/DL — HIGH (ref 70–99)
GLUCOSE BLDC GLUCOMTR-MCNC: 161 MG/DL — HIGH (ref 70–99)
GLUCOSE SERPL-MCNC: 153 MG/DL — HIGH (ref 70–99)
HBV SURFACE AB SER-ACNC: <3 MIU/ML — LOW
HCT VFR BLD CALC: 25.5 % — LOW (ref 34.5–45)
HGB BLD-MCNC: 8.1 G/DL — LOW (ref 11.5–15.5)
MAGNESIUM SERPL-MCNC: 1.9 MG/DL — SIGNIFICANT CHANGE UP (ref 1.6–2.6)
MCHC RBC-ENTMCNC: 29.1 PG — SIGNIFICANT CHANGE UP (ref 27–34)
MCHC RBC-ENTMCNC: 31.8 GM/DL — LOW (ref 32–36)
MCV RBC AUTO: 91.7 FL — SIGNIFICANT CHANGE UP (ref 80–100)
NRBC # BLD: 0 /100 WBCS — SIGNIFICANT CHANGE UP (ref 0–0)
PLATELET # BLD AUTO: 262 K/UL — SIGNIFICANT CHANGE UP (ref 150–400)
POTASSIUM SERPL-MCNC: 4 MMOL/L — SIGNIFICANT CHANGE UP (ref 3.5–5.3)
POTASSIUM SERPL-SCNC: 4 MMOL/L — SIGNIFICANT CHANGE UP (ref 3.5–5.3)
RBC # BLD: 2.78 M/UL — LOW (ref 3.8–5.2)
RBC # FLD: 15 % — HIGH (ref 10.3–14.5)
SODIUM SERPL-SCNC: 138 MMOL/L — SIGNIFICANT CHANGE UP (ref 135–145)
VANCOMYCIN TROUGH SERPL-MCNC: 12.2 UG/ML — SIGNIFICANT CHANGE UP (ref 10–20)
WBC # BLD: 9.69 K/UL — SIGNIFICANT CHANGE UP (ref 3.8–10.5)
WBC # FLD AUTO: 9.69 K/UL — SIGNIFICANT CHANGE UP (ref 3.8–10.5)

## 2019-02-21 PROCEDURE — 99232 SBSQ HOSP IP/OBS MODERATE 35: CPT

## 2019-02-21 RX ORDER — MAGNESIUM OXIDE 400 MG ORAL TABLET 241.3 MG
400 TABLET ORAL
Qty: 0 | Refills: 0 | Status: COMPLETED | OUTPATIENT
Start: 2019-02-21 | End: 2019-02-22

## 2019-02-21 RX ADMIN — TICAGRELOR 90 MILLIGRAM(S): 90 TABLET ORAL at 05:30

## 2019-02-21 RX ADMIN — Medication 100 MILLIGRAM(S): at 05:30

## 2019-02-21 RX ADMIN — HEPARIN SODIUM 5000 UNIT(S): 5000 INJECTION INTRAVENOUS; SUBCUTANEOUS at 21:09

## 2019-02-21 RX ADMIN — MAGNESIUM OXIDE 400 MG ORAL TABLET 400 MILLIGRAM(S): 241.3 TABLET ORAL at 17:27

## 2019-02-21 RX ADMIN — Medication 250 MILLIGRAM(S): at 05:36

## 2019-02-21 RX ADMIN — BRIMONIDINE TARTRATE 1 DROP(S): 2 SOLUTION/ DROPS OPHTHALMIC at 06:11

## 2019-02-21 RX ADMIN — OXYCODONE AND ACETAMINOPHEN 1 TABLET(S): 5; 325 TABLET ORAL at 15:24

## 2019-02-21 RX ADMIN — Medication 100 MILLIGRAM(S): at 21:08

## 2019-02-21 RX ADMIN — Medication 81 MILLIGRAM(S): at 11:28

## 2019-02-21 RX ADMIN — OXYCODONE AND ACETAMINOPHEN 1 TABLET(S): 5; 325 TABLET ORAL at 05:30

## 2019-02-21 RX ADMIN — HEPARIN SODIUM 5000 UNIT(S): 5000 INJECTION INTRAVENOUS; SUBCUTANEOUS at 05:29

## 2019-02-21 RX ADMIN — OXYCODONE AND ACETAMINOPHEN 1 TABLET(S): 5; 325 TABLET ORAL at 22:08

## 2019-02-21 RX ADMIN — Medication 250 MILLIGRAM(S): at 18:09

## 2019-02-21 RX ADMIN — LATANOPROST 1 DROP(S): 0.05 SOLUTION/ DROPS OPHTHALMIC; TOPICAL at 21:09

## 2019-02-21 RX ADMIN — Medication 5 MILLIGRAM(S): at 21:08

## 2019-02-21 RX ADMIN — TICAGRELOR 90 MILLIGRAM(S): 90 TABLET ORAL at 17:28

## 2019-02-21 RX ADMIN — PANTOPRAZOLE SODIUM 40 MILLIGRAM(S): 20 TABLET, DELAYED RELEASE ORAL at 06:09

## 2019-02-21 RX ADMIN — Medication 25 MILLIGRAM(S): at 05:30

## 2019-02-21 RX ADMIN — BRIMONIDINE TARTRATE 1 DROP(S): 2 SOLUTION/ DROPS OPHTHALMIC at 21:09

## 2019-02-21 RX ADMIN — Medication 1 DROP(S): at 06:11

## 2019-02-21 RX ADMIN — HEPARIN SODIUM 5000 UNIT(S): 5000 INJECTION INTRAVENOUS; SUBCUTANEOUS at 15:19

## 2019-02-21 RX ADMIN — Medication 2: at 07:14

## 2019-02-21 RX ADMIN — ATORVASTATIN CALCIUM 80 MILLIGRAM(S): 80 TABLET, FILM COATED ORAL at 21:09

## 2019-02-21 RX ADMIN — Medication 1 DROP(S): at 17:29

## 2019-02-21 RX ADMIN — OXYCODONE AND ACETAMINOPHEN 1 TABLET(S): 5; 325 TABLET ORAL at 11:28

## 2019-02-21 RX ADMIN — OXYCODONE AND ACETAMINOPHEN 1 TABLET(S): 5; 325 TABLET ORAL at 21:08

## 2019-02-21 RX ADMIN — BRIMONIDINE TARTRATE 1 DROP(S): 2 SOLUTION/ DROPS OPHTHALMIC at 15:19

## 2019-02-21 RX ADMIN — OXYCODONE AND ACETAMINOPHEN 1 TABLET(S): 5; 325 TABLET ORAL at 06:30

## 2019-02-21 RX ADMIN — Medication 100 MILLIGRAM(S): at 15:19

## 2019-02-21 NOTE — PROGRESS NOTE ADULT - SUBJECTIVE AND OBJECTIVE BOX
INTERVAL HISTORY:  Less foot pain. No chest pain or dyspnea.	  Chart, PMH medications and allergies reviewed    MEDICATIONS:  MEDICATIONS  (STANDING):  aspirin enteric coated 81 milliGRAM(s) Oral daily  atorvastatin 80 milliGRAM(s) Oral at bedtime  brimonidine 0.2% Ophthalmic Solution 1 Drop(s) Both EYES three times a day  dextrose 5%. 1000 milliLiter(s) (50 mL/Hr) IV Continuous <Continuous>  dextrose 50% Injectable 12.5 Gram(s) IV Push once  dextrose 50% Injectable 25 Gram(s) IV Push once  dextrose 50% Injectable 25 Gram(s) IV Push once  docusate sodium 100 milliGRAM(s) Oral three times a day  heparin  Injectable 5000 Unit(s) SubCutaneous every 8 hours  insulin lispro (HumaLOG) corrective regimen sliding scale   SubCutaneous Before meals and at bedtime  latanoprost 0.005% Ophthalmic Solution 1 Drop(s) Both EYES at bedtime  melatonin 5 milliGRAM(s) Oral at bedtime  metoprolol succinate ER 25 milliGRAM(s) Oral daily  ondansetron Injectable 4 milliGRAM(s) IV Push once  pantoprazole    Tablet 40 milliGRAM(s) Oral before breakfast  ticagrelor 90 milliGRAM(s) Oral two times a day  timolol 0.5% Solution 1 Drop(s) Both EYES two times a day  vancomycin  IVPB 750 milliGRAM(s) IV Intermittent every 12 hours      REVIEW OF SYSTEMS:  CONSTITUTIONAL: No fever, no weight loss, no fatigue  PULMONARY: No cough, no wheezing, chills no hemoptysis; No Shortness of Breath  CARDIOVASCULAR: No chest pain, no palpitations, no syncope, dizziness, no leg swelling  GASTROINTESTINAL: No abdominal pain. No nausea, no  vomiting, no hematemesis; No diarrhea, no constipation. No melena, no hematochezia.  GENITOURINARY: No dysuria, no frequency, no hematuria, no incontinence  SKIN: No itching, no burning, no rashes, no lesions     PHYSICAL EXAM:  T(C): 36.9 (02-21-19 @ 09:00), Max: 37.2 (02-20-19 @ 21:39)  HR: 68 (02-21-19 @ 05:00) (62 - 76)  BP: 149/63 (02-21-19 @ 05:00) (103/51 - 151/66)  RR: 18 (02-21-19 @ 05:00) (16 - 18)  SpO2: 98% (02-21-19 @ 05:00) (95% - 98%)  Wt(kg): --  I&O's Summary    20 Feb 2019 07:01  -  21 Feb 2019 07:00  --------------------------------------------------------  IN: 180 mL / OUT: 2020 mL / NET: -1840 mL    21 Feb 2019 07:01  -  21 Feb 2019 09:10  --------------------------------------------------------  IN: 180 mL / OUT: 0 mL / NET: 180 mL      Height (cm): 165.1 (02-21 @ 05:00)  Weight (kg): 77.3 (02-21 @ 05:00)  BMI (kg/m2): 28.4 (02-21 @ 05:00)  BSA (m2): 1.85 (02-21 @ 05:00)  Appearance:  No deformities, normal appearance	  HEENT:   Normal oral mucosa, PERRL, EOMI	  Neck: No jvd , no masses  Lymphatic: No  lymphadenopathy  Pulmonary: Lungs clear to auscultation and percussion  Cardiovascular: Normal S1 S2, No JVD, No murmur, No edema	  Abdomen:  Soft, Non-tender, + BS  Skin: No rashes, No ecchymoses	  Extremities:  No clubbing or cyanosis  MSK: Normal range of motion, no joint swelling    LABS:		  CARDIAC MARKERS:                         8.1    9.69  )-----------( 262      ( 21 Feb 2019 06:21 )             25.5   02-21    138  |  104  |  18  ----------------------------<  153<H>  4.0   |  24  |  0.89    Ca    8.5      21 Feb 2019 06:21  Phos  4.3     02-20  Mg     1.9     02-21      proBNP:  Lipid Profile:  HgA1c:  TSH:      Culture - Surgical Swab (collected 02-16-19 @ 18:38)  Source: .Surgical Swab left first toe wound  Gram Stain (02-17-19 @ 08:43):    **Please Note**: This is a Corrected Report**    Few-moderate Gram Positive Cocci in Clusters    Rare WBC's    Previously reported as:    No organisms seen    Rare WBC's  Final Report (02-18-19 @ 10:14):    Moderate Methicillin resistant Staphylococcus aureus    Result called to and read back byBakari Hartman RN  02/17/2019    10:09:06  Organism: Methicillin resistant Staphylococcus aureus  Methicillin resistant Staphylococcus aureus (02-18-19 @ 10:14)  Organism: Methicillin resistant Staphylococcus aureus (02-18-19 @ 10:14)      -  Cefazolin: R 16      -  Clindamycin: R >4      -  Daptomycin: S 0.5      -  Erythromycin: R >4      -  Linezolid: S 2      -  Oxacillin: R >2      -  Penicillin: R >8      -  RIF- Rifampin: S <=1 Should not be used as monotherapy      -  Tetra/Doxy: R >8      -  Trimethoprim/Sulfamethoxazole: R >2/38      Method Type: FELIPE  Organism: Methicillin resistant Staphylococcus aureus (02-18-19 @ 10:14)      -  Vancomycin: S 1      Method Type: ETEST      TELEMETRY: NSR	           ECG:  	            RADIOLOGY:   DIAGNOSTIC TESTING: [ ] Echocardiogram: [ ]  Catheterization: [ ] Stress Test:      ASSESSMENT/PLAN: 	    CVA-the patient received a right carotid stent. Clinically she is much improved. Still some dysarthria. She  is on aspirin and ticagrelor.    Coronary artery disease-The patient is chest pain free post procedure.  The patient is on dapt.    Peripheral vascular disease- S/P bypass. Her left lower extremity is warm. The bleeding stopped with the stitch.     H/o DVT- the patient is off anticoagulation.    Hypertension-blood pressure is labile but acceptable. She is on low dose metoprolol.     Diabetes-follow blood glucose.

## 2019-02-21 NOTE — PROGRESS NOTE ADULT - SUBJECTIVE AND OBJECTIVE BOX
O/N: VALERIE, VSS                                              Assessment/Plan:  84 F hx HTN, CAD (stent x7 in 2018), DVT (formerly on Eliquis), DM, HLD, PAD s/p R fem-pop bypass and R great toe amputation admitted (2/11) for L fem-pop bypass ( 2/12) c/b CVA s/p angiography and R ICA stent placement (2/13) s/p LLE great toe partial 1st ray amputation 2/19    - Pain/nausea control  - Follow up  Neurology recommendations  - Vancomycin  - DASH diet  - ISS  -Brilinta. HSQ, ASA  -Dispo: PT recommends [facility] O/N: VALERIE, VSS    SUBJECTIVE: Pt seen and examined at bedside. Pain is well controlled. Denies chest pain, SOB. Denies nausea, emesis. Passing flatus and having bowel movements. Making appropriate UO. Bleeding from great toe yesterday morning, resolved after stitch placed, bleeding has resolved since.      aspirin enteric coated 81 milliGRAM(s) Oral daily  heparin  Injectable 5000 Unit(s) SubCutaneous every 8 hours  metoprolol succinate ER 25 milliGRAM(s) Oral daily  ticagrelor 90 milliGRAM(s) Oral two times a day  vancomycin  IVPB 750 milliGRAM(s) IV Intermittent every 12 hours      Vital Signs Last 24 Hrs  T(C): 36.4 (21 Feb 2019 00:44), Max: 37.2 (20 Feb 2019 21:39)  T(F): 97.6 (21 Feb 2019 00:44), Max: 99 (20 Feb 2019 21:39)  HR: 68 (21 Feb 2019 05:00) (62 - 76)  BP: 149/63 (21 Feb 2019 05:00) (103/51 - 151/66)  BP(mean): --  RR: 18 (21 Feb 2019 05:00) (16 - 18)  SpO2: 98% (21 Feb 2019 05:00) (95% - 98%)    Physical Exam:  General: NAD, resting comfortably in bed  Pulmonary: Nonlabored breathing, no respiratory distress  Extremities: LLE incision c/d/i; Lt foot dressing with minimal drainage,  ISHAN in place, SS  Neuro: A/O x 3; strength/sensation in LLE grossly intact; no focal deficits  Pulses:  LLE distal graft 2+; DP triphasic      LABS:                        8.1    9.69  )-----------( 262      ( 21 Feb 2019 06:21 )             25.5     02-21    138  |  104  |  18  ----------------------------<  153<H>  4.0   |  24  |  0.89    Ca    8.5      21 Feb 2019 06:21  Phos  4.3     02-20  Mg     1.9     02-21                                                    Assessment/Plan:  84 F hx HTN, CAD (stent x7 in 2018), DVT (formerly on Eliquis), DM, HLD, PAD s/p R fem-pop bypass and R great toe amputation admitted (2/11) for L fem-pop bypass ( 2/12) c/b CVA s/p angiography and R ICA stent placement (2/13) s/p LLE great toe partial 1st ray amputation 2/19    - Pain/nausea control  - Follow up  Neurology recommendations  - Vancomycin  - DASH diet  - ISS  -Brilinta. HSQ, ASA  -Dispo: PT recommends [facility]

## 2019-02-22 LAB
ANION GAP SERPL CALC-SCNC: 9 MMOL/L — SIGNIFICANT CHANGE UP (ref 5–17)
BUN SERPL-MCNC: 15 MG/DL — SIGNIFICANT CHANGE UP (ref 7–23)
CALCIUM SERPL-MCNC: 8.7 MG/DL — SIGNIFICANT CHANGE UP (ref 8.4–10.5)
CHLORIDE SERPL-SCNC: 103 MMOL/L — SIGNIFICANT CHANGE UP (ref 96–108)
CO2 SERPL-SCNC: 24 MMOL/L — SIGNIFICANT CHANGE UP (ref 22–31)
CREAT SERPL-MCNC: 0.73 MG/DL — SIGNIFICANT CHANGE UP (ref 0.5–1.3)
GLUCOSE BLDC GLUCOMTR-MCNC: 139 MG/DL — HIGH (ref 70–99)
GLUCOSE BLDC GLUCOMTR-MCNC: 141 MG/DL — HIGH (ref 70–99)
GLUCOSE BLDC GLUCOMTR-MCNC: 149 MG/DL — HIGH (ref 70–99)
GLUCOSE BLDC GLUCOMTR-MCNC: 157 MG/DL — HIGH (ref 70–99)
GLUCOSE SERPL-MCNC: 168 MG/DL — HIGH (ref 70–99)
HCT VFR BLD CALC: 23 % — LOW (ref 34.5–45)
HGB BLD-MCNC: 7.4 G/DL — LOW (ref 11.5–15.5)
MAGNESIUM SERPL-MCNC: 1.9 MG/DL — SIGNIFICANT CHANGE UP (ref 1.6–2.6)
MCHC RBC-ENTMCNC: 30 PG — SIGNIFICANT CHANGE UP (ref 27–34)
MCHC RBC-ENTMCNC: 32.2 GM/DL — SIGNIFICANT CHANGE UP (ref 32–36)
MCV RBC AUTO: 93.1 FL — SIGNIFICANT CHANGE UP (ref 80–100)
NRBC # BLD: 0 /100 WBCS — SIGNIFICANT CHANGE UP (ref 0–0)
PLATELET # BLD AUTO: 258 K/UL — SIGNIFICANT CHANGE UP (ref 150–400)
POTASSIUM SERPL-MCNC: 3.8 MMOL/L — SIGNIFICANT CHANGE UP (ref 3.5–5.3)
POTASSIUM SERPL-SCNC: 3.8 MMOL/L — SIGNIFICANT CHANGE UP (ref 3.5–5.3)
RBC # BLD: 2.47 M/UL — LOW (ref 3.8–5.2)
RBC # FLD: 14.7 % — HIGH (ref 10.3–14.5)
SODIUM SERPL-SCNC: 136 MMOL/L — SIGNIFICANT CHANGE UP (ref 135–145)
SURGICAL PATHOLOGY STUDY: SIGNIFICANT CHANGE UP
WBC # BLD: 7.47 K/UL — SIGNIFICANT CHANGE UP (ref 3.8–10.5)
WBC # FLD AUTO: 7.47 K/UL — SIGNIFICANT CHANGE UP (ref 3.8–10.5)

## 2019-02-22 RX ORDER — HYDROMORPHONE HYDROCHLORIDE 2 MG/ML
0.25 INJECTION INTRAMUSCULAR; INTRAVENOUS; SUBCUTANEOUS ONCE
Qty: 0 | Refills: 0 | Status: DISCONTINUED | OUTPATIENT
Start: 2019-02-22 | End: 2019-02-22

## 2019-02-22 RX ADMIN — OXYCODONE AND ACETAMINOPHEN 1 TABLET(S): 5; 325 TABLET ORAL at 17:36

## 2019-02-22 RX ADMIN — Medication 250 MILLIGRAM(S): at 06:09

## 2019-02-22 RX ADMIN — Medication 1 DROP(S): at 17:37

## 2019-02-22 RX ADMIN — OXYCODONE AND ACETAMINOPHEN 1 TABLET(S): 5; 325 TABLET ORAL at 22:55

## 2019-02-22 RX ADMIN — Medication 100 MILLIGRAM(S): at 21:55

## 2019-02-22 RX ADMIN — OXYCODONE AND ACETAMINOPHEN 1 TABLET(S): 5; 325 TABLET ORAL at 05:44

## 2019-02-22 RX ADMIN — MAGNESIUM OXIDE 400 MG ORAL TABLET 400 MILLIGRAM(S): 241.3 TABLET ORAL at 08:06

## 2019-02-22 RX ADMIN — HEPARIN SODIUM 5000 UNIT(S): 5000 INJECTION INTRAVENOUS; SUBCUTANEOUS at 21:55

## 2019-02-22 RX ADMIN — Medication 2: at 16:42

## 2019-02-22 RX ADMIN — OXYCODONE AND ACETAMINOPHEN 1 TABLET(S): 5; 325 TABLET ORAL at 13:25

## 2019-02-22 RX ADMIN — BRIMONIDINE TARTRATE 1 DROP(S): 2 SOLUTION/ DROPS OPHTHALMIC at 23:45

## 2019-02-22 RX ADMIN — Medication 100 MILLIGRAM(S): at 05:51

## 2019-02-22 RX ADMIN — ATORVASTATIN CALCIUM 80 MILLIGRAM(S): 80 TABLET, FILM COATED ORAL at 21:55

## 2019-02-22 RX ADMIN — Medication 100 MILLIGRAM(S): at 13:24

## 2019-02-22 RX ADMIN — HEPARIN SODIUM 5000 UNIT(S): 5000 INJECTION INTRAVENOUS; SUBCUTANEOUS at 05:51

## 2019-02-22 RX ADMIN — Medication 1 DROP(S): at 06:04

## 2019-02-22 RX ADMIN — HYDROMORPHONE HYDROCHLORIDE 0.25 MILLIGRAM(S): 2 INJECTION INTRAMUSCULAR; INTRAVENOUS; SUBCUTANEOUS at 14:15

## 2019-02-22 RX ADMIN — BRIMONIDINE TARTRATE 1 DROP(S): 2 SOLUTION/ DROPS OPHTHALMIC at 14:15

## 2019-02-22 RX ADMIN — HYDROMORPHONE HYDROCHLORIDE 0.25 MILLIGRAM(S): 2 INJECTION INTRAMUSCULAR; INTRAVENOUS; SUBCUTANEOUS at 15:00

## 2019-02-22 RX ADMIN — Medication 81 MILLIGRAM(S): at 13:25

## 2019-02-22 RX ADMIN — HEPARIN SODIUM 5000 UNIT(S): 5000 INJECTION INTRAVENOUS; SUBCUTANEOUS at 13:24

## 2019-02-22 RX ADMIN — TICAGRELOR 90 MILLIGRAM(S): 90 TABLET ORAL at 17:36

## 2019-02-22 RX ADMIN — Medication 250 MILLIGRAM(S): at 19:34

## 2019-02-22 RX ADMIN — BRIMONIDINE TARTRATE 1 DROP(S): 2 SOLUTION/ DROPS OPHTHALMIC at 06:04

## 2019-02-22 RX ADMIN — TICAGRELOR 90 MILLIGRAM(S): 90 TABLET ORAL at 05:51

## 2019-02-22 RX ADMIN — LATANOPROST 1 DROP(S): 0.05 SOLUTION/ DROPS OPHTHALMIC; TOPICAL at 23:45

## 2019-02-22 RX ADMIN — OXYCODONE AND ACETAMINOPHEN 1 TABLET(S): 5; 325 TABLET ORAL at 06:44

## 2019-02-22 RX ADMIN — Medication 5 MILLIGRAM(S): at 21:55

## 2019-02-22 RX ADMIN — OXYCODONE AND ACETAMINOPHEN 1 TABLET(S): 5; 325 TABLET ORAL at 14:40

## 2019-02-22 RX ADMIN — Medication 25 MILLIGRAM(S): at 05:51

## 2019-02-22 RX ADMIN — OXYCODONE AND ACETAMINOPHEN 1 TABLET(S): 5; 325 TABLET ORAL at 21:55

## 2019-02-22 RX ADMIN — OXYCODONE AND ACETAMINOPHEN 1 TABLET(S): 5; 325 TABLET ORAL at 18:30

## 2019-02-22 RX ADMIN — PANTOPRAZOLE SODIUM 40 MILLIGRAM(S): 20 TABLET, DELAYED RELEASE ORAL at 06:10

## 2019-02-22 NOTE — PROGRESS NOTE ADULT - SUBJECTIVE AND OBJECTIVE BOX
O/N: VALERIE, VSS                                          Assessment/Plan:  84 F hx HTN, CAD (stent x7 in 2018), DVT (formerly on Eliquis), DM, HLD, PAD s/p R fem-pop bypass and R great toe amputation admitted (2/11) for L fem-pop bypass ( 2/12) c/b CVA s/p angiography and R ICA stent placement (2/13) s/p LLE great toe partial 1st ray amputation 2/19    - Pain/nausea control  - Follow up  Neurology recommendations  - Vancomycin  - DASH diet  - ISS  -Brilinta. HSQ, ASA  -Dispo: PT recommends [facility] O/N: VALERIE, VSS      SUBJECTIVE: Pt seen and examined at bedside. Pain is well controlled. Denies chest pain, SOB. Denies nausea, emesis. Denies numbness, weakness.  Passing flatus and having bowel movements. Making appropriate UO.      aspirin enteric coated 81 milliGRAM(s) Oral daily  heparin  Injectable 5000 Unit(s) SubCutaneous every 8 hours  metoprolol succinate ER 25 milliGRAM(s) Oral daily  ticagrelor 90 milliGRAM(s) Oral two times a day  vancomycin  IVPB 750 milliGRAM(s) IV Intermittent every 12 hours      Vital Signs Last 24 Hrs  T(C): 36.6 (22 Feb 2019 06:19), Max: 36.7 (21 Feb 2019 13:54)  T(F): 97.9 (22 Feb 2019 06:19), Max: 98.1 (21 Feb 2019 13:54)  HR: 72 (22 Feb 2019 05:46) (60 - 72)  BP: 148/86 (22 Feb 2019 05:46) (124/58 - 148/86)  BP(mean): --  RR: 16 (22 Feb 2019 05:46) (16 - 18)  SpO2: 100% (22 Feb 2019 05:46) (98% - 100%)    Physical Exam:  General: NAD, resting comfortably in bed  Pulmonary: Nonlabored breathing, no respiratory distress  Extremities: LLE incision c/d/i; Lt foot dressing with minimal drainage,  ISHAN in place, SS  Neuro: A/O x 3; strength/sensation in LLE grossly intact; no focal deficits  Pulses:  LLE distal graft 2+; DP triphasic      LABS:                        7.4    7.47  )-----------( 258      ( 22 Feb 2019 07:44 )             23.0     02-22    136  |  103  |  15  ----------------------------<  168<H>  3.8   |  24  |  0.73    Ca    8.7      22 Feb 2019 07:44  Mg     1.9     02-22                    Assessment/Plan:  84 F hx HTN, CAD (stent x7 in 2018), DVT (formerly on Eliquis), DM, HLD, PAD s/p R fem-pop bypass and R great toe amputation admitted (2/11) for L fem-pop bypass ( 2/12) c/b CVA s/p angiography and R ICA stent placement (2/13) s/p LLE great toe partial 1st ray amputation 2/19    - Pain/nausea control  - Vancomycin  - DASH diet  - ISS  -Brilinta. HSQ, ASA  -Dispo: PT recommends [facility]

## 2019-02-23 LAB
ANION GAP SERPL CALC-SCNC: 10 MMOL/L — SIGNIFICANT CHANGE UP (ref 5–17)
BUN SERPL-MCNC: 16 MG/DL — SIGNIFICANT CHANGE UP (ref 7–23)
CALCIUM SERPL-MCNC: 9.1 MG/DL — SIGNIFICANT CHANGE UP (ref 8.4–10.5)
CHLORIDE SERPL-SCNC: 105 MMOL/L — SIGNIFICANT CHANGE UP (ref 96–108)
CO2 SERPL-SCNC: 25 MMOL/L — SIGNIFICANT CHANGE UP (ref 22–31)
CREAT SERPL-MCNC: 0.75 MG/DL — SIGNIFICANT CHANGE UP (ref 0.5–1.3)
GLUCOSE BLDC GLUCOMTR-MCNC: 121 MG/DL — HIGH (ref 70–99)
GLUCOSE BLDC GLUCOMTR-MCNC: 133 MG/DL — HIGH (ref 70–99)
GLUCOSE BLDC GLUCOMTR-MCNC: 140 MG/DL — HIGH (ref 70–99)
GLUCOSE BLDC GLUCOMTR-MCNC: 160 MG/DL — HIGH (ref 70–99)
GLUCOSE SERPL-MCNC: 128 MG/DL — HIGH (ref 70–99)
HCT VFR BLD CALC: 30.4 % — LOW (ref 34.5–45)
HGB BLD-MCNC: 9.7 G/DL — LOW (ref 11.5–15.5)
MAGNESIUM SERPL-MCNC: 1.9 MG/DL — SIGNIFICANT CHANGE UP (ref 1.6–2.6)
MCHC RBC-ENTMCNC: 29.1 PG — SIGNIFICANT CHANGE UP (ref 27–34)
MCHC RBC-ENTMCNC: 31.9 GM/DL — LOW (ref 32–36)
MCV RBC AUTO: 91.3 FL — SIGNIFICANT CHANGE UP (ref 80–100)
NRBC # BLD: 0 /100 WBCS — SIGNIFICANT CHANGE UP (ref 0–0)
PHOSPHATE SERPL-MCNC: 3.6 MG/DL — SIGNIFICANT CHANGE UP (ref 2.5–4.5)
PLATELET # BLD AUTO: 278 K/UL — SIGNIFICANT CHANGE UP (ref 150–400)
POTASSIUM SERPL-MCNC: 4.3 MMOL/L — SIGNIFICANT CHANGE UP (ref 3.5–5.3)
POTASSIUM SERPL-SCNC: 4.3 MMOL/L — SIGNIFICANT CHANGE UP (ref 3.5–5.3)
RBC # BLD: 3.33 M/UL — LOW (ref 3.8–5.2)
RBC # FLD: 14.9 % — HIGH (ref 10.3–14.5)
SODIUM SERPL-SCNC: 140 MMOL/L — SIGNIFICANT CHANGE UP (ref 135–145)
VANCOMYCIN TROUGH SERPL-MCNC: 15.7 UG/ML — SIGNIFICANT CHANGE UP (ref 10–20)
WBC # BLD: 8.99 K/UL — SIGNIFICANT CHANGE UP (ref 3.8–10.5)
WBC # FLD AUTO: 8.99 K/UL — SIGNIFICANT CHANGE UP (ref 3.8–10.5)

## 2019-02-23 RX ORDER — MAGNESIUM OXIDE 400 MG ORAL TABLET 241.3 MG
400 TABLET ORAL ONCE
Qty: 0 | Refills: 0 | Status: COMPLETED | OUTPATIENT
Start: 2019-02-23 | End: 2019-02-23

## 2019-02-23 RX ADMIN — OXYCODONE AND ACETAMINOPHEN 1 TABLET(S): 5; 325 TABLET ORAL at 05:54

## 2019-02-23 RX ADMIN — BRIMONIDINE TARTRATE 1 DROP(S): 2 SOLUTION/ DROPS OPHTHALMIC at 13:25

## 2019-02-23 RX ADMIN — HEPARIN SODIUM 5000 UNIT(S): 5000 INJECTION INTRAVENOUS; SUBCUTANEOUS at 22:51

## 2019-02-23 RX ADMIN — Medication 250 MILLIGRAM(S): at 12:10

## 2019-02-23 RX ADMIN — TICAGRELOR 90 MILLIGRAM(S): 90 TABLET ORAL at 17:55

## 2019-02-23 RX ADMIN — Medication 1 DROP(S): at 17:55

## 2019-02-23 RX ADMIN — SENNA PLUS 2 TABLET(S): 8.6 TABLET ORAL at 16:39

## 2019-02-23 RX ADMIN — Medication 1 ENEMA: at 23:47

## 2019-02-23 RX ADMIN — HEPARIN SODIUM 5000 UNIT(S): 5000 INJECTION INTRAVENOUS; SUBCUTANEOUS at 05:54

## 2019-02-23 RX ADMIN — Medication 100 MILLIGRAM(S): at 05:54

## 2019-02-23 RX ADMIN — MAGNESIUM OXIDE 400 MG ORAL TABLET 400 MILLIGRAM(S): 241.3 TABLET ORAL at 12:10

## 2019-02-23 RX ADMIN — Medication 10 MILLIGRAM(S): at 18:04

## 2019-02-23 RX ADMIN — LATANOPROST 1 DROP(S): 0.05 SOLUTION/ DROPS OPHTHALMIC; TOPICAL at 22:51

## 2019-02-23 RX ADMIN — BRIMONIDINE TARTRATE 1 DROP(S): 2 SOLUTION/ DROPS OPHTHALMIC at 22:51

## 2019-02-23 RX ADMIN — ATORVASTATIN CALCIUM 80 MILLIGRAM(S): 80 TABLET, FILM COATED ORAL at 22:50

## 2019-02-23 RX ADMIN — PANTOPRAZOLE SODIUM 40 MILLIGRAM(S): 20 TABLET, DELAYED RELEASE ORAL at 07:58

## 2019-02-23 RX ADMIN — Medication 81 MILLIGRAM(S): at 12:10

## 2019-02-23 RX ADMIN — Medication 100 MILLIGRAM(S): at 13:23

## 2019-02-23 RX ADMIN — BRIMONIDINE TARTRATE 1 DROP(S): 2 SOLUTION/ DROPS OPHTHALMIC at 05:55

## 2019-02-23 RX ADMIN — Medication 2: at 11:12

## 2019-02-23 RX ADMIN — TICAGRELOR 90 MILLIGRAM(S): 90 TABLET ORAL at 05:54

## 2019-02-23 RX ADMIN — Medication 250 MILLIGRAM(S): at 22:53

## 2019-02-23 RX ADMIN — Medication 1 DROP(S): at 05:55

## 2019-02-23 RX ADMIN — OXYCODONE AND ACETAMINOPHEN 1 TABLET(S): 5; 325 TABLET ORAL at 06:54

## 2019-02-23 RX ADMIN — Medication 100 MILLIGRAM(S): at 22:50

## 2019-02-23 RX ADMIN — Medication 25 MILLIGRAM(S): at 05:54

## 2019-02-23 RX ADMIN — HEPARIN SODIUM 5000 UNIT(S): 5000 INJECTION INTRAVENOUS; SUBCUTANEOUS at 13:23

## 2019-02-23 NOTE — PROGRESS NOTE ADULT - SUBJECTIVE AND OBJECTIVE BOX
O/N: VALERIE, VSS                                              Assessment/Plan:  84 F hx HTN, CAD (stent x7 in 2018), DVT (formerly on Eliquis), DM, HLD, PAD s/p R fem-pop bypass and R great toe amputation admitted (2/11) for L fem-pop bypass ( 2/12) c/b CVA s/p angiography and R ICA stent placement (2/13) s/p LLE great toe partial 1st ray amputation 2/19    - Pain/nausea control  - Vancomycin  - DASH diet  - ISS  -Brilinta. HSQ, ASA  -Dispo: PT recommends [facility] O/N: VALERIE, VSS    Subjective  Patient seen at bedside. No complaints. Pain well-controlled. Denies nausea, vomiting, chest pain, shortness of breath, fevers, or chills.         PMH:  MRSA infection  Glaucoma  Falls  Depression  Pulmonary embolism  DVT (deep venous thrombosis)  MI (myocardial infarction)  Peripheral vascular disease  Ulcer of lower limb  Arthropathy  Ischemic heart disease  Hypertension  Hyperlipidemia  Diabetes mellitus      Medication:   vancomycin  IVPB 750  aspirin enteric coated 81  heparin  Injectable 5000  metoprolol succinate ER 25  ticagrelor 90  vancomycin  IVPB 750        Vital Signs Last 24 Hrs  T(C): 37.4 (23 Feb 2019 22:06), Max: 37.4 (23 Feb 2019 22:06)  T(F): 99.3 (23 Feb 2019 22:06), Max: 99.3 (23 Feb 2019 22:06)  HR: 60 (23 Feb 2019 21:00) (54 - 63)  BP: 178/81 (23 Feb 2019 21:00) (117/58 - 178/81)  BP(mean): --  RR: 16 (23 Feb 2019 21:00) (16 - 16)  SpO2: 100% (23 Feb 2019 21:00) (100% - 100%)  I&O's Summary    22 Feb 2019 07:01  -  23 Feb 2019 07:00  --------------------------------------------------------  IN: 970 mL / OUT: 1008 mL / NET: -38 mL    23 Feb 2019 07:01  -  23 Feb 2019 23:54  --------------------------------------------------------  IN: 420 mL / OUT: 2000 mL / NET: -1580 mL          Physical Exam:  General: NAD, resting comfortably in bed  Pulmonary: Nonlabored breathing, no respiratory distress  Extremities: LLE incision c/d/i; Lt foot dressing with minimal drainage,  ISHAN in place,   Neuro: A/O x 3; strength/sensation in LLE grossly intact; no focal deficits  Pulses:  LLE distal graft 2+; DP triphasic        LABS:                        9.7    8.99  )-----------( 278      ( 23 Feb 2019 06:47 )             30.4     02-23    140  |  105  |  16  ----------------------------<  128<H>  4.3   |  25  |  0.75    Ca    9.1      23 Feb 2019 06:47  Phos  3.6     02-23  Mg     1.9     02-23          Radiology and Additional Studies:                                          Assessment/Plan:  84 F hx HTN, CAD (stent x7 in 2018), DVT (formerly on Eliquis), DM, HLD, PAD s/p R fem-pop bypass and R great toe amputation admitted (2/11) for L fem-pop bypass ( 2/12) c/b CVA s/p angiography and R ICA stent placement (2/13) s/p LLE great toe partial 1st ray amputation 2/19    Plan  - Pain/nausea control  - Vancomycin  - DASH diet  - ISS  -Brilinta. HSQ, ASA  -Dispo: PT recommends [facility]

## 2019-02-24 LAB
GLUCOSE BLDC GLUCOMTR-MCNC: 120 MG/DL — HIGH (ref 70–99)
GLUCOSE BLDC GLUCOMTR-MCNC: 123 MG/DL — HIGH (ref 70–99)
GLUCOSE BLDC GLUCOMTR-MCNC: 147 MG/DL — HIGH (ref 70–99)
GLUCOSE BLDC GLUCOMTR-MCNC: 157 MG/DL — HIGH (ref 70–99)
VANCOMYCIN TROUGH SERPL-MCNC: 15.4 UG/ML — SIGNIFICANT CHANGE UP (ref 10–20)

## 2019-02-24 RX ORDER — CHLORHEXIDINE GLUCONATE 213 G/1000ML
1 SOLUTION TOPICAL DAILY
Qty: 0 | Refills: 0 | Status: DISCONTINUED | OUTPATIENT
Start: 2019-02-24 | End: 2019-02-26

## 2019-02-24 RX ADMIN — BRIMONIDINE TARTRATE 1 DROP(S): 2 SOLUTION/ DROPS OPHTHALMIC at 06:40

## 2019-02-24 RX ADMIN — Medication 100 MILLIGRAM(S): at 06:39

## 2019-02-24 RX ADMIN — HEPARIN SODIUM 5000 UNIT(S): 5000 INJECTION INTRAVENOUS; SUBCUTANEOUS at 06:40

## 2019-02-24 RX ADMIN — HEPARIN SODIUM 5000 UNIT(S): 5000 INJECTION INTRAVENOUS; SUBCUTANEOUS at 13:26

## 2019-02-24 RX ADMIN — Medication 2: at 17:01

## 2019-02-24 RX ADMIN — BRIMONIDINE TARTRATE 1 DROP(S): 2 SOLUTION/ DROPS OPHTHALMIC at 21:44

## 2019-02-24 RX ADMIN — ATORVASTATIN CALCIUM 80 MILLIGRAM(S): 80 TABLET, FILM COATED ORAL at 21:42

## 2019-02-24 RX ADMIN — Medication 250 MILLIGRAM(S): at 11:44

## 2019-02-24 RX ADMIN — Medication 25 MILLIGRAM(S): at 06:39

## 2019-02-24 RX ADMIN — Medication 100 MILLIGRAM(S): at 21:42

## 2019-02-24 RX ADMIN — LATANOPROST 1 DROP(S): 0.05 SOLUTION/ DROPS OPHTHALMIC; TOPICAL at 21:44

## 2019-02-24 RX ADMIN — TICAGRELOR 90 MILLIGRAM(S): 90 TABLET ORAL at 06:40

## 2019-02-24 RX ADMIN — Medication 1 DROP(S): at 06:40

## 2019-02-24 RX ADMIN — Medication 81 MILLIGRAM(S): at 11:44

## 2019-02-24 RX ADMIN — Medication 250 MILLIGRAM(S): at 23:48

## 2019-02-24 RX ADMIN — Medication 100 MILLIGRAM(S): at 13:25

## 2019-02-24 RX ADMIN — Medication 5 MILLIGRAM(S): at 21:42

## 2019-02-24 RX ADMIN — Medication 1 DROP(S): at 17:38

## 2019-02-24 RX ADMIN — BRIMONIDINE TARTRATE 1 DROP(S): 2 SOLUTION/ DROPS OPHTHALMIC at 13:26

## 2019-02-24 RX ADMIN — HEPARIN SODIUM 5000 UNIT(S): 5000 INJECTION INTRAVENOUS; SUBCUTANEOUS at 21:42

## 2019-02-24 RX ADMIN — PANTOPRAZOLE SODIUM 40 MILLIGRAM(S): 20 TABLET, DELAYED RELEASE ORAL at 06:40

## 2019-02-24 RX ADMIN — TICAGRELOR 90 MILLIGRAM(S): 90 TABLET ORAL at 17:37

## 2019-02-24 NOTE — PROGRESS NOTE ADULT - SUBJECTIVE AND OBJECTIVE BOX
o/n: AFVSS, enema given for constipation; BP improved from 180s to 120s afterward  2.23: AM vanco trough 15; cont vancomycin; reports constipation; dulcolax PRN added      vancomycin  IVPB 750  aspirin enteric coated 81  heparin  Injectable 5000  metoprolol succinate ER 25  ticagrelor 90  vancomycin  IVPB 750      Allergies    Goldbond (Unknown)  penicillins (Anaphylaxis)    Intolerances        Vital Signs Last 24 Hrs  T(C): 35.8 (24 Feb 2019 13:24), Max: 37.6 (24 Feb 2019 00:05)  T(F): 96.5 (24 Feb 2019 13:24), Max: 99.6 (24 Feb 2019 00:05)  HR: 72 (24 Feb 2019 12:19) (58 - 72)  BP: 150/63 (24 Feb 2019 13:30) (128/57 - 188/74)  BP(mean): --  RR: 16 (24 Feb 2019 12:19) (16 - 16)  SpO2: 100% (24 Feb 2019 12:19) (99% - 100%)  I&O's Summary    23 Feb 2019 07:01  -  24 Feb 2019 07:00  --------------------------------------------------------  IN: 420 mL / OUT: 2000 mL / NET: -1580 mL    24 Feb 2019 07:01  -  24 Feb 2019 15:29  --------------------------------------------------------  IN: 180 mL / OUT: 1000 mL / NET: -820 mL        General: NAD, resting comfortably in bed  Pulmonary: Nonlabored breathing, no respiratory distress  Extremities: LLE incision c/d/i; left foot with 1st toe amputation site without drainage, no surrounding erythema. ISHAN drain removed       LABS:                        9.7    8.99  )-----------( 278      ( 23 Feb 2019 06:47 )             30.4     02-23    140  |  105  |  16  ----------------------------<  128<H>  4.3   |  25  |  0.75    Ca    9.1      23 Feb 2019 06:47  Phos  3.6     02-23  Mg     1.9     02-23        84 F hx HTN, CAD (stent x7 in 2018), DVT (formerly on Eliquis), DM, HLD, PAD s/p R fem-pop bypass and R great toe amputation admitted (2/11) for L fem-pop bypass ( 2/12) c/b CVA s/p angiography and R ICA stent placement (2/13) s/p LLE great toe partial 1st ray amputation 2/19    Plan  - Pain/nausea control  - Vancomycin  - DASH diet  - ISS  -Brilinta. HSQ, ASA  -Dispo: PT recommends [facility]

## 2019-02-25 LAB
GLUCOSE BLDC GLUCOMTR-MCNC: 107 MG/DL — HIGH (ref 70–99)
GLUCOSE BLDC GLUCOMTR-MCNC: 122 MG/DL — HIGH (ref 70–99)
GLUCOSE BLDC GLUCOMTR-MCNC: 138 MG/DL — HIGH (ref 70–99)
GLUCOSE BLDC GLUCOMTR-MCNC: 197 MG/DL — HIGH (ref 70–99)

## 2019-02-25 RX ORDER — HYDROMORPHONE HYDROCHLORIDE 2 MG/ML
0.5 INJECTION INTRAMUSCULAR; INTRAVENOUS; SUBCUTANEOUS ONCE
Qty: 0 | Refills: 0 | Status: DISCONTINUED | OUTPATIENT
Start: 2019-02-25 | End: 2019-02-25

## 2019-02-25 RX ADMIN — Medication 100 MILLIGRAM(S): at 06:15

## 2019-02-25 RX ADMIN — Medication 1 DROP(S): at 07:13

## 2019-02-25 RX ADMIN — BRIMONIDINE TARTRATE 1 DROP(S): 2 SOLUTION/ DROPS OPHTHALMIC at 22:18

## 2019-02-25 RX ADMIN — Medication 1 DROP(S): at 19:14

## 2019-02-25 RX ADMIN — HEPARIN SODIUM 5000 UNIT(S): 5000 INJECTION INTRAVENOUS; SUBCUTANEOUS at 06:16

## 2019-02-25 RX ADMIN — OXYCODONE AND ACETAMINOPHEN 1 TABLET(S): 5; 325 TABLET ORAL at 23:51

## 2019-02-25 RX ADMIN — Medication 81 MILLIGRAM(S): at 12:23

## 2019-02-25 RX ADMIN — TICAGRELOR 90 MILLIGRAM(S): 90 TABLET ORAL at 19:14

## 2019-02-25 RX ADMIN — Medication 25 MILLIGRAM(S): at 06:15

## 2019-02-25 RX ADMIN — OXYCODONE AND ACETAMINOPHEN 1 TABLET(S): 5; 325 TABLET ORAL at 19:47

## 2019-02-25 RX ADMIN — OXYCODONE AND ACETAMINOPHEN 1 TABLET(S): 5; 325 TABLET ORAL at 08:55

## 2019-02-25 RX ADMIN — HYDROMORPHONE HYDROCHLORIDE 0.5 MILLIGRAM(S): 2 INJECTION INTRAMUSCULAR; INTRAVENOUS; SUBCUTANEOUS at 16:17

## 2019-02-25 RX ADMIN — OXYCODONE AND ACETAMINOPHEN 1 TABLET(S): 5; 325 TABLET ORAL at 19:14

## 2019-02-25 RX ADMIN — BRIMONIDINE TARTRATE 1 DROP(S): 2 SOLUTION/ DROPS OPHTHALMIC at 06:46

## 2019-02-25 RX ADMIN — HEPARIN SODIUM 5000 UNIT(S): 5000 INJECTION INTRAVENOUS; SUBCUTANEOUS at 22:17

## 2019-02-25 RX ADMIN — PANTOPRAZOLE SODIUM 40 MILLIGRAM(S): 20 TABLET, DELAYED RELEASE ORAL at 06:15

## 2019-02-25 RX ADMIN — Medication 100 MILLIGRAM(S): at 22:17

## 2019-02-25 RX ADMIN — Medication 250 MILLIGRAM(S): at 12:23

## 2019-02-25 RX ADMIN — Medication 100 MILLIGRAM(S): at 15:36

## 2019-02-25 RX ADMIN — Medication 5 MILLIGRAM(S): at 22:19

## 2019-02-25 RX ADMIN — BRIMONIDINE TARTRATE 1 DROP(S): 2 SOLUTION/ DROPS OPHTHALMIC at 15:47

## 2019-02-25 RX ADMIN — HEPARIN SODIUM 5000 UNIT(S): 5000 INJECTION INTRAVENOUS; SUBCUTANEOUS at 15:36

## 2019-02-25 RX ADMIN — OXYCODONE AND ACETAMINOPHEN 1 TABLET(S): 5; 325 TABLET ORAL at 11:19

## 2019-02-25 RX ADMIN — ATORVASTATIN CALCIUM 80 MILLIGRAM(S): 80 TABLET, FILM COATED ORAL at 22:17

## 2019-02-25 RX ADMIN — LATANOPROST 1 DROP(S): 0.05 SOLUTION/ DROPS OPHTHALMIC; TOPICAL at 22:18

## 2019-02-25 RX ADMIN — Medication 2: at 11:29

## 2019-02-25 RX ADMIN — Medication 250 MILLIGRAM(S): at 22:17

## 2019-02-25 RX ADMIN — TICAGRELOR 90 MILLIGRAM(S): 90 TABLET ORAL at 06:15

## 2019-02-25 RX ADMIN — CHLORHEXIDINE GLUCONATE 1 APPLICATION(S): 213 SOLUTION TOPICAL at 12:30

## 2019-02-25 RX ADMIN — HYDROMORPHONE HYDROCHLORIDE 0.5 MILLIGRAM(S): 2 INJECTION INTRAMUSCULAR; INTRAVENOUS; SUBCUTANEOUS at 15:36

## 2019-02-25 NOTE — PROGRESS NOTE ADULT - SUBJECTIVE AND OBJECTIVE BOX
24hr Events:  O/N:Vanc trough 15.4, dose given, VSS  2/24: ISHAN drain out, awaiting [facility] on Mon        Assessment/Plan;  84 F hx HTN, CAD (stent x7 in 2018), DVT (formerly on Eliquis), DM, HLD, PAD s/p R fem-pop bypass and R great toe amputation admitted (2/11) for L fem-pop bypass ( 2/12) c/b CVA s/p angiography and R ICA stent placement (2/13) s/p LLE great toe partial 1st ray amputation 2/19    Plan  - Pain/nausea control  - Vancomycin  - DASH diet  - ISS  -Brilinta. HSQ, ASA  -Dispo: PT recommends [facility] 24hr Events:  O/N:Vanc trough 15.4, dose given, VSS  2/24: ISHAN drain out, awaiting [facility] on Mon    vancomycin  IVPB 750  aspirin enteric coated 81  heparin  Injectable 5000  metoprolol succinate ER 25  ticagrelor 90  vancomycin  IVPB 750        Vital Signs Last 24 Hrs  T(C): 36.7 (25 Feb 2019 05:00), Max: 36.8 (24 Feb 2019 22:08)  T(F): 98.1 (25 Feb 2019 05:00), Max: 98.3 (24 Feb 2019 22:08)  HR: 55 (25 Feb 2019 05:00) (55 - 72)  BP: 172/65 (25 Feb 2019 05:00) (129/58 - 172/65)  BP(mean): --  RR: 14 (25 Feb 2019 05:00) (14 - 16)  SpO2: 100% (25 Feb 2019 05:00) (100% - 100%)  I&O's Summary    24 Feb 2019 07:01  -  25 Feb 2019 07:00  --------------------------------------------------------  IN: 180 mL / OUT: 1000 mL / NET: -820 mL        Physical Exam:  General: NAD, resting comfortably in bed  Pulmonary: Nonlabored breathing, no respiratory distress  Extremities: LLE incision c/d/i; left foot with 1st toe amputation site with vac intact and functioning       Assessment/Plan;  84 F hx HTN, CAD (stent x7 in 2018), DVT (formerly on Eliquis), DM, HLD, PAD s/p R fem-pop bypass and R great toe amputation admitted (2/11) for L fem-pop bypass ( 2/12) c/b CVA s/p angiography and R ICA stent placement (2/13) s/p LLE great toe partial 1st ray amputation 2/19    Plan  - Pain/nausea control  - vac change today  - Vancomycin  - DASH diet  - ISS  -Brilinta. HSQ, ASA  -Dispo: awaiting insurance auth for [facility]

## 2019-02-26 ENCOUNTER — TRANSCRIPTION ENCOUNTER (OUTPATIENT)
Age: 84
End: 2019-02-26

## 2019-02-26 VITALS
HEART RATE: 58 BPM | RESPIRATION RATE: 18 BRPM | DIASTOLIC BLOOD PRESSURE: 63 MMHG | SYSTOLIC BLOOD PRESSURE: 145 MMHG | OXYGEN SATURATION: 100 %

## 2019-02-26 LAB
GLUCOSE BLDC GLUCOMTR-MCNC: 129 MG/DL — HIGH (ref 70–99)
GLUCOSE BLDC GLUCOMTR-MCNC: 149 MG/DL — HIGH (ref 70–99)
VANCOMYCIN TROUGH SERPL-MCNC: 16.5 UG/ML — SIGNIFICANT CHANGE UP (ref 10–20)

## 2019-02-26 PROCEDURE — 85652 RBC SED RATE AUTOMATED: CPT

## 2019-02-26 PROCEDURE — 82330 ASSAY OF CALCIUM: CPT

## 2019-02-26 PROCEDURE — 87040 BLOOD CULTURE FOR BACTERIA: CPT

## 2019-02-26 PROCEDURE — 86706 HEP B SURFACE ANTIBODY: CPT

## 2019-02-26 PROCEDURE — C1884: CPT

## 2019-02-26 PROCEDURE — 86901 BLOOD TYPING SEROLOGIC RH(D): CPT

## 2019-02-26 PROCEDURE — 88311 DECALCIFY TISSUE: CPT

## 2019-02-26 PROCEDURE — 87075 CULTR BACTERIA EXCEPT BLOOD: CPT

## 2019-02-26 PROCEDURE — 70496 CT ANGIOGRAPHY HEAD: CPT

## 2019-02-26 PROCEDURE — 85576 BLOOD PLATELET AGGREGATION: CPT

## 2019-02-26 PROCEDURE — 80053 COMPREHEN METABOLIC PANEL: CPT

## 2019-02-26 PROCEDURE — 73630 X-RAY EXAM OF FOOT: CPT

## 2019-02-26 PROCEDURE — 87070 CULTURE OTHR SPECIMN AEROBIC: CPT

## 2019-02-26 PROCEDURE — 85610 PROTHROMBIN TIME: CPT

## 2019-02-26 PROCEDURE — 0042T: CPT

## 2019-02-26 PROCEDURE — 88305 TISSUE EXAM BY PATHOLOGIST: CPT

## 2019-02-26 PROCEDURE — 86709 HEPATITIS A IGM ANTIBODY: CPT

## 2019-02-26 PROCEDURE — 86850 RBC ANTIBODY SCREEN: CPT

## 2019-02-26 PROCEDURE — 97530 THERAPEUTIC ACTIVITIES: CPT

## 2019-02-26 PROCEDURE — 71045 X-RAY EXAM CHEST 1 VIEW: CPT

## 2019-02-26 PROCEDURE — 80048 BASIC METABOLIC PNL TOTAL CA: CPT

## 2019-02-26 PROCEDURE — 85730 THROMBOPLASTIN TIME PARTIAL: CPT

## 2019-02-26 PROCEDURE — 84295 ASSAY OF SERUM SODIUM: CPT

## 2019-02-26 PROCEDURE — 36430 TRANSFUSION BLD/BLD COMPNT: CPT

## 2019-02-26 PROCEDURE — C1889: CPT

## 2019-02-26 PROCEDURE — 88304 TISSUE EXAM BY PATHOLOGIST: CPT

## 2019-02-26 PROCEDURE — C1769: CPT

## 2019-02-26 PROCEDURE — 97163 PT EVAL HIGH COMPLEX 45 MIN: CPT

## 2019-02-26 PROCEDURE — 87389 HIV-1 AG W/HIV-1&-2 AB AG IA: CPT

## 2019-02-26 PROCEDURE — 84132 ASSAY OF SERUM POTASSIUM: CPT

## 2019-02-26 PROCEDURE — C1894: CPT

## 2019-02-26 PROCEDURE — 86705 HEP B CORE ANTIBODY IGM: CPT

## 2019-02-26 PROCEDURE — 86803 HEPATITIS C AB TEST: CPT

## 2019-02-26 PROCEDURE — 87340 HEPATITIS B SURFACE AG IA: CPT

## 2019-02-26 PROCEDURE — 80202 ASSAY OF VANCOMYCIN: CPT

## 2019-02-26 PROCEDURE — 76000 FLUOROSCOPY <1 HR PHYS/QHP: CPT

## 2019-02-26 PROCEDURE — P9016: CPT

## 2019-02-26 PROCEDURE — 36415 COLL VENOUS BLD VENIPUNCTURE: CPT

## 2019-02-26 PROCEDURE — 86923 COMPATIBILITY TEST ELECTRIC: CPT

## 2019-02-26 PROCEDURE — 70450 CT HEAD/BRAIN W/O DYE: CPT

## 2019-02-26 PROCEDURE — 83036 HEMOGLOBIN GLYCOSYLATED A1C: CPT

## 2019-02-26 PROCEDURE — 87186 SC STD MICRODIL/AGAR DIL: CPT

## 2019-02-26 PROCEDURE — C1725: CPT

## 2019-02-26 PROCEDURE — 83605 ASSAY OF LACTIC ACID: CPT

## 2019-02-26 PROCEDURE — 86900 BLOOD TYPING SEROLOGIC ABO: CPT

## 2019-02-26 PROCEDURE — 85027 COMPLETE CBC AUTOMATED: CPT

## 2019-02-26 PROCEDURE — 86140 C-REACTIVE PROTEIN: CPT

## 2019-02-26 PROCEDURE — 84100 ASSAY OF PHOSPHORUS: CPT

## 2019-02-26 PROCEDURE — 82962 GLUCOSE BLOOD TEST: CPT

## 2019-02-26 PROCEDURE — 85025 COMPLETE CBC W/AUTO DIFF WBC: CPT

## 2019-02-26 PROCEDURE — C1887: CPT

## 2019-02-26 PROCEDURE — 99285 EMERGENCY DEPT VISIT HI MDM: CPT | Mod: 25

## 2019-02-26 PROCEDURE — 86704 HEP B CORE ANTIBODY TOTAL: CPT

## 2019-02-26 PROCEDURE — 85018 HEMOGLOBIN: CPT

## 2019-02-26 PROCEDURE — 97164 PT RE-EVAL EST PLAN CARE: CPT

## 2019-02-26 PROCEDURE — 93005 ELECTROCARDIOGRAM TRACING: CPT

## 2019-02-26 PROCEDURE — 97162 PT EVAL MOD COMPLEX 30 MIN: CPT

## 2019-02-26 PROCEDURE — 96372 THER/PROPH/DIAG INJ SC/IM: CPT

## 2019-02-26 PROCEDURE — 83735 ASSAY OF MAGNESIUM: CPT

## 2019-02-26 PROCEDURE — 71046 X-RAY EXAM CHEST 2 VIEWS: CPT

## 2019-02-26 PROCEDURE — 70498 CT ANGIOGRAPHY NECK: CPT

## 2019-02-26 PROCEDURE — 97110 THERAPEUTIC EXERCISES: CPT

## 2019-02-26 PROCEDURE — 97116 GAIT TRAINING THERAPY: CPT

## 2019-02-26 PROCEDURE — C1876: CPT

## 2019-02-26 RX ORDER — APIXABAN 2.5 MG/1
1 TABLET, FILM COATED ORAL
Qty: 0 | Refills: 0 | COMMUNITY

## 2019-02-26 RX ORDER — HYDROMORPHONE HYDROCHLORIDE 2 MG/ML
0.5 INJECTION INTRAMUSCULAR; INTRAVENOUS; SUBCUTANEOUS ONCE
Qty: 0 | Refills: 0 | Status: DISCONTINUED | OUTPATIENT
Start: 2019-02-26 | End: 2019-02-26

## 2019-02-26 RX ORDER — LANOLIN ALCOHOL/MO/W.PET/CERES
1 CREAM (GRAM) TOPICAL
Qty: 0 | Refills: 0 | COMMUNITY
Start: 2019-02-26

## 2019-02-26 RX ORDER — ALPRAZOLAM 0.25 MG
1 TABLET ORAL
Qty: 0 | Refills: 0 | COMMUNITY

## 2019-02-26 RX ORDER — TICAGRELOR 90 MG/1
1 TABLET ORAL
Qty: 0 | Refills: 0 | COMMUNITY
Start: 2019-02-26

## 2019-02-26 RX ADMIN — OXYCODONE AND ACETAMINOPHEN 1 TABLET(S): 5; 325 TABLET ORAL at 10:43

## 2019-02-26 RX ADMIN — HEPARIN SODIUM 5000 UNIT(S): 5000 INJECTION INTRAVENOUS; SUBCUTANEOUS at 05:36

## 2019-02-26 RX ADMIN — Medication 100 MILLIGRAM(S): at 05:36

## 2019-02-26 RX ADMIN — HYDROMORPHONE HYDROCHLORIDE 0.5 MILLIGRAM(S): 2 INJECTION INTRAMUSCULAR; INTRAVENOUS; SUBCUTANEOUS at 06:55

## 2019-02-26 RX ADMIN — Medication 100 MILLIGRAM(S): at 14:05

## 2019-02-26 RX ADMIN — Medication 1 DROP(S): at 05:37

## 2019-02-26 RX ADMIN — Medication 81 MILLIGRAM(S): at 10:43

## 2019-02-26 RX ADMIN — Medication 250 MILLIGRAM(S): at 13:36

## 2019-02-26 RX ADMIN — TICAGRELOR 90 MILLIGRAM(S): 90 TABLET ORAL at 05:37

## 2019-02-26 RX ADMIN — BRIMONIDINE TARTRATE 1 DROP(S): 2 SOLUTION/ DROPS OPHTHALMIC at 13:36

## 2019-02-26 RX ADMIN — HEPARIN SODIUM 5000 UNIT(S): 5000 INJECTION INTRAVENOUS; SUBCUTANEOUS at 14:05

## 2019-02-26 RX ADMIN — OXYCODONE AND ACETAMINOPHEN 1 TABLET(S): 5; 325 TABLET ORAL at 11:30

## 2019-02-26 RX ADMIN — Medication 25 MILLIGRAM(S): at 05:36

## 2019-02-26 RX ADMIN — HYDROMORPHONE HYDROCHLORIDE 0.5 MILLIGRAM(S): 2 INJECTION INTRAMUSCULAR; INTRAVENOUS; SUBCUTANEOUS at 07:20

## 2019-02-26 RX ADMIN — CHLORHEXIDINE GLUCONATE 1 APPLICATION(S): 213 SOLUTION TOPICAL at 10:44

## 2019-02-26 RX ADMIN — BRIMONIDINE TARTRATE 1 DROP(S): 2 SOLUTION/ DROPS OPHTHALMIC at 05:37

## 2019-02-26 RX ADMIN — PANTOPRAZOLE SODIUM 40 MILLIGRAM(S): 20 TABLET, DELAYED RELEASE ORAL at 05:36

## 2019-02-26 NOTE — DISCHARGE NOTE PROVIDER - CARE PROVIDERS DIRECT ADDRESSES
,[email or web address] ,[email or web address]_Unknown,[email or web address]

## 2019-02-26 NOTE — DISCHARGE NOTE PROVIDER - CARE PROVIDER_API CALL
Vicky Castellanos)  Surgery; Vascular Surgery  [address] [place] [address]  Phone: [phone]  Fax: [phone]  Follow Up Time: 2 weeks    Ricardo Edwards)  Neurology; Vascular Neurology  [address], [address] [place] [address]  Phone: [phone]  Fax: [phone]  Follow Up Time: 2 weeks    Marianela Junior)  Cardiovascular Disease; Internal Medicine  [address] [place] [address]  Phone: [phone]  Fax: [phone]  Follow Up Time: 1 month    jane,   Follow up with Dr Cheng's NP. March 7, 2019  Phone: (   )    -  Fax: (   )    -  Follow Up Time: Vicky Castellanos)  Surgery; Vascular Surgery  [address] [place] [address]  Phone: [phone]  Fax: [phone]  Follow Up Time: 2 weeks    Ricardo Edwards)  Neurology; Vascular Neurology  [address], [address] [place] [address]  Phone: [phone]  Fax: [phone]  Follow Up Time: 2 weeks    Marianela Junior)  Cardiovascular Disease; Internal Medicine  [address] [place] [address]  Phone: [phone]  Fax: [phone]  Follow Up Time: 1 month    Jeannie lara  Follow up with Dr Lara's NP on March 7, 2019 at 11:30am.  Phone: (   )    -  Fax: (   )    -  Follow Up Time: 1 week    Jeannie Lara)  Neurology; Vascular Neurology  [address], [address] [place] [address]  Phone: [phone]  Fax: [phone]  Follow Up Time:

## 2019-02-26 NOTE — DISCHARGE NOTE NURSING/CASE MANAGEMENT/SOCIAL WORK - NSDCDPATPORTLINK_GEN_ALL_CORE
You can access the [facility] Patient Portal, offered by [facility], by registering with the following website: [email or web address]

## 2019-02-26 NOTE — CHART NOTE - NSCHARTNOTEFT_GEN_A_CORE
Admitting Diagnosis:   Patient is a 84y old  Female who presents with a chief complaint of Left foot gangrene (2019 05:29)      PAST MEDICAL & SURGICAL HISTORY:  MRSA infection: right great toe ( amputated)  Glaucoma: b/l  Falls  Depression  Pulmonary embolism  DVT (deep venous thrombosis): Right lower extremity  MI (myocardial infarction): at age 65 y.o  Peripheral vascular disease  Ulcer of lower limb  Arthropathy  Ischemic heart disease  Hypertension  Hyperlipidemia  Diabetes mellitus: II  S/P amputation: may 2018; right great toe  History of surgery: x7 coronary artery stents  History of surgery: right leg bypass 2018 with amputation of right great toe      Current Nutrition Order: DASH/TLC, CSTCHOSN diet     PO Intake: Good (%) [  X ]  Fair (50-75%) [ X ] Poor (<25%) [   ]    GI Issues: Denies N/V/D/C   +BM , (Colace, Senna, Dulcolax)  Zofran PRN for nausea/vomiting    Pain: Denies pain/discomfort     Skin Integrity: surgical incision Left leg, s/p L great toe amputation     Labs: reviewed.         CAPILLARY BLOOD GLUCOSE      POCT Blood Glucose.: 149 mg/dL (2019 11:07)  POCT Blood Glucose.: 129 mg/dL (2019 07:11)  POCT Blood Glucose.: 138 mg/dL (2019 21:44)  POCT Blood Glucose.: 122 mg/dL (2019 16:13)      Medications:  MEDICATIONS  (STANDING):  aspirin enteric coated 81 milliGRAM(s) Oral daily  atorvastatin 80 milliGRAM(s) Oral at bedtime  brimonidine 0.2% Ophthalmic Solution 1 Drop(s) Both EYES three times a day  chlorhexidine 2% Cloths 1 Application(s) Topical daily  dextrose 5%. 1000 milliLiter(s) (50 mL/Hr) IV Continuous <Continuous>  dextrose 50% Injectable 12.5 Gram(s) IV Push once  dextrose 50% Injectable 25 Gram(s) IV Push once  dextrose 50% Injectable 25 Gram(s) IV Push once  docusate sodium 100 milliGRAM(s) Oral three times a day  heparin  Injectable 5000 Unit(s) SubCutaneous every 8 hours  insulin lispro (HumaLOG) corrective regimen sliding scale   SubCutaneous Before meals and at bedtime  latanoprost 0.005% Ophthalmic Solution 1 Drop(s) Both EYES at bedtime  melatonin 5 milliGRAM(s) Oral at bedtime  metoprolol succinate ER 25 milliGRAM(s) Oral daily  ondansetron Injectable 4 milliGRAM(s) IV Push once  pantoprazole    Tablet 40 milliGRAM(s) Oral before breakfast  ticagrelor 90 milliGRAM(s) Oral two times a day  timolol 0.5% Solution 1 Drop(s) Both EYES two times a day    MEDICATIONS  (PRN):  acetaminophen   Tablet .. 650 milliGRAM(s) Oral every 6 hours PRN Mild Pain (1 - 3)  bisacodyl Suppository 10 milliGRAM(s) Rectal daily PRN Constipation  dextrose 40% Gel 15 Gram(s) Oral once PRN Blood Glucose LESS THAN 70 milliGRAM(s)/deciliter  glucagon  Injectable 1 milliGRAM(s) IntraMuscular once PRN Glucose LESS THAN 70 milligrams/deciliter  metoclopramide Injectable 10 milliGRAM(s) IV Push once PRN Nausea and/or Vomiting  ondansetron Injectable 4 milliGRAM(s) IV Push every 6 hours PRN Nausea  oxyCODONE    5 mG/acetaminophen 325 mG 1 Tablet(s) Oral every 4 hours PRN Moderate Pain (4 - 6)  senna 2 Tablet(s) Oral daily PRN Constipation      Weight:  179.8 lbs ()   Daily     Weight Change: No new weights recorded to assess at this time     Ht (): 167.6cm, Wt ( per daughter): 84.5kg, IBW: 130# +/-10%, %IBW: 143%, BMI: 30   Estimated energy needs:   IBW used to calculate energy needs due to pt's current body weight exceeding 120% of IBW. Needs further adjusted for wound healing  25-30 kcal/[id number] kcal  1.2-1.4 gm/k-82 gm protein  30-35 mL/[id number] mL fluids     Subjective:   85 yo F w/ significant PMH including CAD (w/ 7 stents), DVT previously on Eliquis, HTN, DM, HLD, admitted to vascular for surgery and found to have L weakness, found to have significant R carotid stenosis and likely limited flow to R MCA from that. s/p fem angio and balloon assisted right ICA stent placement . Continues to receive abx. s/p debridement of toe . Seen pt in room, tolerating diet well with good appetite. Discharge planning to [facility] in progress by SW/CM.     Previous Nutrition Diagnosis:  Increased nutrient needs (specify); protein RT increased nutrient demand for healing AEB pt s/p amputation     Active [   ]  Resolved [ X ]    If resolved, new PES:   no new nutrition problems identified at this time     Goal:  Meet >75% EER from PO to promote wound healinG    Recommendations:  1. Continue DASH/TLC, CSTCHOSN diet when feasible  2. Monitor BMP, lytes and correct PRN     Education: Reviewed nutritional status for wound healing.     Risk Level: High [   ] Moderate [  X ] Low [   ]

## 2019-02-26 NOTE — DISCHARGE NOTE PROVIDER - NSDCCPCAREPLAN_GEN_ALL_CORE_FT
PRINCIPAL DISCHARGE DIAGNOSIS  Problem: Ulcer of toe  Assessment and Plan of Treatment: PAD with gangrene of toe

## 2019-02-26 NOTE — PROGRESS NOTE ADULT - SUBJECTIVE AND OBJECTIVE BOX
Physical Medicine and Rehabilitation Progress Note:    Patient is a 84y old  Female who presents with a chief complaint of Left foot gangrene (26 Feb 2019 13:53)      HPI:  83 yo female with h/o DM and HLD s/p right leg bypass and R great toe amputation, now healed, presents with a nonhealing L great toe ulcer, which has been previously debrided. 11/13 Aniogram shows patent aorta and bilateral iliac vessels. Patent L CFA and profunda. L SFA occluded at the origin. Extensive collateralization throughout leg with no named vessels until DP reconstitutes distally. Patient notes that she has been having increased claudication symptoms in the left leg with walking. Denies CP, SOB. (11 Feb 2019 15:04)                Vital Signs Last 24 Hrs  T(C): 36.2 (26 Feb 2019 13:21), Max: 36.9 (26 Feb 2019 06:00)  T(F): 97.1 (26 Feb 2019 13:21), Max: 98.4 (26 Feb 2019 06:00)  HR: 48 (26 Feb 2019 11:43) (48 - 60)  BP: 120/58 (26 Feb 2019 11:43) (120/58 - 174/71)  BP(mean): --  RR: 18 (26 Feb 2019 11:43) (16 - 18)  SpO2: 100% (26 Feb 2019 11:43) (99% - 100%)    MEDICATIONS  (STANDING):  aspirin enteric coated 81 milliGRAM(s) Oral daily  atorvastatin 80 milliGRAM(s) Oral at bedtime  brimonidine 0.2% Ophthalmic Solution 1 Drop(s) Both EYES three times a day  chlorhexidine 2% Cloths 1 Application(s) Topical daily  dextrose 5%. 1000 milliLiter(s) (50 mL/Hr) IV Continuous <Continuous>  dextrose 50% Injectable 12.5 Gram(s) IV Push once  dextrose 50% Injectable 25 Gram(s) IV Push once  dextrose 50% Injectable 25 Gram(s) IV Push once  docusate sodium 100 milliGRAM(s) Oral three times a day  heparin  Injectable 5000 Unit(s) SubCutaneous every 8 hours  insulin lispro (HumaLOG) corrective regimen sliding scale   SubCutaneous Before meals and at bedtime  latanoprost 0.005% Ophthalmic Solution 1 Drop(s) Both EYES at bedtime  melatonin 5 milliGRAM(s) Oral at bedtime  metoprolol succinate ER 25 milliGRAM(s) Oral daily  ondansetron Injectable 4 milliGRAM(s) IV Push once  pantoprazole    Tablet 40 milliGRAM(s) Oral before breakfast  ticagrelor 90 milliGRAM(s) Oral two times a day  timolol 0.5% Solution 1 Drop(s) Both EYES two times a day    MEDICATIONS  (PRN):  acetaminophen   Tablet .. 650 milliGRAM(s) Oral every 6 hours PRN Mild Pain (1 - 3)  bisacodyl Suppository 10 milliGRAM(s) Rectal daily PRN Constipation  dextrose 40% Gel 15 Gram(s) Oral once PRN Blood Glucose LESS THAN 70 milliGRAM(s)/deciliter  glucagon  Injectable 1 milliGRAM(s) IntraMuscular once PRN Glucose LESS THAN 70 milligrams/deciliter  metoclopramide Injectable 10 milliGRAM(s) IV Push once PRN Nausea and/or Vomiting  ondansetron Injectable 4 milliGRAM(s) IV Push every 6 hours PRN Nausea  oxyCODONE    5 mG/acetaminophen 325 mG 1 Tablet(s) Oral every 4 hours PRN Moderate Pain (4 - 6)  senna 2 Tablet(s) Oral daily PRN Constipation    Currently Undergoing Physical Therapy at bedside.    Functional Status Assessment: on 2/25/2019      Pain Assessment/Number Scale (0-10) Adult  Presence of Pain: complains of pain/discomfort  Body Location: L foot  Pain Rating (0-10): Rest: 3   Pain Rating (0-10): Activity: 3     Therapeutic Interventions      Bed Mobility  Bed Mobility Training Supine-to-Sit: moderate assist (50% patient effort);  2 person assist  Bed Mobility Training Limitations: decreased ability to use legs for bridging/pushing;  decreased strength;  impaired balance    Sit-Stand Transfer Training  Transfer Training Sit-to-Stand Transfer: Initially maximum assist x 2 person with rolling walker, improved to minimal assist x 2 person with rolling walker ;  LLE Heel Weight-Bearing  Transfer Training Stand-to-Sit Transfer: minimum assist (75% patient effort);  2 person assist;  rolling walker;  LLE Heel Weight-Bearing  Sit-to-Stand Transfer Training Transfer Safety Analysis: decreased balance;  decreased strength;  impaired balance    Gait Training  Gait Training: moderate assist (50% patient effort);  2 person assist;  rolling walker;  bed to chair;  LLE Heel Weight-Bearing  Gait Analysis: swing-to gait   decreased velocity of limb motion;  decreased hip/knee flexion;  decreased step length;  decreased stride length;  difficulty maintaining heel weight-bearing, retropulsion;  decreased strength;  impaired balance;  unable to maintain weight bearing status    Therapeutic Exercise  Therapeutic Exercise Detail: Seated:Long Arc Quads - 2 sets of 10 reps, BLE |Marches - 10 reps, BLE | Sit to Stands - 3 reps from elevated bed                 PM&R Impression: as above    Disposition Plan Recommendations: subacute rehab placement

## 2019-02-26 NOTE — DISCHARGE NOTE NURSING/CASE MANAGEMENT/SOCIAL WORK - NSDCPEPTSTRK_GEN_ALL_CORE
Call 911 for stroke/Stroke warning signs and symptoms/Signs and symptoms of stroke/Need for follow up after discharge/Stroke education booklet/Prescribed medications/Risk factors for stroke/Stroke support groups for patients, families, and friends

## 2019-02-26 NOTE — PROGRESS NOTE ADULT - PROVIDER SPECIALTY LIST ADULT
Cardiology
NSICU
NSICU
Neurology
Neurosurgery
Rehab Medicine
Rehab Medicine
Vascular Surgery
Rehab Medicine

## 2019-02-26 NOTE — DISCHARGE NOTE PROVIDER - NSDCCPTREATMENT_GEN_ALL_CORE_FT
PRINCIPAL PROCEDURE  Procedure: Endarterectomy of left common femoral artery  Findings and Treatment: left fem DP bypass with RSVG      SECONDARY PROCEDURE  Procedure: Angiogram, carotid and cerebral, right  Findings and Treatment: Right carotid stent

## 2019-02-26 NOTE — PROGRESS NOTE ADULT - REASON FOR ADMISSION
Left foot gangrene

## 2019-02-26 NOTE — DISCHARGE NOTE PROVIDER - NSDCFUADDINST_GEN_ALL_CORE_FT
Wound Care: Clean left leg surgery wounds with soap and water daily. Clean left groin wound daily and PRN if it becomes soiled.  Groin wounds are prone to infection and break down if not taken care of meticulously. Place dry gauze in groin to keep wound dry. Change gauze as needed throughout the day if it becomes moist.     Left 1st toe amputation site VAC dressing with pressure 125mmHg. Change 3 x per week. If VAC malfunctions, please place saline moistened gauze in wound and wrap with kerlix and change daily.

## 2019-02-26 NOTE — DISCHARGE NOTE NURSING/CASE MANAGEMENT/SOCIAL WORK - NSDCPEPTSTROKESIGNS_GEN_ALL_CORE
Sudden numbness or weakness of the face, arm, or leg, especially on one side of the body. Confusion, trouble speaking or understanding. Trouble seeing in one or both eyes. Trouble walking, dizziness, loss of balance or coordination. Severe headache.

## 2019-02-26 NOTE — PROGRESS NOTE ADULT - ASSESSMENT
84 F hx HTN, CAD (stent x7 in 2018), DVT (formerly on Eliquis), DM, HLD, PAD s/p R fem-pop bypass and R great toe amputation admitted (2/11) for L fem-pop bypass ( 2/12) c/b CVA s/p angiography and R ICA stent placement (2/13) s/p LLE great toe partial 1st ray amputation 2/19    Plan  - Pain/nausea control  - vac change MWF  - Vancomycin  - DASH diet  - ISS  -Brilinta. HSQ, ASA

## 2019-02-26 NOTE — DISCHARGE NOTE PROVIDER - HOSPITAL COURSE
85 yo female with h/o DM and HLD s/p right leg bypass and R great toe amputation, now healed, presents with a nonhealing L great toe ulcer, which has been previously debrided. 11/13 Aniogram shows patent aorta and bilateral iliac vessels. Patent L CFA and profunda. L SFA occluded at the origin. Extensive collateralization throughout leg with no named vessels until DP reconstitutes distally. Patient notes that she has been having increased claudication symptoms in the left leg with walking. Denies CP, SOB.         Pt admitted 2/11/19. She was taken to OR 2/12/19. She underwent left CFA endarterectomy with roof patch, SFA to DP bypass with RSVG and completion angiogram. Pt tolerated procedure well. POD # 1 daughter and nurse noted slurred speech. Pt found to have left sided weakness. Stroke code was called. CT head showed no acute stroke or hemorrhage. CTA head and neck showed RMCA thrombus. She was taken emergently to cath lab with neurointerventional. She underwent cerebral angio showed high grade stenosis VETO origin and no acute occlusions. She had VETO stent placed. Post op she was taken to NSICU for advanced monitoring. Pt regained her speech and left sided strength.  Pt was maintained on Brilinta, ASA and lipitor.  She was stepped down. She was continued on IV vancomycin for MRSA left great toe. She was taken to OR again on 2/18/19. She underwent left 1st toe ray amputation. She tolerated procedure well. Post op course was unremarkable. PT evaluation recommended [facility]. She completed her ABx course. 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 She was discharged to [facility] in stable condition.

## 2019-02-26 NOTE — PROGRESS NOTE ADULT - SUBJECTIVE AND OBJECTIVE BOX
o/n: VALERIE  2/25: pending insurance auth; vac forms done; vac removed and wet to dry placed      84 F hx HTN, CAD (stent x7 in 2018), DVT (formerly on Eliquis), DM, HLD, PAD s/p R fem-pop bypass and R great toe amputation admitted (2/11) for L fem-pop bypass ( 2/12) c/b CVA s/p angiography and R ICA stent placement (2/13) s/p LLE great toe partial 1st ray amputation 2/19    Plan  - Pain/nausea control  - vac change MWF  - Vancomycin  - DASH diet  - ISS  -Brilinta. HSQ, ASA  -Dispo: awaiting insurance auth for [facility] o/n: VALERIE  2/25: pending insurance auth; vac forms done; vac removed and wet to dry placed    S. Pain with dressing change.    vancomycin  IVPB 750  aspirin enteric coated 81  heparin  Injectable 5000  metoprolol succinate ER 25  ticagrelor 90  vancomycin  IVPB 750      Allergies    Goldbond (Unknown)  penicillins (Anaphylaxis)    Intolerances        Vital Signs Last 24 Hrs  T(C): 36.9 (26 Feb 2019 06:00), Max: 36.9 (26 Feb 2019 06:00)  T(F): 98.4 (26 Feb 2019 06:00), Max: 98.4 (26 Feb 2019 06:00)  HR: 60 (26 Feb 2019 05:45) (50 - 64)  BP: 156/66 (26 Feb 2019 05:45) (139/66 - 156/66)  BP(mean): --  RR: 16 (26 Feb 2019 05:45) (16 - 16)  SpO2: 100% (26 Feb 2019 05:45) (99% - 100%)    Physical Exam:  General: Awake, alert, NAD   Pulmonary:  Cardiovascular:  Abdominal:  Extremities: Left great toe amp site with venous oozing. Clean pink tissue. Pressure held, surgicel placed with pressure dressing.   Pulses:   Right:                                                                           Left:  FEM [ ]2+ [ ]1+ [ ] doppler                                            FEM [ ]2+ [ ]1+ [ ] doppler    POP [ ]2+ [ ]1+ [ ] doppler                                            POP [ ]2+ [ ]1+ [ ] doppler    DP [ ]2+ [ ]1+ [ ] doppler                                               DP [ ]2+ [ ]1+ [ ] doppler  PT[ ]2+ [ ]1+ [ ] doppler                                                 PT [ ]2+ [ ]1+ [ ] doppler      LABS:                RADIOLOGY & ADDITIONAL TESTS:    84 F hx HTN, CAD (stent x7 in 2018), DVT (formerly on Eliquis), DM, HLD, PAD s/p R fem-pop bypass and R great toe amputation admitted (2/11) for L fem-pop bypass ( 2/12) c/b CVA s/p angiography and R ICA stent placement (2/13) s/p LLE great toe partial 1st ray amputation 2/19    Plan  - Pain/nausea control  - vac change MWF  - Vancomycin  - DASH diet  - ISS  -Brilinta. HSQ, ASA  -Dispo: awaiting insurance auth for [facility]

## 2019-02-26 NOTE — DISCHARGE NOTE PROVIDER - NSDCACTIVITY_GEN_ALL_CORE
Walking - Indoors allowed/Showering allowed/No heavy lifting/straining/Physical therapy per routine. Ambulate with assistance and walker. Showering allowed/Physical therapy per routine. Ambulate with assistance and walker.  Left foot with heel weight bearing only./No heavy lifting/straining/Walking - Indoors allowed

## 2019-02-26 NOTE — DISCHARGE NOTE PROVIDER - PROVIDER TOKENS
PROVIDER:[TOKEN:[[id number]],FOLLOWUP:[2 weeks]],PROVIDER:[TOKEN:[[id number]],FOLLOWUP:[2 weeks]],PROVIDER:[TOKEN:[[id number]],FOLLOWUP:[1 month]],FREE:[LAST:[jane],PHONE:[(   )    -],FAX:[(   )    -],ADDRESS:[Follow up with Dr Cheng's NP. March 7, 2019]] PROVIDER:[TOKEN:[[id number]],FOLLOWUP:[2 weeks]],PROVIDER:[TOKEN:[[id number]],FOLLOWUP:[2 weeks]],PROVIDER:[[id number]],FOLLOWUP:[1 month]],FREE:[LAST:[jane],FIRST:[Jeannie],PHONE:[(   )    -],FAX:[(   )    -],ADDRESS:[Follow up with Dr Cheng's NP on March 7, 2019 at 11:30am.],FOLLOWUP:[1 week]],PROVIDER:[TOKEN:[[id number]]]

## 2019-02-27 ENCOUNTER — INBOUND DOCUMENT (OUTPATIENT)
Age: 84
End: 2019-02-27

## 2019-03-01 ENCOUNTER — APPOINTMENT (OUTPATIENT)
Dept: VASCULAR SURGERY | Facility: CLINIC | Age: 84
End: 2019-03-01
Payer: MEDICARE

## 2019-03-01 PROCEDURE — 99024 POSTOP FOLLOW-UP VISIT: CPT

## 2019-03-01 NOTE — PHYSICAL EXAM
[Normal Breath Sounds] : Normal breath sounds [Normal Heart Sounds] : normal heart sounds [2+] : left 2+ [1+] : left 1+ [Ankle Swelling (On Exam)] : present [Ankle Swelling Bilaterally] : bilaterally  [Ankle Swelling On The Left] : moderate [Alert] : alert [Calm] : calm [JVD] : no jugular venous distention  [Varicose Veins Of Lower Extremities] : not present [] : not present [de-identified] : WN/WD, NAD [de-identified] : NC/AT [de-identified] : LLE: + well healed incision from groin down to her foot, staples/sutures intact, S/p 1st toe amputation site clean, with good granulation tissue, no signs of infection

## 2019-03-01 NOTE — DISCUSSION/SUMMARY
[FreeTextEntry1] : 85 yo F with PVD, nonhealing left big toe ulcer requiring  left CFA endarterectomy with roof patch, SFA to DP bypass with RSVG and completion angiogram, followed by 1st toe amputation on 2/19/19 returns today for a post-op visit.\par Patient is doing well, at [facility].\par Left LE incisions are well healing, toe amputation site is clean with good granulation tissue.\par Continue local wound care with wet to dry dressings.\par F/u in 2 weeks for arterial duplex and to remove staples/sutures.

## 2019-03-01 NOTE — REASON FOR VISIT
[de-identified] : left CFA\par endarterectomy with roof patch, SFA to DP bypass with RSVG and completion\par angiogram. [de-identified] : 2/12/19 [de-identified] : 85 yo F with non-healing ulcer of left great toe who underwent  left CFA endarterectomy with roof patch, SFA to DP bypass with RSVG and completion angiogram, followed by 1st toe amputation on 2/19/19. Today she presents for a post op visit. Patient at [facility], states that she is doing well, denies pain in her leg, no fever, chills

## 2019-03-01 NOTE — REVIEW OF SYSTEMS
[Lower Ext Edema] : lower extremity edema [Limb Swelling] : limb swelling [As Noted in HPI] : as noted in HPI [Skin Wound] : skin wound [Negative] : Heme/Lymph [Fever] : no fever [Chills] : no chills [Limb Pain] : no limb pain

## 2019-03-05 DIAGNOSIS — Z88.0 ALLERGY STATUS TO PENICILLIN: ICD-10-CM

## 2019-03-05 DIAGNOSIS — Z89.421 ACQUIRED ABSENCE OF OTHER RIGHT TOE(S): ICD-10-CM

## 2019-03-05 DIAGNOSIS — E11.621 TYPE 2 DIABETES MELLITUS WITH FOOT ULCER: ICD-10-CM

## 2019-03-05 DIAGNOSIS — Z95.828 PRESENCE OF OTHER VASCULAR IMPLANTS AND GRAFTS: ICD-10-CM

## 2019-03-05 DIAGNOSIS — E11.52 TYPE 2 DIABETES MELLITUS WITH DIABETIC PERIPHERAL ANGIOPATHY WITH GANGRENE: ICD-10-CM

## 2019-03-05 DIAGNOSIS — Z79.82 LONG TERM (CURRENT) USE OF ASPIRIN: ICD-10-CM

## 2019-03-05 DIAGNOSIS — Z95.5 PRESENCE OF CORONARY ANGIOPLASTY IMPLANT AND GRAFT: ICD-10-CM

## 2019-03-05 DIAGNOSIS — I65.21 OCCLUSION AND STENOSIS OF RIGHT CAROTID ARTERY: ICD-10-CM

## 2019-03-05 DIAGNOSIS — Z79.01 LONG TERM (CURRENT) USE OF ANTICOAGULANTS: ICD-10-CM

## 2019-03-05 DIAGNOSIS — D62 ACUTE POSTHEMORRHAGIC ANEMIA: ICD-10-CM

## 2019-03-05 DIAGNOSIS — F32.9 MAJOR DEPRESSIVE DISORDER, SINGLE EPISODE, UNSPECIFIED: ICD-10-CM

## 2019-03-05 DIAGNOSIS — Z86.711 PERSONAL HISTORY OF PULMONARY EMBOLISM: ICD-10-CM

## 2019-03-05 DIAGNOSIS — I10 ESSENTIAL (PRIMARY) HYPERTENSION: ICD-10-CM

## 2019-03-05 DIAGNOSIS — B95.62 METHICILLIN RESISTANT STAPHYLOCOCCUS AUREUS INFECTION AS THE CAUSE OF DISEASES CLASSIFIED ELSEWHERE: ICD-10-CM

## 2019-03-05 DIAGNOSIS — I25.10 ATHEROSCLEROTIC HEART DISEASE OF NATIVE CORONARY ARTERY WITHOUT ANGINA PECTORIS: ICD-10-CM

## 2019-03-05 DIAGNOSIS — H40.9 UNSPECIFIED GLAUCOMA: ICD-10-CM

## 2019-03-05 DIAGNOSIS — L97.529 NON-PRESSURE CHRONIC ULCER OF OTHER PART OF LEFT FOOT WITH UNSPECIFIED SEVERITY: ICD-10-CM

## 2019-03-05 DIAGNOSIS — Z86.718 PERSONAL HISTORY OF OTHER VENOUS THROMBOSIS AND EMBOLISM: ICD-10-CM

## 2019-03-05 DIAGNOSIS — I25.2 OLD MYOCARDIAL INFARCTION: ICD-10-CM

## 2019-03-05 DIAGNOSIS — Z91.048 OTHER NONMEDICINAL SUBSTANCE ALLERGY STATUS: ICD-10-CM

## 2019-03-05 DIAGNOSIS — E78.5 HYPERLIPIDEMIA, UNSPECIFIED: ICD-10-CM

## 2019-03-05 DIAGNOSIS — D64.9 ANEMIA, UNSPECIFIED: ICD-10-CM

## 2019-03-07 ENCOUNTER — APPOINTMENT (OUTPATIENT)
Dept: NEUROLOGY | Facility: CLINIC | Age: 84
End: 2019-03-07
Payer: MEDICARE

## 2019-03-07 VITALS
HEART RATE: 73 BPM | HEIGHT: 65 IN | TEMPERATURE: 98.1 F | WEIGHT: 133 LBS | BODY MASS INDEX: 22.16 KG/M2 | OXYGEN SATURATION: 96 %

## 2019-03-07 VITALS — DIASTOLIC BLOOD PRESSURE: 83 MMHG | SYSTOLIC BLOOD PRESSURE: 138 MMHG

## 2019-03-07 DIAGNOSIS — E11.59 TYPE 2 DIABETES MELLITUS WITH OTHER CIRCULATORY COMPLICATIONS: ICD-10-CM

## 2019-03-07 DIAGNOSIS — Z86.69 PERSONAL HISTORY OF OTHER DISEASES OF THE NERVOUS SYSTEM AND SENSE ORGANS: ICD-10-CM

## 2019-03-07 DIAGNOSIS — I99.9 UNSPECIFIED DISORDER OF CIRCULATORY SYSTEM: ICD-10-CM

## 2019-03-07 DIAGNOSIS — Z87.39 PERSONAL HISTORY OF OTHER DISEASES OF THE MUSCULOSKELETAL SYSTEM AND CONNECTIVE TISSUE: ICD-10-CM

## 2019-03-07 DIAGNOSIS — I25.2 OLD MYOCARDIAL INFARCTION: ICD-10-CM

## 2019-03-07 DIAGNOSIS — Z78.9 OTHER SPECIFIED HEALTH STATUS: ICD-10-CM

## 2019-03-07 DIAGNOSIS — Z86.39 PERSONAL HISTORY OF OTHER ENDOCRINE, NUTRITIONAL AND METABOLIC DISEASE: ICD-10-CM

## 2019-03-07 DIAGNOSIS — Z83.3 FAMILY HISTORY OF DIABETES MELLITUS: ICD-10-CM

## 2019-03-07 DIAGNOSIS — Z86.79 PERSONAL HISTORY OF OTHER DISEASES OF THE CIRCULATORY SYSTEM: ICD-10-CM

## 2019-03-07 DIAGNOSIS — Z84.1 FAMILY HISTORY OF DISORDERS OF KIDNEY AND URETER: ICD-10-CM

## 2019-03-07 DIAGNOSIS — Z82.71 FAMILY HISTORY OF POLYCYSTIC KIDNEY: ICD-10-CM

## 2019-03-07 PROCEDURE — 99214 OFFICE O/P EST MOD 30 MIN: CPT

## 2019-03-07 RX ORDER — DOCUSATE SODIUM 50 MG/5ML
50 LIQUID ORAL
Refills: 0 | Status: ACTIVE | COMMUNITY

## 2019-03-07 RX ORDER — LOSARTAN POTASSIUM 100 MG/1
100 TABLET, FILM COATED ORAL
Refills: 0 | Status: ACTIVE | COMMUNITY

## 2019-03-07 RX ORDER — SERTRALINE HYDROCHLORIDE 50 MG/1
50 TABLET, FILM COATED ORAL
Refills: 0 | Status: ACTIVE | COMMUNITY

## 2019-03-07 RX ORDER — HYDRALAZINE HYDROCHLORIDE 25 MG/1
25 TABLET ORAL
Refills: 0 | Status: ACTIVE | COMMUNITY

## 2019-03-07 RX ORDER — INSULIN LISPRO 100 [IU]/ML
100 INJECTION, SOLUTION INTRAVENOUS; SUBCUTANEOUS
Refills: 0 | Status: ACTIVE | COMMUNITY

## 2019-03-07 RX ORDER — ROSUVASTATIN CALCIUM 20 MG/1
20 TABLET, FILM COATED ORAL
Refills: 0 | Status: ACTIVE | COMMUNITY

## 2019-03-07 RX ORDER — METFORMIN HYDROCHLORIDE 750 MG/1
750 TABLET, EXTENDED RELEASE ORAL
Refills: 0 | Status: ACTIVE | COMMUNITY

## 2019-03-07 RX ORDER — OXYCODONE HYDROCHLORIDE 5 MG/1
5 CAPSULE ORAL
Refills: 0 | Status: ACTIVE | COMMUNITY

## 2019-03-07 RX ORDER — BIMATOPROST 0.1 MG/ML
0.01 SOLUTION/ DROPS OPHTHALMIC
Refills: 0 | Status: ACTIVE | COMMUNITY

## 2019-03-07 RX ORDER — COLLAGENASE SANTYL 250 [ARB'U]/G
250 OINTMENT TOPICAL
Refills: 0 | Status: ACTIVE | COMMUNITY

## 2019-03-07 RX ORDER — GABAPENTIN 300 MG/1
300 CAPSULE ORAL
Refills: 0 | Status: ACTIVE | COMMUNITY

## 2019-03-07 RX ORDER — MUPIROCIN CALCIUM 20 MG/G
2 CREAM TOPICAL
Refills: 0 | Status: ACTIVE | COMMUNITY

## 2019-03-07 RX ORDER — METOPROLOL TARTRATE 25 MG/1
25 TABLET, FILM COATED ORAL
Refills: 0 | Status: ACTIVE | COMMUNITY

## 2019-03-08 NOTE — PHYSICAL EXAM
[FreeTextEntry1] : gait - impaired due to left leg weakness and fear and anxiety\par using walker in therapy\par The patient is alert and oriented x3, naming intact x3, repetition normal, follows three-step commands, and is able to participate fully in the history taking.\par Speech is normal with no evidence of dysarthria.\par Memory is intact: Immediate recall 3 out of 3, short-term 3 out of 3, remote memory intact\par Cranial nerves II through XII intact\par Motor exam: left UE 4/56, LLE 4-/5\par Sensory exam: Intact to light touch and pinprick. Romberg negative.\par Coordination and vestibular exam: Finger to nose intact, no evidence of truncal or appendicular ataxia. No evidence of nystagmus. no vestibular symptoms elicited with head turning during ambulation.\par .\par Reflexes: One to 2+ in upper and lower extremities. No pathological reflexes. Downgoing toes.\par \par

## 2019-03-08 NOTE — REVIEW OF SYSTEMS
[Feeling Tired] : feeling tired [Difficulty Walking] : difficulty walking [As Noted in HPI] : as noted in HPI [Anxiety] : anxiety [Negative] : Heme/Lymph [de-identified] : left leg pain

## 2019-03-08 NOTE — HISTORY OF PRESENT ILLNESS
[FreeTextEntry1] : follow up of stroke s/p right ICA critical stenosis s/p emergency stent. Patient also had an a fem pop bypass. CT head showed an old right frontal stroke and MRI shows an acute right parietal stroke. \par Now in rehab and with improved gait\par

## 2019-03-18 ENCOUNTER — APPOINTMENT (OUTPATIENT)
Dept: VASCULAR SURGERY | Facility: CLINIC | Age: 84
End: 2019-03-18
Payer: MEDICARE

## 2019-03-18 PROCEDURE — 99024 POSTOP FOLLOW-UP VISIT: CPT

## 2019-03-18 PROCEDURE — 93880 EXTRACRANIAL BILAT STUDY: CPT

## 2019-03-18 NOTE — PHYSICAL EXAM
[Normal Breath Sounds] : Normal breath sounds [Normal Heart Sounds] : normal heart sounds [2+] : left 2+ [1+] : left 1+ [Ankle Swelling (On Exam)] : present [Ankle Swelling Bilaterally] : bilaterally  [Ankle Swelling On The Left] : moderate [Alert] : alert [Calm] : calm [JVD] : no jugular venous distention  [Varicose Veins Of Lower Extremities] : not present [de-identified] : WN/WD, NAD [] : not present [de-identified] : NC/AT [de-identified] : LLE: + well healed incision from groin down to her foot, staples/sutures intact, S/p 1st toe amputation site clean, with good granulation tissue, no signs of infection

## 2019-03-18 NOTE — HISTORY OF PRESENT ILLNESS
[FreeTextEntry1] : 85 yo F with non-healing ulcer of left great toe who underwent left CFA endarterectomy with roof patch, SFA to DP bypass with RSVG and completion angiogram, followed by 1st toe amputation on 2/19/19. Today she presents for a post op visit. Patient at [facility], states that she is doing well, denies pain in her leg, no fever, chills. Sutures and staples in place in the office.

## 2019-03-18 NOTE — ADDENDUM
[FreeTextEntry1] : This note was written by Bryanna Nowak on 03/18/2019  acting as scribe for Dr. Sherman.

## 2019-03-18 NOTE — ASSESSMENT
[FreeTextEntry1] : 83 yo F with PVD, nonhealing left big toe ulcer requiring  left CFA endarterectomy with roof patch, SFA to DP bypass with RSVG and completion angiogram, followed by 1st toe amputation on 2/19/19 returns today for a post-op visit. Patient is doing well, at [facility] [place]. Left LE incisions are well healing, toe amputation site is clean with good granulation tissue. Staples removed in the office, sutures still in place, leg is slightly edematous. Continue local wound care with wet to dry dressings. Patient can shower. F/u in 2 weeks to remove staples.

## 2019-03-18 NOTE — END OF VISIT
[FreeTextEntry3] : All medical record entries made by the Scribe were at my, Dr. Castellanos's, discretion and personally dictated by me on 03/18/2019. I have reviewed the chart and agree that the record accurately reflects my personal performance of the history, physical exam, assessment and plan. I have also personally directed, reviewed and agreed to the chart.

## 2019-03-28 ENCOUNTER — APPOINTMENT (OUTPATIENT)
Dept: NEUROSURGERY | Facility: CLINIC | Age: 84
End: 2019-03-28
Payer: MEDICARE

## 2019-03-28 VITALS
DIASTOLIC BLOOD PRESSURE: 70 MMHG | OXYGEN SATURATION: 97 % | TEMPERATURE: 98.5 F | WEIGHT: 133 LBS | HEART RATE: 72 BPM | SYSTOLIC BLOOD PRESSURE: 127 MMHG | BODY MASS INDEX: 22.16 KG/M2 | RESPIRATION RATE: 16 BRPM | HEIGHT: 65 IN

## 2019-03-28 DIAGNOSIS — I63.511 CEREBRAL INFARCTION DUE TO UNSPECIFIED OCCLUSION OR STENOSIS OF RIGHT MIDDLE CEREBRAL ARTERY: ICD-10-CM

## 2019-03-28 DIAGNOSIS — I65.21 OCCLUSION AND STENOSIS OF RIGHT CAROTID ARTERY: ICD-10-CM

## 2019-03-28 PROCEDURE — 99024 POSTOP FOLLOW-UP VISIT: CPT

## 2019-03-28 RX ORDER — ACETAMINOPHEN 325 MG/1
325 TABLET ORAL
Refills: 0 | Status: ACTIVE | COMMUNITY

## 2019-03-28 RX ORDER — AMLODIPINE BESYLATE 10 MG/1
10 TABLET ORAL
Refills: 0 | Status: ACTIVE | COMMUNITY

## 2019-03-28 RX ORDER — LACTULOSE 10 G/15ML
10 SOLUTION ORAL
Refills: 0 | Status: ACTIVE | COMMUNITY

## 2019-03-28 RX ORDER — PANTOPRAZOLE 20 MG/1
20 TABLET, DELAYED RELEASE ORAL
Refills: 0 | Status: ACTIVE | COMMUNITY

## 2019-03-28 RX ORDER — SENNOSIDES 8.6 MG/1
8.6 CAPSULE, GELATIN COATED ORAL
Refills: 0 | Status: ACTIVE | COMMUNITY

## 2019-03-28 RX ORDER — TICAGRELOR 90 MG/1
90 TABLET ORAL
Refills: 0 | Status: ACTIVE | COMMUNITY

## 2019-03-28 RX ORDER — MULTIVIT-MIN/IRON/FOLIC ACID/K 18-600-40
CAPSULE ORAL
Refills: 0 | Status: ACTIVE | COMMUNITY

## 2019-03-28 RX ORDER — MELATONIN 5 MG
5 CAPSULE ORAL
Refills: 0 | Status: ACTIVE | COMMUNITY

## 2019-03-28 NOTE — HISTORY OF PRESENT ILLNESS
[FreeTextEntry1] : RIGHT-HANDED Nonsmoker with h/o HTN, DM and HLD, CAD with multiple cardiac stents, s/p RLE bypass and R great toe amputation, now healed, who presented with a nonhealing L great toe ulcer, which has been previously debrided. \par \par 2/12/19 s/p  left CFA endarterectomy. Pt tolerated procedure well.\par \par POD # 1 developed sudden left hemiparesis. CT head showed no acute stroke or hemorrhage. CTP demonstrated large territory of ischemic penumbra and territory at risk in the right hemisphere with a mall fixed deficit. CTA suggested stenosis of the RIGHT ICA and possible intracranial occlusion of the distal RIGHT MCA.\par \par 2/13/19: s/p balloon angioplasty and stenting of symptomatic RIGHT carotid artery stenosis--advised to follow up with a 30 day carotid Duplex\par \par Pt regained her speech and left sided strength. She was continued on IV vancomycin for MRSA left great toe. She was taken to OR again on 2/18/19. She underwent left 1st toe ray amputation. She tolerated procedure well. Post op course was unremarkable. PT evaluation recommended [facility]. Her Eliquis of old DVT was discontinued. She was discharged to [facility] ([facility] in [place], [place]) in stable condition.  Pt is on Brilinta 90mg BID , aspirin 81mg daily and Crestor. She is on PPI for GERD with no esophagitis.\par \par Her speech is fluent and is walking with a cane with PT that is improved her patient and daughter, Anne. She denies chills, fever, SOB, chest pain.\par \par Pt presents to the office for hospital follow up and to review carotid Duplex\par \par \par Handedness: RIGHT\par \par Patient Address:\par [address] [place] [address]\[place] [address]\par \par Vicky Castellanos (MD) \par Surgery; Vascular Surgery \par [address] \[place] [address] \par Phone: [phone] \par Fax: [phone] \par Follow Up Time: 2 weeks \par \par Marianela Junior (MD) \par Cardiovascular Disease; Internal Medicine \par [address] Floor \[place] [address] \par Phone: [phone] \par Fax: [phone] \par Follow Up Time: 1 month \par \par Jeannie Cheng) \par Neurology; Vascular Neurology \par [address], [address] [place] [address] \[place] [address] \par Phone: [phone] \par Fax: [phone] \par \par Dr. Mcclellan\par [facility]\par [address] [place] [address]\[place] [address]

## 2019-03-28 NOTE — REASON FOR VISIT
[Post Hospitalization] : a post hospitalization visit [Family Member] : family member [FreeTextEntry1] : s/p balloon angioplasty and stenting of symptomatic RIGHT carotid artery stenosis on 2/13/19

## 2019-03-28 NOTE — DATA REVIEWED
[de-identified] : US carotid from 3/18/19:  right ICA stent patent. no evidence of in-stent stenosis. L ICA 50-69% stenosis

## 2019-03-28 NOTE — ADDENDUM
[FreeTextEntry1] : 2019\par \par Vicky Castellanos MD\par Vascular Surgery\par [facility]\par [address] \par [address]th Floor \[place] [address]\par \par Patient’s Name: Radha Marrero\par : 1934\par \par Dear Dr. Castellanos:\par \par I hope you and your family are doing well.  I saw Mrs. Marrero in my office at [facility].  She was accompanied by her daughter.  As you know, she is an 84 years-old right handed woman with history of bilateral femoral popliteal bypass surgeries, hypertension, diabetes, hyperlipidemia, cardiac disease status post stenting and a right hemispheric ischemic stroke in 2019.  She underwent acute stenting of severe stenosis of the right carotid artery at the time of presentation with the stroke.  She has had a remarkable recovery.  The left hemiparesis is significantly improved.  \par \par A 1 month follow up carotid duplex demonstrated no evidence of in-stent stenosis.  She has incidental asymptomatic moderate stenosis of the left carotid artery.  We are recommending a follow up duplex in 2 months.  If there is no evidence of in-stent stenosis we will stop Brilinta at that time and continue on daily aspirin.  She would restart Eliquis at that time.  We also recommend that she is on H2 blockers instead of proton pump inhibitors for gastric protection as platelet aggregometry testing was subtherapeutic when she was in the hospital and we want to enhance absorption of Brilinta.  She will follow up with me after the duplex is repeated in 2 months.  \par \par Thank you for allowing us to participate in Mrs. Marrero’s care.  I will keep you updated at all times.\par \par Sincerely,\par \par \par Ricardo Edwards MD\par Chief,\par Neuro-Endovascular Surgery and \par Interventional Neuroradiology \par [facility]\par [address]\par [address]rd Floor\[place] [address]\par T:  [phone]\par F:  [phone]\par

## 2019-03-28 NOTE — PHYSICAL EXAM
[General Appearance - Alert] : alert [General Appearance - In No Acute Distress] : in no acute distress [Oriented To Time, Place, And Person] : oriented to person, place, and time [Impaired Insight] : insight and judgment were intact [Affect] : the affect was normal [Mood] : the mood was normal [Person] : oriented to person [Place] : oriented to place [Time] : oriented to time [Cranial Nerves Oculomotor (III)] : extraocular motion intact [Cranial Nerves Facial (VII)] : face symmetrical [Cranial Nerves Vestibulocochlear (VIII)] : hearing was intact bilaterally [Cranial Nerves Glossopharyngeal (IX)] : tongue and palate midline [Cranial Nerves Accessory (XI - Cranial And Spinal)] : head turning and shoulder shrug symmetric [Cranial Nerves Hypoglossal (XII)] : there was no tongue deviation with protrusion [Motor Tone] : muscle tone was normal in all four extremities [Neck Appearance] : the appearance of the neck was normal [] : no respiratory distress [Respiration, Rhythm And Depth] : normal respiratory rhythm and effort [Exaggerated Use Of Accessory Muscles For Inspiration] : no accessory muscle use [Auscultation Breath Sounds / Voice Sounds] : lungs were clear to auscultation bilaterally [Apical Impulse] : the apical impulse was normal [Heart Rate And Rhythm] : heart rate was normal and rhythm regular [Bowel Sounds] : normal bowel sounds [Abdomen Soft] : soft [FreeTextEntry5] : left sided weakness, mild left arm ataxia [FreeTextEntry6] : 4/5 on LUE and LLE. 5/5 on RUE and RLE [FreeTextEntry8] : unable to assess gait---pt in a wheelchair [FreeTextEntry1] : LLE: + well healed incision from groin down to her foot, staples/sutures intact

## 2019-03-28 NOTE — REVIEW OF SYSTEMS
[As Noted in HPI] : as noted in HPI [Arm Weakness] : arm weakness [Hand Weakness] :  hand weakness [Leg Weakness] : leg weakness [Limping] : limping [Eyesight Problems] : eyesight problems [Constipation] : constipation [Limb Pain] : limb pain [Limb Swelling] : limb swelling [Negative] : Respiratory [Fever] : no fever [Chills] : no chills [Facial Weakness] : no facial weakness [Dizziness] : no dizziness [Fainting] : no fainting [Easy Bleeding] : no tendency for easy bleeding [Easy Bruising] : no tendency for easy bruising [FreeTextEntry3] : left eye cataract [FreeTextEntry9] : left leg swelling

## 2019-03-28 NOTE — ASSESSMENT
[FreeTextEntry1] : A 1 month follow up carotid duplex demonstrated no evidence of in-stent stenosis.  She has incidental asymptomatic moderate stenosis of the left carotid artery.  We are recommending a follow up duplex in 2 months.  If there is no evidence of in-stent stenosis we will stop Brilinta at that time and continue on daily aspirin.  She would restart Eliquis at that time.  We also recommend that she is on H2 blockers instead of proton pump inhibitors for gastric protection as platelet aggregometry testing was subtherapeutic when she was in the hospital and we want to enhance absorption of Brilinta.  She will follow up with me after the duplex is repeated in 2 months.  \par \par Plan:\par 1. Repeat US carotid in 2 months and office visit with Dr. Edwards to review US (May 2019) to assess for in-stent stenosis. Will stop Brilinta if no in stent stenosis. Can start re- Eliquis (for hx of DVT/PE) if Brilinta is D/Cd.\par 2. Stop PPI and switch to [id number] as PPI interacts with Brilinta\par 3. Call office if you develop right sided focal weakness, difficulty speaking.

## 2019-04-01 ENCOUNTER — APPOINTMENT (OUTPATIENT)
Dept: VASCULAR SURGERY | Facility: CLINIC | Age: 84
End: 2019-04-01
Payer: MEDICARE

## 2019-04-01 DIAGNOSIS — L97.521 NON-PRESSURE CHRONIC ULCER OF OTHER PART OF LEFT FOOT LIMITED TO BREAKDOWN OF SKIN: ICD-10-CM

## 2019-04-01 DIAGNOSIS — I70.209 UNSPECIFIED ATHEROSCLEROSIS OF NATIVE ARTERIES OF EXTREMITIES, UNSPECIFIED EXTREMITY: ICD-10-CM

## 2019-04-01 PROCEDURE — 93926 LOWER EXTREMITY STUDY: CPT

## 2019-04-01 PROCEDURE — 99024 POSTOP FOLLOW-UP VISIT: CPT

## 2019-04-01 NOTE — HISTORY OF PRESENT ILLNESS
[FreeTextEntry1] : 85 yo F with non-healing ulcer of left great toe who underwent left CFA endarterectomy with roof patch, SFA to DP bypass with RSVG and completion angiogram, followed by 1st toe amputation on 2/19/19. Today she presents for a post op visit. Patient at [facility], states that she is doing well, denies pain in her leg, no fever, chills.

## 2019-04-01 NOTE — ASSESSMENT
[FreeTextEntry1] : 83 yo F with PVD, nonhealing left big toe ulcer requiring  left CFA endarterectomy with roof patch, SFA to DP bypass with RSVG and completion angiogram, followed by 1st toe amputation on 2/19/19 returns today for a post-op visit. \par Patient is doing well, at [facility]. Left LE incisions are well healing, toe amputation site is clean with good granulation tissue. Sutures removed in the office, leg is slightly edematous. Continue local wound care with wet to dry dressings to 1st toe amputation site. \par Arterial duplex was done in the office today demonstrating patent bypass.\par Patient can shower.\par Patient can start using partial weight bearing. \par F/u in 2 weeks.

## 2019-04-01 NOTE — REASON FOR VISIT
[Family Member] : family member [de-identified] :  left CFA endarterectomy with roof patch, SFA to DP bypass with RSVG and completion angiogram, followed by 1st toe amputation [de-identified] : 2/19/19 [de-identified] : 83 yo F with non-healing ulcer of left great toe who underwent left CFA endarterectomy with roof patch, SFA to DP bypass with RSVG and completion angiogram, followed by 1st toe amputation on 2/19/19. Today she presents for a post op visit. Patient at [facility], states that she is doing well, denies pain in her leg, no fever, chills.

## 2019-04-01 NOTE — PHYSICAL EXAM
[Normal Breath Sounds] : Normal breath sounds [Normal Heart Sounds] : normal heart sounds [2+] : left 2+ [1+] : left 1+ [Ankle Swelling (On Exam)] : present [Ankle Swelling On The Left] : of the left ankle [Ankle Swelling On The Right] : mild [Alert] : alert [Calm] : calm [JVD] : no jugular venous distention  [Varicose Veins Of Lower Extremities] : not present [] : not present [de-identified] : WN/WD, NAD [de-identified] : NC/AT [de-identified] : LLE: + well healed incision from groin down to her foot, sutures intact, S/p 1st toe amputation site clean, with good granulation tissue, no signs of infection

## 2019-05-29 NOTE — PHYSICAL THERAPY INITIAL EVALUATION ADULT - LEVEL OF INDEPENDENCE: SIT/STAND, REHAB EVAL
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  This is a 72 year old Male admitted 5/24/19 for NSTEMI, s/p 3-V CABG, EVH, SHARLENE on 5/28/19 with 125 mg IV Solumedrol intra-op. He had fallen at assisted living, was found to have HTN and elevated troponin.     Current hospital regimen for diabetes CVOR IV insulin gtt, algo 5 to 3, used 0.3 to 3.8 u/hr noc, now 5.3 u/hr. Humalog 1:10, and 4 u tid pc, D5 1/2 NS at 20 cc/hr  Current oral intake status  clears.   Blood sugars while hospitalized      Recent Labs   Lab 05/28/19  1657 05/28/19  1810 05/28/19  1853 05/28/19  1954 05/28/19  2054 05/28/19  2156 05/28/19  2253 05/29/19  0012 05/29/19  0147 05/29/19  0303 05/29/19  0414 05/29/19  0452 05/29/19  0600   GLUCOSE BEDSIDE 99 157* 165* 162* 141* 142* 138* 128* 86 95 123* 125* 134*      Patient has Diabetes Mellitus - we presume, type II. Patient's outpatient regimen includes Novolog mix pen 70/40 at 44 u q am and 32 u q pm per PTA med list.   Unable to obtain details about BG as outpatient, etc.    Patient's diabetes complications include:   Retinopathy - unknown, not mentioned in PMH  Nephropathy- Microaalbuminuria, CKD St III based on gfr's in Epic.   Neuropathy- none mentioned in PMH    Of note, pt is on amiodarone, lithium, geodon, with normal TSH 1.302 on 5/25/19.     Past Medical History:    Nonspecific abnormal results of liver function*               Obesity                                                       Hyperlipidemia                                                  Comment: cholesterol    Constipation                                                   GERD (esophageal reflux)                                      Asthma                                                        Depression                                                    Diabetes Mellitus                                             Schizophrenia                                                 HTN (hypertension)                                            Bronchitis                                                    Pneumonia                                                     Colon polyp                                                   S/P CABG x 3                                    5/28/2019      Past Surgical History:   Procedure Laterality Date   • Colonoscopy diagnostic  05/20/2010    Normal, fair prep,f/u 5 yrs per Dr Amin   • Colonoscopy w/ polypectomy  11/17/2016    dr quiñonez, needed 4 gallon of bowel prep, consider admitting in hospital for overnight bowel prpe next time   • [place] tooth extraction       Family History   Problem Relation Age of Onset   • Diabetes Father    • Diabetes Brother         Born 1944. Lives in [place], [place]   • Diabetes Brother         Born 1945. Lives in [place]   • Diabetes Brother         Born in September, 1950. Lives in [place]     Social History:  Social History     Socioeconomic History   • Marital status:      Spouse name: Not on file   • Number of children: 0   • Years of education: Not on file   • Highest education level: Not on file   Occupational History     Comment: disability   Social Needs   • Financial resource strain: Not on file   • Food insecurity:     Worry: Not on file     Inability: Not on file   • Transportation needs:     Medical: Not on file     Non-medical: Not on file   Tobacco Use   • Smoking status: Never Smoker   • Smokeless tobacco: Never Used   Substance and Sexual Activity   • Alcohol use: No   • Drug use: No   • Sexual activity: Not on file   Lifestyle   • Physical activity:     Days per week: Not on file      Minutes per session: Not on file   • Stress: Not on file   Relationships   • Social connections:     Talks on phone: Not on file     Gets together: Not on file     Attends Jain service: Not on file     Active member of club or organization: Not on file     Attends meetings of clubs or organizations: Not on file     Relationship status: Not on file   • Intimate partner violence:     Fear of current or ex partner: Not on file     Emotionally abused: Not on file     Physically abused: Not on file     Forced sexual activity: Not on file   Other Topics Concern   • Not on file   Social History Narrative   • Not on file     ALLERGIES:   Allergen Reactions   • Abilify    • Celecoxib    • Penicillins      Home medications:  Medications Prior to Admission   Medication Sig Dispense Refill   • senna (SENOKOT) 8.6 MG tablet Take 2 tablets by mouth 2 times daily. Dose: 2 tablets (=17.2 mg). 112 tablet 5   • Lancets Thin Misc      • Insulin Pen Needle 30G X 5 MM Misc Use as directed with pen needles.30X3/16 100 each 3   • insulin aspart 70-30 (NOVOLOG MIX 70/30 FLEXPEN) (70-30) 100 UNIT/ML pen-injector Inject 44 units subcutaneously every morning with breakfast. Inject 32 units subcutaneously every evening with dinner. 15 mL 12   • aspirin 81 MG chewable tablet Chew 1 tablet by mouth daily. 28 tablet 11   • montelukast (SINGULAIR) 10 MG tablet Take 1 tablet by mouth every evening. 28 tablet 11   • omeprazole (PRILOSEC) 40 MG capsule Take 1 capsule by mouth daily. 28 capsule 11   • spironolactone (ALDACTONE) 25 MG tablet Take 1 tablet by mouth daily. 28 tablet 11   • linaclotide (LINZESS) 290 MCG Take 290 mcg by mouth daily (before breakfast). 30 capsule 11   • QUEtiapine (SEROQUEL) 400 MG tablet Take 400 mg by mouth 3 times daily.     • trazodone (DESYREL) 150 MG tablet Take 600 mg by mouth nightly. Dose: 4 tablets (=600 mg).     • acetaminophen (TYLENOL) 500 MG tablet Take 500 mg by mouth every 6 hours as needed for Pain.      • polyethylene glycol (MIRALAX) powder Take 17 g by mouth 2 times daily. 510 g 11   • atorvastatin (LIPITOR) 10 MG tablet Take 10 mg by mouth nightly.      • ziprasidone (GEODON) 80 MG capsule Take 1 capsule by mouth 3 times daily.     • Lithium Carbonate 300 MG tablet Take 300 mg by mouth 2 times daily.        Hospital medications:  • trazodone  600 mg Oral Nightly   • guaiFENesin  1,200 mg Oral 2 times per day   • polyethylene glycol  17 g Oral BID   • magnesium sulfate  2 g Intravenous QHS   • insulin lispro   Subcutaneous TID PC   • insulin lispro  4 Units Subcutaneous TID PC   • AMIODarone  400 mg Oral BID WC   • aspirin  81 mg Oral Daily   • mupirocin  1 application Each Nare 2 times per day   • docusate sodium-sennosides  2 tablet Oral Daily   • [START ON 5/30/2019] bisacodyl  10 mg Rectal Once   • [START ON 5/31/2019] sodium biphosphate  1 enema Rectal Once   • acetaminophen  650 mg Oral 4 times per day   • famotidine  20 mg Intravenous Nightly   • atorvastatin  80 mg Oral Nightly   • lithium  300 mg Oral 2 times per day   • montelukast  10 mg Oral Nightly   • QUEtiapine  400 mg Oral TID   • ziprasidone  80 mg Oral TID     Review of Systems:  A 12 point ROS including Constitutional, Neuro, Eyes, ENT, Cardio, Pulm, GI, , MSK, Skin, Psych, Endocrine and are negative except for:    Chronic constipation. Denies pain. He does not answer most questions, is lethargic.     Physical Exam:  Visit Vitals  /62 (BP Location: Arterial, Patient Position: Supine)   Pulse 81   Temp 100.2 °F (37.9 °C)   Resp 27   Ht 5' 2\" (1.575 m)   Wt 108 kg   SpO2 98%   BMI 43.55 kg/m²     Constitutional: Resting, lethargic, well nourished male in ICU bed in [facility], but difficult to keep awake.  Eyes: Anicteric and no scleral injection. Mostly closed. No lid lesions.  ENT: Moist oropharynx. Good dentition. No lip ulcers. O2 via nc at 4L.   Head: Normocephalic, atraumatic  Neck: Supple, right sided central lines  Respiratory:  non-labored. No peripheral cyanosis. Chest tube in situ.   Cardiac: RR on monitor, trace LE edema bilaterally.    GI: Soft, nontender, nondistended and no guarding.    : Ch in situ  Skin: Cool and dry, normal texture and temperature and no rashes.    Psych: Blunted mood and affect.  Musculoskeletal: LEVINE x 4, no bony deformities, PAS on LE bilaterally.   Neuro: No resting tremor. Lethargic.     Lab Review:  Hemoglobin A1C (%)   Date Value   05/25/2019 7.7 (H)     No components found for: CHOLHDL  TRIGLYCERIDE (mg/dL)   Date Value   03/27/2019 164 (H)     HDL (mg/dL)   Date Value   03/27/2019 48     CALCULATED LDL (mg/dL)   Date Value   03/27/2019 90     No components found for: NONHDLCALC  Sodium (mmol/L)   Date Value   05/28/2019 135     Potassium (mmol/L)   Date Value   05/29/2019 4.3     Chloride (mmol/L)   Date Value   05/28/2019 106     Glucose (mg/dL)   Date Value   05/28/2019 196 (H)     CALCIUM (mg/dL)   Date Value   05/28/2019 9.3   01/19/2011 10.4 (A)     CO2 (mmol/L)   Date Value   01/19/2011 26     Carbon Dioxide (mmol/L)   Date Value   05/28/2019 23     BUN (mg/dL)   Date Value   05/28/2019 22 (H)     Creatinine (mg/dL)   Date Value   05/28/2019 1.33 (H)     TSH (mcUnits/mL)   Date Value   05/25/2019 1.302     WBC (K/mcL)   Date Value   05/29/2019 16.1 (H)     RBC (mil/mcL)   Date Value   05/29/2019 2.89 (L)     HCT (%)   Date Value   05/29/2019 27.8 (L)     HGB (g/dL)   Date Value   05/29/2019 8.9 (L)     PLT (K/mcL)   Date Value   05/29/2019 184   12/06/2013 297   01/19/2011 294     AST/SGOT (Units/L)   Date Value   05/28/2019 35     ALT/SGPT (Units/L)   Date Value   05/28/2019 32     No results found for: GGTP  ALK PHOSPHATASE (Units/L)   Date Value   05/28/2019 107     TOTAL BILIRUBIN (mg/dL)   Date Value   05/28/2019 0.4       Assessment:  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   On amiodarone and lithium with normal TSH      Plan:  Continue CVOR IV insulin infusion, and Humalog orders for POD #1.   He did get Methylpred 125 mg IV intra-op 5/28/19 at 1300.   Start Lantus 20 u qhs.   Hypoglycemia protocol.     If he remains on amiodarone, he should have thyroid function tested q 3 mo as an outpatient.     Case discussed with and plan of care developed with attending endocrinologist, Dr. Adams.     Virgie Lazcano PA-C      Resting in bed.   NAD, flat affect.   Gives yes and no answers.   No pain, no shortness of breath, no nausea.     NAD, lethargic.   Flat affect.   NC in place.   No accessory muscle use, non labored breathing.   abd obese, soft, non tender to light palpation.     I have personally seen and examined the patient with PA.   History, lab work medications were reviewed.   Assessment and plan was developed and dicussed with PA.     Sandy Adams MD        moderate assist (50% patients effort)/Mod on left min on the right/minimum assist (75% patients effort)

## 2019-05-30 ENCOUNTER — APPOINTMENT (OUTPATIENT)
Dept: NEUROSURGERY | Facility: CLINIC | Age: 84
End: 2019-05-30

## 2019-06-14 ENCOUNTER — APPOINTMENT (OUTPATIENT)
Dept: NEUROLOGY | Facility: CLINIC | Age: 84
End: 2019-06-14

## 2019-06-16 PROBLEM — I73.9 CLAUDICATION: Status: ACTIVE | Noted: 2018-10-29

## 2019-07-18 ENCOUNTER — EMERGENCY (EMERGENCY)
Facility: HOSPITAL | Age: 84
LOS: 1 days | Discharge: HOME | End: 2019-07-18
Attending: EMERGENCY MEDICINE | Admitting: EMERGENCY MEDICINE
Payer: MEDICARE

## 2019-07-18 VITALS
WEIGHT: 179.9 LBS | DIASTOLIC BLOOD PRESSURE: 79 MMHG | OXYGEN SATURATION: 100 % | HEIGHT: 66 IN | RESPIRATION RATE: 18 BRPM | SYSTOLIC BLOOD PRESSURE: 155 MMHG | TEMPERATURE: 98 F | HEART RATE: 83 BPM

## 2019-07-18 DIAGNOSIS — Z79.899 OTHER LONG TERM (CURRENT) DRUG THERAPY: ICD-10-CM

## 2019-07-18 DIAGNOSIS — Z88.0 ALLERGY STATUS TO PENICILLIN: ICD-10-CM

## 2019-07-18 DIAGNOSIS — E11.9 TYPE 2 DIABETES MELLITUS WITHOUT COMPLICATIONS: ICD-10-CM

## 2019-07-18 DIAGNOSIS — Z98.890 OTHER SPECIFIED POSTPROCEDURAL STATES: Chronic | ICD-10-CM

## 2019-07-18 DIAGNOSIS — M79.672 PAIN IN LEFT FOOT: ICD-10-CM

## 2019-07-18 DIAGNOSIS — M25.572 PAIN IN LEFT ANKLE AND JOINTS OF LEFT FOOT: ICD-10-CM

## 2019-07-18 DIAGNOSIS — E78.5 HYPERLIPIDEMIA, UNSPECIFIED: ICD-10-CM

## 2019-07-18 DIAGNOSIS — I10 ESSENTIAL (PRIMARY) HYPERTENSION: ICD-10-CM

## 2019-07-18 DIAGNOSIS — Z89.9 ACQUIRED ABSENCE OF LIMB, UNSPECIFIED: Chronic | ICD-10-CM

## 2019-07-18 DIAGNOSIS — Z91.048 OTHER NONMEDICINAL SUBSTANCE ALLERGY STATUS: ICD-10-CM

## 2019-07-18 DIAGNOSIS — Z79.84 LONG TERM (CURRENT) USE OF ORAL HYPOGLYCEMIC DRUGS: ICD-10-CM

## 2019-07-18 PROCEDURE — 99284 EMERGENCY DEPT VISIT MOD MDM: CPT | Mod: 25

## 2019-07-18 PROCEDURE — 73620 X-RAY EXAM OF FOOT: CPT | Mod: 26,LT

## 2019-07-18 NOTE — ED ADULT NURSE NOTE - OBJECTIVE STATEMENT
84 y/o female c/o left foot pain. Pt is from NH. pt reports recent surgical procedure to remove greater toe. Pt reports left foot feels achy. Pt denies fever, chills. Pt foot unwrapped. No foul smell from foot, nor does infection suspected at this time. Pt speaks clear, MAEx4, has unlabored breathing. Abd soft obese nt nd. Skin dry warm.

## 2019-07-18 NOTE — ED ADULT NURSE NOTE - NSIMPLEMENTINTERV_GEN_ALL_ED
Implemented All Fall with Harm Risk Interventions:  [place] to call system. Call bell, personal items and telephone within reach. Instruct patient to call for assistance. Room bathroom lighting operational. Non-slip footwear when patient is off stretcher. Physically safe environment: no spills, clutter or unnecessary equipment. Stretcher in lowest position, wheels locked, appropriate side rails in place. Provide visual cue, wrist band, yellow gown, etc. Monitor gait and stability. Monitor for mental status changes and reorient to person, place, and time. Review medications for side effects contributing to fall risk. Reinforce activity limits and safety measures with patient and family. Provide visual clues: red socks.

## 2019-07-19 VITALS
TEMPERATURE: 98 F | SYSTOLIC BLOOD PRESSURE: 142 MMHG | HEART RATE: 78 BPM | DIASTOLIC BLOOD PRESSURE: 73 MMHG | RESPIRATION RATE: 18 BRPM | OXYGEN SATURATION: 100 %

## 2019-07-19 LAB
ALBUMIN SERPL ELPH-MCNC: 3.7 G/DL — SIGNIFICANT CHANGE UP (ref 3.3–5)
ALBUMIN SERPL ELPH-MCNC: 3.8 G/DL — SIGNIFICANT CHANGE UP (ref 3.3–5)
ALP SERPL-CCNC: 73 U/L — SIGNIFICANT CHANGE UP (ref 40–120)
ALP SERPL-CCNC: SIGNIFICANT CHANGE UP U/L (ref 40–120)
ALT FLD-CCNC: SIGNIFICANT CHANGE UP U/L (ref 10–45)
ALT FLD-CCNC: SIGNIFICANT CHANGE UP U/L (ref 10–45)
ANION GAP SERPL CALC-SCNC: 11 MMOL/L — SIGNIFICANT CHANGE UP (ref 5–17)
ANION GAP SERPL CALC-SCNC: 9 MMOL/L — SIGNIFICANT CHANGE UP (ref 5–17)
ANION GAP SERPL CALC-SCNC: 9 MMOL/L — SIGNIFICANT CHANGE UP (ref 5–17)
AST SERPL-CCNC: SIGNIFICANT CHANGE UP U/L (ref 10–40)
AST SERPL-CCNC: SIGNIFICANT CHANGE UP U/L (ref 10–40)
BASOPHILS # BLD AUTO: 0.02 K/UL — SIGNIFICANT CHANGE UP (ref 0–0.2)
BASOPHILS NFR BLD AUTO: 0.3 % — SIGNIFICANT CHANGE UP (ref 0–2)
BILIRUB SERPL-MCNC: 0.2 MG/DL — SIGNIFICANT CHANGE UP (ref 0.2–1.2)
BILIRUB SERPL-MCNC: 0.3 MG/DL — SIGNIFICANT CHANGE UP (ref 0.2–1.2)
BUN SERPL-MCNC: 18 MG/DL — SIGNIFICANT CHANGE UP (ref 7–23)
BUN SERPL-MCNC: 19 MG/DL — SIGNIFICANT CHANGE UP (ref 7–23)
BUN SERPL-MCNC: 20 MG/DL — SIGNIFICANT CHANGE UP (ref 7–23)
CALCIUM SERPL-MCNC: 8.8 MG/DL — SIGNIFICANT CHANGE UP (ref 8.4–10.5)
CALCIUM SERPL-MCNC: 9 MG/DL — SIGNIFICANT CHANGE UP (ref 8.4–10.5)
CALCIUM SERPL-MCNC: 9.2 MG/DL — SIGNIFICANT CHANGE UP (ref 8.4–10.5)
CHLORIDE SERPL-SCNC: 104 MMOL/L — SIGNIFICANT CHANGE UP (ref 96–108)
CHLORIDE SERPL-SCNC: 106 MMOL/L — SIGNIFICANT CHANGE UP (ref 96–108)
CHLORIDE SERPL-SCNC: 110 MMOL/L — HIGH (ref 96–108)
CO2 SERPL-SCNC: 22 MMOL/L — SIGNIFICANT CHANGE UP (ref 22–31)
CO2 SERPL-SCNC: 23 MMOL/L — SIGNIFICANT CHANGE UP (ref 22–31)
CO2 SERPL-SCNC: 24 MMOL/L — SIGNIFICANT CHANGE UP (ref 22–31)
CREAT SERPL-MCNC: 0.68 MG/DL — SIGNIFICANT CHANGE UP (ref 0.5–1.3)
CREAT SERPL-MCNC: 0.72 MG/DL — SIGNIFICANT CHANGE UP (ref 0.5–1.3)
CREAT SERPL-MCNC: 0.87 MG/DL — SIGNIFICANT CHANGE UP (ref 0.5–1.3)
CRP SERPL-MCNC: 0.12 MG/DL — SIGNIFICANT CHANGE UP (ref 0–0.4)
EOSINOPHIL # BLD AUTO: 0.17 K/UL — SIGNIFICANT CHANGE UP (ref 0–0.5)
EOSINOPHIL NFR BLD AUTO: 2.8 % — SIGNIFICANT CHANGE UP (ref 0–6)
ERYTHROCYTE [SEDIMENTATION RATE] IN BLOOD: 38 MM/HR — HIGH
GLUCOSE SERPL-MCNC: 112 MG/DL — HIGH (ref 70–99)
GLUCOSE SERPL-MCNC: 159 MG/DL — HIGH (ref 70–99)
GLUCOSE SERPL-MCNC: 208 MG/DL — HIGH (ref 70–99)
HCT VFR BLD CALC: 35.4 % — SIGNIFICANT CHANGE UP (ref 34.5–45)
HGB BLD-MCNC: 11.1 G/DL — LOW (ref 11.5–15.5)
IMM GRANULOCYTES NFR BLD AUTO: 0.3 % — SIGNIFICANT CHANGE UP (ref 0–1.5)
LACTATE SERPL-SCNC: 1.4 MMOL/L — SIGNIFICANT CHANGE UP (ref 0.5–2)
LYMPHOCYTES # BLD AUTO: 1.96 K/UL — SIGNIFICANT CHANGE UP (ref 1–3.3)
LYMPHOCYTES # BLD AUTO: 32.4 % — SIGNIFICANT CHANGE UP (ref 13–44)
MCHC RBC-ENTMCNC: 27.5 PG — SIGNIFICANT CHANGE UP (ref 27–34)
MCHC RBC-ENTMCNC: 31.4 GM/DL — LOW (ref 32–36)
MCV RBC AUTO: 87.8 FL — SIGNIFICANT CHANGE UP (ref 80–100)
MONOCYTES # BLD AUTO: 0.77 K/UL — SIGNIFICANT CHANGE UP (ref 0–0.9)
MONOCYTES NFR BLD AUTO: 12.7 % — SIGNIFICANT CHANGE UP (ref 2–14)
NEUTROPHILS # BLD AUTO: 3.11 K/UL — SIGNIFICANT CHANGE UP (ref 1.8–7.4)
NEUTROPHILS NFR BLD AUTO: 51.5 % — SIGNIFICANT CHANGE UP (ref 43–77)
NRBC # BLD: 0 /100 WBCS — SIGNIFICANT CHANGE UP (ref 0–0)
PLATELET # BLD AUTO: 201 K/UL — SIGNIFICANT CHANGE UP (ref 150–400)
POTASSIUM SERPL-MCNC: 3.7 MMOL/L — SIGNIFICANT CHANGE UP (ref 3.5–5.3)
POTASSIUM SERPL-MCNC: SIGNIFICANT CHANGE UP MMOL/L (ref 3.5–5.3)
POTASSIUM SERPL-MCNC: SIGNIFICANT CHANGE UP MMOL/L (ref 3.5–5.3)
POTASSIUM SERPL-SCNC: 3.7 MMOL/L — SIGNIFICANT CHANGE UP (ref 3.5–5.3)
POTASSIUM SERPL-SCNC: SIGNIFICANT CHANGE UP MMOL/L (ref 3.5–5.3)
POTASSIUM SERPL-SCNC: SIGNIFICANT CHANGE UP MMOL/L (ref 3.5–5.3)
PROT SERPL-MCNC: 7.1 G/DL — SIGNIFICANT CHANGE UP (ref 6–8.3)
PROT SERPL-MCNC: 7.6 G/DL — SIGNIFICANT CHANGE UP (ref 6–8.3)
RBC # BLD: 4.03 M/UL — SIGNIFICANT CHANGE UP (ref 3.8–5.2)
RBC # FLD: 17.3 % — HIGH (ref 10.3–14.5)
SODIUM SERPL-SCNC: 135 MMOL/L — SIGNIFICANT CHANGE UP (ref 135–145)
SODIUM SERPL-SCNC: 140 MMOL/L — SIGNIFICANT CHANGE UP (ref 135–145)
SODIUM SERPL-SCNC: 143 MMOL/L — SIGNIFICANT CHANGE UP (ref 135–145)
WBC # BLD: 6.05 K/UL — SIGNIFICANT CHANGE UP (ref 3.8–10.5)
WBC # FLD AUTO: 6.05 K/UL — SIGNIFICANT CHANGE UP (ref 3.8–10.5)

## 2019-07-19 PROCEDURE — 86140 C-REACTIVE PROTEIN: CPT

## 2019-07-19 PROCEDURE — 80048 BASIC METABOLIC PNL TOTAL CA: CPT

## 2019-07-19 PROCEDURE — 93971 EXTREMITY STUDY: CPT

## 2019-07-19 PROCEDURE — 36415 COLL VENOUS BLD VENIPUNCTURE: CPT

## 2019-07-19 PROCEDURE — 73620 X-RAY EXAM OF FOOT: CPT | Mod: 26,LT

## 2019-07-19 PROCEDURE — 99284 EMERGENCY DEPT VISIT MOD MDM: CPT

## 2019-07-19 PROCEDURE — 93971 EXTREMITY STUDY: CPT | Mod: 26,LT

## 2019-07-19 PROCEDURE — 73620 X-RAY EXAM OF FOOT: CPT

## 2019-07-19 PROCEDURE — 80053 COMPREHEN METABOLIC PANEL: CPT

## 2019-07-19 PROCEDURE — 83605 ASSAY OF LACTIC ACID: CPT

## 2019-07-19 PROCEDURE — 85652 RBC SED RATE AUTOMATED: CPT

## 2019-07-19 PROCEDURE — 85025 COMPLETE CBC W/AUTO DIFF WBC: CPT

## 2019-07-19 RX ORDER — ACETAMINOPHEN 500 MG
650 TABLET ORAL ONCE
Refills: 0 | Status: COMPLETED | OUTPATIENT
Start: 2019-07-19 | End: 2019-07-19

## 2019-07-19 RX ADMIN — Medication 650 MILLIGRAM(S): at 02:00

## 2019-07-19 RX ADMIN — Medication 650 MILLIGRAM(S): at 01:12

## 2019-07-19 NOTE — ED PROVIDER NOTE - OBJECTIVE STATEMENT
85F hx htn, dm, high chol, dvt/pe, depression, PVD, sent in from the NH for L foot pain. pt had great toe amputation in the past with Dr. Castellanos.  no fevers.  states pain to toe and lower leg.  no chest pain, no SOB.

## 2019-07-19 NOTE — ED PROVIDER NOTE - CARE PROVIDER_API CALL
Vicky Castellanos)  Surgery; Vascular Surgery  [address] [place] [address]  Phone: [phone]  Fax: [phone]  Follow Up Time:

## 2019-07-19 NOTE — ED PROVIDER NOTE - PROGRESS NOTE DETAILS
seen by vascular - no signs of infection, no need for acute vascular intervention at this time. negative DVT.  bedside POC potassium 3.6.  spoke with daughter Anne - pt can be discharged back to NH - will f/u with Dr. Castellanos Monday 9a-3p for appt

## 2019-07-19 NOTE — ED PROVIDER NOTE - DIAGNOSTIC INTERPRETATION
ER Physician: Demian  INTERPRETATION:  no acute fracture; no soft tissue swelling noted; s/p 1st metatarsal amputation

## 2019-07-19 NOTE — ED PROVIDER NOTE - CLINICAL SUMMARY MEDICAL DECISION MAKING FREE TEXT BOX
L foot pain- hx of toe amputation, no evidence of cellulitis, compartments soft, no crepitus  -check labs  -xray, US  -tylenol  -vasc consult

## 2019-07-19 NOTE — CONSULT NOTE ADULT - ASSESSMENT
86yo F presents with left foot pain, previous amputation site healing well, no drainage/infection, no WBC, no fever, +doppler signals present in the foot - bypass open.     -Recommend pain control   -Clean foot daily with soap and water  -Please return to Vascular Surgery Office for an evaluation by Dr. Castellanos and a surveillance US of the bypass, on Friday 7/19/19 or Mon 7/22/19 between 9am-3pm.        Case d/w chief resident on call.

## 2019-07-19 NOTE — ED PROVIDER NOTE - NSFOLLOWUPINSTRUCTIONS_ED_ALL_ED_FT
No evidence of infection, no bony destruction on xray.  no DVT on ultrasound.  Seen by Vascular surgery - recommend patient to come to Dr. Castellanos's clinic Monday 7/22 between 9A-3P for follow-up appointment.    Patient can be given tylenol #3 if pain persists.     Foot Pain  Many things can cause foot pain. Some common causes are:  An injury.  A sprain.  Arthritis.  Blisters.  Bunions.  Follow these instructions at home:  Pay attention to any changes in your symptoms. Take these actions to help with your discomfort:  If directed, put ice on the affected area:  Put ice in a plastic bag.  Place a towel between your skin and the bag.  Leave the ice on for 15–20 minutes, 3?4 times a day for 2 days.  Take over-the-counter and prescription medicines only as told by your health care provider.  Wear comfortable, supportive shoes that fit you well. Do not wear high heels.  Do not stand or walk for long periods of time.  Do not lift a lot of weight. This can put added pressure on your feet.  Do stretches to relieve foot pain and stiffness as told by your health care provider.  Rub your foot gently.  Keep your feet clean and dry.  Contact a health care provider if:  Your pain does not get better after a few days of self-care.  Your pain gets worse.  You cannot stand on your foot.  Get help right away if:  Your foot is numb or tingling.  Your foot or toes are swollen.  Your foot or toes turn white or blue.  You have warmth and redness along your foot.  This information is not intended to replace advice given to you by your health care provider. Make sure you discuss any questions you have with your health care provider.

## 2019-07-19 NOTE — ED PROVIDER NOTE - PHYSICAL EXAMINATION
L foot - s/p great toe metatarsal amputation, no erythema, no pus, well-healing scar, no dehiscence, no fluctuance  trace edema b/l  1+dp

## 2019-07-26 ENCOUNTER — APPOINTMENT (OUTPATIENT)
Dept: VASCULAR SURGERY | Facility: CLINIC | Age: 84
End: 2019-07-26
Payer: MEDICARE

## 2019-07-26 PROCEDURE — 99213 OFFICE O/P EST LOW 20 MIN: CPT

## 2019-07-26 NOTE — REVIEW OF SYSTEMS
[Loss Of Hearing] : hearing loss [Incontinence] : incontinence [Joint Pain] : joint pain [Negative] : Heme/Lymph

## 2019-08-22 NOTE — PHYSICAL EXAM
[2+] : left 2+ [de-identified] : pleasant [FreeTextEntry1] : bypasses both legs are patent\par  [de-identified] : toe on the left foot healing

## 2019-08-22 NOTE — HISTORY OF PRESENT ILLNESS
[FreeTextEntry1] : pt comes in for evaluation of the toe amp\par doing well no issues\par no pain \par

## 2020-01-06 NOTE — HISTORY OF PRESENT ILLNESS
[FreeTextEntry1] : RIGHT-HANDED Nonsmoker with h/o HTN, DM and HLD, CAD with multiple cardiac stents, s/p RLE bypass and R great toe amputation, DVT (was on Eliquis) who presented with a nonhealing L great toe ulcer, which has been previously debrided. She underwent left CFA endarterectomy on 2/12/19.\par \par ON POD # 1 she developed sudden left hemiparesis. CT head showed no acute stroke or hemorrhage. CTP demonstrated large territory of ischemic penumbra and territory at risk in the right hemisphere with a mall fixed deficit. CTA suggested stenosis of the RIGHT ICA and possible intracranial occlusion of the distal RIGHT MCA.\par \par Patient underwent balloon angioplasty and stenting of symptomatic RIGHT carotid artery stenosis on 2/13/19. One month follow up US carotid from 3/18/19 showed a patent right ICA stent and no evidence of in-stent stenosis. L ICA with moderate (50-69%) stenosis. \par \par Eliquis for history of DVT was discontinued\par \par On the last visit on  3/28/19, she was on Brilinta 90mg BID, aspirin 81mg daily, Crestor and PPI for GERD with no esophagitis.\par \par Advised to follow up with another US carotid in 2 months (May 2019) with recommendation to stop Brilinta and continue ASA if there is no evidence of in-stent stenosis. She would restart Eliquis at that time. It was also recommend that she is on H2 blockers instead of proton pump inhibitors for gastric protection as platelet aggregometry testing was subtherapeutic when she was in the hospital and we want to enhance absorption of Brilinta. \par \par She presents to the office to review US carotid duplex. Her speech is fluent and is walking with a cane with PT that is improved her patient. She denies new onset focal weakness, dysarthria, double vision, numbness/tingling and unstready gait.\par \par Handedness: RIGHT\par \par Patient Address:\par [address] [place] [address]\[place] [address]\par \par Vicky Castellanos (MD) \par Surgery; Vascular Surgery \[facility] [address] \[place] [address] \par Phone: [phone] \par Fax: [phone] \par Follow Up Time: 2 weeks \par \par Marianela Junior) \par Cardiovascular Disease; Internal Medicine \par [address] \[place] [address] \par Phone: [phone] \par Fax: [phone] \par Follow Up Time: 1 month \par \par Jeannie Cheng) \par Neurology; Vascular Neurology \par [address], [address] \[place] [address] \par Phone: [phone] \par Fax: [phone] \par \par Dr. Mcclellan\par [facility]\par [address] [place] [address]\[place] [address] \par

## 2020-01-06 NOTE — END OF VISIT
[FreeTextEntry3] : Written by Claudia Espinoza NP acting as a scribe for Dr. Ricardo Edwards MD.  The documentation recorded by the scribe accurately reflects the service I personally performed and the decisions made by me, Dr. Ricardo Edwards \par \par

## 2020-01-06 NOTE — REASON FOR VISIT
[FreeTextEntry1] : LAST VISIT on 3/28/19:\par One month follow up US carotid from 3/18/19 showed a patent right ICA stent and no evidence of in-stent stenosis. L ICA with moderate (50-69%) stenosis. \par \par Eliquis for history of DVT was discontinued\par \par Was on Brilinta 90mg BID, aspirin 81mg daily, Crestor and PPI for GERD with no esophagitis.\par \par Patient was advised to follow up with another US carotid in 2 months (May 2019) with recommendation to stop Brilinta and continue ASA if there is no evidence of in-stent stenosis. She would restart Eliquis at that time. It was also recommend that she be on H2 blockers instead of proton pump inhibitors for gastric protection as platelet aggregometry testing was subtherapeutic when she was in the hospital and we want to enhance absorption of Brilinta. \par \par \par TODAy'S VISIT:\par She presents to the office to review an overdue US carotid duplex.\par \par

## 2020-01-09 ENCOUNTER — APPOINTMENT (OUTPATIENT)
Dept: VASCULAR SURGERY | Facility: CLINIC | Age: 85
End: 2020-01-09

## 2020-01-09 ENCOUNTER — APPOINTMENT (OUTPATIENT)
Dept: NEUROSURGERY | Facility: CLINIC | Age: 85
End: 2020-01-09

## 2020-01-30 ENCOUNTER — INPATIENT (INPATIENT)
Facility: HOSPITAL | Age: 85
LOS: 4 days | Discharge: NURSING, REHAB OR LONG-TERM CARE | DRG: 315 | End: 2020-02-04
Attending: SURGERY | Admitting: SURGERY
Payer: MEDICARE

## 2020-01-30 VITALS
TEMPERATURE: 97 F | OXYGEN SATURATION: 99 % | SYSTOLIC BLOOD PRESSURE: 119 MMHG | RESPIRATION RATE: 18 BRPM | WEIGHT: 186.07 LBS | HEART RATE: 69 BPM | DIASTOLIC BLOOD PRESSURE: 66 MMHG

## 2020-01-30 DIAGNOSIS — Z98.890 OTHER SPECIFIED POSTPROCEDURAL STATES: Chronic | ICD-10-CM

## 2020-01-30 DIAGNOSIS — Z95.828 PRESENCE OF OTHER VASCULAR IMPLANTS AND GRAFTS: Chronic | ICD-10-CM

## 2020-01-30 DIAGNOSIS — Z89.9 ACQUIRED ABSENCE OF LIMB, UNSPECIFIED: Chronic | ICD-10-CM

## 2020-01-30 LAB
ALBUMIN SERPL ELPH-MCNC: 3.4 G/DL — SIGNIFICANT CHANGE UP (ref 3.3–5)
ALBUMIN SERPL ELPH-MCNC: 3.7 G/DL — SIGNIFICANT CHANGE UP (ref 3.3–5)
ALP SERPL-CCNC: 94 U/L — SIGNIFICANT CHANGE UP (ref 40–120)
ALP SERPL-CCNC: SIGNIFICANT CHANGE UP U/L (ref 40–120)
ALT FLD-CCNC: 31 U/L — SIGNIFICANT CHANGE UP (ref 10–45)
ALT FLD-CCNC: SIGNIFICANT CHANGE UP U/L (ref 10–45)
ANION GAP SERPL CALC-SCNC: 10 MMOL/L — SIGNIFICANT CHANGE UP (ref 5–17)
ANION GAP SERPL CALC-SCNC: 12 MMOL/L — SIGNIFICANT CHANGE UP (ref 5–17)
AST SERPL-CCNC: 25 U/L — SIGNIFICANT CHANGE UP (ref 10–40)
AST SERPL-CCNC: SIGNIFICANT CHANGE UP U/L (ref 10–40)
BILIRUB SERPL-MCNC: 0.2 MG/DL — SIGNIFICANT CHANGE UP (ref 0.2–1.2)
BILIRUB SERPL-MCNC: 0.2 MG/DL — SIGNIFICANT CHANGE UP (ref 0.2–1.2)
BLD GP AB SCN SERPL QL: NEGATIVE — SIGNIFICANT CHANGE UP
BUN SERPL-MCNC: 27 MG/DL — HIGH (ref 7–23)
BUN SERPL-MCNC: 27 MG/DL — HIGH (ref 7–23)
CALCIUM SERPL-MCNC: 9 MG/DL — SIGNIFICANT CHANGE UP (ref 8.4–10.5)
CALCIUM SERPL-MCNC: 9.1 MG/DL — SIGNIFICANT CHANGE UP (ref 8.4–10.5)
CHLORIDE SERPL-SCNC: 104 MMOL/L — SIGNIFICANT CHANGE UP (ref 96–108)
CHLORIDE SERPL-SCNC: 106 MMOL/L — SIGNIFICANT CHANGE UP (ref 96–108)
CO2 SERPL-SCNC: 18 MMOL/L — LOW (ref 22–31)
CO2 SERPL-SCNC: 23 MMOL/L — SIGNIFICANT CHANGE UP (ref 22–31)
CREAT SERPL-MCNC: 0.89 MG/DL — SIGNIFICANT CHANGE UP (ref 0.5–1.3)
CREAT SERPL-MCNC: 0.93 MG/DL — SIGNIFICANT CHANGE UP (ref 0.5–1.3)
GLUCOSE BLDC GLUCOMTR-MCNC: 165 MG/DL — HIGH (ref 70–99)
GLUCOSE SERPL-MCNC: 149 MG/DL — HIGH (ref 70–99)
GLUCOSE SERPL-MCNC: 169 MG/DL — HIGH (ref 70–99)
HCT VFR BLD CALC: 37.1 % — SIGNIFICANT CHANGE UP (ref 34.5–45)
HGB BLD-MCNC: 11.6 G/DL — SIGNIFICANT CHANGE UP (ref 11.5–15.5)
MCHC RBC-ENTMCNC: 28.4 PG — SIGNIFICANT CHANGE UP (ref 27–34)
MCHC RBC-ENTMCNC: 31.3 GM/DL — LOW (ref 32–36)
MCV RBC AUTO: 90.7 FL — SIGNIFICANT CHANGE UP (ref 80–100)
NRBC # BLD: 0 /100 WBCS — SIGNIFICANT CHANGE UP (ref 0–0)
PLATELET # BLD AUTO: 211 K/UL — SIGNIFICANT CHANGE UP (ref 150–400)
POTASSIUM SERPL-MCNC: 4.1 MMOL/L — SIGNIFICANT CHANGE UP (ref 3.5–5.3)
POTASSIUM SERPL-MCNC: SIGNIFICANT CHANGE UP MMOL/L (ref 3.5–5.3)
POTASSIUM SERPL-SCNC: 4.1 MMOL/L — SIGNIFICANT CHANGE UP (ref 3.5–5.3)
POTASSIUM SERPL-SCNC: SIGNIFICANT CHANGE UP MMOL/L (ref 3.5–5.3)
PROT SERPL-MCNC: 6.4 G/DL — SIGNIFICANT CHANGE UP (ref 6–8.3)
PROT SERPL-MCNC: 7.3 G/DL — SIGNIFICANT CHANGE UP (ref 6–8.3)
RBC # BLD: 4.09 M/UL — SIGNIFICANT CHANGE UP (ref 3.8–5.2)
RBC # FLD: 15.3 % — HIGH (ref 10.3–14.5)
RH IG SCN BLD-IMP: POSITIVE — SIGNIFICANT CHANGE UP
SODIUM SERPL-SCNC: 136 MMOL/L — SIGNIFICANT CHANGE UP (ref 135–145)
SODIUM SERPL-SCNC: 137 MMOL/L — SIGNIFICANT CHANGE UP (ref 135–145)
WBC # BLD: 6.35 K/UL — SIGNIFICANT CHANGE UP (ref 3.8–10.5)
WBC # FLD AUTO: 6.35 K/UL — SIGNIFICANT CHANGE UP (ref 3.8–10.5)

## 2020-01-30 PROCEDURE — 73620 X-RAY EXAM OF FOOT: CPT | Mod: 26,LT

## 2020-01-30 PROCEDURE — 99285 EMERGENCY DEPT VISIT HI MDM: CPT

## 2020-01-30 PROCEDURE — 99233 SBSQ HOSP IP/OBS HIGH 50: CPT | Mod: GC

## 2020-01-30 RX ORDER — GABAPENTIN 400 MG/1
300 CAPSULE ORAL THREE TIMES A DAY
Refills: 0 | Status: DISCONTINUED | OUTPATIENT
Start: 2020-01-30 | End: 2020-02-04

## 2020-01-30 RX ORDER — CHOLECALCIFEROL (VITAMIN D3) 125 MCG
1 CAPSULE ORAL
Qty: 0 | Refills: 0 | DISCHARGE

## 2020-01-30 RX ORDER — METOPROLOL TARTRATE 50 MG
25 TABLET ORAL DAILY
Refills: 0 | Status: DISCONTINUED | OUTPATIENT
Start: 2020-01-30 | End: 2020-02-04

## 2020-01-30 RX ORDER — TICAGRELOR 90 MG/1
90 TABLET ORAL
Refills: 0 | Status: DISCONTINUED | OUTPATIENT
Start: 2020-01-30 | End: 2020-02-04

## 2020-01-30 RX ORDER — GLUCAGON INJECTION, SOLUTION 0.5 MG/.1ML
1 INJECTION, SOLUTION SUBCUTANEOUS ONCE
Refills: 0 | Status: DISCONTINUED | OUTPATIENT
Start: 2020-01-30 | End: 2020-02-04

## 2020-01-30 RX ORDER — ACETAMINOPHEN 500 MG
650 TABLET ORAL EVERY 6 HOURS
Refills: 0 | Status: DISCONTINUED | OUTPATIENT
Start: 2020-01-30 | End: 2020-02-04

## 2020-01-30 RX ORDER — SODIUM CHLORIDE 9 MG/ML
1000 INJECTION INTRAMUSCULAR; INTRAVENOUS; SUBCUTANEOUS
Refills: 0 | Status: DISCONTINUED | OUTPATIENT
Start: 2020-01-30 | End: 2020-01-31

## 2020-01-30 RX ORDER — DEXTROSE 50 % IN WATER 50 %
12.5 SYRINGE (ML) INTRAVENOUS ONCE
Refills: 0 | Status: DISCONTINUED | OUTPATIENT
Start: 2020-01-30 | End: 2020-02-04

## 2020-01-30 RX ORDER — PILOCARPINE HCL 4 %
1 DROPS OPHTHALMIC (EYE)
Qty: 0 | Refills: 0 | DISCHARGE

## 2020-01-30 RX ORDER — POLYETHYLENE GLYCOL 3350 17 G/17G
1 POWDER, FOR SOLUTION ORAL
Qty: 0 | Refills: 0 | DISCHARGE

## 2020-01-30 RX ORDER — SENNA PLUS 8.6 MG/1
2 TABLET ORAL
Qty: 0 | Refills: 0 | DISCHARGE

## 2020-01-30 RX ORDER — SERTRALINE 25 MG/1
1 TABLET, FILM COATED ORAL
Qty: 0 | Refills: 0 | DISCHARGE

## 2020-01-30 RX ORDER — ATORVASTATIN CALCIUM 80 MG/1
80 TABLET, FILM COATED ORAL AT BEDTIME
Refills: 0 | Status: DISCONTINUED | OUTPATIENT
Start: 2020-01-30 | End: 2020-02-04

## 2020-01-30 RX ORDER — CHOLECALCIFEROL (VITAMIN D3) 125 MCG
2000 CAPSULE ORAL DAILY
Refills: 0 | Status: DISCONTINUED | OUTPATIENT
Start: 2020-01-30 | End: 2020-02-04

## 2020-01-30 RX ORDER — ENOXAPARIN SODIUM 100 MG/ML
40 INJECTION SUBCUTANEOUS EVERY 24 HOURS
Refills: 0 | Status: DISCONTINUED | OUTPATIENT
Start: 2020-01-30 | End: 2020-02-04

## 2020-01-30 RX ORDER — TIMOLOL 0.5 %
1 DROPS OPHTHALMIC (EYE)
Refills: 0 | Status: DISCONTINUED | OUTPATIENT
Start: 2020-01-30 | End: 2020-02-04

## 2020-01-30 RX ORDER — HYDRALAZINE HCL 50 MG
25 TABLET ORAL
Refills: 0 | Status: DISCONTINUED | OUTPATIENT
Start: 2020-01-30 | End: 2020-02-04

## 2020-01-30 RX ORDER — VANCOMYCIN HCL 1 G
1000 VIAL (EA) INTRAVENOUS ONCE
Refills: 0 | Status: COMPLETED | OUTPATIENT
Start: 2020-01-30 | End: 2020-01-30

## 2020-01-30 RX ORDER — INSULIN LISPRO 100/ML
VIAL (ML) SUBCUTANEOUS
Refills: 0 | Status: DISCONTINUED | OUTPATIENT
Start: 2020-01-30 | End: 2020-02-04

## 2020-01-30 RX ORDER — AMLODIPINE BESYLATE 2.5 MG/1
10 TABLET ORAL DAILY
Refills: 0 | Status: DISCONTINUED | OUTPATIENT
Start: 2020-01-30 | End: 2020-02-04

## 2020-01-30 RX ORDER — BRIMONIDINE TARTRATE 2 MG/MG
1 SOLUTION/ DROPS OPHTHALMIC THREE TIMES A DAY
Refills: 0 | Status: DISCONTINUED | OUTPATIENT
Start: 2020-01-30 | End: 2020-02-04

## 2020-01-30 RX ORDER — LANOLIN ALCOHOL/MO/W.PET/CERES
5 CREAM (GRAM) TOPICAL AT BEDTIME
Refills: 0 | Status: DISCONTINUED | OUTPATIENT
Start: 2020-01-30 | End: 2020-02-04

## 2020-01-30 RX ORDER — CIPROFLOXACIN LACTATE 400MG/40ML
400 VIAL (ML) INTRAVENOUS ONCE
Refills: 0 | Status: COMPLETED | OUTPATIENT
Start: 2020-01-30 | End: 2020-01-31

## 2020-01-30 RX ORDER — LATANOPROST 0.05 MG/ML
1 SOLUTION/ DROPS OPHTHALMIC; TOPICAL AT BEDTIME
Refills: 0 | Status: DISCONTINUED | OUTPATIENT
Start: 2020-01-30 | End: 2020-02-04

## 2020-01-30 RX ORDER — METRONIDAZOLE 500 MG
500 TABLET ORAL ONCE
Refills: 0 | Status: COMPLETED | OUTPATIENT
Start: 2020-01-30 | End: 2020-01-30

## 2020-01-30 RX ORDER — FAMOTIDINE 10 MG/ML
1 INJECTION INTRAVENOUS
Qty: 0 | Refills: 0 | DISCHARGE

## 2020-01-30 RX ORDER — SENNA PLUS 8.6 MG/1
2 TABLET ORAL AT BEDTIME
Refills: 0 | Status: DISCONTINUED | OUTPATIENT
Start: 2020-01-30 | End: 2020-02-04

## 2020-01-30 RX ORDER — POLYETHYLENE GLYCOL 3350 17 G/17G
17 POWDER, FOR SOLUTION ORAL
Refills: 0 | Status: DISCONTINUED | OUTPATIENT
Start: 2020-01-30 | End: 2020-02-04

## 2020-01-30 RX ORDER — SODIUM CHLORIDE 9 MG/ML
1000 INJECTION, SOLUTION INTRAVENOUS
Refills: 0 | Status: DISCONTINUED | OUTPATIENT
Start: 2020-01-30 | End: 2020-02-04

## 2020-01-30 RX ORDER — ALPRAZOLAM 0.25 MG
1 TABLET ORAL
Qty: 0 | Refills: 0 | DISCHARGE

## 2020-01-30 RX ORDER — NETARSUDIL 0.2 MG/ML
1 SOLUTION/ DROPS OPHTHALMIC; TOPICAL
Qty: 0 | Refills: 0 | DISCHARGE

## 2020-01-30 RX ORDER — DEXTROSE 50 % IN WATER 50 %
15 SYRINGE (ML) INTRAVENOUS ONCE
Refills: 0 | Status: DISCONTINUED | OUTPATIENT
Start: 2020-01-30 | End: 2020-02-04

## 2020-01-30 RX ORDER — VANCOMYCIN HCL 1 G
1250 VIAL (EA) INTRAVENOUS EVERY 12 HOURS
Refills: 0 | Status: DISCONTINUED | OUTPATIENT
Start: 2020-01-31 | End: 2020-02-01

## 2020-01-30 RX ORDER — PILOCARPINE HCL 4 %
1 DROPS OPHTHALMIC (EYE) THREE TIMES A DAY
Refills: 0 | Status: DISCONTINUED | OUTPATIENT
Start: 2020-01-30 | End: 2020-02-04

## 2020-01-30 RX ORDER — ASPIRIN/CALCIUM CARB/MAGNESIUM 324 MG
81 TABLET ORAL DAILY
Refills: 0 | Status: DISCONTINUED | OUTPATIENT
Start: 2020-01-30 | End: 2020-02-04

## 2020-01-30 RX ADMIN — Medication 100 MILLIGRAM(S): at 22:53

## 2020-01-30 RX ADMIN — Medication 2: at 22:53

## 2020-01-30 RX ADMIN — Medication 250 MILLIGRAM(S): at 21:33

## 2020-01-30 NOTE — H&P ADULT - ASSESSMENT
86 yo F with h/o CAD, DM, stroke s/p R ICA stent, and PAD s/p BLE bypasses and 1st toe amps. L 1st toe amp site with an infected wound now, and likely LLE SFA-DP bypass with vein graft is thrombosed due to poorly audible signals in L foot.    Plan:  - admit to [facility] surg, Dr. Castellanos  - prn pain control  - home meds, but holding ARB (angio tomorrow)  - diabetic diet now, NPO with IVF past midnight  - I/Os  - ISS  - Abx: vanc, cipro, flagyl (PCN allergy: whole body edema)  - f/u wound cultures, f/u blood cultures  - wound care to L foot: saline WTD, kerlix, clean with betadine/peroxide daily  - bedrest tonight  - DVT ppx (Lovenox, no SCDs)  - AM labs  - NPO p MN for OR tomorrow: LLE angio, possible L foot wound debridement (already added on, needs pre-op workup completed and consent)  - discussed with Dr. Castellanos

## 2020-01-30 NOTE — H&P ADULT - NSICDXPASTMEDICALHX_GEN_ALL_CORE_FT
PAST MEDICAL HISTORY:  Arthropathy     Depression     Diabetes mellitus II    DVT (deep venous thrombosis) Right lower extremity    Falls     Glaucoma b/l    Hyperlipidemia     Hypertension     Ischemic heart disease     MI (myocardial infarction) at age 65 y.o    MRSA infection right great toe ( amputated)    Peripheral vascular disease     Pulmonary embolism     Ulcer of lower limb

## 2020-01-30 NOTE — ED ADULT NURSE NOTE - CHIEF COMPLAINT QUOTE
pt c/o L toe pain and wound dehiscence for one year. s/p toe amputation months ago. VSS. pt follows under dr. luis, sent for possible blockage in toe.

## 2020-01-30 NOTE — PATIENT PROFILE ADULT - NSPROHMSYMPCOND_GEN_A_NUR
behavioral health/musculoskeletal/respiratory/cardiovascular/diabetes cardiovascular/musculoskeletal/neurologic/behavioral health/respiratory

## 2020-01-30 NOTE — ED ADULT NURSE NOTE - OBJECTIVE STATEMENT
pt brought to ER as per EMS and nursing home MD note stating "r/o occluded bypass to left leg due to non healing foot ulcer".

## 2020-01-30 NOTE — PROGRESS NOTE ADULT - SUBJECTIVE AND OBJECTIVE BOX
Pre-op Diagnosis: PVD  Procedure: LLE Angiogram/plasty/stent  Surgeon: Emanuel    Consent: Signed in chart                          11.6   6.35  )-----------( 211      ( 30 Jan 2020 21:23 )             37.1     01-30    136  |  106  |  27<H>  ----------------------------<  169<H>  4.1   |  18<L>  |  0.89    Ca    9.0      30 Jan 2020 22:13  TPro  6.4  /  Alb  3.4  /  TBili  0.2  /  DBili  x   /  AST  25  /  ALT  31  /  AlkPhos  94  01-30    Type & Screen: Opos Ab neg  CXR: P  EKG: NSR    A/P: 85yFemale pre-op for above procedure  1. NPO past midnight, except medications  2. IVFcalcium carbonate 1250 mG  + Vitamin D (OsCal 500 + D) 1 Tablet(s) Oral daily  cholecalciferol 2000 Unit(s) Oral daily  dextrose 5%. 1000 milliLiter(s) IV Continuous <Continuous>  sodium chloride 0.9%. 1000 milliLiter(s) IV Continuous <Continuous>    3. [1 ] Blood on hold, Units:

## 2020-01-30 NOTE — ED PROVIDER NOTE - CLINICAL SUMMARY MEDICAL DECISION MAKING FREE TEXT BOX
84 yo F w/ non healing wound and L foot pain. Vascular was consulted and here in the ed. Pt found to have no pulse via doppler on L foot. Concern for failure of L grafting. admitted to vascular. IV abx given and labs are done.

## 2020-01-30 NOTE — ED ADULT TRIAGE NOTE - CHIEF COMPLAINT QUOTE
pt c/o L toe pain and wound dehiscence for one year. s/p toe amputation months ago. VSS. pt c/o L toe pain and wound dehiscence for one year. s/p toe amputation months ago. VSS. pt follows under dr. luis, sent for possible blockage in toe.

## 2020-01-30 NOTE — H&P ADULT - NSHPPHYSICALEXAM_GEN_ALL_CORE
neuro inta Vital Signs Last 24 Hrs  T(C): 36.3 (30 Jan 2020 19:40), Max: 36.3 (30 Jan 2020 19:40)  T(F): 97.3 (30 Jan 2020 19:40), Max: 97.3 (30 Jan 2020 19:40)  HR: 69 (30 Jan 2020 19:40) (69 - 69)  BP: 119/66 (30 Jan 2020 19:40) (119/66 - 119/66)  BP(mean): --  RR: 18 (30 Jan 2020 19:40) (18 - 18)  SpO2: 99% (30 Jan 2020 19:40) (99% - 99%)    NAD  normal resp effort  NSR  abd soft, NT/ND  LLE: palp fem, biphasic pop, weak mono DP/PT. Open shallow ulcer with white exudate in wound at prior 1st toe amp site, some underlying granulation tissue, with foul odor, does not probe to bone, is tender though.  RLE: palp fem, biphasic pop/DP/PT. Healed 1st toe amp site.  ABIs not performed due to tibial bypass on R and pedal bypass on L.  No motor or sensory deficit in either foot.  No focal neuro deficit

## 2020-01-30 NOTE — H&P ADULT - HISTORY OF PRESENT ILLNESS
85 yo F with CAD (s/p 7 stents, on ASA/Brillinta), h/o DVT/PE (not on anticoagulation anymore), DM, HTN, HLD, glaucoma, constipation, and PAD (R fem-peroneal bypass and 1st toe amputation, L SFA-DP bypass with vein graft and 1st toe amputation). Her LLE bypass surgery was complicated by stroke s/p R ICA stent by neurointerventional team. She presents today for non-healing wound at prior left first toe amputation site. She has had pain there for past one year, but has worsened today. She states there has been purulent drainage from it. No fevers/chills, no nausea/vomiting, no CP/SOA. No left foot coolness/numbness/weakness. She has been living in a nursing home recently. H/o MRSA in prior foot infection.

## 2020-01-30 NOTE — PATIENT PROFILE ADULT - MUSCULOSKELETAL CONDITIONS MANAGED AT HOME
mobility limited/amputation/PVD, MRSA left foot, DVT, amputation of some toes right foot and left great toe infection amputation/mobility limited/PVD, MRSA left foot, DVT, amputation of left and right great toe amputation

## 2020-01-30 NOTE — H&P ADULT - NSICDXPASTSURGICALHX_GEN_ALL_CORE_FT
PAST SURGICAL HISTORY:  H/O arterial bypass of lower limb LLE SFA-DP bypass with rGSV    History of surgery right leg bypass fem-peroneal 5/2018 with amputation of right great toe    History of surgery x7 coronary artery stents    S/P amputation may 2018; right great toe

## 2020-01-30 NOTE — H&P ADULT - NSHPLABSRESULTS_GEN_ALL_CORE
11.6   6.35  )-----------( 211      ( 30 Jan 2020 21:23 )             37.1       01-30    137  |  104  |  27<H>  ----------------------------<  149<H>  SEE NOTE   |  23  |  0.93    Ca    9.1      30 Jan 2020 21:23    TPro  7.3  /  Alb  3.7  /  TBili  0.2  /  DBili  x   /  AST  SEE NOTE  /  ALT  SEE NOTE  /  AlkPhos  SEE NOTE  01-30      L foot Xray: no obvious osteo or gas on my wet read, pending read by radiology

## 2020-01-30 NOTE — ED PROVIDER NOTE - OBJECTIVE STATEMENT
86 y/o F p/w persistent L toe pain. Vascular bypass was done on L leg. Chronic non healing wound present to L toe. Here in ED for eval. Pt is well appearing and has no fever.

## 2020-01-30 NOTE — ED PROVIDER NOTE - LOWER EXTREMITY EXAM, LEFT
linear non healing wound to L great toe. Purulent discharge. healing surgical scar of medial aspect of LLE. no tenderness to palpation.

## 2020-01-31 LAB
-  COAGULASE NEGATIVE STAPHYLOCOCCUS: SIGNIFICANT CHANGE UP
ANION GAP SERPL CALC-SCNC: 11 MMOL/L — SIGNIFICANT CHANGE UP (ref 5–17)
APTT BLD: 19.9 SEC — LOW (ref 27.5–36.3)
BASOPHILS # BLD AUTO: 0.02 K/UL — SIGNIFICANT CHANGE UP (ref 0–0.2)
BASOPHILS NFR BLD AUTO: 0.4 % — SIGNIFICANT CHANGE UP (ref 0–2)
BUN SERPL-MCNC: 24 MG/DL — HIGH (ref 7–23)
CALCIUM SERPL-MCNC: 9 MG/DL — SIGNIFICANT CHANGE UP (ref 8.4–10.5)
CHLORIDE SERPL-SCNC: 110 MMOL/L — HIGH (ref 96–108)
CO2 SERPL-SCNC: 20 MMOL/L — LOW (ref 22–31)
CREAT SERPL-MCNC: 0.85 MG/DL — SIGNIFICANT CHANGE UP (ref 0.5–1.3)
EOSINOPHIL # BLD AUTO: 0.18 K/UL — SIGNIFICANT CHANGE UP (ref 0–0.5)
EOSINOPHIL NFR BLD AUTO: 3.4 % — SIGNIFICANT CHANGE UP (ref 0–6)
GLUCOSE BLDC GLUCOMTR-MCNC: 110 MG/DL — HIGH (ref 70–99)
GLUCOSE BLDC GLUCOMTR-MCNC: 117 MG/DL — HIGH (ref 70–99)
GLUCOSE BLDC GLUCOMTR-MCNC: 139 MG/DL — HIGH (ref 70–99)
GLUCOSE BLDC GLUCOMTR-MCNC: 145 MG/DL — HIGH (ref 70–99)
GLUCOSE SERPL-MCNC: 114 MG/DL — HIGH (ref 70–99)
GRAM STN FLD: SIGNIFICANT CHANGE UP
GRAM STN FLD: SIGNIFICANT CHANGE UP
HBA1C BLD-MCNC: 6.9 % — HIGH (ref 4–5.6)
HCT VFR BLD CALC: 33.2 % — LOW (ref 34.5–45)
HGB BLD-MCNC: 10.4 G/DL — LOW (ref 11.5–15.5)
IMM GRANULOCYTES NFR BLD AUTO: 0.2 % — SIGNIFICANT CHANGE UP (ref 0–1.5)
INR BLD: 1.03 — SIGNIFICANT CHANGE UP (ref 0.88–1.16)
LYMPHOCYTES # BLD AUTO: 1.41 K/UL — SIGNIFICANT CHANGE UP (ref 1–3.3)
LYMPHOCYTES # BLD AUTO: 26.7 % — SIGNIFICANT CHANGE UP (ref 13–44)
MAGNESIUM SERPL-MCNC: 2.1 MG/DL — SIGNIFICANT CHANGE UP (ref 1.6–2.6)
MCHC RBC-ENTMCNC: 28.3 PG — SIGNIFICANT CHANGE UP (ref 27–34)
MCHC RBC-ENTMCNC: 31.3 GM/DL — LOW (ref 32–36)
MCV RBC AUTO: 90.2 FL — SIGNIFICANT CHANGE UP (ref 80–100)
METHOD TYPE: SIGNIFICANT CHANGE UP
MONOCYTES # BLD AUTO: 0.74 K/UL — SIGNIFICANT CHANGE UP (ref 0–0.9)
MONOCYTES NFR BLD AUTO: 14 % — SIGNIFICANT CHANGE UP (ref 2–14)
NEUTROPHILS # BLD AUTO: 2.92 K/UL — SIGNIFICANT CHANGE UP (ref 1.8–7.4)
NEUTROPHILS NFR BLD AUTO: 55.3 % — SIGNIFICANT CHANGE UP (ref 43–77)
NRBC # BLD: 0 /100 WBCS — SIGNIFICANT CHANGE UP (ref 0–0)
PHOSPHATE SERPL-MCNC: 4 MG/DL — SIGNIFICANT CHANGE UP (ref 2.5–4.5)
PLATELET # BLD AUTO: 166 K/UL — SIGNIFICANT CHANGE UP (ref 150–400)
POTASSIUM SERPL-MCNC: 4.1 MMOL/L — SIGNIFICANT CHANGE UP (ref 3.5–5.3)
POTASSIUM SERPL-SCNC: 4.1 MMOL/L — SIGNIFICANT CHANGE UP (ref 3.5–5.3)
PROTHROM AB SERPL-ACNC: 11.7 SEC — SIGNIFICANT CHANGE UP (ref 10–12.9)
RBC # BLD: 3.68 M/UL — LOW (ref 3.8–5.2)
RBC # FLD: 15.3 % — HIGH (ref 10.3–14.5)
SODIUM SERPL-SCNC: 141 MMOL/L — SIGNIFICANT CHANGE UP (ref 135–145)
SPECIMEN SOURCE: SIGNIFICANT CHANGE UP
WBC # BLD: 5.28 K/UL — SIGNIFICANT CHANGE UP (ref 3.8–10.5)
WBC # FLD AUTO: 5.28 K/UL — SIGNIFICANT CHANGE UP (ref 3.8–10.5)

## 2020-01-31 PROCEDURE — 99223 1ST HOSP IP/OBS HIGH 75: CPT

## 2020-01-31 PROCEDURE — 99233 SBSQ HOSP IP/OBS HIGH 50: CPT | Mod: GC

## 2020-01-31 PROCEDURE — 71045 X-RAY EXAM CHEST 1 VIEW: CPT | Mod: 26

## 2020-01-31 RX ORDER — CIPROFLOXACIN LACTATE 400MG/40ML
VIAL (ML) INTRAVENOUS
Refills: 0 | Status: DISCONTINUED | OUTPATIENT
Start: 2020-01-31 | End: 2020-02-04

## 2020-01-31 RX ORDER — METRONIDAZOLE 500 MG
TABLET ORAL
Refills: 0 | Status: DISCONTINUED | OUTPATIENT
Start: 2020-01-31 | End: 2020-02-02

## 2020-01-31 RX ORDER — METRONIDAZOLE 500 MG
500 TABLET ORAL ONCE
Refills: 0 | Status: COMPLETED | OUTPATIENT
Start: 2020-01-31 | End: 2020-01-31

## 2020-01-31 RX ORDER — OXYCODONE AND ACETAMINOPHEN 5; 325 MG/1; MG/1
1 TABLET ORAL ONCE
Refills: 0 | Status: DISCONTINUED | OUTPATIENT
Start: 2020-01-31 | End: 2020-01-31

## 2020-01-31 RX ORDER — CIPROFLOXACIN LACTATE 400MG/40ML
400 VIAL (ML) INTRAVENOUS EVERY 12 HOURS
Refills: 0 | Status: DISCONTINUED | OUTPATIENT
Start: 2020-01-31 | End: 2020-02-04

## 2020-01-31 RX ORDER — CIPROFLOXACIN LACTATE 400MG/40ML
400 VIAL (ML) INTRAVENOUS ONCE
Refills: 0 | Status: COMPLETED | OUTPATIENT
Start: 2020-01-31 | End: 2020-01-31

## 2020-01-31 RX ORDER — METRONIDAZOLE 500 MG
500 TABLET ORAL EVERY 8 HOURS
Refills: 0 | Status: DISCONTINUED | OUTPATIENT
Start: 2020-01-31 | End: 2020-02-02

## 2020-01-31 RX ADMIN — GABAPENTIN 300 MILLIGRAM(S): 400 CAPSULE ORAL at 22:04

## 2020-01-31 RX ADMIN — POLYETHYLENE GLYCOL 3350 17 GRAM(S): 17 POWDER, FOR SOLUTION ORAL at 18:33

## 2020-01-31 RX ADMIN — SENNA PLUS 2 TABLET(S): 8.6 TABLET ORAL at 22:05

## 2020-01-31 RX ADMIN — Medication 166.67 MILLIGRAM(S): at 10:11

## 2020-01-31 RX ADMIN — ATORVASTATIN CALCIUM 80 MILLIGRAM(S): 80 TABLET, FILM COATED ORAL at 22:04

## 2020-01-31 RX ADMIN — Medication 650 MILLIGRAM(S): at 07:45

## 2020-01-31 RX ADMIN — Medication 100 MILLIGRAM(S): at 22:05

## 2020-01-31 RX ADMIN — Medication 100 MILLIGRAM(S): at 06:50

## 2020-01-31 RX ADMIN — SENNA PLUS 2 TABLET(S): 8.6 TABLET ORAL at 00:54

## 2020-01-31 RX ADMIN — Medication 81 MILLIGRAM(S): at 13:13

## 2020-01-31 RX ADMIN — BRIMONIDINE TARTRATE 1 DROP(S): 2 SOLUTION/ DROPS OPHTHALMIC at 22:05

## 2020-01-31 RX ADMIN — BRIMONIDINE TARTRATE 1 DROP(S): 2 SOLUTION/ DROPS OPHTHALMIC at 00:57

## 2020-01-31 RX ADMIN — Medication 1 DROP(S): at 00:55

## 2020-01-31 RX ADMIN — Medication 1 DROP(S): at 18:33

## 2020-01-31 RX ADMIN — Medication 100 MILLIGRAM(S): at 13:49

## 2020-01-31 RX ADMIN — Medication 1 DROP(S): at 06:56

## 2020-01-31 RX ADMIN — Medication 25 MILLIGRAM(S): at 08:33

## 2020-01-31 RX ADMIN — TICAGRELOR 90 MILLIGRAM(S): 90 TABLET ORAL at 06:47

## 2020-01-31 RX ADMIN — BRIMONIDINE TARTRATE 1 DROP(S): 2 SOLUTION/ DROPS OPHTHALMIC at 06:56

## 2020-01-31 RX ADMIN — Medication 1 TABLET(S): at 18:33

## 2020-01-31 RX ADMIN — Medication 25 MILLIGRAM(S): at 00:53

## 2020-01-31 RX ADMIN — Medication 1 DROP(S): at 13:49

## 2020-01-31 RX ADMIN — LATANOPROST 1 DROP(S): 0.05 SOLUTION/ DROPS OPHTHALMIC; TOPICAL at 22:04

## 2020-01-31 RX ADMIN — Medication 1 DROP(S): at 22:05

## 2020-01-31 RX ADMIN — Medication 200 MILLIGRAM(S): at 18:33

## 2020-01-31 RX ADMIN — Medication 1 DROP(S): at 10:32

## 2020-01-31 RX ADMIN — SODIUM CHLORIDE 80 MILLILITER(S): 9 INJECTION INTRAMUSCULAR; INTRAVENOUS; SUBCUTANEOUS at 01:11

## 2020-01-31 RX ADMIN — Medication 5 MILLIGRAM(S): at 22:05

## 2020-01-31 RX ADMIN — LATANOPROST 1 DROP(S): 0.05 SOLUTION/ DROPS OPHTHALMIC; TOPICAL at 00:57

## 2020-01-31 RX ADMIN — AMLODIPINE BESYLATE 10 MILLIGRAM(S): 2.5 TABLET ORAL at 08:31

## 2020-01-31 RX ADMIN — Medication 25 MILLIGRAM(S): at 18:58

## 2020-01-31 RX ADMIN — Medication 5 MILLIGRAM(S): at 01:10

## 2020-01-31 RX ADMIN — Medication 166.67 MILLIGRAM(S): at 22:00

## 2020-01-31 RX ADMIN — Medication 25 MILLIGRAM(S): at 06:47

## 2020-01-31 RX ADMIN — GABAPENTIN 300 MILLIGRAM(S): 400 CAPSULE ORAL at 13:49

## 2020-01-31 RX ADMIN — GABAPENTIN 300 MILLIGRAM(S): 400 CAPSULE ORAL at 06:46

## 2020-01-31 RX ADMIN — ENOXAPARIN SODIUM 40 MILLIGRAM(S): 100 INJECTION SUBCUTANEOUS at 06:45

## 2020-01-31 RX ADMIN — Medication 200 MILLIGRAM(S): at 01:10

## 2020-01-31 RX ADMIN — TICAGRELOR 90 MILLIGRAM(S): 90 TABLET ORAL at 18:33

## 2020-01-31 RX ADMIN — ATORVASTATIN CALCIUM 80 MILLIGRAM(S): 80 TABLET, FILM COATED ORAL at 00:55

## 2020-01-31 RX ADMIN — Medication 650 MILLIGRAM(S): at 06:46

## 2020-01-31 RX ADMIN — BRIMONIDINE TARTRATE 1 DROP(S): 2 SOLUTION/ DROPS OPHTHALMIC at 13:13

## 2020-01-31 RX ADMIN — GABAPENTIN 300 MILLIGRAM(S): 400 CAPSULE ORAL at 00:53

## 2020-01-31 RX ADMIN — OXYCODONE AND ACETAMINOPHEN 1 TABLET(S): 5; 325 TABLET ORAL at 10:52

## 2020-01-31 RX ADMIN — OXYCODONE AND ACETAMINOPHEN 1 TABLET(S): 5; 325 TABLET ORAL at 11:31

## 2020-01-31 RX ADMIN — Medication 200 MILLIGRAM(S): at 06:56

## 2020-01-31 RX ADMIN — Medication 2000 UNIT(S): at 13:13

## 2020-01-31 NOTE — CONSULT NOTE ADULT - SUBJECTIVE AND OBJECTIVE BOX
Patient is a 85y old  Female who presents with a chief complaint of left foot wound infection, L SFA-DP bypass graft thrombosis (31 Jan 2020 08:27)      HPI:  83 yo F with CAD (s/p 7 stents, on ASA/Brillinta), h/o DVT/PE (not on anticoagulation anymore), DM, HTN, HLD, glaucoma, constipation, and PAD (R fem-peroneal bypass and 1st toe amputation, L SFA-DP bypass with vein graft and 1st toe amputation). Her LLE bypass surgery was complicated by stroke s/p R ICA stent by neurointerventional team. She presents today for non-healing wound at prior left first toe amputation site. She has had pain there for past one year, but has worsened today. She states there has been purulent drainage from it. No fevers/chills, no nausea/vomiting, no CP/SOA. No left foot coolness/numbness/weakness. She has been living in a nursing home recently. H/o MRSA in prior foot infection. (30 Jan 2020 21:55)    Seen in AM, pending LLE angio today. C/o of pain in left foot otherwise no complaints. Did not sleep well. Denies CP, SOB.     Poor historian. Hx of whole body swelling 2/2 penicillin 35 years ago. Has not taken penicillin since.     REVIEW OF SYSTEMS otherwise negative      Allergies    Goldbond (Unknown)  penicillins (Anaphylaxis)    Intolerances        Home Medications:  acetaminophen 325 mg oral tablet: 2 tab(s) orally every 6 hours, As needed, Mild Pain (1 - 3) (13 Nov 2018 12:52)  Alphagan: to each affected eye 3 times a day (13 Nov 2018 12:52)  amLODIPine 10 mg oral tablet: 1 tab(s) orally once a day (13 Nov 2018 12:52)  aspirin 81 mg oral delayed release tablet: 1 tab(s) orally once a day (13 Nov 2018 12:52)  Calcium 500+D oral tablet, chewable: 1 tab(s) orally once a day (30 Jan 2020 21:35)  Crestor 20 mg oral tablet: 1 tab(s) orally once a day (at bedtime) (13 Nov 2018 12:52)  famotidine 20 mg oral tablet: 1 tab(s) orally once a day (30 Jan 2020 21:36)  gabapentin 300 mg oral capsule: 1 cap(s) orally 3 times a day (13 Nov 2018 12:52)  Vibha-Lanta oral suspension: 10 milliliter(s) orally every 8 hours, As Needed for constipation (30 Jan 2020 21:37)  hydrALAZINE 25 mg oral tablet: 1 tab(s) orally 4 times a day (13 Nov 2018 12:52)  losartan 100 mg oral tablet: 1 tab(s) orally once a day (13 Nov 2018 12:52)  Lumigan 0.01% ophthalmic solution: 1 drop(s) to each affected eye once a day (in the evening) (13 Nov 2018 12:52)  melatonin 5 mg oral tablet: 1 tab(s) orally once a day (at bedtime) (26 Feb 2019 13:56)  metFORMIN 750 mg oral tablet, extended release: 1 tab(s) orally once a day (13 Nov 2018 12:52)  methazolAMIDE 25 mg oral tablet: 1 tab(s) orally 3 times a day (30 Jan 2020 21:35)  metoprolol succinate 25 mg oral tablet, extended release: 1 tab(s) orally once a day (13 Nov 2018 12:52)  MiraLax oral powder for reconstitution: 1 packet(s) orally 2 times a day (30 Jan 2020 21:32)  oxyCODONE-acetaminophen 5 mg-325 mg oral tablet: 1 tab(s) orally every 6 hours, As Needed - 10) (13 Nov 2018 12:52)  pilocarpine 2% ophthalmic solution: 1 dose(s) in the left eye 3 times a day (30 Jan 2020 21:33)  Rhopressa 0.02% ophthalmic solution: 1 drop(s) to each affected eye once a day (in the evening) (30 Jan 2020 21:36)  senna oral tablet: 2 tab(s) orally once a day (at bedtime) (30 Jan 2020 21:32)  ticagrelor 90 mg oral tablet: 1 tab(s) orally 2 times a day (26 Feb 2019 13:56)  Timoptic 0.5% ophthalmic solution: 1 drop(s) to each affected eye 2 times a day (13 Nov 2018 12:52)  Vitamin D3 2000 intl units (50 mcg) oral capsule: 1 cap(s) orally once a day (30 Jan 2020 21:35)      PAST MEDICAL & SURGICAL HISTORY:  MRSA infection: right great toe ( amputated)  Glaucoma: b/l  Falls  Depression  Pulmonary embolism  DVT (deep venous thrombosis): Right lower extremity  MI (myocardial infarction): at age 65 y.o  Peripheral vascular disease  Ulcer of lower limb  Arthropathy  Ischemic heart disease  Hypertension  Hyperlipidemia  Diabetes mellitus: II  H/O arterial bypass of lower limb: LLE SFA-DP bypass with rGSV  S/P amputation: may 2018; right great toe  History of surgery: x7 coronary artery stents  History of surgery: right leg bypass fem-peroneal 5/2018 with amputation of right great toe      FAMILY HISTORY:  No pertinent family history in first degree relatives      SOCIAL HISTORY: no smoking      Vital Signs Last 24 Hrs  T(C): 36.6 (31 Jan 2020 10:45), Max: 36.9 (30 Jan 2020 22:36)  T(F): 97.8 (31 Jan 2020 10:45), Max: 98.5 (30 Jan 2020 22:36)  HR: 61 (31 Jan 2020 10:12) (61 - 74)  BP: 130/58 (31 Jan 2020 10:12) (113/56 - 130/58)  BP(mean): --  RR: 18 (31 Jan 2020 10:12) (13 - 18)  SpO2: 99% (31 Jan 2020 10:12) (99% - 100%)   I&O's Detail    30 Jan 2020 07:01  -  31 Jan 2020 07:00  --------------------------------------------------------  IN:    IV PiggyBack: 400 mL    sodium chloride 0.9%.: 400 mL  Total IN: 800 mL    OUT:    Voided: 700 mL  Total OUT: 700 mL    Total NET: 100 mL      31 Jan 2020 07:01  -  31 Jan 2020 11:43  --------------------------------------------------------  IN:  Total IN: 0 mL    OUT:    Voided: 300 mL  Total OUT: 300 mL    Total NET: -300 mL        Daily Height in cm: 165.1 (31 Jan 2020 08:48)    Daily     Physical Exam:   GEN: NAD, pleasant  HEENT: dry MM  CV: S1 S2 RRR,   Lung: CTAB  Abd: soft NT ND  Ext: s/p partial 1st ray amputation R foot, L foot wrapped    MEDICATIONS  (STANDING):  amLODIPine   Tablet 10 milliGRAM(s) Oral daily  aspirin enteric coated 81 milliGRAM(s) Oral daily  atorvastatin 80 milliGRAM(s) Oral at bedtime  brimonidine 0.2% Ophthalmic Solution 1 Drop(s) Both EYES three times a day  calcium carbonate 1250 mG  + Vitamin D (OsCal 500 + D) 1 Tablet(s) Oral daily  cholecalciferol 2000 Unit(s) Oral daily  ciprofloxacin   IVPB 400 milliGRAM(s) IV Intermittent every 12 hours  ciprofloxacin   IVPB      dextrose 5%. 1000 milliLiter(s) (50 mL/Hr) IV Continuous <Continuous>  dextrose 50% Injectable 12.5 Gram(s) IV Push once  enoxaparin Injectable 40 milliGRAM(s) SubCutaneous every 24 hours  gabapentin 300 milliGRAM(s) Oral three times a day  hydrALAZINE 25 milliGRAM(s) Oral four times a day  insulin lispro (HumaLOG) corrective regimen sliding scale   SubCutaneous Before meals and at bedtime  latanoprost 0.005% Ophthalmic Solution 1 Drop(s) Both EYES at bedtime  melatonin 5 milliGRAM(s) Oral at bedtime  metoprolol succinate ER 25 milliGRAM(s) Oral daily  metroNIDAZOLE  IVPB      metroNIDAZOLE  IVPB 500 milliGRAM(s) IV Intermittent every 8 hours  pilocarpine 2% Solution 1 Drop(s) Left EYE three times a day  polyethylene glycol 3350 17 Gram(s) Oral two times a day  senna 2 Tablet(s) Oral at bedtime  sodium chloride 0.9%. 1000 milliLiter(s) (80 mL/Hr) IV Continuous <Continuous>  ticagrelor 90 milliGRAM(s) Oral two times a day  timolol 0.5% Solution 1 Drop(s) Both EYES two times a day  vancomycin  IVPB 1250 milliGRAM(s) IV Intermittent every 12 hours    MEDICATIONS  (PRN):  acetaminophen   Tablet .. 650 milliGRAM(s) Oral every 6 hours PRN Moderate Pain (4 - 6)  dextrose 40% Gel 15 Gram(s) Oral once PRN Blood Glucose LESS THAN 70 milliGRAM(s)/deciliter  glucagon  Injectable 1 milliGRAM(s) IntraMuscular once PRN Glucose LESS THAN 70 milligrams/deciliter                LABS:                        10.4   5.28  )-----------( 166      ( 31 Jan 2020 06:27 )             33.2     01-31    141  |  110<H>  |  24<H>  ----------------------------<  114<H>  4.1   |  20<L>  |  0.85    Ca    9.0      31 Jan 2020 06:27  Phos  4.0     01-31  Mg     2.1     01-31    TPro  6.4  /  Alb  3.4  /  TBili  0.2  /  DBili  x   /  AST  25  /  ALT  31  /  AlkPhos  94  01-30        PT/INR - ( 31 Jan 2020 06:27 )   PT: 11.7 sec;   INR: 1.03          PTT - ( 31 Jan 2020 06:27 )  PTT:19.9 sec  CAPILLARY BLOOD GLUCOSE      POCT Blood Glucose.: 110 mg/dL (31 Jan 2020 06:57)  POCT Blood Glucose.: 165 mg/dL (30 Jan 2020 22:43)      Culture - Blood (collected 30 Jan 2020 22:30)  Source: .Blood Blood  Preliminary Report (31 Jan 2020 11:01):    No growth at 12 hours    Culture - Blood (collected 30 Jan 2020 22:30)  Source: .Blood Blood  Preliminary Report (31 Jan 2020 11:01):    No growth at 12 hours    Culture - Surgical Swab (collected 30 Jan 2020 22:00)  Source: .Surgical Swab wound  Gram Stain (31 Jan 2020 11:00):    Numerous Gram Positive Rods    Few Gram Positive Cocci in Clusters    Few White blood cells      RADIOLOGY & ADDITIONAL STUDIES:

## 2020-01-31 NOTE — CONSULT NOTE ADULT - ASSESSMENT
85 year old F p/w L foot diabetic foot infection, medicine following for comanagement.     1) Diabetic foot infection: c/w Vanc/Cipro/Flagyl, for LLE angio today, f/u cultures and de-escalate, for LLE angio today, candidate for penicillin skin allergy testing  2) PAD: c/w DAPT, statin, LLE angio today,   3) CAD: on DAPT, c/w statin, c/w metoprolol  4) DM: A1C 6.9, c/w SSI, FS well controlled  5) HTN: c/w norvasc, hydralazine, hold ARB prior to procedure, restart after  6) CVA: on DAPT, c/w statin  7) Glaucoma: c/w home meds  8) DVT ppx: lovenox  9) Neuropathy: c/w gabapentin

## 2020-01-31 NOTE — PROGRESS NOTE ADULT - SUBJECTIVE AND OBJECTIVE BOX
Patient is a 85y old  Female who presents with a chief complaint of left foot wound infection, L SFA-DP bypass graft thrombosis (30 Jan 2020 23:50)      INTERVAL HPI/OVERNIGHT EVENTS: VALERIE      MEDICATIONS  (STANDING):  amLODIPine   Tablet 10 milliGRAM(s) Oral daily  aspirin enteric coated 81 milliGRAM(s) Oral daily  atorvastatin 80 milliGRAM(s) Oral at bedtime  brimonidine 0.2% Ophthalmic Solution 1 Drop(s) Both EYES three times a day  calcium carbonate 1250 mG  + Vitamin D (OsCal 500 + D) 1 Tablet(s) Oral daily  cholecalciferol 2000 Unit(s) Oral daily  ciprofloxacin   IVPB 400 milliGRAM(s) IV Intermittent every 12 hours  ciprofloxacin   IVPB      dextrose 5%. 1000 milliLiter(s) (50 mL/Hr) IV Continuous <Continuous>  dextrose 50% Injectable 12.5 Gram(s) IV Push once  enoxaparin Injectable 40 milliGRAM(s) SubCutaneous every 24 hours  gabapentin 300 milliGRAM(s) Oral three times a day  hydrALAZINE 25 milliGRAM(s) Oral four times a day  insulin lispro (HumaLOG) corrective regimen sliding scale   SubCutaneous Before meals and at bedtime  latanoprost 0.005% Ophthalmic Solution 1 Drop(s) Both EYES at bedtime  melatonin 5 milliGRAM(s) Oral at bedtime  metoprolol succinate ER 25 milliGRAM(s) Oral daily  metroNIDAZOLE  IVPB      metroNIDAZOLE  IVPB 500 milliGRAM(s) IV Intermittent every 8 hours  pilocarpine 2% Solution 1 Drop(s) Left EYE three times a day  polyethylene glycol 3350 17 Gram(s) Oral two times a day  senna 2 Tablet(s) Oral at bedtime  sodium chloride 0.9%. 1000 milliLiter(s) (80 mL/Hr) IV Continuous <Continuous>  ticagrelor 90 milliGRAM(s) Oral two times a day  timolol 0.5% Solution 1 Drop(s) Both EYES two times a day  vancomycin  IVPB 1250 milliGRAM(s) IV Intermittent every 12 hours    MEDICATIONS  (PRN):  acetaminophen   Tablet .. 650 milliGRAM(s) Oral every 6 hours PRN Moderate Pain (4 - 6)  dextrose 40% Gel 15 Gram(s) Oral once PRN Blood Glucose LESS THAN 70 milliGRAM(s)/deciliter  glucagon  Injectable 1 milliGRAM(s) IntraMuscular once PRN Glucose LESS THAN 70 milligrams/deciliter      Allergies    Goldbond (Unknown)  penicillins (Anaphylaxis)    Intolerances          Vital Signs Last 24 Hrs  T(C): 36.9 (30 Jan 2020 23:49), Max: 36.9 (30 Jan 2020 22:36)  T(F): 98.4 (30 Jan 2020 23:49), Max: 98.5 (30 Jan 2020 22:36)  HR: 68 (31 Jan 2020 07:00) (68 - 74)  BP: 117/56 (31 Jan 2020 07:00) (113/56 - 128/71)  BP(mean): --  RR: 17 (31 Jan 2020 07:00) (13 - 18)  SpO2: 100% (31 Jan 2020 07:00) (99% - 100%)  CAPILLARY BLOOD GLUCOSE      POCT Blood Glucose.: 110 mg/dL (31 Jan 2020 06:57)  POCT Blood Glucose.: 165 mg/dL (30 Jan 2020 22:43)        Physical Exam:    General: NAD  Pulm: normal resp effort  CV: NSR  Abdomen: abd soft, NT/ND  LLE: palp fem, biphasic pop, weak mono DP/PT. Open shallow ulcer with white exudate in wound at prior 1st toe amp site, some underlying granulation tissue, with foul odor, does not probe to bone, is tender though.  RLE: palp fem, biphasic pop/DP/PT. Healed 1st toe amp site.  	ABIs not performed due to tibial bypass on R and pedal bypass on L.  	No motor or sensory deficit in either foot.  Neuro: No focal neuro deficit    LABS:                        10.4   5.28  )-----------( 166      ( 31 Jan 2020 06:27 )             33.2     01-31    141  |  110<H>  |  24<H>  ----------------------------<  114<H>  4.1   |  20<L>  |  0.85    Ca    9.0      31 Jan 2020 06:27  Phos  4.0     01-31  Mg     2.1     01-31    TPro  6.4  /  Alb  3.4  /  TBili  0.2  /  DBili  x   /  AST  25  /  ALT  31  /  AlkPhos  94  01-30    PT/INR - ( 31 Jan 2020 06:27 )   PT: 11.7 sec;   INR: 1.03          PTT - ( 31 Jan 2020 06:27 )  PTT:19.9 sec        RADIOLOGY & ADDITIONAL TESTS:    ---------------------------------------------------------------------------  I personally reviewed: [  ]EKG   [  ]CXR    [  ] CT    [  ]Other  ---------------------------------------------------------------------------  PLEASE CHECK WHEN PRESENT:     [  ] Heart Failure     [  ] Acute     [  ] Acute on Chronic     [  ] Chronic  -------------------------------------------------------------------     [  ]Diastolic [HFpEF]     [  ]Systolic [HFrEF]     [  ]Combined [HFpEF & HFrEF]     [  ]Other:  -------------------------------------------------------------------  [ ] Respiratory failure  [ ] Acute cor pulmonale  [ ] Asthma/COPD Exacerbation  [ ] Pleural effusion  [ ] Aspiration pneumonia  -------------------------------------------------------------------  [  ]KAREN     [  ]ATN     [  ]Reneal Medullary Necrosis     [  ]Renal Cortical Necrosis     [  ]Other Pathological Lesions:    [  ]CKD 1  [  ]CKD 2  [  ]CKD 3  [  ]CKD 4  [  ]CKD 5  [  ]Other  -------------------------------------------------------------------  [ x ]Diabetes  [  ] Diabetic PVD Ulcer  [  ] Neuropathic ulcer to DM  [  ] Diabetes with Nephropathy  [  ] Osteomyelitis due to diabetes  --------------------------------------------------------------------  [  ]Malnutrition: See Nutrition Note  [  ]Cachexia  [  ]Other:   [  ]Supplement Ordered:  [  ]Morbid Obesity (BMI >=40]  ---------------------------------------------------------------------  [ ] Sepsis/severe sepsis/septic shock  [ ] UTI  [ ] Pneumonia  -----------------------------------------------------------------------  [ ] Acidosis/alkalosis  [ ] Fluid overload  [ ] Hypokalemia  [ ] Hyperkalemia  [ ] Hypomagnesemia  [ ] Hypophosphatemia  [ ] Hyperphosphatemia  ------------------------------------------------------------------------  [ ] Acute blood loss anemia  [ ] Post op blood loss anemia  [ ] Iron deficiency anemia  [ ] Anemia due to chronic disease  [ ] Hypercoagulable state  ----------------------------------------------------------------------  [ x] Cerebral infarction  [ ] Transient ischemia attack  [ ] Encephalopathy          84 yo F with h/o CAD, DM, stroke s/p R ICA stent, and PAD s/p BLE bypasses and 1st toe amps. L 1st toe amp site with an infected wound now, and likely LLE SFA-DP bypass with vein graft is thrombosed due to poorly audible signals in L foot.    Plan:  - admit to vasc surg, Dr. Castellanos  - prn pain control  - home meds, but holding ARB (angio tomorrow)  - diabetic diet now, NPO with IVF past midnight  - I/Os  - ISS  - Abx: vanc, cipro, flagyl (PCN allergy: whole body edema)  - f/u wound cultures, f/u blood cultures  - wound care to L foot: saline WTD, kerlix, clean with betadine/peroxide daily  - bedrest tonight  - DVT ppx (Lovenox, no SCDs)  - AM labs  - NPO p MN for OR tomorrow: LLE angio, possible L foot wound debridement (already added on, needs pre-op workup completed and consent)  - discussed with Dr. Castellanos

## 2020-02-01 LAB
-  CEFAZOLIN: SIGNIFICANT CHANGE UP
-  CLINDAMYCIN: SIGNIFICANT CHANGE UP
-  DAPTOMYCIN: SIGNIFICANT CHANGE UP
-  ERYTHROMYCIN: SIGNIFICANT CHANGE UP
-  LINEZOLID: SIGNIFICANT CHANGE UP
-  OXACILLIN: SIGNIFICANT CHANGE UP
-  PENICILLIN: SIGNIFICANT CHANGE UP
-  RIFAMPIN: SIGNIFICANT CHANGE UP
-  TETRACYCLINE: SIGNIFICANT CHANGE UP
-  TRIMETHOPRIM/SULFAMETHOXAZOLE: SIGNIFICANT CHANGE UP
-  VANCOMYCIN: SIGNIFICANT CHANGE UP
CULTURE RESULTS: SIGNIFICANT CHANGE UP
GLUCOSE BLDC GLUCOMTR-MCNC: 115 MG/DL — HIGH (ref 70–99)
GLUCOSE BLDC GLUCOMTR-MCNC: 122 MG/DL — HIGH (ref 70–99)
GLUCOSE BLDC GLUCOMTR-MCNC: 194 MG/DL — HIGH (ref 70–99)
GLUCOSE BLDC GLUCOMTR-MCNC: 219 MG/DL — HIGH (ref 70–99)
METHOD TYPE: SIGNIFICANT CHANGE UP
METHOD TYPE: SIGNIFICANT CHANGE UP
ORGANISM # SPEC MICROSCOPIC CNT: SIGNIFICANT CHANGE UP
SPECIMEN SOURCE: SIGNIFICANT CHANGE UP
VANCOMYCIN TROUGH SERPL-MCNC: 22.6 UG/ML — HIGH (ref 10–20)
VANCOMYCIN TROUGH SERPL-MCNC: 24.9 UG/ML — HIGH (ref 10–20)

## 2020-02-01 PROCEDURE — 99233 SBSQ HOSP IP/OBS HIGH 50: CPT | Mod: GC

## 2020-02-01 RX ORDER — VANCOMYCIN HCL 1 G
1000 VIAL (EA) INTRAVENOUS EVERY 12 HOURS
Refills: 0 | Status: DISCONTINUED | OUTPATIENT
Start: 2020-02-01 | End: 2020-02-01

## 2020-02-01 RX ADMIN — POLYETHYLENE GLYCOL 3350 17 GRAM(S): 17 POWDER, FOR SOLUTION ORAL at 06:11

## 2020-02-01 RX ADMIN — Medication 100 MILLIGRAM(S): at 06:11

## 2020-02-01 RX ADMIN — AMLODIPINE BESYLATE 10 MILLIGRAM(S): 2.5 TABLET ORAL at 06:08

## 2020-02-01 RX ADMIN — Medication 250 MILLIGRAM(S): at 11:23

## 2020-02-01 RX ADMIN — SENNA PLUS 2 TABLET(S): 8.6 TABLET ORAL at 21:40

## 2020-02-01 RX ADMIN — ENOXAPARIN SODIUM 40 MILLIGRAM(S): 100 INJECTION SUBCUTANEOUS at 06:08

## 2020-02-01 RX ADMIN — Medication 650 MILLIGRAM(S): at 21:41

## 2020-02-01 RX ADMIN — Medication 200 MILLIGRAM(S): at 17:14

## 2020-02-01 RX ADMIN — BRIMONIDINE TARTRATE 1 DROP(S): 2 SOLUTION/ DROPS OPHTHALMIC at 21:42

## 2020-02-01 RX ADMIN — BRIMONIDINE TARTRATE 1 DROP(S): 2 SOLUTION/ DROPS OPHTHALMIC at 13:49

## 2020-02-01 RX ADMIN — Medication 2: at 21:40

## 2020-02-01 RX ADMIN — TICAGRELOR 90 MILLIGRAM(S): 90 TABLET ORAL at 17:14

## 2020-02-01 RX ADMIN — Medication 4: at 12:41

## 2020-02-01 RX ADMIN — GABAPENTIN 300 MILLIGRAM(S): 400 CAPSULE ORAL at 21:40

## 2020-02-01 RX ADMIN — Medication 1 DROP(S): at 06:09

## 2020-02-01 RX ADMIN — Medication 25 MILLIGRAM(S): at 06:08

## 2020-02-01 RX ADMIN — Medication 100 MILLIGRAM(S): at 21:41

## 2020-02-01 RX ADMIN — Medication 81 MILLIGRAM(S): at 11:23

## 2020-02-01 RX ADMIN — TICAGRELOR 90 MILLIGRAM(S): 90 TABLET ORAL at 06:08

## 2020-02-01 RX ADMIN — POLYETHYLENE GLYCOL 3350 17 GRAM(S): 17 POWDER, FOR SOLUTION ORAL at 17:14

## 2020-02-01 RX ADMIN — GABAPENTIN 300 MILLIGRAM(S): 400 CAPSULE ORAL at 13:49

## 2020-02-01 RX ADMIN — LATANOPROST 1 DROP(S): 0.05 SOLUTION/ DROPS OPHTHALMIC; TOPICAL at 21:42

## 2020-02-01 RX ADMIN — Medication 1 DROP(S): at 17:14

## 2020-02-01 RX ADMIN — Medication 1 TABLET(S): at 11:23

## 2020-02-01 RX ADMIN — Medication 100 MILLIGRAM(S): at 13:49

## 2020-02-01 RX ADMIN — Medication 1 DROP(S): at 13:49

## 2020-02-01 RX ADMIN — GABAPENTIN 300 MILLIGRAM(S): 400 CAPSULE ORAL at 06:08

## 2020-02-01 RX ADMIN — BRIMONIDINE TARTRATE 1 DROP(S): 2 SOLUTION/ DROPS OPHTHALMIC at 06:11

## 2020-02-01 RX ADMIN — Medication 25 MILLIGRAM(S): at 17:14

## 2020-02-01 RX ADMIN — Medication 2000 UNIT(S): at 11:23

## 2020-02-01 RX ADMIN — Medication 1 DROP(S): at 21:41

## 2020-02-01 RX ADMIN — ATORVASTATIN CALCIUM 80 MILLIGRAM(S): 80 TABLET, FILM COATED ORAL at 21:42

## 2020-02-01 RX ADMIN — Medication 5 MILLIGRAM(S): at 21:40

## 2020-02-01 RX ADMIN — Medication 25 MILLIGRAM(S): at 11:23

## 2020-02-01 RX ADMIN — Medication 200 MILLIGRAM(S): at 06:12

## 2020-02-01 NOTE — PROGRESS NOTE ADULT - SUBJECTIVE AND OBJECTIVE BOX
O/N: VALERIE, VSS                            86 yo F with h/o CAD, DM, stroke s/p R ICA stent, and PAD s/p BLE bypasses and 1st toe amps. L 1st toe amp site with an infected wound now, and likely LLE SFA-DP bypass with vein graft is thrombosed due to poorly audible signals in L foot.    Plan:  - prn pain control  - home meds, but holding ARB (angio tomorrow)  - diabetic diet now, NPO with IVF past midnight  - I/Os  - ISS  - Abx: vanc, cipro, flagyl (PCN allergy: whole body edema)  - f/u wound cultures, f/u blood cultures  - wound care to L foot: saline WTD, kerlix, clean with betadine/peroxide daily  - DVT ppx (Lovenox, no SCDs)  - AM labs O/N: VALERIE, VSS                ciprofloxacin   IVPB 400  ciprofloxacin   IVPB   metroNIDAZOLE  IVPB   metroNIDAZOLE  IVPB 500  vancomycin  IVPB 1250  amLODIPine   Tablet 10  aspirin enteric coated 81  ciprofloxacin   IVPB 400  ciprofloxacin   IVPB   enoxaparin Injectable 40  hydrALAZINE 25  metoprolol succinate ER 25  metroNIDAZOLE  IVPB   metroNIDAZOLE  IVPB 500  ticagrelor 90  vancomycin  IVPB 1250        Vital Signs Last 24 Hrs  T(C): 36.6 (01 Feb 2020 06:00), Max: 36.6 (31 Jan 2020 10:45)  T(F): 97.9 (01 Feb 2020 06:00), Max: 97.9 (01 Feb 2020 06:00)  HR: 63 (01 Feb 2020 06:06) (50 - 67)  BP: 120/58 (01 Feb 2020 06:06) (105/51 - 130/58)  BP(mean): --  RR: 16 (01 Feb 2020 06:06) (16 - 20)  SpO2: 100% (01 Feb 2020 06:06) (99% - 100%)  I&O's Summary    31 Jan 2020 07:01  -  01 Feb 2020 07:00  --------------------------------------------------------  IN: 0 mL / OUT: 550 mL / NET: -550 mL      Physical Exam:    General: NAD  Pulm: normal resp effort  CV: NSR  Abdomen: abd soft, NT/ND  LLE: palp fem, biphasic pop, weak mono DP/PT. Open shallow ulcer with white exudate in wound at prior 1st toe amp site, some underlying granulation tissue, with foul odor, does not probe to bone, is tender though.  RLE: palp fem, biphasic pop/DP/PT. Healed 1st toe amp site.  	ABIs not performed due to tibial bypass on R and pedal bypass on L.  	No motor or sensory deficit in either foot.      LABS:                        10.4   5.28  )-----------( 166      ( 31 Jan 2020 06:27 )             33.2     01-31    141  |  110<H>  |  24<H>  ----------------------------<  114<H>  4.1   |  20<L>  |  0.85    Ca    9.0      31 Jan 2020 06:27  Phos  4.0     01-31  Mg     2.1     01-31    TPro  6.4  /  Alb  3.4  /  TBili  0.2  /  DBili  x   /  AST  25  /  ALT  31  /  AlkPhos  94  01-30    PT/INR - ( 31 Jan 2020 06:27 )   PT: 11.7 sec;   INR: 1.03          PTT - ( 31 Jan 2020 06:27 )  PTT:19.9 sec      RADIOLOGY & ADDITIONAL TESTS:    ---------------------------------------------------------------------------  I personally reviewed: [  ]EKG   [  ]CXR    [  ] CT    [  ]Other  ---------------------------------------------------------------------------  PLEASE CHECK WHEN PRESENT:     [  ] Heart Failure     [  ] Acute     [  ] Acute on Chronic     [  ] Chronic  -------------------------------------------------------------------     [  ]Diastolic [HFpEF]     [  ]Systolic [HFrEF]     [  ]Combined [HFpEF & HFrEF]     [  ]Other:  -------------------------------------------------------------------  [ ] Respiratory failure  [ ] Acute cor pulmonale  [ ] Asthma/COPD Exacerbation  [ ] Pleural effusion  [ ] Aspiration pneumonia  -------------------------------------------------------------------  [  ]KAREN     [  ]ATN     [  ]Reneal Medullary Necrosis     [  ]Renal Cortical Necrosis     [  ]Other Pathological Lesions:    [  ]CKD 1  [  ]CKD 2  [  ]CKD 3  [  ]CKD 4  [  ]CKD 5  [  ]Other  -------------------------------------------------------------------  [ x ]Diabetes  [  ] Diabetic PVD Ulcer  [  ] Neuropathic ulcer to DM  [  ] Diabetes with Nephropathy  [  ] Osteomyelitis due to diabetes  --------------------------------------------------------------------  [  ]Malnutrition: See Nutrition Note  [  ]Cachexia  [  ]Other:   [  ]Supplement Ordered:  [  ]Morbid Obesity (BMI >=40]  ---------------------------------------------------------------------  [ ] Sepsis/severe sepsis/septic shock  [ ] UTI  [ ] Pneumonia  -----------------------------------------------------------------------  [ ] Acidosis/alkalosis  [ ] Fluid overload  [ ] Hypokalemia  [ ] Hyperkalemia  [ ] Hypomagnesemia  [ ] Hypophosphatemia  [ ] Hyperphosphatemia  ------------------------------------------------------------------------  [ ] Acute blood loss anemia  [ ] Post op blood loss anemia  [ ] Iron deficiency anemia  [ ] Anemia due to chronic disease  [ ] Hypercoagulable state  ----------------------------------------------------------------------  [ x] Cerebral infarction  [ ] Transient ischemia attack  [ ] Encephalopathy            84 yo F with h/o CAD, DM, stroke s/p R ICA stent, and PAD s/p BLE bypasses and 1st toe amps. L 1st toe amp site with an infected wound now, and likely LLE SFA-DP bypass with vein graft is thrombosed due to poorly audible signals in L foot.    Plan:  - prn pain control  - home meds, but holding ARB (angio tomorrow)  - diabetic diet now, NPO with IVF past midnight  - I/Os  - ISS  - Abx: vanc, cipro, flagyl (PCN allergy: whole body edema)  - f/u wound cultures, f/u blood cultures  - wound care to L foot: saline WTD, kerlix, clean with betadine/peroxide daily  - DVT ppx (Lovenox, no SCDs)  - AM labs

## 2020-02-01 NOTE — PROGRESS NOTE ADULT - SUBJECTIVE AND OBJECTIVE BOX
Interval Events:  Patient seen and examined at bedside. No acute complaints, pain in foot is well controlled. Good appetite, no nausea/vomiting/diarrhea. + constipation and dry cough.     MEDICATIONS:  Pulmonary:    Antimicrobials:  ciprofloxacin   IVPB 400 milliGRAM(s) IV Intermittent every 12 hours  ciprofloxacin   IVPB      metroNIDAZOLE  IVPB      metroNIDAZOLE  IVPB 500 milliGRAM(s) IV Intermittent every 8 hours    Anticoagulants:  aspirin enteric coated 81 milliGRAM(s) Oral daily  enoxaparin Injectable 40 milliGRAM(s) SubCutaneous every 24 hours  ticagrelor 90 milliGRAM(s) Oral two times a day    Cardiac:  amLODIPine   Tablet 10 milliGRAM(s) Oral daily  hydrALAZINE 25 milliGRAM(s) Oral four times a day  metoprolol succinate ER 25 milliGRAM(s) Oral daily      Allergies    Goldbond (Unknown)  penicillins (Anaphylaxis)    Intolerances        Vital Signs Last 24 Hrs  T(C): 36.6 (01 Feb 2020 14:40), Max: 36.6 (01 Feb 2020 06:00)  T(F): 97.9 (01 Feb 2020 14:40), Max: 97.9 (01 Feb 2020 06:00)  HR: 78 (01 Feb 2020 17:10) (62 - 78)  BP: 161/68 (01 Feb 2020 17:10) (105/55 - 161/68)  BP(mean): --  RR: 16 (01 Feb 2020 17:10) (16 - 20)  SpO2: 99% (01 Feb 2020 17:10) (98% - 100%)    01-31 @ 07:01 - 02-01 @ 07:00  --------------------------------------------------------  IN: 0 mL / OUT: 550 mL / NET: -550 mL    02-01 @ 07:01 - 02-01 @ 19:41  --------------------------------------------------------  IN: 180 mL / OUT: 300 mL / NET: -120 mL          PHYSICAL EXAM:    General: Well developed; well nourished; in no acute distress  Eyes: PERRL, EOM intact; conjunctiva and sclera clear  Respiratory: Decreased BS at bases  Cardiovascular: Regular rate and rhythm. S1 and S2 Normal; No murmurs, gallops or rubs  Gastrointestinal: Soft non-tender non-distended; Normal bowel sounds  Extremities: L foot with dressing C/D/I, no C/C/E  Neurological: Alert and oriented x3  Skin: Warm and dry. No obvious rash    LABS:      CBC Full  -  ( 31 Jan 2020 06:27 )  WBC Count : 5.28 K/uL  RBC Count : 3.68 M/uL  Hemoglobin : 10.4 g/dL  Hematocrit : 33.2 %  Platelet Count - Automated : 166 K/uL  Mean Cell Volume : 90.2 fl  Mean Cell Hemoglobin : 28.3 pg  Mean Cell Hemoglobin Concentration : 31.3 gm/dL  Auto Neutrophil # : 2.92 K/uL  Auto Lymphocyte # : 1.41 K/uL  Auto Monocyte # : 0.74 K/uL  Auto Eosinophil # : 0.18 K/uL  Auto Basophil # : 0.02 K/uL  Auto Neutrophil % : 55.3 %  Auto Lymphocyte % : 26.7 %  Auto Monocyte % : 14.0 %  Auto Eosinophil % : 3.4 %  Auto Basophil % : 0.4 %    01-31    141  |  110<H>  |  24<H>  ----------------------------<  114<H>  4.1   |  20<L>  |  0.85    Ca    9.0      31 Jan 2020 06:27  Phos  4.0     01-31  Mg     2.1     01-31    TPro  6.4  /  Alb  3.4  /  TBili  0.2  /  DBili  x   /  AST  25  /  ALT  31  /  AlkPhos  94  01-30    PT/INR - ( 31 Jan 2020 06:27 )   PT: 11.7 sec;   INR: 1.03          PTT - ( 31 Jan 2020 06:27 )  PTT:19.9 sec                  RADIOLOGY & ADDITIONAL STUDIES (The following images were personally reviewed):

## 2020-02-01 NOTE — PROGRESS NOTE ADULT - ASSESSMENT
85 year old F p/w L foot diabetic foot infection, medicine following for comanagement.     1) Diabetic foot infection: c/w Vanc/Cipro/Flagyl, for LLE angio Monday 2/3/20. Wound cx with MRSA and corynebacterium. V trough elevated, hold next dose and decrease to 1g IV q12; corynebacterium covered by cipro, F/U BCx  2) PAD: c/w DAPT, statin, LLE angio Monday  3) CAD: on DAPT, c/w statin, c/w metoprolol  4) DM: A1C 6.9, c/w SSI, FS well controlled  5) HTN: c/w norvasc, hydralazine, hold ARB prior to procedure, restart after  6) CVA: on DAPT, c/w statin  7) Glaucoma: c/w home meds  8) DVT ppx: lovenox  9) Neuropathy: c/w gabapentin  10) Constipation: initiate senna

## 2020-02-02 LAB
-  CEFAZOLIN: SIGNIFICANT CHANGE UP
-  CLINDAMYCIN: SIGNIFICANT CHANGE UP
-  ERYTHROMYCIN: SIGNIFICANT CHANGE UP
-  LINEZOLID: SIGNIFICANT CHANGE UP
-  OXACILLIN: SIGNIFICANT CHANGE UP
-  PENICILLIN: SIGNIFICANT CHANGE UP
-  RIFAMPIN: SIGNIFICANT CHANGE UP
-  TRIMETHOPRIM/SULFAMETHOXAZOLE: SIGNIFICANT CHANGE UP
-  VANCOMYCIN: SIGNIFICANT CHANGE UP
ANION GAP SERPL CALC-SCNC: 11 MMOL/L — SIGNIFICANT CHANGE UP (ref 5–17)
BUN SERPL-MCNC: 20 MG/DL — SIGNIFICANT CHANGE UP (ref 7–23)
CALCIUM SERPL-MCNC: 8.8 MG/DL — SIGNIFICANT CHANGE UP (ref 8.4–10.5)
CHLORIDE SERPL-SCNC: 107 MMOL/L — SIGNIFICANT CHANGE UP (ref 96–108)
CO2 SERPL-SCNC: 23 MMOL/L — SIGNIFICANT CHANGE UP (ref 22–31)
CREAT SERPL-MCNC: 0.95 MG/DL — SIGNIFICANT CHANGE UP (ref 0.5–1.3)
GLUCOSE BLDC GLUCOMTR-MCNC: 112 MG/DL — HIGH (ref 70–99)
GLUCOSE BLDC GLUCOMTR-MCNC: 123 MG/DL — HIGH (ref 70–99)
GLUCOSE BLDC GLUCOMTR-MCNC: 139 MG/DL — HIGH (ref 70–99)
GLUCOSE BLDC GLUCOMTR-MCNC: 153 MG/DL — HIGH (ref 70–99)
GLUCOSE SERPL-MCNC: 219 MG/DL — HIGH (ref 70–99)
HCT VFR BLD CALC: 32.1 % — LOW (ref 34.5–45)
HGB BLD-MCNC: 10.3 G/DL — LOW (ref 11.5–15.5)
MAGNESIUM SERPL-MCNC: 1.8 MG/DL — SIGNIFICANT CHANGE UP (ref 1.6–2.6)
MCHC RBC-ENTMCNC: 28.1 PG — SIGNIFICANT CHANGE UP (ref 27–34)
MCHC RBC-ENTMCNC: 32.1 GM/DL — SIGNIFICANT CHANGE UP (ref 32–36)
MCV RBC AUTO: 87.5 FL — SIGNIFICANT CHANGE UP (ref 80–100)
METHOD TYPE: SIGNIFICANT CHANGE UP
NRBC # BLD: 0 /100 WBCS — SIGNIFICANT CHANGE UP (ref 0–0)
PHOSPHATE SERPL-MCNC: 2.8 MG/DL — SIGNIFICANT CHANGE UP (ref 2.5–4.5)
PLATELET # BLD AUTO: 174 K/UL — SIGNIFICANT CHANGE UP (ref 150–400)
POTASSIUM SERPL-MCNC: 3.7 MMOL/L — SIGNIFICANT CHANGE UP (ref 3.5–5.3)
POTASSIUM SERPL-SCNC: 3.7 MMOL/L — SIGNIFICANT CHANGE UP (ref 3.5–5.3)
RBC # BLD: 3.67 M/UL — LOW (ref 3.8–5.2)
RBC # FLD: 15.7 % — HIGH (ref 10.3–14.5)
SODIUM SERPL-SCNC: 141 MMOL/L — SIGNIFICANT CHANGE UP (ref 135–145)
VANCOMYCIN TROUGH SERPL-MCNC: 17.3 UG/ML — SIGNIFICANT CHANGE UP (ref 10–20)
WBC # BLD: 6.5 K/UL — SIGNIFICANT CHANGE UP (ref 3.8–10.5)
WBC # FLD AUTO: 6.5 K/UL — SIGNIFICANT CHANGE UP (ref 3.8–10.5)

## 2020-02-02 RX ORDER — POTASSIUM CHLORIDE 20 MEQ
20 PACKET (EA) ORAL ONCE
Refills: 0 | Status: COMPLETED | OUTPATIENT
Start: 2020-02-02 | End: 2020-02-02

## 2020-02-02 RX ORDER — VANCOMYCIN HCL 1 G
1000 VIAL (EA) INTRAVENOUS EVERY 12 HOURS
Refills: 0 | Status: DISCONTINUED | OUTPATIENT
Start: 2020-02-02 | End: 2020-02-04

## 2020-02-02 RX ORDER — MAGNESIUM SULFATE 500 MG/ML
1 VIAL (ML) INJECTION ONCE
Refills: 0 | Status: COMPLETED | OUTPATIENT
Start: 2020-02-02 | End: 2020-02-02

## 2020-02-02 RX ORDER — SODIUM,POTASSIUM PHOSPHATES 278-250MG
1 POWDER IN PACKET (EA) ORAL
Refills: 0 | Status: COMPLETED | OUTPATIENT
Start: 2020-02-02 | End: 2020-02-02

## 2020-02-02 RX ADMIN — Medication 25 MILLIGRAM(S): at 17:53

## 2020-02-02 RX ADMIN — Medication 1 PACKET(S): at 14:41

## 2020-02-02 RX ADMIN — Medication 650 MILLIGRAM(S): at 12:37

## 2020-02-02 RX ADMIN — GABAPENTIN 300 MILLIGRAM(S): 400 CAPSULE ORAL at 22:04

## 2020-02-02 RX ADMIN — Medication 650 MILLIGRAM(S): at 13:37

## 2020-02-02 RX ADMIN — TICAGRELOR 90 MILLIGRAM(S): 90 TABLET ORAL at 17:53

## 2020-02-02 RX ADMIN — Medication 1 TABLET(S): at 12:37

## 2020-02-02 RX ADMIN — POLYETHYLENE GLYCOL 3350 17 GRAM(S): 17 POWDER, FOR SOLUTION ORAL at 06:30

## 2020-02-02 RX ADMIN — LATANOPROST 1 DROP(S): 0.05 SOLUTION/ DROPS OPHTHALMIC; TOPICAL at 21:05

## 2020-02-02 RX ADMIN — Medication 200 MILLIGRAM(S): at 06:30

## 2020-02-02 RX ADMIN — Medication 20 MILLIEQUIVALENT(S): at 12:37

## 2020-02-02 RX ADMIN — BRIMONIDINE TARTRATE 1 DROP(S): 2 SOLUTION/ DROPS OPHTHALMIC at 07:38

## 2020-02-02 RX ADMIN — Medication 25 MILLIGRAM(S): at 06:59

## 2020-02-02 RX ADMIN — AMLODIPINE BESYLATE 10 MILLIGRAM(S): 2.5 TABLET ORAL at 06:30

## 2020-02-02 RX ADMIN — Medication 100 MILLIGRAM(S): at 07:38

## 2020-02-02 RX ADMIN — TICAGRELOR 90 MILLIGRAM(S): 90 TABLET ORAL at 06:30

## 2020-02-02 RX ADMIN — Medication 81 MILLIGRAM(S): at 12:37

## 2020-02-02 RX ADMIN — Medication 1 DROP(S): at 06:31

## 2020-02-02 RX ADMIN — Medication 250 MILLIGRAM(S): at 12:36

## 2020-02-02 RX ADMIN — Medication 1 DROP(S): at 21:07

## 2020-02-02 RX ADMIN — Medication 5 MILLIGRAM(S): at 22:04

## 2020-02-02 RX ADMIN — Medication 1 PACKET(S): at 12:37

## 2020-02-02 RX ADMIN — ENOXAPARIN SODIUM 40 MILLIGRAM(S): 100 INJECTION SUBCUTANEOUS at 06:31

## 2020-02-02 RX ADMIN — Medication 2: at 16:39

## 2020-02-02 RX ADMIN — BRIMONIDINE TARTRATE 1 DROP(S): 2 SOLUTION/ DROPS OPHTHALMIC at 21:06

## 2020-02-02 RX ADMIN — GABAPENTIN 300 MILLIGRAM(S): 400 CAPSULE ORAL at 14:41

## 2020-02-02 RX ADMIN — GABAPENTIN 300 MILLIGRAM(S): 400 CAPSULE ORAL at 06:30

## 2020-02-02 RX ADMIN — Medication 25 MILLIGRAM(S): at 12:38

## 2020-02-02 RX ADMIN — Medication 100 MILLIGRAM(S): at 14:41

## 2020-02-02 RX ADMIN — Medication 25 MILLIGRAM(S): at 00:53

## 2020-02-02 RX ADMIN — Medication 1 DROP(S): at 17:53

## 2020-02-02 RX ADMIN — Medication 100 GRAM(S): at 12:36

## 2020-02-02 RX ADMIN — BRIMONIDINE TARTRATE 1 DROP(S): 2 SOLUTION/ DROPS OPHTHALMIC at 14:42

## 2020-02-02 RX ADMIN — ATORVASTATIN CALCIUM 80 MILLIGRAM(S): 80 TABLET, FILM COATED ORAL at 22:04

## 2020-02-02 RX ADMIN — Medication 1 DROP(S): at 06:32

## 2020-02-02 RX ADMIN — Medication 200 MILLIGRAM(S): at 17:53

## 2020-02-02 RX ADMIN — SENNA PLUS 2 TABLET(S): 8.6 TABLET ORAL at 22:04

## 2020-02-02 RX ADMIN — Medication 1 DROP(S): at 14:41

## 2020-02-02 RX ADMIN — Medication 25 MILLIGRAM(S): at 12:37

## 2020-02-02 RX ADMIN — Medication 2000 UNIT(S): at 12:37

## 2020-02-02 NOTE — PROGRESS NOTE ADULT - SUBJECTIVE AND OBJECTIVE BOX
INTERVAL HPI/OVERNIGHT EVENTS:  ON: Recheck Vanc trough at 9pm - 22. Holding evening dose. Tmax 100.1 oral/100.7 rectal, treated w/ tylenol.   2/1: Vanc trough elevated 24. Reduced dose to 1000g and given at 10am as per medicine    No acute complaints. Denies pain. Denies N/V. Denies CP/SOB.    Review of Systems: 12 point review of systems otherwise negative    MEDICATIONS  (STANDING):  amLODIPine   Tablet 10 milliGRAM(s) Oral daily  aspirin enteric coated 81 milliGRAM(s) Oral daily  atorvastatin 80 milliGRAM(s) Oral at bedtime  brimonidine 0.2% Ophthalmic Solution 1 Drop(s) Both EYES three times a day  calcium carbonate 1250 mG  + Vitamin D (OsCal 500 + D) 1 Tablet(s) Oral daily  cholecalciferol 2000 Unit(s) Oral daily  ciprofloxacin   IVPB 400 milliGRAM(s) IV Intermittent every 12 hours  ciprofloxacin   IVPB      dextrose 5%. 1000 milliLiter(s) (50 mL/Hr) IV Continuous <Continuous>  dextrose 50% Injectable 12.5 Gram(s) IV Push once  enoxaparin Injectable 40 milliGRAM(s) SubCutaneous every 24 hours  gabapentin 300 milliGRAM(s) Oral three times a day  hydrALAZINE 25 milliGRAM(s) Oral four times a day  insulin lispro (HumaLOG) corrective regimen sliding scale   SubCutaneous Before meals and at bedtime  latanoprost 0.005% Ophthalmic Solution 1 Drop(s) Both EYES at bedtime  magnesium sulfate  IVPB 1 Gram(s) IV Intermittent once  melatonin 5 milliGRAM(s) Oral at bedtime  metoprolol succinate ER 25 milliGRAM(s) Oral daily  metroNIDAZOLE  IVPB      metroNIDAZOLE  IVPB 500 milliGRAM(s) IV Intermittent every 8 hours  pilocarpine 2% Solution 1 Drop(s) Left EYE three times a day  polyethylene glycol 3350 17 Gram(s) Oral two times a day  potassium chloride    Tablet ER 20 milliEquivalent(s) Oral once  potassium phosphate / sodium phosphate powder 1 Packet(s) Oral every 2 hours  senna 2 Tablet(s) Oral at bedtime  ticagrelor 90 milliGRAM(s) Oral two times a day  timolol 0.5% Solution 1 Drop(s) Both EYES two times a day    MEDICATIONS  (PRN):  acetaminophen   Tablet .. 650 milliGRAM(s) Oral every 6 hours PRN Moderate Pain (4 - 6)  dextrose 40% Gel 15 Gram(s) Oral once PRN Blood Glucose LESS THAN 70 milliGRAM(s)/deciliter  glucagon  Injectable 1 milliGRAM(s) IntraMuscular once PRN Glucose LESS THAN 70 milligrams/deciliter    Allergies  Goldbond (Unknown)  penicillins (Anaphylaxis)    Vital Signs Last 24 Hrs  T(C): 36.2 (02 Feb 2020 08:56), Max: 38.2 (01 Feb 2020 23:12)  T(F): 97.2 (02 Feb 2020 08:56), Max: 100.7 (01 Feb 2020 23:12)  HR: 77 (02 Feb 2020 08:17) (71 - 86)  BP: 122/57 (02 Feb 2020 08:17) (122/57 - 161/68)  BP(mean): --  RR: 17 (02 Feb 2020 08:17) (16 - 18)  SpO2: 96% (02 Feb 2020 08:17) (96% - 99%)    CAPILLARY BLOOD GLUCOSE  POCT Blood Glucose.: 123 mg/dL (02 Feb 2020 06:54)  POCT Blood Glucose.: 194 mg/dL (01 Feb 2020 21:28)  POCT Blood Glucose.: 122 mg/dL (01 Feb 2020 17:07)  POCT Blood Glucose.: 219 mg/dL (01 Feb 2020 12:21)    02-01 @ 07:01  -  02-02 @ 07:00  --------------------------------------------------------  IN: 180 mL / OUT: 1100 mL / NET: -920 mL      Physical Exam:    General: NAD  Pulm: normal resp effort  LLE: palp fem, biphasic pop, weak mono DP/PT. Open shallow ulcer with white exudate in wound at prior 1st toe amp site, some underlying granulation tissue, with foul odor, does not probe to bone, is tender though.  RLE: palp fem, biphasic pop/DP/PT. Healed 1st toe amp site.    LABS:                    10.3   6.50  )-----------( 174      ( 02 Feb 2020 10:14 )             32.1     02-02  141  |  107  |  20  ----------------------------<  219<H>  3.7   |  23  |  0.95    Ca    8.8      02 Feb 2020 10:14  Phos  2.8     02-02  Mg     1.8     02-02              RADIOLOGY & ADDITIONAL TESTS:    PLEASE CHECK WHEN PRESENT:     [  ] Heart Failure     [  ] Acute     [  ] Acute on Chronic     [  ] Chronic  -------------------------------------------------------------------     [  ]Diastolic [HFpEF]     [  ]Systolic [HFrEF]     [  ]Combined [HFpEF & HFrEF]     [  ]Other:  -------------------------------------------------------------------  [ ] Respiratory failure  [ ] Acute cor pulmonale  [ ] Asthma/COPD Exacerbation  [ ] Pleural effusion  [ ] Aspiration pneumonia  -------------------------------------------------------------------  [  ]KAREN     [  ]ATN     [  ]Reneal Medullary Necrosis     [  ]Renal Cortical Necrosis     [  ]Other Pathological Lesions:    [  ]CKD 1  [  ]CKD 2  [  ]CKD 3  [  ]CKD 4  [  ]CKD 5  [  ]Other  -------------------------------------------------------------------  [ x ]Diabetes  [  ] Diabetic PVD Ulcer  [  ] Neuropathic ulcer to DM  [  ] Diabetes with Nephropathy  [  ] Osteomyelitis due to diabetes  --------------------------------------------------------------------  [  ]Malnutrition: See Nutrition Note  [  ]Cachexia  [  ]Other:   [  ]Supplement Ordered:  [  ]Morbid Obesity (BMI >=40]  ---------------------------------------------------------------------  [ ] Sepsis/severe sepsis/septic shock  [ ] UTI  [ ] Pneumonia  -----------------------------------------------------------------------  [ ] Acidosis/alkalosis  [ ] Fluid overload  [ ] Hypokalemia  [ ] Hyperkalemia  [ ] Hypomagnesemia  [ ] Hypophosphatemia  [ ] Hyperphosphatemia  ------------------------------------------------------------------------  [ ] Acute blood loss anemia  [ ] Post op blood loss anemia  [ ] Iron deficiency anemia  [ ] Anemia due to chronic disease  [ ] Hypercoagulable state  ----------------------------------------------------------------------  [ x] Cerebral infarction  [ ] Transient ischemia attack  [ ] Encephalopathy      Assessment/Plan: 85y Female

## 2020-02-02 NOTE — PROGRESS NOTE ADULT - SUBJECTIVE AND OBJECTIVE BOX
PRE-OP NOTE, Vascular Surgery, PCN:     Pre-op Diagnosis: BYPASS GRAFT MECHANICALCOMPLICATION  Bypass graft mechanical complication, initial encounter    Procedure: LLE Angiogram, possible angioplasty/stent, possible 1st toe debridement  Surgeon: Emanuel                        10.3   6.50  )-----------( 174      ( 02 Feb 2020 10:14 )             32.1     02-02  141  |  107  |  20  ----------------------------<  219<H>  3.7   |  23  |  0.95    Ca    8.8      02 Feb 2020 10:14  Phos  2.8     02-02  Mg     1.8     02-02    CAPILLARY BLOOD GLUCOSE  POCT Blood Glucose.: 112 mg/dL (02 Feb 2020 12:22)  POCT Blood Glucose.: 123 mg/dL (02 Feb 2020 06:54)  POCT Blood Glucose.: 194 mg/dL (01 Feb 2020 21:28)  POCT Blood Glucose.: 122 mg/dL (01 Feb 2020 17:07)    Type & Screen: Repeat pending AM labs  CXR: In Computer  EKG: In Chart  Echocardiogram:  < from: TTE Echo w/Cont Complete (11.14.18 @ 11:44) >  Interpretation Summary  Normal left ventricular size and wall thickness.The left ventricular wall   motion is normal.The left ventricular ejection fraction is normal.The   left   ventricular ejection fraction is estimated to be 60-65%The left atrium is   dilated.Right atrial size is normal.The right ventricle is normal in   size and   function.No evidence for any hemodynamically significant valvular   disease.There is no echocardiographic evidence for pulmonary   hypertension.No   aortic root dilatation.There is no pericardial effusion.    < end of copied text >    A/P: 85y Female pre-op for above procedure  - NPO past midnight  - 2U PRBC on hold  - IVF past midnight  - Consent: In chart

## 2020-02-02 NOTE — PROGRESS NOTE ADULT - ASSESSMENT
84 yo F with h/o CAD, DM, stroke s/p R ICA stent, and PAD s/p BLE bypasses and 1st toe amps. L 1st toe amp site with an infected wound now, and likely LLE SFA-DP bypass with vein graft is thrombosed due to poorly audible signals in L foot.    Plan:  - prn pain control  - home meds, but holding ARB (angio tomorrow)  - diabetic diet now, NPO with IVF past midnight  - I/Os  - ISS  - Abx: vanc, cipro, flagyl (PCN allergy: whole body edema)  - f/u wound cultures, f/u blood cultures  - wound care to L foot: saline WTD, kerlix, clean with betadine/peroxide daily  - DVT ppx (Lovenox, no SCDs)  - Pre-op for OR tomorrow

## 2020-02-03 ENCOUNTER — APPOINTMENT (OUTPATIENT)
Dept: VASCULAR SURGERY | Facility: CLINIC | Age: 85
End: 2020-02-03

## 2020-02-03 DIAGNOSIS — I73.9 PERIPHERAL VASCULAR DISEASE, UNSPECIFIED: ICD-10-CM

## 2020-02-03 LAB
ANION GAP SERPL CALC-SCNC: 10 MMOL/L — SIGNIFICANT CHANGE UP (ref 5–17)
APTT BLD: 26.7 SEC — LOW (ref 27.5–36.3)
BLD GP AB SCN SERPL QL: NEGATIVE — SIGNIFICANT CHANGE UP
BUN SERPL-MCNC: 22 MG/DL — SIGNIFICANT CHANGE UP (ref 7–23)
CALCIUM SERPL-MCNC: 8.9 MG/DL — SIGNIFICANT CHANGE UP (ref 8.4–10.5)
CHLORIDE SERPL-SCNC: 108 MMOL/L — SIGNIFICANT CHANGE UP (ref 96–108)
CO2 SERPL-SCNC: 23 MMOL/L — SIGNIFICANT CHANGE UP (ref 22–31)
CREAT SERPL-MCNC: 0.91 MG/DL — SIGNIFICANT CHANGE UP (ref 0.5–1.3)
GLUCOSE BLDC GLUCOMTR-MCNC: 114 MG/DL — HIGH (ref 70–99)
GLUCOSE BLDC GLUCOMTR-MCNC: 134 MG/DL — HIGH (ref 70–99)
GLUCOSE BLDC GLUCOMTR-MCNC: 152 MG/DL — HIGH (ref 70–99)
GLUCOSE BLDC GLUCOMTR-MCNC: 217 MG/DL — HIGH (ref 70–99)
GLUCOSE BLDC GLUCOMTR-MCNC: 276 MG/DL — HIGH (ref 70–99)
GLUCOSE BLDC GLUCOMTR-MCNC: 97 MG/DL — SIGNIFICANT CHANGE UP (ref 70–99)
GLUCOSE SERPL-MCNC: 129 MG/DL — HIGH (ref 70–99)
GRAM STN FLD: SIGNIFICANT CHANGE UP
HCT VFR BLD CALC: 32.1 % — LOW (ref 34.5–45)
HGB BLD-MCNC: 10.4 G/DL — LOW (ref 11.5–15.5)
INR BLD: 1.08 — SIGNIFICANT CHANGE UP (ref 0.88–1.16)
MAGNESIUM SERPL-MCNC: 2.1 MG/DL — SIGNIFICANT CHANGE UP (ref 1.6–2.6)
MCHC RBC-ENTMCNC: 28.5 PG — SIGNIFICANT CHANGE UP (ref 27–34)
MCHC RBC-ENTMCNC: 32.4 GM/DL — SIGNIFICANT CHANGE UP (ref 32–36)
MCV RBC AUTO: 87.9 FL — SIGNIFICANT CHANGE UP (ref 80–100)
NRBC # BLD: 0 /100 WBCS — SIGNIFICANT CHANGE UP (ref 0–0)
PHOSPHATE SERPL-MCNC: 3.6 MG/DL — SIGNIFICANT CHANGE UP (ref 2.5–4.5)
PLATELET # BLD AUTO: 155 K/UL — SIGNIFICANT CHANGE UP (ref 150–400)
POTASSIUM SERPL-MCNC: 4 MMOL/L — SIGNIFICANT CHANGE UP (ref 3.5–5.3)
POTASSIUM SERPL-SCNC: 4 MMOL/L — SIGNIFICANT CHANGE UP (ref 3.5–5.3)
PROTHROM AB SERPL-ACNC: 12.4 SEC — SIGNIFICANT CHANGE UP (ref 10–12.9)
RBC # BLD: 3.65 M/UL — LOW (ref 3.8–5.2)
RBC # FLD: 15.9 % — HIGH (ref 10.3–14.5)
RH IG SCN BLD-IMP: POSITIVE — SIGNIFICANT CHANGE UP
SODIUM SERPL-SCNC: 141 MMOL/L — SIGNIFICANT CHANGE UP (ref 135–145)
SPECIMEN SOURCE: SIGNIFICANT CHANGE UP
WBC # BLD: 5.34 K/UL — SIGNIFICANT CHANGE UP (ref 3.8–10.5)
WBC # FLD AUTO: 5.34 K/UL — SIGNIFICANT CHANGE UP (ref 3.8–10.5)

## 2020-02-03 PROCEDURE — 75710 ARTERY X-RAYS ARM/LEG: CPT | Mod: 26,GC

## 2020-02-03 PROCEDURE — 36246 INS CATH ABD/L-EXT ART 2ND: CPT | Mod: GC

## 2020-02-03 PROCEDURE — 99232 SBSQ HOSP IP/OBS MODERATE 35: CPT

## 2020-02-03 PROCEDURE — 11042 DBRDMT SUBQ TIS 1ST 20SQCM/<: CPT | Mod: GC

## 2020-02-03 PROCEDURE — 75625 CONTRAST EXAM ABDOMINL AORTA: CPT | Mod: 26,GC

## 2020-02-03 RX ORDER — PENICILLIN G POTASSIUM 5000000 [IU]/1
1 POWDER, FOR SOLUTION INTRAMUSCULAR; INTRAPLEURAL; INTRATHECAL; INTRAVENOUS ONCE
Refills: 0 | Status: DISCONTINUED | OUTPATIENT
Start: 2020-02-03 | End: 2020-02-04

## 2020-02-03 RX ORDER — BENZYLPENICILLOYL POLYLYSINE 0.25 ML
0.25 AMPUL (ML) INTRADERMAL ONCE
Refills: 0 | Status: DISCONTINUED | OUTPATIENT
Start: 2020-02-03 | End: 2020-02-04

## 2020-02-03 RX ORDER — OXYCODONE AND ACETAMINOPHEN 5; 325 MG/1; MG/1
1 TABLET ORAL ONCE
Refills: 0 | Status: DISCONTINUED | OUTPATIENT
Start: 2020-02-03 | End: 2020-02-03

## 2020-02-03 RX ORDER — SODIUM CHLORIDE 9 MG/ML
1000 INJECTION INTRAMUSCULAR; INTRAVENOUS; SUBCUTANEOUS
Refills: 0 | Status: DISCONTINUED | OUTPATIENT
Start: 2020-02-03 | End: 2020-02-03

## 2020-02-03 RX ORDER — SODIUM CHLORIDE 9 MG/ML
0.5 INJECTION INTRAMUSCULAR; INTRAVENOUS; SUBCUTANEOUS ONCE
Refills: 0 | Status: DISCONTINUED | OUTPATIENT
Start: 2020-02-03 | End: 2020-02-04

## 2020-02-03 RX ADMIN — AMLODIPINE BESYLATE 10 MILLIGRAM(S): 2.5 TABLET ORAL at 05:40

## 2020-02-03 RX ADMIN — Medication 25 MILLIGRAM(S): at 07:17

## 2020-02-03 RX ADMIN — Medication 25 MILLIGRAM(S): at 18:37

## 2020-02-03 RX ADMIN — Medication 25 MILLIGRAM(S): at 05:40

## 2020-02-03 RX ADMIN — POLYETHYLENE GLYCOL 3350 17 GRAM(S): 17 POWDER, FOR SOLUTION ORAL at 18:37

## 2020-02-03 RX ADMIN — BRIMONIDINE TARTRATE 1 DROP(S): 2 SOLUTION/ DROPS OPHTHALMIC at 15:33

## 2020-02-03 RX ADMIN — ENOXAPARIN SODIUM 40 MILLIGRAM(S): 100 INJECTION SUBCUTANEOUS at 07:17

## 2020-02-03 RX ADMIN — SODIUM CHLORIDE 60 MILLILITER(S): 9 INJECTION INTRAMUSCULAR; INTRAVENOUS; SUBCUTANEOUS at 02:03

## 2020-02-03 RX ADMIN — LATANOPROST 1 DROP(S): 0.05 SOLUTION/ DROPS OPHTHALMIC; TOPICAL at 21:12

## 2020-02-03 RX ADMIN — Medication 1 TABLET(S): at 15:33

## 2020-02-03 RX ADMIN — TICAGRELOR 90 MILLIGRAM(S): 90 TABLET ORAL at 05:40

## 2020-02-03 RX ADMIN — Medication 250 MILLIGRAM(S): at 00:53

## 2020-02-03 RX ADMIN — Medication 200 MILLIGRAM(S): at 05:40

## 2020-02-03 RX ADMIN — BRIMONIDINE TARTRATE 1 DROP(S): 2 SOLUTION/ DROPS OPHTHALMIC at 21:11

## 2020-02-03 RX ADMIN — GABAPENTIN 300 MILLIGRAM(S): 400 CAPSULE ORAL at 22:07

## 2020-02-03 RX ADMIN — Medication 25 MILLIGRAM(S): at 15:33

## 2020-02-03 RX ADMIN — BRIMONIDINE TARTRATE 1 DROP(S): 2 SOLUTION/ DROPS OPHTHALMIC at 05:41

## 2020-02-03 RX ADMIN — OXYCODONE AND ACETAMINOPHEN 1 TABLET(S): 5; 325 TABLET ORAL at 16:33

## 2020-02-03 RX ADMIN — Medication 2000 UNIT(S): at 15:32

## 2020-02-03 RX ADMIN — Medication 4: at 22:07

## 2020-02-03 RX ADMIN — TICAGRELOR 90 MILLIGRAM(S): 90 TABLET ORAL at 18:37

## 2020-02-03 RX ADMIN — Medication 250 MILLIGRAM(S): at 12:50

## 2020-02-03 RX ADMIN — Medication 1 DROP(S): at 18:38

## 2020-02-03 RX ADMIN — Medication 5 MILLIGRAM(S): at 22:07

## 2020-02-03 RX ADMIN — GABAPENTIN 300 MILLIGRAM(S): 400 CAPSULE ORAL at 15:33

## 2020-02-03 RX ADMIN — Medication 25 MILLIGRAM(S): at 00:53

## 2020-02-03 RX ADMIN — Medication 1 DROP(S): at 05:41

## 2020-02-03 RX ADMIN — Medication 1 DROP(S): at 15:33

## 2020-02-03 RX ADMIN — SENNA PLUS 2 TABLET(S): 8.6 TABLET ORAL at 22:07

## 2020-02-03 RX ADMIN — Medication 81 MILLIGRAM(S): at 15:33

## 2020-02-03 RX ADMIN — Medication 2: at 07:17

## 2020-02-03 RX ADMIN — Medication 6: at 18:48

## 2020-02-03 RX ADMIN — GABAPENTIN 300 MILLIGRAM(S): 400 CAPSULE ORAL at 05:40

## 2020-02-03 RX ADMIN — OXYCODONE AND ACETAMINOPHEN 1 TABLET(S): 5; 325 TABLET ORAL at 15:33

## 2020-02-03 RX ADMIN — ATORVASTATIN CALCIUM 80 MILLIGRAM(S): 80 TABLET, FILM COATED ORAL at 22:07

## 2020-02-03 RX ADMIN — Medication 1 DROP(S): at 21:13

## 2020-02-03 RX ADMIN — Medication 200 MILLIGRAM(S): at 18:37

## 2020-02-03 NOTE — PACU DISCHARGE NOTE - COMMENTS
pt aao x3.  VSS.  right groin dsg remains c/d/i; no bleeding or hematoma noted.  left foot dsg c/d/i.  pt denies c/o pain.  + right leg DP/PT pulses via doppler.  report given to RN on 5 uris; pt to go to [id number] via bed off monitor as ordered

## 2020-02-03 NOTE — BRIEF OPERATIVE NOTE - OPERATION/FINDINGS
LLE angiogram via right groin 5F sheath access.   - diagnostic angio only, no intervention, occluded SFA to DP bypass.   - no intervention.   contrast: 50cc

## 2020-02-03 NOTE — PROGRESS NOTE ADULT - SUBJECTIVE AND OBJECTIVE BOX
Procedure: LLE Angiogram  Surgeon: Emanuel    S: Pt states she is having pain in her left foot, otherwise no complaints. Denies N/V, CP, SOB, [place], calf tenderness. Pain controlled with medication.    O:  T(C): 36.1 (02-03-20 @ 11:45), Max: 36.2 (02-03-20 @ 10:30)  T(F): 97 (02-03-20 @ 11:45), Max: 97.1 (02-03-20 @ 10:30)  HR: 57 (02-03-20 @ 11:45) (56 - 70)  BP: 104/50 (02-03-20 @ 11:45) (104/50 - 141/71)  RR: 18 (02-03-20 @ 11:45) (10 - 22)  SpO2: 100% (02-03-20 @ 11:45) (100% - 100%)  Wt(kg): --                        10.4   5.34  )-----------( 155      ( 03 Feb 2020 05:58 )             32.1     02-03    141  |  108  |  22  ----------------------------<  129<H>  4.0   |  23  |  0.91    Ca    8.9      03 Feb 2020 05:58  Phos  3.6     02-03  Mg     2.1     02-03        Gen: NAD, resting comfortably in bed  C/V: NSR  Pulm: Nonlabored breathing, no respiratory distress  Abd: soft, NT/ND  Extrem: WWP, no calf edema  Right groin access point with no swelling, bleeding or drainage      A/P: 85yFemale s/p above procedure  Diet: consistent carb  IVF: NS @ 60  Pain/nausea control  DVT ppx: lovenox  Dispo plan: [facility]

## 2020-02-03 NOTE — CHART NOTE - NSCHARTNOTEFT_GEN_A_CORE
Penicillin Allergy Skin Testing    DX: Reported PCN drug allergy -Reported as swelling 35 years ago      1) Informed consent was obtained and time-out was performed.    2) Scratch test: NEGATIVE  * Histamine: 4 mm   * Saline diluent: 0 mm  * Pre-Pen: 0 mm  * Penicillin  mm    3) Intradermal test: NEGATIVE  * Saline diluent: 0 mm  * Pre-pen #1: 0 mm (no growth beyond original size)  * Pre-pen #2: 0 mm (no growth beyond original size)  * Penicillin G #1: 0 mm (no growth beyond original size)  * Penicillin G #2: 0 mm (no growth beyond original size)    Procedure was performed successfully without complications.    Results: Negative Penicillin Allergy Skin Testing    Results were explained to the patient and communicated with primary team.    Procedure was performed by Dr. Haroon Lugo.     Time face to face 60 minutes with >50% on counseling and coordination of care.

## 2020-02-03 NOTE — PROGRESS NOTE ADULT - SUBJECTIVE AND OBJECTIVE BOX
24hr Events:  O/N: NPO/IVF for OR, VSS  2/2: AM Vanc level ok; giving 1000mg; d/c Flagyl based on sensitivities; pre-op'd and consented (from Friday)        Assessment/Plan;  84 yo F with h/o CAD, DM, stroke s/p R ICA stent, and PAD s/p BLE bypasses and 1st toe amps. L 1st toe amp site with an infected wound now, and likely LLE SFA-DP bypass with vein graft is thrombosed due to poorly audible signals in L foot.    Plan:  - prn pain control  - home meds, but holding ARB (angio tomorrow)  - NPO/IVF  - I/Os  - ISS  - Abx: vanc, cipro, flagyl (PCN allergy: whole body edema)  - f/u wound cultures, f/u blood cultures  - wound care to L foot: saline WTD, kerlix, clean with betadine/peroxide daily  - DVT ppx (Lovenox, no SCDs) 24hr Events:  O/N: NPO/IVF for OR, VSS  2/2: AM Vanc level ok; giving 1000mg; d/c Flagyl based on sensitivities; pre-op'd and consented (from Friday)    ciprofloxacin   IVPB 400  ciprofloxacin   IVPB   vancomycin  IVPB 1000  amLODIPine   Tablet 10  aspirin enteric coated 81  ciprofloxacin   IVPB 400  ciprofloxacin   IVPB   enoxaparin Injectable 40  hydrALAZINE 25  metoprolol succinate ER 25  ticagrelor 90  vancomycin  IVPB 1000        Vital Signs Last 24 Hrs  T(C): 36.8 (03 Feb 2020 06:52), Max: 37.2 (02 Feb 2020 14:25)  T(F): 98.3 (03 Feb 2020 06:52), Max: 98.9 (02 Feb 2020 14:25)  HR: 63 (03 Feb 2020 07:11) (63 - 87)  BP: 118/59 (03 Feb 2020 07:11) (118/59 - 147/69)  BP(mean): --  RR: 20 (03 Feb 2020 05:38) (16 - 20)  SpO2: 100% (03 Feb 2020 05:38) (96% - 100%)  I&O's Summary    02 Feb 2020 07:01  -  03 Feb 2020 07:00  --------------------------------------------------------  IN: 1740 mL / OUT: 1200 mL / NET: 540 mL        Physical Exam:  General: NAD, resting comfortably in bed  Pulmonary: Nonlabored breathing, no respiratory distress  LLE: weak mono DP. Open shallow ulcer with white exudate in wound at prior 1st toe amp site, some underlying granulation tissue, with foul odor, does not probe to bone, is tender though.      LABS:                        10.4   5.34  )-----------( 155      ( 03 Feb 2020 05:58 )             32.1     02-03    141  |  108  |  22  ----------------------------<  129<H>  4.0   |  23  |  0.91    Ca    8.9      03 Feb 2020 05:58  Phos  3.6     02-03  Mg     2.1     02-03      PT/INR - ( 03 Feb 2020 05:58 )   PT: 12.4 sec;   INR: 1.08          PTT - ( 03 Feb 2020 05:58 )  PTT:26.7 sec      Assessment/Plan;  84 yo F with h/o CAD, DM, stroke s/p R ICA stent, and PAD s/p BLE bypasses and 1st toe amps. L 1st toe amp site with an infected wound now, and likely LLE SFA-DP bypass with vein graft is thrombosed due to poorly audible signals in L foot.    Plan:  - prn pain control  - home meds, but holding ARB (angio tomorrow)  - NPO/IVF  - I/Os  - ISS  - Abx: vanc, cipro (flagyl d/cd) (PCN allergy: whole body edema)  - f/u wound cultures, f/u blood cultures  - wound care to L foot: saline WTD, kerlix, clean with betadine/peroxide daily  - DVT ppx (Lovenox, no SCDs) 24hr Events:  O/N: NPO/IVF for OR, VSS  2/2: AM Vanc level ok; giving 1000mg; d/c Flagyl based on sensitivities; pre-op'd and consented (from Friday)    ciprofloxacin   IVPB 400  ciprofloxacin   IVPB   vancomycin  IVPB 1000  amLODIPine   Tablet 10  aspirin enteric coated 81  ciprofloxacin   IVPB 400  ciprofloxacin   IVPB   enoxaparin Injectable 40  hydrALAZINE 25  metoprolol succinate ER 25  ticagrelor 90  vancomycin  IVPB 1000        Vital Signs Last 24 Hrs  T(C): 36.8 (03 Feb 2020 06:52), Max: 37.2 (02 Feb 2020 14:25)  T(F): 98.3 (03 Feb 2020 06:52), Max: 98.9 (02 Feb 2020 14:25)  HR: 63 (03 Feb 2020 07:11) (63 - 87)  BP: 118/59 (03 Feb 2020 07:11) (118/59 - 147/69)  BP(mean): --  RR: 20 (03 Feb 2020 05:38) (16 - 20)  SpO2: 100% (03 Feb 2020 05:38) (96% - 100%)  I&O's Summary    02 Feb 2020 07:01  -  03 Feb 2020 07:00  --------------------------------------------------------  IN: 1740 mL / OUT: 1200 mL / NET: 540 mL        Physical Exam:  General: NAD, resting comfortably in bed  Pulmonary: Nonlabored breathing, no respiratory distress  LLE: weak mono DP. Open shallow ulcer with white exudate in wound at prior 1st toe amp site, some underlying granulation tissue, with foul odor, does not probe to bone, is tender though.      LABS:                        10.4   5.34  )-----------( 155      ( 03 Feb 2020 05:58 )             32.1     02-03    141  |  108  |  22  ----------------------------<  129<H>  4.0   |  23  |  0.91    Ca    8.9      03 Feb 2020 05:58  Phos  3.6     02-03  Mg     2.1     02-03      PT/INR - ( 03 Feb 2020 05:58 )   PT: 12.4 sec;   INR: 1.08          PTT - ( 03 Feb 2020 05:58 )  PTT:26.7 sec    PLEASE CHECK WHEN PRESENT:     [  ] Heart Failure     [  ] Acute     [  ] Acute on Chronic     [  ] Chronic  -------------------------------------------------------------------     [  ]Diastolic [HFpEF]     [  ]Systolic [HFrEF]     [  ]Combined [HFpEF & HFrEF]     [  ]Other:  -------------------------------------------------------------------  [ ] Respiratory failure  [ ] Acute cor pulmonale  [ ] Asthma/COPD Exacerbation  [ ] Pleural effusion  [ ] Aspiration pneumonia  -------------------------------------------------------------------  [  ]KAREN     [  ]ATN     [  ]Reneal Medullary Necrosis     [  ]Renal Cortical Necrosis     [  ]Other Pathological Lesions:    [  ]CKD 1  [  ]CKD 2  [  ]CKD 3  [  ]CKD 4  [  ]CKD 5  [  ]Other  -------------------------------------------------------------------  [ x ]Diabetes  [ X ] Diabetic PVD Ulcer  [  ] Neuropathic ulcer to DM  [  ] Diabetes with Nephropathy  [  ] Osteomyelitis due to diabetes  --------------------------------------------------------------------  [  ]Malnutrition: See Nutrition Note  [  ]Cachexia  [  ]Other:   [  ]Supplement Ordered:  [  ]Morbid Obesity (BMI >=40]  ---------------------------------------------------------------------  [ ] Sepsis/severe sepsis/septic shock  [ ] UTI  [ ] Pneumonia  -----------------------------------------------------------------------  [ ] Acidosis/alkalosis  [ ] Fluid overload  [ ] Hypokalemia  [ ] Hyperkalemia  [ ] Hypomagnesemia  [ ] Hypophosphatemia  [ ] Hyperphosphatemia  ------------------------------------------------------------------------  [ ] Acute blood loss anemia  [ ] Post op blood loss anemia  [ ] Iron deficiency anemia  [X ] Anemia due to chronic disease  [ ] Hypercoagulable state  ----------------------------------------------------------------------  [ x] Cerebral infarction  [ ] Transient ischemia attack  [ ] Encephalopathy    Assessment/Plan;  86 yo F with h/o CAD, DM, stroke s/p R ICA stent, and PAD s/p BLE bypasses and 1st toe amps. L 1st toe amp site with an infected wound now, and likely LLE SFA-DP bypass with vein graft is thrombosed due to poorly audible signals in L foot.    Plan:  - prn pain control  - home meds, but holding ARB (angio tomorrow)  - NPO/IVF  - I/Os  - ISS  - Abx: vanc, cipro (flagyl d/cd) (PCN allergy: whole body edema)  - f/u wound cultures, f/u blood cultures  - wound care to L foot: saline WTD, kerlix, clean with betadine/peroxide daily  - DVT ppx (Lovenox, no SCDs)

## 2020-02-03 NOTE — CONSULT NOTE ADULT - ASSESSMENT
85 year old F p/w L foot diabetic foot infection, medicine following for comanagement.     1) Diabetic foot infection: c/w Vanc/Cipro/Flagyl, for LLE angio Monday 2/3/20. Wound cx with MRSA and corynebacterium. V trough elevated, hold next dose and decrease to 1g IV q12; corynebacterium covered by cipro, F/U BCx  2) PAD: c/w DAPT, statin, LLE angio Monday  3) CAD: on DAPT, c/w statin, c/w metoprolol  4) DM: A1C 6.9, c/w SSI, FS well controlled  5) HTN: c/w norvasc, hydralazine, hold ARB prior to procedure, restart after  6) CVA: on DAPT, c/w statin  7) Glaucoma: c/w home meds  8) DVT ppx: lovenox  9) Neuropathy: c/w gabapentin  10) Constipation: initiate senna 85 year old F p/w L foot diabetic foot infection, medicine following for comanagement.     1) Diabetic foot infection: c/w current abx  for LLE angio Monday 2/3/20. Wound cx with MRSA and corynebacterium.   repeat blood cultures today  2) PAD: c/w DAPT, statin, LLE angio Monday  3) CAD: on DAPT, c/w statin, c/w metoprolol  4) DM: A1C 6.9, c/w SSI, FS well controlled  5) HTN: c/w norvasc, hydralazine, hold ARB prior to procedure, restart after  6) CVA: on DAPT, c/w statin  7) Glaucoma: c/w home meds  8) DVT ppx: lovenox  9) Neuropathy: c/w gabapentin  10) Constipation: initiate senna

## 2020-02-04 ENCOUNTER — TRANSCRIPTION ENCOUNTER (OUTPATIENT)
Age: 85
End: 2020-02-04

## 2020-02-04 VITALS — TEMPERATURE: 98 F

## 2020-02-04 LAB
CULTURE RESULTS: SIGNIFICANT CHANGE UP
GLUCOSE BLDC GLUCOMTR-MCNC: 156 MG/DL — HIGH (ref 70–99)
SPECIMEN SOURCE: SIGNIFICANT CHANGE UP
VANCOMYCIN TROUGH SERPL-MCNC: 24 UG/ML — HIGH (ref 10–20)

## 2020-02-04 PROCEDURE — ZZZZZ: CPT

## 2020-02-04 PROCEDURE — 93880 EXTRACRANIAL BILAT STUDY: CPT | Mod: 26

## 2020-02-04 PROCEDURE — 99238 HOSP IP/OBS DSCHRG MGMT 30/<: CPT

## 2020-02-04 RX ORDER — LINEZOLID 600 MG/300ML
1 INJECTION, SOLUTION INTRAVENOUS
Qty: 20 | Refills: 0
Start: 2020-02-04 | End: 2020-02-13

## 2020-02-04 RX ORDER — OXYCODONE AND ACETAMINOPHEN 5; 325 MG/1; MG/1
1 TABLET ORAL ONCE
Refills: 0 | Status: DISCONTINUED | OUTPATIENT
Start: 2020-02-04 | End: 2020-02-04

## 2020-02-04 RX ORDER — AZTREONAM 2 G
1 VIAL (EA) INJECTION
Qty: 20 | Refills: 0
Start: 2020-02-04 | End: 2020-02-13

## 2020-02-04 RX ADMIN — AMLODIPINE BESYLATE 10 MILLIGRAM(S): 2.5 TABLET ORAL at 06:06

## 2020-02-04 RX ADMIN — Medication 2: at 07:26

## 2020-02-04 RX ADMIN — Medication 25 MILLIGRAM(S): at 00:32

## 2020-02-04 RX ADMIN — Medication 25 MILLIGRAM(S): at 06:06

## 2020-02-04 RX ADMIN — POLYETHYLENE GLYCOL 3350 17 GRAM(S): 17 POWDER, FOR SOLUTION ORAL at 06:06

## 2020-02-04 RX ADMIN — GABAPENTIN 300 MILLIGRAM(S): 400 CAPSULE ORAL at 06:06

## 2020-02-04 RX ADMIN — Medication 1 DROP(S): at 06:08

## 2020-02-04 RX ADMIN — Medication 1 DROP(S): at 06:07

## 2020-02-04 RX ADMIN — ENOXAPARIN SODIUM 40 MILLIGRAM(S): 100 INJECTION SUBCUTANEOUS at 06:06

## 2020-02-04 RX ADMIN — Medication 200 MILLIGRAM(S): at 06:07

## 2020-02-04 RX ADMIN — BRIMONIDINE TARTRATE 1 DROP(S): 2 SOLUTION/ DROPS OPHTHALMIC at 06:08

## 2020-02-04 RX ADMIN — OXYCODONE AND ACETAMINOPHEN 1 TABLET(S): 5; 325 TABLET ORAL at 08:26

## 2020-02-04 RX ADMIN — OXYCODONE AND ACETAMINOPHEN 1 TABLET(S): 5; 325 TABLET ORAL at 07:26

## 2020-02-04 RX ADMIN — TICAGRELOR 90 MILLIGRAM(S): 90 TABLET ORAL at 06:07

## 2020-02-04 NOTE — DISCHARGE NOTE PROVIDER - NSDCFUADDINST_GEN_ALL_CORE_FT
Follow-up with Dr. Castellanos in 1-2 weeks in office at [phone].     Wound Care: Remove dressings to shower with soap and water. Dry well. Cover with dry gauze and Kerlix wrap daily. Please call your doctor if you have a fever of over 101.9 or swelling/bleeding at wound.     Do NOT bathe, swim, or hot tub until you are cleared by your doctor. Follow-up with Dr. Castellanos in 1-2 weeks in office at [phone].     Wound Care: Remove dressings to shower with soap and water. Dry well. Cover with dry gauze and Kerlix wrap daily.     Do NOT bathe, swim, or hot tub until you are cleared by your doctor.    Please call your doctor if you have a fever of over 101.9 or swelling/bleeding at wound. Take Linezolid 600mg every 12 hours for 10 days.     Follow-up with Dr. Castellanos in 1-2 weeks in office at [phone].     Wound Care: Remove dressings to shower with soap and water. Dry well. Cover with dry gauze and Kerlix wrap daily.     Do NOT bathe, swim, or hot tub until you are cleared by your doctor.    Please call your doctor if you have a fever of over 101.9 or swelling/bleeding at wound.

## 2020-02-04 NOTE — DISCHARGE NOTE PROVIDER - HOSPITAL COURSE
85 yo F with CAD (s/p 7 stents, on ASA/Brillinta), h/o DVT/PE (not on anticoagulation anymore), DM, HTN, HLD, glaucoma, constipation, and PAD (R fem-peroneal bypass and 1st toe amputation, L SFA-DP bypass with vein graft and 1st toe amputation). Her LLE bypass surgery was complicated by stroke s/p R ICA stent by neurointerventional team. She presents today for non-healing wound at prior left first toe amputation site. She has had pain there for past one year, but has worsened today. She states there has been purulent drainage from it. No fevers/chills, no nausea/vomiting, no CP/SOA. No left foot coolness/numbness/weakness. She has been living in a nursing home recently. H/o MRSA in prior foot infection.         The patient was admitted to the vascular surgery service for further care. She was placed on IV antibiotics for infection of the left toe amputation site. On 2/3 the patient underwent a left lower extremity angiogram that showed occlusion of the SFA-DP bypass. No intervention was taken at this time. Per attending, the patient has been deemed safe for discharge with instructions to continue antibiotics for ten days and follow up with Dr. Castellanos.

## 2020-02-04 NOTE — DISCHARGE NOTE PROVIDER - CARE PROVIDER_API CALL
Vicky Castellanos)  Surgery; Vascular Surgery  [address] [place] [address]  Phone: [phone]  Fax: [phone]  Follow Up Time:

## 2020-02-04 NOTE — DISCHARGE NOTE PROVIDER - NSDCMRMEDTOKEN_GEN_ALL_CORE_FT
acetaminophen 325 mg oral tablet: 2 tab(s) orally every 6 hours, As needed, Mild Pain (1 - 3)  Alphagan: to each affected eye 3 times a day  amLODIPine 10 mg oral tablet: 1 tab(s) orally once a day  aspirin 81 mg oral delayed release tablet: 1 tab(s) orally once a day  Calcium 500+D oral tablet, chewable: 1 tab(s) orally once a day  Crestor 20 mg oral tablet: 1 tab(s) orally once a day (at bedtime)  famotidine 20 mg oral tablet: 1 tab(s) orally once a day  gabapentin 300 mg oral capsule: 1 cap(s) orally 3 times a day  Vibha-Lanta oral suspension: 10 milliliter(s) orally every 8 hours, As Needed for constipation  hydrALAZINE 25 mg oral tablet: 1 tab(s) orally 4 times a day  losartan 100 mg oral tablet: 1 tab(s) orally once a day  Lumigan 0.01% ophthalmic solution: 1 drop(s) to each affected eye once a day (in the evening)  melatonin 5 mg oral tablet: 1 tab(s) orally once a day (at bedtime)  metFORMIN 750 mg oral tablet, extended release: 1 tab(s) orally once a day  methazolAMIDE 25 mg oral tablet: 1 tab(s) orally 3 times a day  metoprolol succinate 25 mg oral tablet, extended release: 1 tab(s) orally once a day  MiraLax oral powder for reconstitution: 1 packet(s) orally 2 times a day  oxyCODONE-acetaminophen 5 mg-325 mg oral tablet: 1 tab(s) orally every 6 hours, As Needed - 10)  pilocarpine 2% ophthalmic solution: 1 dose(s) in the left eye 3 times a day  Rhopressa 0.02% ophthalmic solution: 1 drop(s) to each affected eye once a day (in the evening)  senna oral tablet: 2 tab(s) orally once a day (at bedtime)  ticagrelor 90 mg oral tablet: 1 tab(s) orally 2 times a day  Timoptic 0.5% ophthalmic solution: 1 drop(s) to each affected eye 2 times a day  Vitamin D3 2000 intl units (50 mcg) oral capsule: 1 cap(s) orally once a day

## 2020-02-04 NOTE — DISCHARGE NOTE PROVIDER - NSDCCPCAREPLAN_GEN_ALL_CORE_FT
PRINCIPAL DISCHARGE DIAGNOSIS  Diagnosis: Bypass graft mechanical complication, initial encounter  Assessment and Plan of Treatment:       SECONDARY DISCHARGE DIAGNOSES  Diagnosis: Hyperlipidemia  Assessment and Plan of Treatment:     Diagnosis: Hypertension  Assessment and Plan of Treatment:     Diagnosis: H/O ischemic heart disease  Assessment and Plan of Treatment:     Diagnosis: Degenerative arthropathy  Assessment and Plan of Treatment:     Diagnosis: Diabetes mellitus  Assessment and Plan of Treatment:     Diagnosis: CAD, multiple vessel  Assessment and Plan of Treatment:     Diagnosis: DVT, lower extremity  Assessment and Plan of Treatment:     Diagnosis: Pulmonary embolism  Assessment and Plan of Treatment:     Diagnosis: Glaucoma  Assessment and Plan of Treatment:     Diagnosis: PVD (peripheral vascular disease)  Assessment and Plan of Treatment: PRINCIPAL DISCHARGE DIAGNOSIS  Diagnosis: Ischemic ulcer diabetic foot  Assessment and Plan of Treatment: with infection MRSA and Corynebacterium      SECONDARY DISCHARGE DIAGNOSES  Diagnosis: Diabetic peripheral vascular disease  Assessment and Plan of Treatment:     Diagnosis: Diabetic peripheral neuropathy  Assessment and Plan of Treatment:     Diagnosis: Cerebrovascular accident (CVA)  Assessment and Plan of Treatment:     Diagnosis: Femoral-tibial bypass graft occlusion, left, subsequent encounter  Assessment and Plan of Treatment:     Diagnosis: Hyperlipidemia  Assessment and Plan of Treatment:     Diagnosis: Hypertension  Assessment and Plan of Treatment:     Diagnosis: H/O ischemic heart disease  Assessment and Plan of Treatment:     Diagnosis: Degenerative arthropathy  Assessment and Plan of Treatment:     Diagnosis: Diabetes mellitus  Assessment and Plan of Treatment:     Diagnosis: CAD, multiple vessel  Assessment and Plan of Treatment:     Diagnosis: DVT, lower extremity  Assessment and Plan of Treatment:     Diagnosis: Pulmonary embolism  Assessment and Plan of Treatment:     Diagnosis: Glaucoma  Assessment and Plan of Treatment:

## 2020-02-04 NOTE — DISCHARGE NOTE PROVIDER - NSDCFUSCHEDAPPT_GEN_ALL_CORE_FT
GRADY CARDONA ; 03/16/2020 ; [facility] Card[facility] [address]  GRADY CARDONA ; 03/16/2020 ; [facility] Card[facility] [address]  GRADY CARDONA ; 03/31/2020 ; [facility] Neuro [address]

## 2020-02-04 NOTE — PROGRESS NOTE ADULT - REASON FOR ADMISSION
left foot wound infection, L SFA-DP bypass graft thrombosis

## 2020-02-04 NOTE — DISCHARGE NOTE NURSING/CASE MANAGEMENT/SOCIAL WORK - PATIENT PORTAL LINK FT
You can access the FollowMyHealth Patient Portal offered by [facility] by registering at the following website: [email or web address]. By joining [employer]s FollowMyHealth portal, you will also be able to view your health information using other applications (apps) compatible with our system.

## 2020-02-04 NOTE — PROGRESS NOTE ADULT - SUBJECTIVE AND OBJECTIVE BOX
24hr Events:  O/N:11pm Vanc trough 24, Vanc d/cd  2/3: OR for LLE angio showing occluded SFA to DP bypass, POC wnl, pencillin allergy test negative. repeat BCx sent          Assessment/Plan:  86 yo F with h/o CAD, DM, stroke s/p R ICA stent, and PAD s/p BLE bypasses and 1st toe amps. L 1st toe amp site with an infected wound now, and likely LLE SFA-DP bypass with vein graft is thrombosed due to poorly audible signals in L foot.    Plan:  - prn pain control  - home meds, but holding ARB (angio tomorrow)  - Consist Carb diet  - I/Os  - ISS  - Abx: vanc, cipro (flagyl d/cd) (PCN allergy: whole body edema)  - f/u wound cultures, f/u blood cultures  - wound care to L foot: saline WTD, kerlix, clean with betadine/peroxide daily  - DVT ppx (Lovenox, no SCDs) 24hr Events:  O/N:11pm Vanc trough 24, Vanc d/cd  2/3: OR for LLE angio showing occluded SFA to DP bypass, POC wnl, pencillin allergy test negative. repeat BCx sent    Subjective: Pt seen and examined at bedside. She reports that she is feeling well, denies n/v/cp/sob. Her only complaint is pain in her left foot near operative site. She is tolerating her diet and voiding appropriately.    Pain:   Nausea: [ ] YES [x ] NO            Vomiting: [ ] YES [x ] NO  Abd Pain: [ ] YES [x ] NO  Diarrhea: [ ] YES [x ] NO         Constipation: [ ] YES [ x] NO     Chest Pain: [ ] YES [ x] NO    SOB:  [ ] YES [x ] NO  Flatus: [ ] YES [x ] NO  BM: [ ] YES [ x] NO  Voiding: [ ] YES [x ] NO  Weakness: [ ] YES [x ] NO  Numbness: [ ] YES [x ] NO  Extremity coldness: [ ] YES [x ] NO  Claudication: [ ] YES [x ] NO  Extremity Swelling: [ ] YES [ x] NO  Ulcers: [ ] YES [ x] NO        Vital Signs Last 24 Hrs  T(C): 37 (04 Feb 2020 06:16), Max: 37 (04 Feb 2020 06:16)  T(F): 98.6 (04 Feb 2020 06:16), Max: 98.6 (04 Feb 2020 06:16)  HR: 81 (04 Feb 2020 06:04) (56 - 81)  BP: 148/61 (04 Feb 2020 06:04) (104/50 - 148/61)  BP(mean): 70 (03 Feb 2020 11:30) (70 - 98)  RR: 16 (04 Feb 2020 06:04) (10 - 22)  SpO2: 99% (04 Feb 2020 06:04) (97% - 100%)    I&O's Summary    03 Feb 2020 07:01  -  04 Feb 2020 07:00  --------------------------------------------------------  IN: 790 mL / OUT: 1900 mL / NET: -1110 mL        Physical Exam:  General: NAD, resting comfortably  Pulmonary: normal resp effort  Cardiovascular: NSR  Abdominal: soft, NT/ND  Extremities: WWP, normal strength, left toe amp site clean and dry, dressing changed at bedside with dry kerlix.   Neuro: A/O x 3, no focal deficits, sensation normal    Lines/drains/tubes:    LABS:                        10.4   5.34  )-----------( 155      ( 03 Feb 2020 05:58 )             32.1     02-03    141  |  108  |  22  ----------------------------<  129<H>  4.0   |  23  |  0.91    Ca    8.9      03 Feb 2020 05:58  Phos  3.6     02-03  Mg     2.1     02-03      PT/INR - ( 03 Feb 2020 05:58 )   PT: 12.4 sec;   INR: 1.08          PTT - ( 03 Feb 2020 05:58 )  PTT:26.7 sec      CAPILLARY BLOOD GLUCOSE      POCT Blood Glucose.: 156 mg/dL (04 Feb 2020 06:56)  POCT Blood Glucose.: 217 mg/dL (03 Feb 2020 21:32)  POCT Blood Glucose.: 276 mg/dL (03 Feb 2020 17:46)  POCT Blood Glucose.: 134 mg/dL (03 Feb 2020 15:47)  POCT Blood Glucose.: 114 mg/dL (03 Feb 2020 12:35)  POCT Blood Glucose.: 97 mg/dL (03 Feb 2020 10:39)      RADIOLOGY & ADDITIONAL TESTS:      PLEASE CHECK WHEN PRESENT:     [  ] Heart Failure     [  ] Acute     [  ] Acute on Chronic     [  ] Chronic  -------------------------------------------------------------------     [  ]Diastolic [HFpEF]     [  ]Systolic [HFrEF]     [  ]Combined [HFpEF & HFrEF]     [  ]Other:  -------------------------------------------------------------------  [ ] Respiratory failure  [ ] Acute cor pulmonale  [ ] Asthma/COPD Exacerbation  [ ] Pleural effusion  [ ] Aspiration pneumonia  -------------------------------------------------------------------  [  ]KAREN     [  ]ATN     [  ]Reneal Medullary Necrosis     [  ]Renal Cortical Necrosis     [  ]Other Pathological Lesions:    [  ]CKD 1  [  ]CKD 2  [  ]CKD 3  [  ]CKD 4  [  ]CKD 5  [  ]Other  -------------------------------------------------------------------  [ x ]Diabetes  [ X ] Diabetic PVD Ulcer  [  ] Neuropathic ulcer to DM  [  ] Diabetes with Nephropathy  [  ] Osteomyelitis due to diabetes  --------------------------------------------------------------------  [  ]Malnutrition: See Nutrition Note  [  ]Cachexia  [  ]Other:   [  ]Supplement Ordered:  [  ]Morbid Obesity (BMI >=40]  ---------------------------------------------------------------------  [ ] Sepsis/severe sepsis/septic shock  [ ] UTI  [ ] Pneumonia  -----------------------------------------------------------------------  [ ] Acidosis/alkalosis  [ ] Fluid overload  [ ] Hypokalemia  [ ] Hyperkalemia  [ ] Hypomagnesemia  [ ] Hypophosphatemia  [ ] Hyperphosphatemia  ------------------------------------------------------------------------  [ ] Acute blood loss anemia  [ ] Post op blood loss anemia  [ ] Iron deficiency anemia  [X ] Anemia due to chronic disease  [ ] Hypercoagulable state  ----------------------------------------------------------------------  [ x] Cerebral infarction  [ ] Transient ischemia attack  [ ] Encephalopathy      Assessment/Plan:  86 yo F with h/o CAD, DM, stroke s/p R ICA stent, and PAD s/p BLE bypasses and 1st toe amps. L 1st toe amp site with an infected wound now, and likely LLE SFA-DP bypass with vein graft is thrombosed due to poorly audible signals in L foot.    Plan:  - prn pain control  - home meds, but holding ARB (angio tomorrow)  - Consist Carb diet  - I/Os  - ISS  - Abx: vanc, cipro (flagyl d/cd) (PCN allergy: whole body edema)  - f/u wound cultures, f/u blood cultures  - wound care to L foot: saline WTD, kerlix, clean with betadine/peroxide daily  - DVT ppx (Lovenox, no SCDs)

## 2020-02-04 NOTE — PROGRESS NOTE ADULT - PROVIDER SPECIALTY LIST ADULT
Hospitalist
Vascular Surgery

## 2020-02-04 NOTE — DISCHARGE NOTE NURSING/CASE MANAGEMENT/SOCIAL WORK - NSDCCRNAME_GEN_ALL_CORE_FT
[facility] ([address] [place] [address] [place] [address])

## 2020-02-05 LAB
-  CEFAZOLIN: SIGNIFICANT CHANGE UP
-  CLINDAMYCIN: SIGNIFICANT CHANGE UP
-  DAPTOMYCIN: SIGNIFICANT CHANGE UP
-  ERYTHROMYCIN: SIGNIFICANT CHANGE UP
-  LINEZOLID: SIGNIFICANT CHANGE UP
-  OXACILLIN: SIGNIFICANT CHANGE UP
-  PENICILLIN: SIGNIFICANT CHANGE UP
-  RIFAMPIN: SIGNIFICANT CHANGE UP
-  TETRACYCLINE: SIGNIFICANT CHANGE UP
-  TRIMETHOPRIM/SULFAMETHOXAZOLE: SIGNIFICANT CHANGE UP
-  VANCOMYCIN: SIGNIFICANT CHANGE UP
-  VANCOMYCIN: SIGNIFICANT CHANGE UP
CULTURE RESULTS: SIGNIFICANT CHANGE UP
METHOD TYPE: SIGNIFICANT CHANGE UP
METHOD TYPE: SIGNIFICANT CHANGE UP
ORGANISM # SPEC MICROSCOPIC CNT: SIGNIFICANT CHANGE UP
SPECIMEN SOURCE: SIGNIFICANT CHANGE UP

## 2020-02-06 LAB
CULTURE RESULTS: SIGNIFICANT CHANGE UP
ORGANISM # SPEC MICROSCOPIC CNT: SIGNIFICANT CHANGE UP
SPECIMEN SOURCE: SIGNIFICANT CHANGE UP

## 2020-02-10 PROCEDURE — 36415 COLL VENOUS BLD VENIPUNCTURE: CPT

## 2020-02-10 PROCEDURE — 80053 COMPREHEN METABOLIC PANEL: CPT

## 2020-02-10 PROCEDURE — 85730 THROMBOPLASTIN TIME PARTIAL: CPT

## 2020-02-10 PROCEDURE — 85610 PROTHROMBIN TIME: CPT

## 2020-02-10 PROCEDURE — 85027 COMPLETE CBC AUTOMATED: CPT

## 2020-02-10 PROCEDURE — 86901 BLOOD TYPING SEROLOGIC RH(D): CPT

## 2020-02-10 PROCEDURE — 71045 X-RAY EXAM CHEST 1 VIEW: CPT

## 2020-02-10 PROCEDURE — 99285 EMERGENCY DEPT VISIT HI MDM: CPT

## 2020-02-10 PROCEDURE — 85025 COMPLETE CBC W/AUTO DIFF WBC: CPT

## 2020-02-10 PROCEDURE — 87070 CULTURE OTHR SPECIMN AEROBIC: CPT

## 2020-02-10 PROCEDURE — 84100 ASSAY OF PHOSPHORUS: CPT

## 2020-02-10 PROCEDURE — 83735 ASSAY OF MAGNESIUM: CPT

## 2020-02-10 PROCEDURE — 87186 SC STD MICRODIL/AGAR DIL: CPT

## 2020-02-10 PROCEDURE — 73620 X-RAY EXAM OF FOOT: CPT

## 2020-02-10 PROCEDURE — 87040 BLOOD CULTURE FOR BACTERIA: CPT

## 2020-02-10 PROCEDURE — 80048 BASIC METABOLIC PNL TOTAL CA: CPT

## 2020-02-10 PROCEDURE — 76000 FLUOROSCOPY <1 HR PHYS/QHP: CPT

## 2020-02-10 PROCEDURE — 82962 GLUCOSE BLOOD TEST: CPT

## 2020-02-10 PROCEDURE — 87150 DNA/RNA AMPLIFIED PROBE: CPT

## 2020-02-10 PROCEDURE — 80202 ASSAY OF VANCOMYCIN: CPT

## 2020-02-10 PROCEDURE — 83036 HEMOGLOBIN GLYCOSYLATED A1C: CPT

## 2020-02-10 PROCEDURE — 87075 CULTR BACTERIA EXCEPT BLOOD: CPT

## 2020-02-10 PROCEDURE — 93880 EXTRACRANIAL BILAT STUDY: CPT

## 2020-02-10 PROCEDURE — 86850 RBC ANTIBODY SCREEN: CPT

## 2020-02-11 DIAGNOSIS — H40.9 UNSPECIFIED GLAUCOMA: ICD-10-CM

## 2020-02-11 DIAGNOSIS — L97.529 NON-PRESSURE CHRONIC ULCER OF OTHER PART OF LEFT FOOT WITH UNSPECIFIED SEVERITY: ICD-10-CM

## 2020-02-11 DIAGNOSIS — Z86.711 PERSONAL HISTORY OF PULMONARY EMBOLISM: ICD-10-CM

## 2020-02-11 DIAGNOSIS — Z88.0 ALLERGY STATUS TO PENICILLIN: ICD-10-CM

## 2020-02-11 DIAGNOSIS — I10 ESSENTIAL (PRIMARY) HYPERTENSION: ICD-10-CM

## 2020-02-11 DIAGNOSIS — T81.89XA OTHER COMPLICATIONS OF PROCEDURES, NOT ELSEWHERE CLASSIFIED, INITIAL ENCOUNTER: ICD-10-CM
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
## 2020-02-11 DIAGNOSIS — Z86.73 PERSONAL HISTORY OF TRANSIENT ISCHEMIC ATTACK (TIA), AND CEREBRAL INFARCTION WITHOUT RESIDUAL DEFICITS: ICD-10-CM

## 2020-02-11 DIAGNOSIS — I25.2 OLD MYOCARDIAL INFARCTION: ICD-10-CM

## 2020-02-11 DIAGNOSIS — E11.621 TYPE 2 DIABETES MELLITUS WITH FOOT ULCER: ICD-10-CM

## 2020-02-11 DIAGNOSIS — Z79.82 LONG TERM (CURRENT) USE OF ASPIRIN: ICD-10-CM

## 2020-02-11 DIAGNOSIS — T82.868A THROMBOSIS DUE TO VASCULAR PROSTHETIC DEVICES, IMPLANTS AND GRAFTS, INITIAL ENCOUNTER: ICD-10-CM

## 2020-02-11 DIAGNOSIS — Z89.411 ACQUIRED ABSENCE OF RIGHT GREAT TOE: ICD-10-CM

## 2020-02-11 DIAGNOSIS — F32.9 MAJOR DEPRESSIVE DISORDER, SINGLE EPISODE, UNSPECIFIED: ICD-10-CM

## 2020-02-11 DIAGNOSIS — E11.51 TYPE 2 DIABETES MELLITUS WITH DIABETIC PERIPHERAL ANGIOPATHY WITHOUT GANGRENE: ICD-10-CM

## 2020-02-11 DIAGNOSIS — Z95.5 PRESENCE OF CORONARY ANGIOPLASTY IMPLANT AND GRAFT: ICD-10-CM

## 2020-02-11 DIAGNOSIS — I25.10 ATHEROSCLEROTIC HEART DISEASE OF NATIVE CORONARY ARTERY WITHOUT ANGINA PECTORIS: ICD-10-CM

## 2020-02-11 DIAGNOSIS — B95.62 METHICILLIN RESISTANT STAPHYLOCOCCUS AUREUS INFECTION AS THE CAUSE OF DISEASES CLASSIFIED ELSEWHERE: ICD-10-CM

## 2020-02-11 DIAGNOSIS — Z79.84 LONG TERM (CURRENT) USE OF ORAL HYPOGLYCEMIC DRUGS: ICD-10-CM

## 2020-02-11 DIAGNOSIS — E11.40 TYPE 2 DIABETES MELLITUS WITH DIABETIC NEUROPATHY, UNSPECIFIED: ICD-10-CM

## 2020-02-11 DIAGNOSIS — T81.43XA INFECTION FOLLOWING A PROCEDURE, ORGAN AND SPACE SURGICAL SITE, INITIAL ENCOUNTER: ICD-10-CM

## 2020-02-11 DIAGNOSIS — K59.00 CONSTIPATION, UNSPECIFIED: ICD-10-CM

## 2020-02-11 DIAGNOSIS — E78.5 HYPERLIPIDEMIA, UNSPECIFIED: ICD-10-CM

## 2020-02-11 DIAGNOSIS — Z86.718 PERSONAL HISTORY OF OTHER VENOUS THROMBOSIS AND EMBOLISM: ICD-10-CM

## 2020-02-24 ENCOUNTER — APPOINTMENT (OUTPATIENT)
Dept: VASCULAR SURGERY | Facility: CLINIC | Age: 85
End: 2020-02-24
Payer: MEDICARE

## 2020-02-24 PROCEDURE — 99213 OFFICE O/P EST LOW 20 MIN: CPT

## 2020-02-27 NOTE — HISTORY OF PRESENT ILLNESS
[FreeTextEntry1] : 84 y/o F with non-healing ulcer of left great toe who underwent left CFA endarterectomy with roof patch, SFA to DP bypass with RSVG and completion angiogram, followed by 1st toe amputation on 2/19/19 presents today for post-op s/p Aortogram, L femoral angiogram, debridement of the first toe amputation toe 02/03/2020. Patient reports doing well today. Denies any pain, fever or chills. \par

## 2020-02-27 NOTE — PHYSICAL EXAM
[Normal Breath Sounds] : Normal breath sounds [Alert] : alert [Oriented to Person] : oriented to person [Oriented to Place] : oriented to place [Oriented to Time] : oriented to time [Calm] : calm [JVD] : no jugular venous distention  [Normal Heart Sounds] : normal heart sounds [Normal Rate and Rhythm] : normal rate and rhythm [2+] : left 2+ [de-identified] : NCAT [de-identified] : Well-appearing, NAD [de-identified] : L foot: + small wound over 1st toe amputation site, almost healed [de-identified] : +FROM B/L

## 2020-02-27 NOTE — ASSESSMENT
[FreeTextEntry1] : 86 y/o F s/p Aortogram, L femoral angiogram, debridement of the first toe amputation toe 02/03/2020. Patient reports doing well today. Denies any pain, fever or chills. \par L great toe amputation site wound is almost healed, clean, no signs of infection.\par Patient was recommended to wash it with soap/water and to apply Bacitracin.\par Pt may wear regular wide, soft snickers.\par F/u in 3-4 weeks.\par  [Arterial/Venous Disease] : arterial/venous disease [Ulcer Care] : ulcer care [Foot care/Footwear] : foot care/footwear

## 2020-03-16 ENCOUNTER — APPOINTMENT (OUTPATIENT)
Dept: HEART AND VASCULAR | Facility: CLINIC | Age: 85
End: 2020-03-16

## 2020-03-31 ENCOUNTER — APPOINTMENT (OUTPATIENT)
Dept: NEUROLOGY | Facility: CLINIC | Age: 85
End: 2020-03-31

## 2020-07-23 ENCOUNTER — EMERGENCY (EMERGENCY)
Facility: HOSPITAL | Age: 85
LOS: 1 days | Discharge: HOME | End: 2020-07-23
Attending: EMERGENCY MEDICINE | Admitting: EMERGENCY MEDICINE
Payer: MEDICARE

## 2020-07-23 VITALS
RESPIRATION RATE: 18 BRPM | OXYGEN SATURATION: 100 % | WEIGHT: 179.9 LBS | DIASTOLIC BLOOD PRESSURE: 83 MMHG | HEART RATE: 72 BPM | HEIGHT: 61 IN | SYSTOLIC BLOOD PRESSURE: 142 MMHG | TEMPERATURE: 100 F

## 2020-07-23 VITALS
RESPIRATION RATE: 18 BRPM | OXYGEN SATURATION: 100 % | TEMPERATURE: 98 F | HEART RATE: 69 BPM | DIASTOLIC BLOOD PRESSURE: 87 MMHG | SYSTOLIC BLOOD PRESSURE: 146 MMHG

## 2020-07-23 DIAGNOSIS — Z98.890 OTHER SPECIFIED POSTPROCEDURAL STATES: Chronic | ICD-10-CM

## 2020-07-23 DIAGNOSIS — Z95.828 PRESENCE OF OTHER VASCULAR IMPLANTS AND GRAFTS: Chronic | ICD-10-CM

## 2020-07-23 DIAGNOSIS — Z89.9 ACQUIRED ABSENCE OF LIMB, UNSPECIFIED: Chronic | ICD-10-CM

## 2020-07-23 LAB
ANION GAP SERPL CALC-SCNC: 10 MMOL/L — SIGNIFICANT CHANGE UP (ref 5–17)
BUN SERPL-MCNC: 29 MG/DL — HIGH (ref 7–23)
CALCIUM SERPL-MCNC: 9.8 MG/DL — SIGNIFICANT CHANGE UP (ref 8.4–10.5)
CHLORIDE SERPL-SCNC: 105 MMOL/L — SIGNIFICANT CHANGE UP (ref 96–108)
CO2 SERPL-SCNC: 26 MMOL/L — SIGNIFICANT CHANGE UP (ref 22–31)
CREAT SERPL-MCNC: 0.98 MG/DL — SIGNIFICANT CHANGE UP (ref 0.5–1.3)
GLUCOSE SERPL-MCNC: 129 MG/DL — HIGH (ref 70–99)
HCT VFR BLD CALC: 37.8 % — SIGNIFICANT CHANGE UP (ref 34.5–45)
HGB BLD-MCNC: 12.3 G/DL — SIGNIFICANT CHANGE UP (ref 11.5–15.5)
MCHC RBC-ENTMCNC: 29.7 PG — SIGNIFICANT CHANGE UP (ref 27–34)
MCHC RBC-ENTMCNC: 32.5 GM/DL — SIGNIFICANT CHANGE UP (ref 32–36)
MCV RBC AUTO: 91.3 FL — SIGNIFICANT CHANGE UP (ref 80–100)
NRBC # BLD: 0 /100 WBCS — SIGNIFICANT CHANGE UP (ref 0–0)
PLATELET # BLD AUTO: 183 K/UL — SIGNIFICANT CHANGE UP (ref 150–400)
POTASSIUM SERPL-MCNC: 4.8 MMOL/L — SIGNIFICANT CHANGE UP (ref 3.5–5.3)
POTASSIUM SERPL-SCNC: 4.8 MMOL/L — SIGNIFICANT CHANGE UP (ref 3.5–5.3)
RBC # BLD: 4.14 M/UL — SIGNIFICANT CHANGE UP (ref 3.8–5.2)
RBC # FLD: 15.7 % — HIGH (ref 10.3–14.5)
SODIUM SERPL-SCNC: 141 MMOL/L — SIGNIFICANT CHANGE UP (ref 135–145)
WBC # BLD: 6.95 K/UL — SIGNIFICANT CHANGE UP (ref 3.8–10.5)
WBC # FLD AUTO: 6.95 K/UL — SIGNIFICANT CHANGE UP (ref 3.8–10.5)

## 2020-07-23 PROCEDURE — 73630 X-RAY EXAM OF FOOT: CPT

## 2020-07-23 PROCEDURE — 36415 COLL VENOUS BLD VENIPUNCTURE: CPT

## 2020-07-23 PROCEDURE — 85027 COMPLETE CBC AUTOMATED: CPT

## 2020-07-23 PROCEDURE — 99284 EMERGENCY DEPT VISIT MOD MDM: CPT

## 2020-07-23 PROCEDURE — 73630 X-RAY EXAM OF FOOT: CPT | Mod: 26,LT

## 2020-07-23 PROCEDURE — 80048 BASIC METABOLIC PNL TOTAL CA: CPT

## 2020-07-23 PROCEDURE — 99284 EMERGENCY DEPT VISIT MOD MDM: CPT | Mod: 25

## 2020-07-23 RX ORDER — OXYCODONE AND ACETAMINOPHEN 5; 325 MG/1; MG/1
1 TABLET ORAL ONCE
Refills: 0 | Status: DISCONTINUED | OUTPATIENT
Start: 2020-07-23 | End: 2020-07-23

## 2020-07-23 RX ADMIN — OXYCODONE AND ACETAMINOPHEN 1 TABLET(S): 5; 325 TABLET ORAL at 23:17

## 2020-07-23 NOTE — ED PROVIDER NOTE - CLINICAL SUMMARY MEDICAL DECISION MAKING FREE TEXT BOX
referred to ED for concern for non healing wound at 1st toe, no drainage on exam, dopplerable pulses, afebrile, no wbc elevation, seen by vascular, to continue routine wound care. disc with daughter BINA Rodríguez.

## 2020-07-23 NOTE — ED ADULT NURSE NOTE - OTHER COMPLAINTS
jony from [facility] for left foot pain, recent amputation of left big toe.  denies chills, cp, sob.  Per daughter appointment tommorow with dr. abrams

## 2020-07-23 NOTE — ED PROVIDER NOTE - PHYSICAL EXAMINATION
CONSTITUTIONAL: alert and in no apparent distress.  HEENT: Head is atraumatic. Eyes clear bilaterally, normal EOMI. Airway patent.  CARDIAC: Normal rate, regular rhythm.  Heart sounds S1, S2.   RESPIRATORY: Breath sounds clear and equal bilaterally. no tachypnea, respiratory distress.   GASTROINTESTINAL: Abdomen soft, non-tender, no guarding, distension.  MUSCULOSKELETAL: Spine appears normal, no midline spinal tenderness, range of motion is not limited, no muscle or joint tenderness. no bony tenderness. no JVD, peripheral edema. healing wound to L foot, no drainage, crepitus, dopplered pulses.  NEUROLOGICAL:  no focal deficits, no motor or sensory deficits.  SKIN: Skin normal color for race, warm, dry and intact. No evidence of rash.  PSYCHIATRIC: normal mood and affect. no apparent risk to self or others.

## 2020-07-23 NOTE — ED PROVIDER NOTE - PATIENT PORTAL LINK FT
You can access the FollowMyHealth Patient Portal offered by [facility] by registering at the following website: [email or web address]. By joining [facility]’s FollowMyHealth portal, you will also be able to view your health information using other applications (apps) compatible with our system.

## 2020-07-23 NOTE — CONSULT NOTE ADULT - SUBJECTIVE AND OBJECTIVE BOX
Vascular Attending:    Emanuel    HPI: 87 yo with CAD, h/o DVT/PE, DM, HTN, HLD,and PAD s/p R fem-peroneal bypass and 1st toe amputation, L SFA-DP bypass with vein graft and 1st toe amputation referred to ED by Nursing Home with concerns for non healing wound, weak pulses and pain to left foot no other complaints. Denies, chest pain, sob, drainage from wound.     PAST MEDICAL & SURGICAL HISTORY:  MRSA infection: right great toe ( amputated)  Glaucoma: b/l  Falls  Depression  Pulmonary embolism  DVT (deep venous thrombosis): Right lower extremity  MI (myocardial infarction): at age 65 y.o  Peripheral vascular disease  Ulcer of lower limb  Arthropathy  Ischemic heart disease  Hypertension  Hyperlipidemia  Diabetes mellitus: II  H/O arterial bypass of lower limb: LLE SFA-DP bypass with rGSV  S/P amputation: may 2018; right great toe  History of surgery: x7 coronary artery stents  History of surgery: right leg bypass fem-peroneal 5/2018 with amputation of right great toe    REVIEW OF SYSTEMS  Neg except as in HPI    Allergies    Goldbond (Unknown)  NEGATIVE PENICILLIN SKIN ALLERGY TESTING (Unknown)  penicillins (Anaphylaxis)  SOCIAL HISTORY:    FAMILY HISTORY:  No pertinent family history in first degree relatives    Vital Signs Last 24 Hrs  T(C): 36.6 (23 Jul 2020 23:39), Max: 37.5 (23 Jul 2020 19:27)  T(F): 97.8 (23 Jul 2020 23:39), Max: 99.5 (23 Jul 2020 19:27)  HR: 69 (23 Jul 2020 23:39) (69 - 72)  BP: 146/87 (23 Jul 2020 23:39) (142/83 - 146/87)  BP(mean): --  RR: 18 (23 Jul 2020 23:39) (18 - 18)  SpO2: 100% (23 Jul 2020 23:39) (100% - 100%)    PHYSICAL EXAM:    Constitutional: Pt AXOX3 in NAD  Respiratory: Unlabored  Cardiovascular: S1S2  Gastrointestinal: soft NT, ND  Extremities: L foot 1st digit healing amputated site, no drainage, fibrinous or eschar  Vascular: L Biphasic DP/PT  Neurological: intact motor/sensori      LABS:                        12.3   6.95  )-----------( 183      ( 23 Jul 2020 21:15 )             37.8     07-23    141  |  105  |  29<H>  ----------------------------<  129<H>  4.8   |  26  |  0.98    Ca    9.8      23 Jul 2020 21:15    RadIOLOGY & ADDITIONAL STUDIES

## 2020-07-23 NOTE — ED ADULT TRIAGE NOTE - OTHER COMPLAINTS
biba from [facility] for left foot pain, recent amputation of left big toe.  denies fever, chills. jony from [facility] for left foot pain, recent amputation of left big toe.  denies chills, cp, sob.  Per daughter appointment tommorow with dr. abrams

## 2020-07-23 NOTE — ED ADULT NURSE NOTE - OBJECTIVE STATEMENT
Patient AOX4 c/o pain to L foot with hx of L 1st toe amputation x 1 year ago. Wound c/d/i, no drainage, redness noted. Denies fevers/chill/decrease in PO intake.

## 2020-07-23 NOTE — ED PROVIDER NOTE - CARE PROVIDER_API CALL
Vicky Castellanos N  SURGERY  [address] [place] [address]  Phone: [phone]  Fax: [phone]  Follow Up Time:

## 2020-07-23 NOTE — ED ADULT NURSE NOTE - PSH
H/O arterial bypass of lower limb  LLE SFA-DP bypass with rGSV  History of surgery  x7 coronary artery stents  History of surgery  right leg bypass fem-peroneal 5/2018 with amputation of right great toe  S/P amputation  may 2018; right great toe

## 2020-07-23 NOTE — CONSULT NOTE ADULT - ASSESSMENT
85 yo with CAD, h/o DVT/PE, DM, HTN, HLD,and PAD s/p R fem-peroneal bypass and 1st toe amputation, L SFA-DP bypass with vein graft and 1st toe amputation referred to ED by Nursing Home with concerns for non healing wound and pain to LLE. Pt has appt with Dr Castellanos in tomorrow.    -Labs wnl  -foot xray no abnormalities (wet read)      -No Acute vascular intervention  -F/u w/ Dr Castellanos on Monday

## 2020-07-23 NOTE — ED PROVIDER NOTE - OBJECTIVE STATEMENT
85 yo with CAD (s/p 7 stents, on ASA/Brillinta), h/o DVT/PE (not on anticoagulation anymore), DM, HTN, HLD, glaucoma, constipation, and PAD (R fem-peroneal bypass and 1st toe amputation, L SFA-DP bypass with vein graft and 1st toe amputation) with concerns for non healing wound, weak pulses and pain to left foot. no other complaints. denies f/c, chest pain, sob, drainage. Coming from [facility].

## 2020-07-23 NOTE — ED ADULT NURSE NOTE - INTERVENTIONS DEFINITIONS
Reinforce activity limits and safety measures with patient and family/[place] to call system/Call bell, personal items and telephone within reach/Stretcher in lowest position, wheels locked, appropriate side rails in place/Provide visual cue, wrist band, yellow gown, etc./Monitor for mental status changes and reorient to person, place, and time/Review medications for side effects contributing to fall risk/Room bathroom lighting operational/Monitor gait and stability/Instruct patient to call for assistance/Physically safe environment: no spills, clutter or unnecessary equipment/Provide visual clues: red socks/Non-slip footwear when patient is off stretcher

## 2020-07-23 NOTE — ED ADULT NURSE REASSESSMENT NOTE - NS ED NURSE REASSESS COMMENT FT1
Report received from Patricia CURRAN, will assume care of pt at this time. Pt in ER room 20, assessment as noted, NAD noted.

## 2020-07-24 ENCOUNTER — APPOINTMENT (OUTPATIENT)
Dept: VASCULAR SURGERY | Facility: CLINIC | Age: 85
End: 2020-07-24
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
## 2020-07-27 DIAGNOSIS — I10 ESSENTIAL (PRIMARY) HYPERTENSION: ICD-10-CM
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
## 2020-07-27 DIAGNOSIS — E11.9 TYPE 2 DIABETES MELLITUS WITHOUT COMPLICATIONS: ICD-10-CM

## 2020-07-27 DIAGNOSIS — S91.302A UNSPECIFIED OPEN WOUND, LEFT FOOT, INITIAL ENCOUNTER: ICD-10-CM

## 2020-07-27 DIAGNOSIS — Z79.84 LONG TERM (CURRENT) USE OF ORAL HYPOGLYCEMIC DRUGS: ICD-10-CM
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
## 2020-07-27 DIAGNOSIS — E78.5 HYPERLIPIDEMIA, UNSPECIFIED: ICD-10-CM

## 2020-07-27 DIAGNOSIS — Z88.0 ALLERGY STATUS TO PENICILLIN: ICD-10-CM
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
## 2020-07-27 DIAGNOSIS — Z79.82 LONG TERM (CURRENT) USE OF ASPIRIN: ICD-10-CM

## 2020-07-27 DIAGNOSIS — M79.672 PAIN IN LEFT FOOT: ICD-10-CM

## 2020-07-27 DIAGNOSIS — Z79.899 OTHER LONG TERM (CURRENT) DRUG THERAPY: ICD-10-CM

## 2020-08-28 ENCOUNTER — APPOINTMENT (OUTPATIENT)
Dept: VASCULAR SURGERY | Facility: CLINIC | Age: 85
End: 2020-08-28

## 2020-09-11 ENCOUNTER — APPOINTMENT (OUTPATIENT)
Dept: VASCULAR SURGERY | Facility: CLINIC | Age: 85
End: 2020-09-11

## 2020-12-10 NOTE — PROGRESS NOTE ADULT - PROBLEM SELECTOR PROBLEM 8
PT SCHEDULED AND MAILED A REMINDER    Prophylactic measure Nutrition, metabolism, and development symptoms

## 2021-03-11 NOTE — PACU DISCHARGE NOTE - NSCLINEINSERTRD_GEN_ALL_CORE
Pt reports pain in teeth on left side started 3 days ago, is taking ibuprofen with no relief, taking old penicillin that he had at home. Pt states pain getting progressively worse swelling from ears, throat. No

## 2022-01-16 NOTE — CONSULT NOTE ADULT - PROBLEM SELECTOR PROBLEM 5
-- DO NOT REPLY / DO NOT REPLY ALL --  -- Message is from the [facility]--    Result Request  Type of Test / Lab: lab results  Date Test / Lab: 1/13  Location: [facility]  Test / Lab ordered/authorized by: Florencia Yun CNP    Other comments:Patient is calling because she has missed a call regarding her lab results.     Preferred Delivery Method   Call back patient with results.  Phone number:  [phone]    Caller Information       Type Contact Phone    01/16/2022 09:24 AM CST Phone (Incoming) No Tron[place]o (Self) [phone] (M)          Alternative phone number: none     Turnaround time given to caller:   \"This message will be sent to [state Provider's name]. The clinical team will fulfill your request as soon as they review your message when the office opens on Monday (or after the holiday).\"  
Spoke with pt regarding urine culture result.  Pt states her urinary symptoms are improving but she is still having soreness/tenderness to the vulvar area and it is uncomfortable to sit down.  Pt states she is taking the Valtrex that was prescribed at visit but she does not think it is helping with the burning and tenderness.  Recommended that pt f/u with gyne or PCP for further evaluation.  Pt verbalizes understanding and agrees with plan.  
Diabetes mellitus

## 2022-01-28 NOTE — ED PROVIDER NOTE - CONSTITUTIONAL NEGATIVE STATEMENT, MLM
PAST SURGICAL HISTORY:  H/O cervical incompetence abdominal cerclage 2018    H/O laparoscopy 2017    History of hysteroscopy 2017 2018    
no fever and no chills.

## 2022-02-25 NOTE — ED ADULT NURSE NOTE - BREATHING, MLM
Ongoing SW/CM Assessment/Plan of Care Note     See SW/CM flowsheets for goals and other objective data.    Patient/Family discharge goal (s):             PT Recommendation:  Recommendation for Discharge: PT WI: 24 Hour assist, Home, Home therapy (plans for pt to d/c to daughter's home; family working on arranging 24 hour support)       OT Recommendation:  Recommendations for Discharge: OT WI: Home, Home therapy, 24 Hour assist       SLP Recommendation:       Disposition:  Rehab versus Home    Progress note:   Discharge Planning.  Aware physiatrist was consulted to see if inpatient rehab is an option for pt.  Aware per rehab coordinator's note from today that physiatry awaiting further therapy assessment before evaluating.  See note from today.  SW received call from pt's RN care manager with [facility], Kylie, at phone: [phone].  Updated by Kylie.  She is asking that when pt return home that medication management be added to the home care orders ([facility]).   following for home care arrangements.  Informed her of this request.  Aware pt asking for a two wheeled walker.  PT stating can issue walker if pt's four wheeled walker was not issued by insurance within last five years.  SW to follow up on outcome of physiatry consult.  Will update [facility] once more is known.        Spontaneous, unlabored and symmetrical

## 2022-09-20 NOTE — PATIENT PROFILE ADULT - NSASFALLATTEMPTOOB_GEN_A_NUR
GENERAL SURGERY CONSULT    Patient: Dimitri Mock Date of Service: 2022   : 1956 MRN: [id number]   Admission Date:  2022    SUBJECTIVE:   CHIEF COMPLAINT:   Chief Complaint   Patient presents with   • Abdominal Pain     PCP: Johnnie Gusman MD    HISTORY OF PRESENT ILLNESS:  Dimitri Mock is a 66 year old male admitted 2022 who is being seen in consultation regarding RLQ abdominal pain and abnormal CT.  Patient notes RLQ abdominal pain for some months.  He had 1 episode of vomiting last week but was otherwise tolerating diet.  Denies change in bowel movements.  Never had a colonoscopy.      PAST MEDICAL HISTORY:  History reviewed. No pertinent past medical history.    PAST SURGICAL HISTORY:  History reviewed. No pertinent surgical history.    FAMILY HISTORY:  History reviewed. No pertinent family history.    Social History     Tobacco Use   • Smoking status: Never Smoker   • Smokeless tobacco: Never Used   Substance Use Topics   • Alcohol use: Not Currently   • Drug use: Not Currently     OBJECTIVE:     Visit Vitals  BP (!) 147/87 (BP Location: LUE - Left upper extremity, Patient Position: Supine)   Pulse 74   Temp 99.3 °F (37.4 °C) (Oral)   Resp 18   Ht 5' 7\" (1.702 m)   Wt 92.1 kg (203 lb 0.7 oz)   SpO2 99%   BMI 31.80 kg/m²     APPEARANCE:  alert, polite and cooperative, in no acute distress  HEAD:  normocephalic, atraumatic  EYES:  conjunctivae normal  LUNGS: Respirations regular and non-labored.    ABDOMEN: Rounded, rectus diastasis, soft, +RLQ tenderness, no rigidity  NEURO: Cranial nerves and motor function are grossly intact  PSYCH:  pleasant  SKIN:  No lesions noted    DIAGNOSTIC STUDIES:     Lab Results   Component Value Date    WBC 11.8 (H) 2022    HGB 15.6 2022    HCT 46.6 2022     2022       Lab Results   Component Value Date    SODIUM 134 (L) 2022    POTASSIUM 3.5 2022    CHLORIDE 99 2022    CO2 23 2022    BUN 8  09/20/2022    CREATININE 0.83 09/20/2022    GLUCOSE 124 (H) 09/20/2022    MG 1.9 09/20/2022    AST 31 09/19/2022    ALKPT 110 09/19/2022    BILIRUBIN 0.8 09/19/2022    CALCIUM 8.7 09/20/2022    LIPA 71 (L) 09/19/2022     ASSESSMENT AND PLAN:   Dimitri Mock is a 66 year old male being seen in consultation regarding RLQ abdominal pain    IMPRESSION:  RLQ phlegmon - diverticulitis vs. appendicitis with abscess.  Possible terminal ileitis/colitis  CT findings of ileus  Hiatal hernia  Hypertension  Leukocytosis - downtrending    PMHx:  No known, smoker    RECOMMENDATIONS / PLAN:  VSS.  ABX and IR evaluation.  Can have diet from surgical prospective.  No acute surgical intervention at this time.    The patient indicated understanding of the diagnosis and agreed with the plan of care.    Attending Physician: Dr Szymanski   no

## 2022-09-26 NOTE — PROGRESS NOTE ADULT - I WAS PHYSICALLY PRESENT FOR THE KEY PORTIONS OF THE EVALUATION AND MANAGEMENT (E/M) SERVICE PROVIDED.  I AGREE WITH THE ABOVE HISTORY, PHYSICAL, AND PLAN WHICH I HAVE REVIEWED AND EDITED WHERE APPROPRIATE
Statement Selected
The patient is a 39y Female complaining of hypoglycemia.
Statement Selected

## 2022-10-24 NOTE — CONSULT NOTE ADULT - MUSCULOSKELETAL
Wait 10 minutes in between glaucoma medications and ATs. details… No joint pain, swelling or deformity; no limitation of movement

## 2023-10-25 NOTE — PATIENT PROFILE ADULT - NSPROSPHOSPCHAPLAINYN_GEN_A_NUR
Nurses Note -- 4 Eyes      10/24/2023   2030      Skin assessed during: Admit      [] No Altered Skin Integrity Present    []Prevention Measures Documented      [x] Yes- Altered Skin Integrity Present or Discovered   [] LDA Added if Not in Epic (Describe Wound)   [] New Altered Skin Integrity was Present on Admit and Documented in LDA   [] Wound Image Taken    Wound Care Consulted? No, Inpatient consult to Plastic Surgery in place for wound infection    Attending Nurse:  Alli Yi RN/Staff Member:   SILVIA Hobbs          no

## 2024-01-07 NOTE — PATIENT PROFILE ADULT - IS THERE A SUSPICION OF ABUSE/NEGLIGENCE?
White blood cell count is normal     she is a little bit borderline anemic at 11.3 and should be above 12.0    she can take a vitamin with iron daily.     Blood sugar, kidney and liver function are normal     cholesterol is normal    at 181      triglycerides are normal at 84     thyroid function is normal       blood work looks stable we will just monitor the anemia and take a vitamin with iron.  
no

## 2024-03-17 NOTE — PROGRESS NOTE ADULT - SUBJECTIVE AND OBJECTIVE BOX
Interval Events: Reviewed  Patient seen and examined at bedside.    Patient is a 84y old  Female who presents with a chief complaint of b/l great toe ulcers and pain (29 May 2018 08:06)  She out abetting the chair feeling better and the pain decreased    PAST MEDICAL & SURGICAL HISTORY:  Peripheral vascular disease  Ulcer of lower limb  Arthropathy  Ischemic heart disease  Hypertension  Hyperlipidemia  Diabetes mellitus      MEDICATIONS:  Pulmonary:    Antimicrobials:    Anticoagulants:  apixaban 5 milliGRAM(s) Oral every 12 hours  aspirin enteric coated 81 milliGRAM(s) Oral daily    Cardiac:  amLODIPine   Tablet 10 milliGRAM(s) Oral daily  metoprolol succinate ER 25 milliGRAM(s) Oral daily      Allergies    penicillins (Anaphylaxis)    Intolerances        Vital Signs Last 24 Hrs  T(C): 36.9 (04 Jun 2018 22:10), Max: 36.9 (04 Jun 2018 22:10)  T(F): 98.5 (04 Jun 2018 22:10), Max: 98.5 (04 Jun 2018 22:10)  HR: 69 (04 Jun 2018 20:13) (52 - 69)  BP: 143/61 (04 Jun 2018 20:13) (92/42 - 174/70)  BP(mean): --  RR: 17 (04 Jun 2018 20:13) (14 - 17)  SpO2: 100% (04 Jun 2018 20:13) (95% - 100%)    06-03 @ 07:01 - 06-04 @ 07:00  --------------------------------------------------------  IN: 420 mL / OUT: 400 mL / NET: 20 mL    06-04 @ 07:01 - 06-04 @ 22:19  --------------------------------------------------------  IN: 420 mL / OUT: 1 mL / NET: 419 mL          LABS:      CBC Full  -  ( 03 Jun 2018 12:20 )  WBC Count : 7.7 K/uL  Hemoglobin : 10.7 g/dL  Hematocrit : 32.7 %  Platelet Count - Automated : 360 K/uL  Mean Cell Volume : 89.3 fL  Mean Cell Hemoglobin : 29.2 pg  Mean Cell Hemoglobin Concentration : 32.7 g/dL  Auto Neutrophil # : x  Auto Lymphocyte # : x  Auto Monocyte # : x  Auto Eosinophil # : x  Auto Basophil # : x  Auto Neutrophil % : x  Auto Lymphocyte % : x  Auto Monocyte % : x  Auto Eosinophil % : x  Auto Basophil % : x    06-03    139  |  101  |  25<H>  ----------------------------<  160<H>  4.0   |  28  |  0.88    Ca    9.3      03 Jun 2018 12:20  Phos  4.1     06-03  Mg     2.2     06-03                          RADIOLOGY & ADDITIONAL STUDIES (The following images were personally reviewed):  Vivar:                                     No  Urine output:                       adequate  DVT prophylaxis:                 Yes  Flattus:                                  Yes  Bowel movement:              No No

## 2024-04-16 NOTE — PRE-OP CHECKLIST - HEIGHT IN FEET
Take medicines as prescribed    See your family doctor in one to 2 days for further evaluation, workup, and treatment as necessary    Avoid driving or operating machinery while taking medicines as some medicines might cause drowsiness and may cause problems. Also pain medicines have potential of being addictive  so use Pain meds specially Narcotics Sparingly.    The exam and treatment you received in Emergency Room was for an urgent problem and NOT INTENDED AS COMPLETE CARE. It is important that you FOLLOW UP with a doctor for ongoing care. If your symptoms become WORSE or you DO NOT IMPROVE and you are unable to reach your health care provider, you should RETURN to the emergency department. The Emergency Room doctor has provided a PRELIMINARY INTERPRETATION of all your STUDIES. A final interpretation may be done after you are discharged. IF A CHANGE in your diagnosis or treatment is needed WE WILL CONTACT YOU. It is critical that we have a CURRENT PHONE NUMBER FOR YOU.      5

## 2024-09-17 NOTE — PATIENT PROFILE ADULT - BRADEN SCORE
We care about your health; the following recommendations(s) have been made today:    Testing ordered today: Pulmonary Function Testing Instructions    Patient Name: Khadra Luque   Date: ___________ Time: _____________      What you need to do before your appointment:  Wear loose fitting clothing so that you may breathe comfortably.  Eat a light meal before testing.   Do not smoke/vape or drink alcohol the day of the test.  Avoid vigorous exercise within 30 minutes of testing.    Continue to take all medications EXCEPT those listed below, unless you feel your condition will worsen and you will be unable to complete the testing.     Please do NOT take 6 hours prior to test   Albuterol Proair Ventolin Proventil   Atrovent Xopenex Combivent Duoneb       Please do NOT take 12 hours prior to test   Advair Dulera Flovent Pulmicort   Qvar Serevent Symbicort Wixela       Please do NOT take 24 hours prior to test   Anoro Breo Brovana Incruse   Singulair Spiriva Theophylline Trelegy     NO Prednisone 14 days prior to lung test, UNLESS Prednisone is part of a long-term medication treatment plan.    To schedule call [facility] at [phone]   Please call our office if you have questions.     What: LUNG TEST   Where: IMAGING DEPARTMENT - main floor     [facility]   [id number] [place] [address]     Please follow up with the above recommendation within the next couple of days, if you have any further questions or concerns, please contact our office at Dr. Vasquez:    [facility] at [phone]      Thank you for choosing this clinic for your medical care.     Pulmonary and Sleep Medicine  Formerly named [facility]   
19

## 2024-11-15 NOTE — PATIENT PROFILE ADULT. - PAIN SCALE PREFERRED, PROFILE
Phoned pt's son Jus and introduced myself as an oncology nurse navigator and explained the services a nurse navigator can provide. I can help to answer questions and give information to help make decisions and assist with resources they may need.  We discussed the visit he and his mom had with Dr. Lewis. Ms Bangura is newly dx left breast ca. She has a past history of left breast cancer in the late 90s. Ms. Bangura's dementia is fairly advanced and she was unable to participate in the conversation with Dr. Lewis. Jus and his wife Abby were the historians and represented her at the visit.   She is currently scheduled for a bone scan and CT abd/pel and Jus and his wife Abby are not sure if these tests are necessary, as they do not think  will undergo any surgery or treatment. Jus states he just wants his mom to be comfortable and that would be her wish.  He has a call to Dr. Lewis's office to discuss this further. I stated the appointment with Dr. Marks will be helpful to his mom if her wishes are for comfort measures, Dr. Marks could help to facilitate with pt's currently memory care unit. Jus appreciated the information and has my contact number to reach out with any other questions or concerns and plans to keep the appt with Dr. Marks scheduled on  11/25 @ 10:00.  
numerical 0-10

## 2025-02-06 NOTE — PROGRESS NOTE ADULT - PROBLEM SELECTOR PLAN 1
Continue Cipro, vanco are off wound care. The patient is stable postoperatively would decrease the pain at the right foot.
Result Communication    Resulted Orders   Immunoglobulin IgE   Result Value Ref Range    IMMUNOGLOBULIN E 1,793 (H) <OR=93 kU/L   [place] IgE   Result Value Ref Range    ALMOND (F20) IGE 17.00 (H) kU/L    CLASS 3    Cashew IgE   Result Value Ref Range    CASHEW NUT (F202) IGE >100 (H) kU/L    CLASS 6    [place]hew Nut Component RAna o 3   Result Value Ref Range    Chelsy o3 (f443) >100 (H) <0.10 kU/L      Comment:      Positive cashew component IgE results may be   clinically significant even if quantification levels   (kU/L) are low. IgE reactivity to cashew component   Chelsy o 3 is associated with systemic allergic  reactions, which can be severe. Patients with a history  of true cashew allergy are often allergic to other  tree nuts, and in particular, pistachio. Additional  information can be found at [email or web address].        Pistachio IgE   Result Value Ref Range    PISTACHIO (F203) IGE >100 (H) kU/L    CLASS 6    Hazelnut Component Panel   Result Value Ref Range    Cor a1 (f428) <0.10 <0.10 kU/L    Cor a8 (f425) 0.14 (H) <0.10 kU/L    Cor a9 (f440) 41.4 (H) <0.10 kU/L    Cor a14 (f439) 0.49 (H) <0.10 kU/L      Comment:      Positive hazelnut component IgE results may be   clinically significant even if quantification levels   (kU/L) are low. IgE reactivity to hazelnut components   Cor a 9 and Cor a 14 are most associated with allergic  reactions, which may be systemic and severe. IgE  reactivity to hazelnut component Cor a 1 alone has been  associated primarily with mild local reactions and may  be seen in the clinical setting of tree pollen allergy  and oral allergy syndrome. IgE reactivity to hazelnut  component Cor a 8 has been associated with both systemic  allergic reactions and milder oropharyngeal reactions,  as well as IgE reactivity to other lipid transfer   proteins found in stone fruits. Patients with a history  of true hazelnut allergy are often allergic to other  tree nuts. Additional information can be 
"found at   [email or web address]        Hazelnut IgE   Result Value Ref Range    HAZELNUT (F17) IGE 25.20 (H) kU/L    CLASS 4    [place] IgE   Result Value Ref Range    WALNUT (F256) IGE 54.40 (H) kU/L    CLASS 5    [place] Component Panel   Result Value Ref Range    rJug r1 (f441) 35.8 (H) <0.10 kU/L    rJug r3 (f442) 0.36 (H) <0.10 kU/L      Comment:      Positive walnut component IgE results may be   clinically significant even if quantification levels   (kU/L) are low. IgE reactivity to walnut component   Jug r 1 is most associated with systemic allergic  reactions, which can be severe. IgE reactivity to  walnut component Jug r 3 has been associated with both  systemic allergic reactions, as well as milder local  reactions. IgE reactivity to Jug r 3 has also been  associated with IgE reactivity to other lipid transfer  proteins found in pollens, stone fruits and other nuts.    Patients with a history of true walnut allergy are  often allergic to other tree nuts, and in particular,   pecan. Additional information can be found at   [email or web address]        Pecan, Nut IgE   Result Value Ref Range    PECAN NUT (F201) IGE 1.32 (H) kU/L    CLASS 2    [place] IgE   Result Value Ref Range    PINE NUT (F253) IGE 2.80 (H) kU/L    CLASS 2       Comment:      INTERPRETATION    SPECIFIC                  LEVEL OF ALLERGEN     IGE CLASS     kU/L      SPECIFIC IGE ANTIBODY    --------   ---------    ----------------       0           <0.10     ABSENT/UNDETECTABLE       0/1     0.10-0.34     VERY LOW LEVEL       1       0.35-0.69     LOW LEVEL       2       0.70-3.49     MODERATE LEVEL       3       3.50-17.4     HIGH LEVEL       4       17.5-49.9     VERY HIGH LEVEL       5               VERY HIGH LEVEL       6       >100          VERY HIGH LEVEL     The clinical relevance of allergen results of 0.10-0.34 kU/L  are undetermined and intended for specialist use.     Allergens denoted with a \"**\" include results using one "
or  more analyte specific reagents. In those cases, the test was  developed and its analytical performance characteristics  have been determined by [employer]. It has not been  cleared or approved by the U.S. Food and Drug  Administration. This assay has been validated pursuant to  the CLIA regulations and is used fo r clinical purposes.     [place] Nut IgE   Result Value Ref Range    BRAZIL NUT (F18) IGE 16.10 (H) kU/L    CLASS 3    [place] Nut Component Panel   Result Value Ref Range    [place] e1 (f354) 2.89 (H) <0.10 kU/L      Comment:      Positive [place] nut component IgE results may be   clinically significant even if quantification levels   (kU/L) are low. IgE reactivity to [place] nut component   [place]oses e 1 is associated with systemic allergic reactions,   which can be severe. Patients with a history of true   [place] nut allergy are often allergic to other  tree nuts. Additional information can be found at   [email or web address]        Macadamia nut IgE   Result Value Ref Range    MACADAMIA NUT () IGE 19.30 (H) kU/L    CLASS 4    Cod IgE   Result Value Ref Range    CODFISH (F3) IGE 8.39 (H) kU/L    CLASS 3    Tuna IgE   Result Value Ref Range    TUNA (F40) IGE 2.57 (H) kU/L    CLASS 2    [place] IgE   Result Value Ref Range    SALMON (F41) IGE 7.61 (H) kU/L    CLASS 3    Allergen Interpretation   Result Value Ref Range    Allergen Interpretation        Comment:         Specific                        Level of Allergen  IGE Class      kU/L             Specific IGE Antibody   -----         ---------        -------------------    0              <0.10           Absent/Undetectable    0/1        0.10-0.34           Very Low Level    1          0.35-0.69           Low Level    2          0.70-3.49           Moderate Level    3          3.50-17.4           High Level    4          17.5-49.9           Very High Level    5                        Very High Level    6              >100            Very High 
"Level     The clinical relevance of allergen results of  0.10-0.34 kU/L are undetermined and intended for   specialist use.     Allergens denoted with a \"**\" include results using  one or more analyte specific reagents. In those  cases, the test was developed and its analytical  performance characteristics have been determined by  [employer]. It has not been cleared or approved  by the U.S. Food and Drug Administration. This assay   has been v alidated pursuant to the CLIA regulations   and is used for clinical purposes.         9:24 AM      Results were successfully communicated with the mother and they acknowledged their understanding. Updated FARE plan sent via email.    "
remote MI, Nuc stress reveals moderated sized, reversible inferior and inferolateral defect with summed stress score of 5 c/w a mild to moderate defect.  We have increased her meds
Continue Cipro, vanco, and wound care. The patient is stable postoperatively would decrease the pain at the right foot.  Vancomycin level if therapeutic
Cipro, vanco were discontinued follow on wound care. The patient is stable postoperatively.  Decrease the pain at the right foot.
Continue Cipro and wound care. The patient is stable postoperatively would decrease the pain at the right foot
Continue vancomycin and wound care. Patient awaiting cardiac clearance for surgery
Continue Cipro, vanco, and wound care. The patient is stable postoperatively would decrease the pain at the right foot.  Vancomycin level if therapeutic
Cipro, vanco were discontinued follow on wound care. The patient is stable postoperatively.  Decrease the pain at the right foot.
Continue Cipro, vanco, and wound care. The patient is stable postoperatively would decrease the pain at the right foot.  Vancomycin level if therapeutic